# Patient Record
Sex: MALE | Race: WHITE | Employment: OTHER | URBAN - METROPOLITAN AREA
[De-identification: names, ages, dates, MRNs, and addresses within clinical notes are randomized per-mention and may not be internally consistent; named-entity substitution may affect disease eponyms.]

---

## 2017-07-25 PROBLEM — M54.17 RADICULOPATHY OF LUMBOSACRAL REGION: Status: ACTIVE | Noted: 2017-07-25

## 2017-11-02 PROBLEM — Z78.9 STATIN INTOLERANCE: Status: ACTIVE | Noted: 2017-11-02

## 2018-03-14 ENCOUNTER — ANTI-COAG VISIT (OUTPATIENT)
Dept: FAMILY MEDICINE CLINIC | Age: 63
End: 2018-03-14

## 2018-03-14 ENCOUNTER — NURSE ONLY (OUTPATIENT)
Dept: FAMILY MEDICINE CLINIC | Age: 63
End: 2018-03-14

## 2018-03-14 DIAGNOSIS — Z51.81 ENCOUNTER FOR MONITORING COUMADIN THERAPY: Primary | ICD-10-CM

## 2018-03-14 DIAGNOSIS — Z79.01 ENCOUNTER FOR MONITORING COUMADIN THERAPY: Primary | ICD-10-CM

## 2018-03-14 LAB
INTERNATIONAL NORMALIZATION RATIO, POC: 2.4
INTERNATIONAL NORMALIZATION RATIO, POC: 2.4
PROTHROMBIN TIME, POC: 29.1

## 2018-03-14 PROCEDURE — 85610 PROTHROMBIN TIME: CPT | Performed by: FAMILY MEDICINE

## 2018-03-14 PROCEDURE — 93793 ANTICOAG MGMT PT WARFARIN: CPT | Performed by: FAMILY MEDICINE

## 2018-04-11 ENCOUNTER — ANTI-COAG VISIT (OUTPATIENT)
Dept: FAMILY MEDICINE CLINIC | Age: 63
End: 2018-04-11

## 2018-04-11 ENCOUNTER — NURSE ONLY (OUTPATIENT)
Dept: FAMILY MEDICINE CLINIC | Age: 63
End: 2018-04-11

## 2018-04-11 DIAGNOSIS — Z79.01 ENCOUNTER FOR MONITORING COUMADIN THERAPY: Primary | ICD-10-CM

## 2018-04-11 DIAGNOSIS — Z79.01 ANTICOAGULATED ON COUMADIN: ICD-10-CM

## 2018-04-11 DIAGNOSIS — Z51.81 ENCOUNTER FOR MONITORING COUMADIN THERAPY: Primary | ICD-10-CM

## 2018-04-11 DIAGNOSIS — I48.0 PAROXYSMAL ATRIAL FIBRILLATION (HCC): ICD-10-CM

## 2018-04-11 LAB
INTERNATIONAL NORMALIZATION RATIO, POC: 2.3
INTERNATIONAL NORMALIZATION RATIO, POC: 2.3
PROTHROMBIN TIME, POC: 28

## 2018-04-11 PROCEDURE — 93793 ANTICOAG MGMT PT WARFARIN: CPT | Performed by: FAMILY MEDICINE

## 2018-04-11 PROCEDURE — 85610 PROTHROMBIN TIME: CPT | Performed by: FAMILY MEDICINE

## 2018-04-26 ENCOUNTER — OFFICE VISIT (OUTPATIENT)
Dept: FAMILY MEDICINE CLINIC | Age: 63
End: 2018-04-26

## 2018-04-26 ENCOUNTER — ANTI-COAG VISIT (OUTPATIENT)
Dept: FAMILY MEDICINE CLINIC | Age: 63
End: 2018-04-26

## 2018-04-26 VITALS
DIASTOLIC BLOOD PRESSURE: 80 MMHG | HEART RATE: 90 BPM | TEMPERATURE: 98.1 F | SYSTOLIC BLOOD PRESSURE: 138 MMHG | BODY MASS INDEX: 43.81 KG/M2 | OXYGEN SATURATION: 96 % | WEIGHT: 315 LBS

## 2018-04-26 DIAGNOSIS — I10 ESSENTIAL HYPERTENSION: ICD-10-CM

## 2018-04-26 DIAGNOSIS — I48.0 PAROXYSMAL ATRIAL FIBRILLATION (HCC): ICD-10-CM

## 2018-04-26 LAB
HBA1C MFR BLD: 9.3 %
INTERNATIONAL NORMALIZATION RATIO, POC: 2.2
INTERNATIONAL NORMALIZATION RATIO, POC: 2.2
PROTHROMBIN TIME, POC: 26.9

## 2018-04-26 PROCEDURE — 99214 OFFICE O/P EST MOD 30 MIN: CPT | Performed by: FAMILY MEDICINE

## 2018-04-26 PROCEDURE — 93793 ANTICOAG MGMT PT WARFARIN: CPT | Performed by: FAMILY MEDICINE

## 2018-04-26 PROCEDURE — 85610 PROTHROMBIN TIME: CPT | Performed by: FAMILY MEDICINE

## 2018-04-26 PROCEDURE — 83036 HEMOGLOBIN GLYCOSYLATED A1C: CPT | Performed by: FAMILY MEDICINE

## 2018-05-23 ENCOUNTER — NURSE ONLY (OUTPATIENT)
Dept: FAMILY MEDICINE CLINIC | Age: 63
End: 2018-05-23

## 2018-05-23 ENCOUNTER — ANTI-COAG VISIT (OUTPATIENT)
Dept: FAMILY MEDICINE CLINIC | Age: 63
End: 2018-05-23

## 2018-05-23 DIAGNOSIS — Z51.81 ENCOUNTER FOR MONITORING COUMADIN THERAPY: Primary | ICD-10-CM

## 2018-05-23 DIAGNOSIS — Z79.01 ENCOUNTER FOR MONITORING COUMADIN THERAPY: Primary | ICD-10-CM

## 2018-05-23 LAB
INTERNATIONAL NORMALIZATION RATIO, POC: 2.2
INTERNATIONAL NORMALIZATION RATIO, POC: 2.2
PROTHROMBIN TIME, POC: 26.7

## 2018-05-23 PROCEDURE — 85610 PROTHROMBIN TIME: CPT | Performed by: FAMILY MEDICINE

## 2018-05-23 PROCEDURE — 93793 ANTICOAG MGMT PT WARFARIN: CPT | Performed by: FAMILY MEDICINE

## 2018-07-05 ENCOUNTER — NURSE ONLY (OUTPATIENT)
Dept: FAMILY MEDICINE CLINIC | Age: 63
End: 2018-07-05

## 2018-07-05 ENCOUNTER — ANTI-COAG VISIT (OUTPATIENT)
Dept: FAMILY MEDICINE CLINIC | Age: 63
End: 2018-07-05

## 2018-07-05 DIAGNOSIS — I48.0 PAROXYSMAL ATRIAL FIBRILLATION (HCC): ICD-10-CM

## 2018-07-05 LAB
INTERNATIONAL NORMALIZATION RATIO, POC: 2
INTERNATIONAL NORMALIZATION RATIO, POC: 2
PROTHROMBIN TIME, POC: 24

## 2018-07-05 PROCEDURE — 85610 PROTHROMBIN TIME: CPT | Performed by: FAMILY MEDICINE

## 2018-07-05 PROCEDURE — 93793 ANTICOAG MGMT PT WARFARIN: CPT | Performed by: FAMILY MEDICINE

## 2018-07-11 DIAGNOSIS — I10 ESSENTIAL HYPERTENSION: ICD-10-CM

## 2018-07-12 RX ORDER — HYDROCHLOROTHIAZIDE 25 MG/1
25 TABLET ORAL DAILY
Qty: 90 TABLET | Refills: 1 | Status: SHIPPED | OUTPATIENT
Start: 2018-07-12 | End: 2018-12-13 | Stop reason: SDUPTHER

## 2018-08-09 ENCOUNTER — TELEPHONE (OUTPATIENT)
Dept: FAMILY MEDICINE CLINIC | Age: 63
End: 2018-08-09

## 2018-08-13 ENCOUNTER — ANTI-COAG VISIT (OUTPATIENT)
Dept: FAMILY MEDICINE CLINIC | Age: 63
End: 2018-08-13

## 2018-08-13 ENCOUNTER — OFFICE VISIT (OUTPATIENT)
Dept: FAMILY MEDICINE CLINIC | Age: 63
End: 2018-08-13

## 2018-08-13 ENCOUNTER — HOSPITAL ENCOUNTER (OUTPATIENT)
Age: 63
Discharge: HOME | End: 2018-08-15

## 2018-08-13 VITALS
HEART RATE: 66 BPM | WEIGHT: 315 LBS | SYSTOLIC BLOOD PRESSURE: 128 MMHG | TEMPERATURE: 97.8 F | OXYGEN SATURATION: 95 % | DIASTOLIC BLOOD PRESSURE: 78 MMHG | BODY MASS INDEX: 43.26 KG/M2

## 2018-08-13 DIAGNOSIS — L98.9 SKIN LESION: ICD-10-CM

## 2018-08-13 DIAGNOSIS — E78.2 MIXED HYPERLIPIDEMIA: Chronic | ICD-10-CM

## 2018-08-13 DIAGNOSIS — I10 ESSENTIAL HYPERTENSION: ICD-10-CM

## 2018-08-13 DIAGNOSIS — I48.0 PAROXYSMAL ATRIAL FIBRILLATION (HCC): ICD-10-CM

## 2018-08-13 LAB
ALBUMIN SERPL-MCNC: 4.1 G/DL (ref 3.5–5.2)
ALP BLD-CCNC: 101 U/L (ref 40–129)
ALT SERPL-CCNC: 22 U/L (ref 0–40)
ANION GAP SERPL CALCULATED.3IONS-SCNC: 19 MMOL/L (ref 7–16)
AST SERPL-CCNC: 30 U/L (ref 0–39)
BASOPHILS ABSOLUTE: 0.06 E9/L (ref 0–0.2)
BASOPHILS RELATIVE PERCENT: 0.8 % (ref 0–2)
BILIRUB SERPL-MCNC: 0.5 MG/DL (ref 0–1.2)
BUN BLDV-MCNC: 21 MG/DL (ref 8–23)
CALCIUM SERPL-MCNC: 9.1 MG/DL (ref 8.6–10.2)
CHLORIDE BLD-SCNC: 101 MMOL/L (ref 98–107)
CHOLESTEROL, TOTAL: 188 MG/DL (ref 0–199)
CO2: 22 MMOL/L (ref 22–29)
CREAT SERPL-MCNC: 1 MG/DL (ref 0.7–1.2)
CREATININE URINE: 221 MG/DL (ref 40–278)
EOSINOPHILS ABSOLUTE: 0.18 E9/L (ref 0.05–0.5)
EOSINOPHILS RELATIVE PERCENT: 2.5 % (ref 0–6)
GFR AFRICAN AMERICAN: >60
GFR NON-AFRICAN AMERICAN: >60 ML/MIN/1.73
GLUCOSE BLD-MCNC: 187 MG/DL (ref 74–109)
HBA1C MFR BLD: 8.6 %
HCT VFR BLD CALC: 48.8 % (ref 37–54)
HDLC SERPL-MCNC: 35 MG/DL
HEMOGLOBIN: 15.8 G/DL (ref 12.5–16.5)
IMMATURE GRANULOCYTES #: 0.04 E9/L
IMMATURE GRANULOCYTES %: 0.6 % (ref 0–5)
INTERNATIONAL NORMALIZATION RATIO, POC: 2.5
INTERNATIONAL NORMALIZATION RATIO, POC: 2.5
LDL CHOLESTEROL CALCULATED: 114 MG/DL (ref 0–99)
LYMPHOCYTES ABSOLUTE: 1.63 E9/L (ref 1.5–4)
LYMPHOCYTES RELATIVE PERCENT: 22.7 % (ref 20–42)
MCH RBC QN AUTO: 27.4 PG (ref 26–35)
MCHC RBC AUTO-ENTMCNC: 32.4 % (ref 32–34.5)
MCV RBC AUTO: 84.7 FL (ref 80–99.9)
MICROALBUMIN UR-MCNC: 30.2 MG/L
MICROALBUMIN/CREAT UR-RTO: 13.7 (ref 0–30)
MONOCYTES ABSOLUTE: 0.71 E9/L (ref 0.1–0.95)
MONOCYTES RELATIVE PERCENT: 9.9 % (ref 2–12)
NEUTROPHILS ABSOLUTE: 4.55 E9/L (ref 1.8–7.3)
NEUTROPHILS RELATIVE PERCENT: 63.5 % (ref 43–80)
PDW BLD-RTO: 15.9 FL (ref 11.5–15)
PLATELET # BLD: 253 E9/L (ref 130–450)
PMV BLD AUTO: 11.4 FL (ref 7–12)
POTASSIUM SERPL-SCNC: 4 MMOL/L (ref 3.5–5)
PROTHROMBIN TIME, POC: 29.4
RBC # BLD: 5.76 E12/L (ref 3.8–5.8)
SODIUM BLD-SCNC: 142 MMOL/L (ref 132–146)
TOTAL PROTEIN: 7.9 G/DL (ref 6.4–8.3)
TRIGL SERPL-MCNC: 193 MG/DL (ref 0–149)
VLDLC SERPL CALC-MCNC: 39 MG/DL
WBC # BLD: 7.2 E9/L (ref 4.5–11.5)

## 2018-08-13 PROCEDURE — 85610 PROTHROMBIN TIME: CPT | Performed by: FAMILY MEDICINE

## 2018-08-13 PROCEDURE — 83036 HEMOGLOBIN GLYCOSYLATED A1C: CPT | Performed by: FAMILY MEDICINE

## 2018-08-13 PROCEDURE — 80053 COMPREHEN METABOLIC PANEL: CPT

## 2018-08-13 PROCEDURE — 85025 COMPLETE CBC W/AUTO DIFF WBC: CPT

## 2018-08-13 PROCEDURE — 82570 ASSAY OF URINE CREATININE: CPT

## 2018-08-13 PROCEDURE — 82044 UR ALBUMIN SEMIQUANTITATIVE: CPT

## 2018-08-13 PROCEDURE — 93793 ANTICOAG MGMT PT WARFARIN: CPT | Performed by: FAMILY MEDICINE

## 2018-08-13 PROCEDURE — 36415 COLL VENOUS BLD VENIPUNCTURE: CPT | Performed by: FAMILY MEDICINE

## 2018-08-13 PROCEDURE — 99214 OFFICE O/P EST MOD 30 MIN: CPT | Performed by: FAMILY MEDICINE

## 2018-08-13 PROCEDURE — 80061 LIPID PANEL: CPT

## 2018-08-13 ASSESSMENT — PATIENT HEALTH QUESTIONNAIRE - PHQ9
SUM OF ALL RESPONSES TO PHQ QUESTIONS 1-9: 0
SUM OF ALL RESPONSES TO PHQ QUESTIONS 1-9: 0
SUM OF ALL RESPONSES TO PHQ9 QUESTIONS 1 & 2: 0
2. FEELING DOWN, DEPRESSED OR HOPELESS: 0
1. LITTLE INTEREST OR PLEASURE IN DOING THINGS: 0

## 2018-08-13 NOTE — PROGRESS NOTES
[facility]  Office Progress Note - Dr. Cora Arreaga  8/13/18    Chief Complaint   Patient presents with    Diabetes        S:   Diabetes mellitus  Follow-up - worsening recently. Was regularly forgetting evening 70/30 insulin dose. He did well for a while after our last appt but more recently has bene forgetting or hasnt been eating at home a lot. Has a statin intolerance and has tried a couple different kinds. Lab Results   Component Value Date    LABA1C 8.6 08/13/2018    LABA1C 9.3 04/26/2018    LABA1C 8.7 12/29/2017     Lab Results   Component Value Date    LABMICR 33.7 (H) 05/22/2017    LDLCALC 106 (H) 05/22/2017    CREATININE 0.9 05/22/2017     Wt Readings from Last 3 Encounters:   08/13/18 (!) 319 lb (144.7 kg)   04/26/18 (!) 323 lb (146.5 kg)   12/29/17 (!) 332 lb (150.6 kg)   Weight down a couple more pounds - good progress. Patient continues diabetic medication regimen. Compliance is good. No side effects of medications noted. Diabetes is not adequately controlled. Peripheral neuropathy is present. Regular foot exams encouraged. Vision changes are not present. Yearly eye exam encouraged. Strongly. Hypertension  Follow-up - has been borderline controlled recently. Blood pressure is controlled today. Better. BP Readings from Last 3 Encounters:   08/13/18 128/78   04/26/18 138/80   12/29/17 136/80     Patient continues medications regularly. Compliance is good. Denies CP, sob, abd pain, headaches, vision changes, dizziness, hypotensive symptoms. No side effects from medications noted.'    Hyperlipidemia  Follow-up  Unable to tolerate statins. Discussed RYR as a less effective alternative, though not good evidence.      Lab Results   Component Value Date    CHOL 175 05/22/2017    CHOL 167 01/13/2016    CHOL 159 03/24/2012     Lab Results   Component Value Date    TRIG 153 (H) 05/22/2017    TRIG 218 (H) 01/13/2016    TRIG 157 (H) 03/24/2012     Lab Results   Component person, place, and time. He appears well-developed and well-nourished. HENT:    Nose: Nose normal.    Mouth/Throat: Oropharynx is clear and moist.   Eyes:    Conjunctivae are normal.    Pupils are equal, round, and reactive to light. EOMI. Neck:    Normal range of motion. No thyromegaly or nodules noted. No bruit. Cardiovascular:    Normal rate, irregularly irreg rhythm and normal heart sounds. No murmur. No gallop and no friction rub. Pulmonary/Chest:    Effort normal and breath sounds normal.    No wheezes. No rales or rhonchi. Abdominal:    Soft. Obese. Bowel sounds are normal.    No distension. No tenderness. Musculoskeletal:    Normal range of motion. No joint swelling noted. +1 mid calf peripheral edema. Skin:    Skin is warm and dry. No rashes. Skin lesion at the right temple area about 5mm, scabbed. Psychiatric:    He has a normal mood and affect. Normal groom and dress. Current Outpatient Prescriptions on File Prior to Visit   Medication Sig Dispense Refill    hydrochlorothiazide (HYDRODIURIL) 25 MG tablet Take 1 tablet by mouth daily For blood pressure. 90 tablet 1    diltiazem (CARDIZEM CD) 240 MG extended release capsule Take 1 capsule by mouth daily 90 capsule 3    carvedilol (COREG) 25 MG tablet Take 1 tablet by mouth 2 times daily 180 tablet 3    furosemide (LASIX) 20 MG tablet Take one tablet by mouth 4 days weekly  Sa, Wna, T, Th. 50 tablet 1    benazepril (LOTENSIN) 20 MG tablet Take 1 tablet by mouth 2 times daily 180 tablet 1    glipiZIDE (GLUCOTROL) 10 MG tablet Take 1 tablet by mouth 2 times daily (before meals) 180 tablet 3    warfarin (COUMADIN) 10 MG tablet TAKE ACCORDING TO COUMADIN REGIMEN 90 tablet 3    miconazole (MICOTIN) 2 % cream Apply topically to rash twice daily. 28 g 0    warfarin (COUMADIN) 2 MG tablet Take according to warfarin regimen.  30 tablet 3    metFORMIN (GLUCOPHAGE) 1000 MG tablet Take 1 tablet by mouth 2 times

## 2018-08-14 DIAGNOSIS — I10 ESSENTIAL HYPERTENSION: ICD-10-CM

## 2018-08-14 RX ORDER — BENAZEPRIL HYDROCHLORIDE 20 MG/1
20 TABLET ORAL 2 TIMES DAILY
Qty: 180 TABLET | Refills: 1 | Status: SHIPPED | OUTPATIENT
Start: 2018-08-14 | End: 2018-12-13 | Stop reason: SDUPTHER

## 2018-08-14 RX ORDER — WARFARIN SODIUM 2 MG/1
TABLET ORAL
Qty: 90 TABLET | Refills: 3 | Status: SHIPPED | OUTPATIENT
Start: 2018-08-14 | End: 2018-12-13 | Stop reason: SDUPTHER

## 2018-08-15 ENCOUNTER — OFFICE VISIT (OUTPATIENT)
Dept: CARDIOLOGY CLINIC | Age: 63
End: 2018-08-15

## 2018-08-15 VITALS
HEIGHT: 72 IN | SYSTOLIC BLOOD PRESSURE: 120 MMHG | WEIGHT: 315 LBS | DIASTOLIC BLOOD PRESSURE: 74 MMHG | HEART RATE: 61 BPM | BODY MASS INDEX: 42.66 KG/M2 | RESPIRATION RATE: 16 BRPM

## 2018-08-15 DIAGNOSIS — E66.01 MORBID OBESITY DUE TO EXCESS CALORIES (HCC): ICD-10-CM

## 2018-08-15 DIAGNOSIS — Z99.89 OSA ON CPAP: ICD-10-CM

## 2018-08-15 DIAGNOSIS — G47.33 OSA ON CPAP: ICD-10-CM

## 2018-08-15 DIAGNOSIS — E11.9 CONTROLLED TYPE 2 DIABETES MELLITUS WITHOUT COMPLICATION, WITHOUT LONG-TERM CURRENT USE OF INSULIN (HCC): ICD-10-CM

## 2018-08-15 DIAGNOSIS — Z78.9 STATIN INTOLERANCE: ICD-10-CM

## 2018-08-15 DIAGNOSIS — I10 ESSENTIAL HYPERTENSION: ICD-10-CM

## 2018-08-15 DIAGNOSIS — M54.17 RADICULOPATHY OF LUMBOSACRAL REGION: ICD-10-CM

## 2018-08-15 DIAGNOSIS — E78.5 DYSLIPIDEMIA: ICD-10-CM

## 2018-08-15 DIAGNOSIS — E66.01 MORBID OBESITY WITH BMI OF 40.0-44.9, ADULT (HCC): ICD-10-CM

## 2018-08-15 DIAGNOSIS — I48.0 PAF (PAROXYSMAL ATRIAL FIBRILLATION) (HCC): Primary | ICD-10-CM

## 2018-08-15 PROCEDURE — 93000 ELECTROCARDIOGRAM COMPLETE: CPT | Performed by: INTERNAL MEDICINE

## 2018-08-15 PROCEDURE — 99214 OFFICE O/P EST MOD 30 MIN: CPT | Performed by: INTERNAL MEDICINE

## 2018-08-15 NOTE — PROGRESS NOTES
OFFICE VISIT     PRIMARY CARE PHYSICIAN:      Jodi Elam MD       ALLERGIES / SENSITIVITIES:        Allergies   Allergen Reactions    Lipitor      Body pain, DIARRHEA ETC. HAS PROBS WITH ALL CHOLESTEROL MEDS-MOST PROB WITH LIPITOR    Other Itching     Throat itches when he eats magnus nuts          REVIEWED MEDICATIONS:        Current Outpatient Prescriptions:     benazepril (LOTENSIN) 20 MG tablet, Take 1 tablet by mouth 2 times daily, Disp: 180 tablet, Rfl: 1    warfarin (COUMADIN) 2 MG tablet, Take according to warfarin regimen. (Patient taking differently: Take according to warfarin regimen;  Currently 11mg daily, with 10mg Mon, Wed & Fri.), Disp: 90 tablet, Rfl: 3    hydrochlorothiazide (HYDRODIURIL) 25 MG tablet, Take 1 tablet by mouth daily For blood pressure., Disp: 90 tablet, Rfl: 1    diltiazem (CARDIZEM CD) 240 MG extended release capsule, Take 1 capsule by mouth daily, Disp: 90 capsule, Rfl: 3    carvedilol (COREG) 25 MG tablet, Take 1 tablet by mouth 2 times daily, Disp: 180 tablet, Rfl: 3    furosemide (LASIX) 20 MG tablet, Take one tablet by mouth 4 days weekly  Sa, Su, T, Th., Disp: 50 tablet, Rfl: 1    glipiZIDE (GLUCOTROL) 10 MG tablet, Take 1 tablet by mouth 2 times daily (before meals), Disp: 180 tablet, Rfl: 3    warfarin (COUMADIN) 10 MG tablet, TAKE ACCORDING TO COUMADIN REGIMEN (Patient taking differently: TAKE ACCORDING TO COUMADIN REGIMEN; currently 11 mg daily, with 10 mg on Mon, Wed & Fri), Disp: 90 tablet, Rfl: 3    metFORMIN (GLUCOPHAGE) 1000 MG tablet, Take 1 tablet by mouth 2 times daily (with meals), Disp: 180 tablet, Rfl: 3    CPAP Machine MISC, by Does not apply route nightly, Disp: , Rfl:     Insulin NPH Isophane & Regular (NOVOLIN 70/30 SC), Inject 30 Units into the skin 2 times daily , Disp: , Rfl:       S: REASON FOR VISIT:       Chief Complaint   Patient presents with    Atrial Fibrillation     9 month ov. Last echo & stress 5/2015.   Labs of 8/13/18 ([facility] Denied ED/Hosp admits. Is c/o LE edema. No other complaints.  Hypertension    Hyperlipidemia    Diabetes    Sleep Apnea    Edema          History of Present Illness:       Office Visit for follow up of A fib, HTN,      No hospitalizations or surgeries since last visit     Wilner any exertional chest pain   C/o mild FUENTES, no orthopnea   No palpitations, dizzy or syncope.    Active at home   No orthopnea   Try to watch diet   Compliant with all medications       Past Medical History:   Diagnosis Date    Anticoagulant long-term use     Atrial fibrillation (HCC)     [facility]     Degenerative joint disease of left hip 1/28/2015    Diabetes mellitus ([facility]     Hyperlipidemia     Hypertension     Obesity     PONV (postoperative nausea and vomiting)     Radiculopathy of lumbosacral region 7/25/2017    Radiculopathy of lumbosacral region 7/25/2017    Sleep apnea     Type II or unspecified type diabetes mellitus without mention of complication, not stated as uncontrolled             Past Surgical History:   Procedure Laterality Date    APPENDECTOMY      CHOLECYSTECTOMY      CYST INCISION AND DRAINAGE  06/08/2017    abd wall cyst    HERNIA REPAIR      HIP ARTHROPLASTY Left           Family History   Problem Relation Age of Onset    High Blood Pressure Mother     Cancer Mother         lymphoma    Stroke Mother     Diabetes Father     Heart Surgery Sister         heart valve replacement    Heart Disease Brother         pt was waiting for a heart transplant    Cancer Maternal Grandfather     Cancer Paternal Grandmother     Cancer Paternal Grandfather           Social History   Substance Use Topics    Smoking status: Never Smoker    Smokeless tobacco: Never Used    Alcohol use No      Comment: drinks occasional iced tea/coffee         Review of Systems:  HEENT: negative for acute visual symptoms or auditory problems, no dysphagia  Constitutional: negative [facility]): On Coumadin, Dr Matthew Thomas follows INTs. -     EKG 12 Lead    Essential hypertension: Stable    Dyslipidemia: Statin intolerance; Low cholesterol diet disucssed    Morbid obesity with BMI of 40.0-44.9, adult [facility]): Diet, exercise and weight loss discussed. AMIRAH on CPAP: Compliant with CPAP    Radiculopathy of lumbosacral region    Statin intolerance    Controlled type 2 diabetes mellitus without complication, without long-term current use of insulin ([facility]    Preventive Cardiology: Low cholesterol diet, regular exercise as tolerate, and gradual weight loss discussed. Monitor BP and heart rates. All questions answered about cardiac diagnoses and cardiac medications. Continue current medications. Compliance with medications and f/u with all physicians discussed. Risk factor modification based on risk profile discussed. Call if any exertional chest pain, short of breath, dizzy or palpitations   Follow up in 9 months or earlier if needed.          [facility]  [address] [place]  [phone]

## 2018-08-29 DIAGNOSIS — I10 ESSENTIAL HYPERTENSION: ICD-10-CM

## 2018-08-30 RX ORDER — FUROSEMIDE 20 MG/1
TABLET ORAL
Qty: 50 TABLET | Refills: 1 | Status: SHIPPED | OUTPATIENT
Start: 2018-08-30 | End: 2018-12-13 | Stop reason: SDUPTHER

## 2018-09-13 ENCOUNTER — ANTI-COAG VISIT (OUTPATIENT)
Dept: FAMILY MEDICINE CLINIC | Age: 63
End: 2018-09-13

## 2018-09-13 ENCOUNTER — NURSE ONLY (OUTPATIENT)
Dept: FAMILY MEDICINE CLINIC | Age: 63
End: 2018-09-13

## 2018-09-13 DIAGNOSIS — I48.0 PAROXYSMAL ATRIAL FIBRILLATION (HCC): Primary | ICD-10-CM

## 2018-09-13 LAB
INTERNATIONAL NORMALIZATION RATIO, POC: 1.3
INTERNATIONAL NORMALIZATION RATIO, POC: 1.3
PROTHROMBIN TIME, POC: 15.5

## 2018-09-13 PROCEDURE — 85610 PROTHROMBIN TIME: CPT | Performed by: FAMILY MEDICINE

## 2018-09-13 PROCEDURE — 93793 ANTICOAG MGMT PT WARFARIN: CPT | Performed by: FAMILY MEDICINE

## 2018-09-20 ENCOUNTER — ANTI-COAG VISIT (OUTPATIENT)
Dept: FAMILY MEDICINE CLINIC | Age: 63
End: 2018-09-20

## 2018-09-20 ENCOUNTER — NURSE ONLY (OUTPATIENT)
Dept: FAMILY MEDICINE CLINIC | Age: 63
End: 2018-09-20

## 2018-09-20 DIAGNOSIS — I48.0 PAROXYSMAL ATRIAL FIBRILLATION (HCC): Primary | ICD-10-CM

## 2018-09-20 LAB
INTERNATIONAL NORMALIZATION RATIO, POC: 1.9
INTERNATIONAL NORMALIZATION RATIO, POC: 1.9
PROTHROMBIN TIME, POC: 22.6

## 2018-09-20 PROCEDURE — 85610 PROTHROMBIN TIME: CPT | Performed by: FAMILY MEDICINE

## 2018-09-20 PROCEDURE — 93793 ANTICOAG MGMT PT WARFARIN: CPT | Performed by: FAMILY MEDICINE

## 2018-10-01 ENCOUNTER — ANTI-COAG VISIT (OUTPATIENT)
Dept: FAMILY MEDICINE CLINIC | Age: 63
End: 2018-10-01

## 2018-10-01 ENCOUNTER — NURSE ONLY (OUTPATIENT)
Dept: FAMILY MEDICINE CLINIC | Age: 63
End: 2018-10-01

## 2018-10-01 DIAGNOSIS — I48.0 PAROXYSMAL ATRIAL FIBRILLATION (HCC): ICD-10-CM

## 2018-10-01 DIAGNOSIS — Z79.01 ANTICOAGULATED ON COUMADIN: Primary | ICD-10-CM

## 2018-10-01 LAB
INR BLD: 3.1
INTERNATIONAL NORMALIZATION RATIO, POC: 3.1
PROTIME: 37 SECONDS

## 2018-10-01 PROCEDURE — 93793 ANTICOAG MGMT PT WARFARIN: CPT | Performed by: FAMILY MEDICINE

## 2018-10-01 PROCEDURE — 85610 PROTHROMBIN TIME: CPT | Performed by: FAMILY MEDICINE

## 2018-10-17 ENCOUNTER — ANTI-COAG VISIT (OUTPATIENT)
Dept: FAMILY MEDICINE CLINIC | Age: 63
End: 2018-10-17

## 2018-10-17 ENCOUNTER — NURSE ONLY (OUTPATIENT)
Dept: FAMILY MEDICINE CLINIC | Age: 63
End: 2018-10-17

## 2018-10-17 DIAGNOSIS — I48.0 PAROXYSMAL ATRIAL FIBRILLATION (HCC): Primary | ICD-10-CM

## 2018-10-17 LAB
INTERNATIONAL NORMALIZATION RATIO, POC: 3.2
INTERNATIONAL NORMALIZATION RATIO, POC: 3.2
PROTHROMBIN TIME, POC: 38

## 2018-10-17 PROCEDURE — 85610 PROTHROMBIN TIME: CPT | Performed by: FAMILY MEDICINE

## 2018-10-17 PROCEDURE — 93793 ANTICOAG MGMT PT WARFARIN: CPT | Performed by: FAMILY MEDICINE

## 2018-10-31 ENCOUNTER — NURSE ONLY (OUTPATIENT)
Dept: FAMILY MEDICINE CLINIC | Age: 63
End: 2018-10-31

## 2018-10-31 ENCOUNTER — ANTI-COAG VISIT (OUTPATIENT)
Dept: FAMILY MEDICINE CLINIC | Age: 63
End: 2018-10-31

## 2018-10-31 DIAGNOSIS — I48.0 PAROXYSMAL ATRIAL FIBRILLATION (HCC): Primary | ICD-10-CM

## 2018-10-31 LAB
INTERNATIONAL NORMALIZATION RATIO, POC: 2.7
INTERNATIONAL NORMALIZATION RATIO, POC: 2.7
PROTHROMBIN TIME, POC: 32.4

## 2018-10-31 PROCEDURE — 85610 PROTHROMBIN TIME: CPT | Performed by: FAMILY MEDICINE

## 2018-10-31 PROCEDURE — 93793 ANTICOAG MGMT PT WARFARIN: CPT | Performed by: FAMILY MEDICINE

## 2018-11-14 ENCOUNTER — ANTI-COAG VISIT (OUTPATIENT)
Dept: FAMILY MEDICINE CLINIC | Age: 63
End: 2018-11-14

## 2018-11-14 ENCOUNTER — NURSE ONLY (OUTPATIENT)
Dept: FAMILY MEDICINE CLINIC | Age: 63
End: 2018-11-14

## 2018-11-14 DIAGNOSIS — I48.0 PAROXYSMAL ATRIAL FIBRILLATION (HCC): Primary | ICD-10-CM

## 2018-11-14 PROCEDURE — 85610 PROTHROMBIN TIME: CPT | Performed by: FAMILY MEDICINE

## 2018-11-14 PROCEDURE — 93793 ANTICOAG MGMT PT WARFARIN: CPT | Performed by: FAMILY MEDICINE

## 2018-12-13 ENCOUNTER — ANTI-COAG VISIT (OUTPATIENT)
Dept: FAMILY MEDICINE CLINIC | Age: 63
End: 2018-12-13

## 2018-12-13 ENCOUNTER — OFFICE VISIT (OUTPATIENT)
Dept: FAMILY MEDICINE CLINIC | Age: 63
End: 2018-12-13

## 2018-12-13 VITALS
HEART RATE: 77 BPM | OXYGEN SATURATION: 95 % | BODY MASS INDEX: 44.62 KG/M2 | WEIGHT: 315 LBS | DIASTOLIC BLOOD PRESSURE: 84 MMHG | SYSTOLIC BLOOD PRESSURE: 130 MMHG | TEMPERATURE: 97.9 F

## 2018-12-13 DIAGNOSIS — I48.19 PERSISTENT ATRIAL FIBRILLATION (HCC): ICD-10-CM

## 2018-12-13 DIAGNOSIS — E11.9 CONTROLLED TYPE 2 DIABETES MELLITUS WITHOUT COMPLICATION, WITHOUT LONG-TERM CURRENT USE OF INSULIN (HCC): Primary | ICD-10-CM

## 2018-12-13 LAB
HBA1C MFR BLD: 8.5 %
INTERNATIONAL NORMALIZATION RATIO, POC: 2.2
INTERNATIONAL NORMALIZATION RATIO, POC: 2.2
PROTHROMBIN TIME, POC: 26.5

## 2018-12-13 PROCEDURE — 90686 IIV4 VACC NO PRSV 0.5 ML IM: CPT | Performed by: FAMILY MEDICINE

## 2018-12-13 PROCEDURE — 90471 IMMUNIZATION ADMIN: CPT | Performed by: FAMILY MEDICINE

## 2018-12-13 PROCEDURE — 85610 PROTHROMBIN TIME: CPT | Performed by: FAMILY MEDICINE

## 2018-12-13 PROCEDURE — 83036 HEMOGLOBIN GLYCOSYLATED A1C: CPT | Performed by: FAMILY MEDICINE

## 2018-12-13 PROCEDURE — 99213 OFFICE O/P EST LOW 20 MIN: CPT | Performed by: FAMILY MEDICINE

## 2018-12-13 RX ORDER — HYDROCHLOROTHIAZIDE 25 MG/1
25 TABLET ORAL DAILY
Qty: 90 TABLET | Refills: 1 | Status: SHIPPED | OUTPATIENT
Start: 2018-12-13 | End: 2019-06-13 | Stop reason: SDUPTHER

## 2018-12-13 RX ORDER — WARFARIN SODIUM 2 MG/1
TABLET ORAL
Qty: 90 TABLET | Refills: 3 | Status: SHIPPED | OUTPATIENT
Start: 2018-12-13 | End: 2020-03-23

## 2018-12-13 RX ORDER — GLIPIZIDE 10 MG/1
TABLET ORAL
Qty: 180 TABLET | Refills: 3 | Status: SHIPPED | OUTPATIENT
Start: 2018-12-13 | End: 2020-02-13

## 2018-12-13 RX ORDER — FUROSEMIDE 20 MG/1
TABLET ORAL
Qty: 50 TABLET | Refills: 1 | Status: SHIPPED | OUTPATIENT
Start: 2018-12-13 | End: 2019-09-16 | Stop reason: SDUPTHER

## 2018-12-13 RX ORDER — WARFARIN SODIUM 10 MG/1
TABLET ORAL
Qty: 90 TABLET | Refills: 3 | Status: SHIPPED | OUTPATIENT
Start: 2018-12-13 | End: 2019-10-14 | Stop reason: SDUPTHER

## 2018-12-13 RX ORDER — BENAZEPRIL HYDROCHLORIDE 20 MG/1
20 TABLET ORAL 2 TIMES DAILY
Qty: 180 TABLET | Refills: 1 | Status: SHIPPED | OUTPATIENT
Start: 2018-12-13 | End: 2019-06-13 | Stop reason: SDUPTHER

## 2018-12-13 RX ORDER — DILTIAZEM HYDROCHLORIDE 240 MG/1
240 CAPSULE, COATED, EXTENDED RELEASE ORAL DAILY
Qty: 90 CAPSULE | Refills: 3 | Status: SHIPPED | OUTPATIENT
Start: 2018-12-13 | End: 2020-01-09 | Stop reason: SDUPTHER

## 2018-12-13 RX ORDER — CARVEDILOL 25 MG/1
25 TABLET ORAL 2 TIMES DAILY
Qty: 180 TABLET | Refills: 3 | Status: SHIPPED | OUTPATIENT
Start: 2018-12-13 | End: 2020-01-09 | Stop reason: SDUPTHER

## 2018-12-13 NOTE — PATIENT INSTRUCTIONS
season. But even when the vaccine doesn't exactly match these viruses, it may still provide some protection. Flu vaccine cannot prevent:  · Flu that is caused by a virus not covered by the vaccine. · Illnesses that look like flu but are not. Some people should not get this vaccine  Tell the person who is giving you the vaccine:  · If you have any severe (life-threatening) allergies. If you ever had a life-threatening allergic reaction after a dose of flu vaccine, or have a severe allergy to any part of this vaccine, you may be advised not to get vaccinated. Most, but not all, types of flu vaccine contain a small amount of egg protein. · If you ever had Guillain-Barré syndrome (also called GBS) Some people with a history of GBS should not get this vaccine. This should be discussed with your doctor. · If you are not feeling well. It is usually okay to get flu vaccine when you have a mild illness, but you might be asked to come back when you feel better. Risks of a vaccine reaction  With any medicine, including vaccines, there is a chance of reactions. These are usually mild and go away on their own, but serious reactions are also possible. Most people who get a flu shot do not have any problems with it. Minor problems following a flu shot include:  · Soreness, redness, or swelling where the shot was given  · Hoarseness  · Sore, red or itchy eyes  · Cough  · Fever  · Aches  · Headache  · Itching  · Fatigue  If these problems occur, they usually begin soon after the shot and last 1 or 2 days. More serious problems following a flu shot can include the following:  · There may be a small increased risk of Guillain-Barré Syndrome (GBS) after inactivated flu vaccine. This risk has been estimated at 1 or 2 additional cases per million people vaccinated. This is much lower than the risk of severe complications from flu, which can be prevented by flu vaccine.   · [facility] children who get the flu shot along with [phone]. [facility] does not give medical advice. The National Vaccine Injury Compensation Program  The National Vaccine Injury Compensation Program (VICP) is a federal program that was created to compensate people who may have been injured by certain vaccines. Persons who believe they may have been injured by a vaccine can learn about the program and about filing a claim by calling 0-811.998.5563 or visiting the [employer] website at www.[facility].gov/vaccinecompensation. There is a time limit to file a claim for compensation. How can I learn more? · Ask your healthcare provider. He or she can give you the vaccine package insert or suggest other sources of information. · Call your local or state health department. · Contact the Centers for Disease Control and Prevention (CDC):  ? Call 4-123.199.1096 (1-800-CDC-INFO) or  ? Visit CDC's website at www.cdc.gov/flu  Vaccine Information Statement  Inactivated Influenza Vaccine  8/7/2015)  42 SHAN Wallace 264KK-70  Department of Health and Human Services  Centers for Disease Control and Prevention  Many Vaccine Information Statements are available in [place] and other languages. See www.immunize.org/vis. Muchas hojas de información sobre vacunas están disponibles en español y en otros idiomas. Visite www.immunize.org/vis. Care instructions adapted under license by [facility] ([facility]). If you have questions about a medical condition or this instruction, always ask your healthcare professional. [place] [address] any warranty or liability for your use of this information.

## 2018-12-13 NOTE — PROGRESS NOTES
HERNIA REPAIR      HIP ARTHROPLASTY Left        Social History   Substance Use Topics    Smoking status: Never Smoker    Smokeless tobacco: Never Used    Alcohol use No      Comment: drinks occasional iced tea/coffee       ROS:  No CP, No palpitations,   No sob, No cough,   No abd pain, No heartburn,   No headaches,   No tingling, No numbness, No weakness,   No bowel changes, No hematochezia, No melena,  No bladder changes, No hematuria  No skin rashes, No skin lesions. No vision changes, No hearing changes,   No polyuria, polydipsia, polyphagia. Stable mood. ROS otherwise negative unless as listed in HPI. Chart reviewed and updated where appropriate for PMH, Fam, and Soc Hx. Physical Exam   /84 (Site: Right Upper Arm, Position: Sitting, Cuff Size: Large Adult)   Pulse 77   Temp 97.9 °F (36.6 °C) (Oral)   Wt (!) 329 lb (149.2 kg)   SpO2 95%   BMI 44.62 kg/m²   Wt Readings from Last 3 Encounters:   12/13/18 (!) 329 lb (149.2 kg)   08/15/18 (!) 327 lb 9.6 oz (148.6 kg)   08/13/18 (!) 319 lb (144.7 kg)       Constitutional:    He is oriented to person, place, and time. He appears well-developed and well-nourished. HENT:    Nose: Nose normal.    Mouth/Throat: Oropharynx is clear and moist.   Eyes:    Conjunctivae are normal.    Pupils are equal, round, and reactive to light. EOMI. Neck:    Normal range of motion. No thyromegaly or nodules noted. No bruit. Cardiovascular:    Normal rate, regular rhythm and normal heart sounds. No murmur. No gallop and no friction rub. Pulmonary/Chest:    Effort normal and breath sounds normal.    No wheezes. No rales or rhonchi. Abdominal:    Soft. Bowel sounds are normal.    No distension. No tenderness. Musculoskeletal:    Normal range of motion. No joint swelling noted. +1 to +2 peripheral edema. at the ankles  Skin:    Skin is warm and dry. No rashes, lesions. Psychiatric:    He has a normal mood and affect.     Normal groom

## 2019-01-14 ENCOUNTER — ANTI-COAG VISIT (OUTPATIENT)
Dept: FAMILY MEDICINE CLINIC | Age: 64
End: 2019-01-14

## 2019-01-14 ENCOUNTER — NURSE ONLY (OUTPATIENT)
Dept: FAMILY MEDICINE CLINIC | Age: 64
End: 2019-01-14

## 2019-01-14 DIAGNOSIS — I48.0 PAROXYSMAL ATRIAL FIBRILLATION (HCC): Primary | ICD-10-CM

## 2019-01-14 LAB
INTERNATIONAL NORMALIZATION RATIO, POC: 2.4
PROTHROMBIN TIME, POC: 29

## 2019-01-14 PROCEDURE — 93793 ANTICOAG MGMT PT WARFARIN: CPT | Performed by: FAMILY MEDICINE

## 2019-01-14 PROCEDURE — 85610 PROTHROMBIN TIME: CPT | Performed by: FAMILY MEDICINE

## 2019-02-11 ENCOUNTER — ANTI-COAG VISIT (OUTPATIENT)
Dept: FAMILY MEDICINE CLINIC | Age: 64
End: 2019-02-11

## 2019-02-11 ENCOUNTER — NURSE ONLY (OUTPATIENT)
Dept: FAMILY MEDICINE CLINIC | Age: 64
End: 2019-02-11

## 2019-02-11 DIAGNOSIS — I48.0 PAROXYSMAL ATRIAL FIBRILLATION (HCC): Primary | ICD-10-CM

## 2019-02-11 LAB
INTERNATIONAL NORMALIZATION RATIO, POC: 3.1
PROTHROMBIN TIME, POC: 37.5

## 2019-02-11 PROCEDURE — 85610 PROTHROMBIN TIME: CPT | Performed by: FAMILY MEDICINE

## 2019-02-11 PROCEDURE — 93793 ANTICOAG MGMT PT WARFARIN: CPT | Performed by: FAMILY MEDICINE

## 2019-02-23 ENCOUNTER — HOSPITAL ENCOUNTER (EMERGENCY)
Age: 64
Discharge: HOME | End: 2019-02-23

## 2019-02-23 ENCOUNTER — APPOINTMENT (OUTPATIENT)
Dept: GENERAL RADIOLOGY | Age: 64
End: 2019-02-23

## 2019-02-23 VITALS
TEMPERATURE: 98 F | WEIGHT: 315 LBS | BODY MASS INDEX: 42.66 KG/M2 | DIASTOLIC BLOOD PRESSURE: 86 MMHG | OXYGEN SATURATION: 96 % | SYSTOLIC BLOOD PRESSURE: 152 MMHG | HEIGHT: 72 IN | RESPIRATION RATE: 18 BRPM | HEART RATE: 67 BPM

## 2019-02-23 DIAGNOSIS — M47.816 OSTEOARTHRITIS OF LUMBAR SPINE, UNSPECIFIED SPINAL OSTEOARTHRITIS COMPLICATION STATUS: ICD-10-CM

## 2019-02-23 DIAGNOSIS — S30.0XXA LUMBAR CONTUSION, INITIAL ENCOUNTER: Primary | ICD-10-CM
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
## 2019-02-23 DIAGNOSIS — M43.10 RETROLISTHESIS OF VERTEBRAE: ICD-10-CM

## 2019-02-23 LAB
BACTERIA: NORMAL /HPF
BILIRUBIN URINE: NEGATIVE
BLOOD, URINE: ABNORMAL
CLARITY: CLEAR
COLOR: YELLOW
EPITHELIAL CELLS, UA: NORMAL /HPF
GLUCOSE URINE: 500 MG/DL
KETONES, URINE: NEGATIVE MG/DL
LEUKOCYTE ESTERASE, URINE: NEGATIVE
NITRITE, URINE: NEGATIVE
PH UA: 5 (ref 5–9)
PROTEIN UA: NEGATIVE MG/DL
RBC UA: NORMAL /HPF (ref 0–2)
SPECIFIC GRAVITY UA: 1.02 (ref 1–1.03)
UROBILINOGEN, URINE: 0.2 E.U./DL
WBC UA: NORMAL /HPF (ref 0–5)

## 2019-02-23 PROCEDURE — 6370000000 HC RX 637 (ALT 250 FOR IP): Performed by: NURSE PRACTITIONER

## 2019-02-23 PROCEDURE — 72110 X-RAY EXAM L-2 SPINE 4/>VWS: CPT

## 2019-02-23 PROCEDURE — 81001 URINALYSIS AUTO W/SCOPE: CPT

## 2019-02-23 PROCEDURE — 99283 EMERGENCY DEPT VISIT LOW MDM: CPT

## 2019-02-23 RX ORDER — HYDROCODONE BITARTRATE AND ACETAMINOPHEN 5; 325 MG/1; MG/1
1 TABLET ORAL ONCE
Status: COMPLETED | OUTPATIENT
Start: 2019-02-23 | End: 2019-02-23

## 2019-02-23 RX ORDER — METHYLPREDNISOLONE 4 MG/1
TABLET ORAL
Qty: 1 KIT | Refills: 0 | Status: SHIPPED | OUTPATIENT
Start: 2019-02-23 | End: 2019-03-01

## 2019-02-23 RX ORDER — HYDROCODONE BITARTRATE AND ACETAMINOPHEN 5; 325 MG/1; MG/1
1 TABLET ORAL EVERY 6 HOURS PRN
Qty: 12 TABLET | Refills: 0 | Status: SHIPPED | OUTPATIENT
Start: 2019-02-23 | End: 2019-02-26

## 2019-02-23 RX ADMIN — HYDROCODONE BITARTRATE AND ACETAMINOPHEN 1 TABLET: 5; 325 TABLET ORAL at 13:16

## 2019-02-23 ASSESSMENT — PAIN DESCRIPTION - LOCATION: LOCATION: BACK

## 2019-02-23 ASSESSMENT — PAIN DESCRIPTION - PAIN TYPE: TYPE: ACUTE PAIN

## 2019-02-23 ASSESSMENT — PAIN SCALES - GENERAL
PAINLEVEL_OUTOF10: 8

## 2019-02-23 ASSESSMENT — PAIN DESCRIPTION - DESCRIPTORS: DESCRIPTORS: ACHING

## 2019-02-23 ASSESSMENT — PAIN DESCRIPTION - ORIENTATION: ORIENTATION: LEFT;LOWER

## 2019-02-23 ASSESSMENT — PAIN - FUNCTIONAL ASSESSMENT: PAIN_FUNCTIONAL_ASSESSMENT: PREVENTS OR INTERFERES SOME ACTIVE ACTIVITIES AND ADLS

## 2019-02-25 ENCOUNTER — ANTI-COAG VISIT (OUTPATIENT)
Dept: FAMILY MEDICINE CLINIC | Age: 64
End: 2019-02-25

## 2019-02-25 ENCOUNTER — NURSE ONLY (OUTPATIENT)
Dept: FAMILY MEDICINE CLINIC | Age: 64
End: 2019-02-25

## 2019-02-25 DIAGNOSIS — I48.0 PAROXYSMAL ATRIAL FIBRILLATION (HCC): Primary | ICD-10-CM

## 2019-02-25 LAB
INTERNATIONAL NORMALIZATION RATIO, POC: 2.8
PROTHROMBIN TIME, POC: 33.4

## 2019-02-25 PROCEDURE — 85610 PROTHROMBIN TIME: CPT | Performed by: FAMILY MEDICINE

## 2019-02-25 PROCEDURE — 93793 ANTICOAG MGMT PT WARFARIN: CPT | Performed by: FAMILY MEDICINE

## 2019-03-06 ENCOUNTER — OFFICE VISIT (OUTPATIENT)
Dept: FAMILY MEDICINE CLINIC | Age: 64
End: 2019-03-06

## 2019-03-06 VITALS
TEMPERATURE: 98.6 F | OXYGEN SATURATION: 96 % | SYSTOLIC BLOOD PRESSURE: 132 MMHG | WEIGHT: 315 LBS | DIASTOLIC BLOOD PRESSURE: 86 MMHG | BODY MASS INDEX: 42.66 KG/M2 | HEIGHT: 72 IN | HEART RATE: 86 BPM

## 2019-03-06 DIAGNOSIS — M25.552 LEFT HIP PAIN: Primary | ICD-10-CM

## 2019-03-06 DIAGNOSIS — S70.02XA CONTUSION OF LEFT HIP, INITIAL ENCOUNTER: ICD-10-CM

## 2019-03-06 PROCEDURE — 99213 OFFICE O/P EST LOW 20 MIN: CPT | Performed by: FAMILY MEDICINE

## 2019-03-06 RX ORDER — CYCLOBENZAPRINE HCL 10 MG
5-10 TABLET ORAL 3 TIMES DAILY PRN
Qty: 30 TABLET | Refills: 0 | Status: SHIPPED | OUTPATIENT
Start: 2019-03-06 | End: 2019-03-16

## 2019-03-06 ASSESSMENT — PATIENT HEALTH QUESTIONNAIRE - PHQ9
SUM OF ALL RESPONSES TO PHQ QUESTIONS 1-9: 0
SUM OF ALL RESPONSES TO PHQ9 QUESTIONS 1 & 2: 0
2. FEELING DOWN, DEPRESSED OR HOPELESS: 0
SUM OF ALL RESPONSES TO PHQ QUESTIONS 1-9: 0
1. LITTLE INTEREST OR PLEASURE IN DOING THINGS: 0

## 2019-03-20 ENCOUNTER — ANTI-COAG VISIT (OUTPATIENT)
Dept: FAMILY MEDICINE CLINIC | Age: 64
End: 2019-03-20

## 2019-03-20 ENCOUNTER — NURSE ONLY (OUTPATIENT)
Dept: FAMILY MEDICINE CLINIC | Age: 64
End: 2019-03-20

## 2019-03-20 DIAGNOSIS — I48.19 PERSISTENT ATRIAL FIBRILLATION (HCC): Primary | ICD-10-CM

## 2019-03-20 LAB
INTERNATIONAL NORMALIZATION RATIO, POC: 3.6
PROTHROMBIN TIME, POC: 43.4

## 2019-03-20 PROCEDURE — 85610 PROTHROMBIN TIME: CPT | Performed by: FAMILY MEDICINE

## 2019-03-20 PROCEDURE — 93793 ANTICOAG MGMT PT WARFARIN: CPT | Performed by: FAMILY MEDICINE

## 2019-04-02 ENCOUNTER — NURSE ONLY (OUTPATIENT)
Dept: FAMILY MEDICINE CLINIC | Age: 64
End: 2019-04-02

## 2019-04-02 ENCOUNTER — ANTI-COAG VISIT (OUTPATIENT)
Dept: FAMILY MEDICINE CLINIC | Age: 64
End: 2019-04-02

## 2019-04-02 DIAGNOSIS — I48.0 PAROXYSMAL ATRIAL FIBRILLATION (HCC): Primary | ICD-10-CM

## 2019-04-02 LAB
INTERNATIONAL NORMALIZATION RATIO, POC: 4.2
PROTHROMBIN TIME, POC: 50.2

## 2019-04-02 PROCEDURE — 93793 ANTICOAG MGMT PT WARFARIN: CPT | Performed by: FAMILY MEDICINE

## 2019-04-02 PROCEDURE — 85610 PROTHROMBIN TIME: CPT | Performed by: FAMILY MEDICINE

## 2019-04-09 ENCOUNTER — NURSE ONLY (OUTPATIENT)
Dept: FAMILY MEDICINE CLINIC | Age: 64
End: 2019-04-09

## 2019-04-09 ENCOUNTER — ANTI-COAG VISIT (OUTPATIENT)
Dept: FAMILY MEDICINE CLINIC | Age: 64
End: 2019-04-09

## 2019-04-09 DIAGNOSIS — I48.0 PAROXYSMAL ATRIAL FIBRILLATION (HCC): Primary | ICD-10-CM

## 2019-04-09 LAB
INTERNATIONAL NORMALIZATION RATIO, POC: 1.7
PROTHROMBIN TIME, POC: 20.4

## 2019-04-09 PROCEDURE — 93793 ANTICOAG MGMT PT WARFARIN: CPT | Performed by: FAMILY MEDICINE

## 2019-04-09 PROCEDURE — 85610 PROTHROMBIN TIME: CPT | Performed by: FAMILY MEDICINE

## 2019-04-24 ENCOUNTER — NURSE ONLY (OUTPATIENT)
Dept: FAMILY MEDICINE CLINIC | Age: 64
End: 2019-04-24

## 2019-04-24 ENCOUNTER — ANTI-COAG VISIT (OUTPATIENT)
Dept: FAMILY MEDICINE CLINIC | Age: 64
End: 2019-04-24

## 2019-04-24 DIAGNOSIS — I48.0 PAROXYSMAL ATRIAL FIBRILLATION (HCC): Primary | ICD-10-CM

## 2019-04-24 LAB
INTERNATIONAL NORMALIZATION RATIO, POC: 2.4
PROTHROMBIN TIME, POC: 28.7

## 2019-04-24 PROCEDURE — 85610 PROTHROMBIN TIME: CPT | Performed by: FAMILY MEDICINE

## 2019-04-24 PROCEDURE — 93793 ANTICOAG MGMT PT WARFARIN: CPT | Performed by: FAMILY MEDICINE

## 2019-05-15 ENCOUNTER — ANTI-COAG VISIT (OUTPATIENT)
Dept: FAMILY MEDICINE CLINIC | Age: 64
End: 2019-05-15

## 2019-05-15 ENCOUNTER — OFFICE VISIT (OUTPATIENT)
Dept: CARDIOLOGY CLINIC | Age: 64
End: 2019-05-15

## 2019-05-15 ENCOUNTER — NURSE ONLY (OUTPATIENT)
Dept: FAMILY MEDICINE CLINIC | Age: 64
End: 2019-05-15

## 2019-05-15 VITALS
RESPIRATION RATE: 20 BRPM | WEIGHT: 315 LBS | HEART RATE: 75 BPM | DIASTOLIC BLOOD PRESSURE: 86 MMHG | BODY MASS INDEX: 42.66 KG/M2 | HEIGHT: 72 IN | SYSTOLIC BLOOD PRESSURE: 130 MMHG

## 2019-05-15 DIAGNOSIS — I48.0 PAROXYSMAL ATRIAL FIBRILLATION (HCC): Primary | ICD-10-CM

## 2019-05-15 DIAGNOSIS — I10 ESSENTIAL HYPERTENSION: ICD-10-CM

## 2019-05-15 DIAGNOSIS — Z99.89 OSA ON CPAP: ICD-10-CM

## 2019-05-15 DIAGNOSIS — I48.19 PERSISTENT ATRIAL FIBRILLATION (HCC): Primary | ICD-10-CM

## 2019-05-15 DIAGNOSIS — E11.9 CONTROLLED TYPE 2 DIABETES MELLITUS WITHOUT COMPLICATION, WITHOUT LONG-TERM CURRENT USE OF INSULIN (HCC): ICD-10-CM

## 2019-05-15 DIAGNOSIS — E78.5 DYSLIPIDEMIA: ICD-10-CM

## 2019-05-15 DIAGNOSIS — E66.01 MORBID OBESITY WITH BMI OF 40.0-44.9, ADULT (HCC): ICD-10-CM

## 2019-05-15 DIAGNOSIS — G47.33 OSA ON CPAP: ICD-10-CM

## 2019-05-15 DIAGNOSIS — Z78.9 STATIN INTOLERANCE: ICD-10-CM

## 2019-05-15 LAB
INTERNATIONAL NORMALIZATION RATIO, POC: 2.7
PROTHROMBIN TIME, POC: 32.7

## 2019-05-15 PROCEDURE — 85610 PROTHROMBIN TIME: CPT | Performed by: FAMILY MEDICINE

## 2019-05-15 PROCEDURE — 93793 ANTICOAG MGMT PT WARFARIN: CPT | Performed by: FAMILY MEDICINE

## 2019-05-15 PROCEDURE — 99214 OFFICE O/P EST MOD 30 MIN: CPT | Performed by: INTERNAL MEDICINE

## 2019-05-15 PROCEDURE — 93000 ELECTROCARDIOGRAM COMPLETE: CPT | Performed by: INTERNAL MEDICINE

## 2019-05-15 NOTE — PROGRESS NOTES
OFFICE VISIT     PRIMARY CARE PHYSICIAN:      Javy Cain MD       ALLERGIES / SENSITIVITIES:        Allergies   Allergen Reactions    Lipitor      Body pain, DIARRHEA ETC. HAS PROBS WITH ALL CHOLESTEROL MEDS-MOST PROB WITH LIPITOR    Other Itching     Throat itches when he eats magnus nuts          REVIEWED MEDICATIONS:        Current Outpatient Medications:     diltiazem (CARDIZEM CD) 240 MG extended release capsule, Take 1 capsule by mouth daily, Disp: 90 capsule, Rfl: 3    carvedilol (COREG) 25 MG tablet, Take 1 tablet by mouth 2 times daily, Disp: 180 tablet, Rfl: 3    hydrochlorothiazide (HYDRODIURIL) 25 MG tablet, Take 1 tablet by mouth daily For blood pressure., Disp: 90 tablet, Rfl: 1    warfarin (COUMADIN) 10 MG tablet, TAKE ACCORDING TO COUMADIN REGIMEN, Disp: 90 tablet, Rfl: 3    benazepril (LOTENSIN) 20 MG tablet, Take 1 tablet by mouth 2 times daily, Disp: 180 tablet, Rfl: 1    warfarin (COUMADIN) 2 MG tablet, Take according to warfarin regimen;  Currently 11mg daily, with 10mg Mon, Wed & Fri., Disp: 90 tablet, Rfl: 3    furosemide (LASIX) 20 MG tablet, Take one tablet by mouth 4 days weekly  Sa, Su, T, Th., Disp: 50 tablet, Rfl: 1    metFORMIN (GLUCOPHAGE) 1000 MG tablet, Take 1 tablet by mouth 2 times daily (with meals), Disp: 180 tablet, Rfl: 3    glipiZIDE (GLUCOTROL) 10 MG tablet, TAKE ONE TABLET BY MOUTH TWICE DAILY BEFORE MEALS, Disp: 180 tablet, Rfl: 3    CPAP Machine MISC, by Does not apply route nightly, Disp: , Rfl:     Insulin NPH Isophane & Regular (NOVOLIN 70/30 SC), Inject 30 Units into the skin 2 times daily , Disp: , Rfl:       S: REASON FOR VISIT:       Chief Complaint   Patient presents with    Atrial Fibrillation     9 mo ov; having some edema LE          History of Present Illness:       Office Visit for follow up of A Fib, HTN    Katherin Briscoe few months ago and hit the left hip      No hospitalizations or surgeries since last visit     Wilner any exertional chest pain or short of breath   No palpitations, dizzy or syncope.    Active at home   No orthopnea   Try to watch diet   Compliant with all medications       Past Medical History:   Diagnosis Date    Anticoagulant long-term use     Atrial fibrillation (HCC)     [facility]est[facility]) [facility]     Degenerative joint disease of left hip 1/28/2015    [facility]     Hyperlipidemia     Hypertension     Obesity     PONV (postoperative nausea and vomiting)     Radiculopathy of lumbosacral region 7/25/2017    Radiculopathy of lumbosacral region 7/25/2017    Sleep apnea     Type II or unspecified type diabetes mellitus without mention of complication, not stated as uncontrolled             Past Surgical History:   Procedure Laterality Date    APPENDECTOMY      CHOLECYSTECTOMY      CYST INCISION AND DRAINAGE  06/08/2017    abd wall cyst    HERNIA REPAIR      HIP ARTHROPLASTY Left           Family History   Problem Relation Age of Onset    High Blood Pressure Mother     Cancer Mother         lymphoma    Stroke Mother     Diabetes Father     Heart Surgery Sister         heart valve replacement    Heart Disease Brother         pt was waiting for a heart transplant    Cancer Maternal Grandfather     Cancer Paternal Grandmother     Cancer Paternal Grandfather           Social History     Tobacco Use    Smoking status: Never Smoker    Smokeless tobacco: Never Used   Substance Use Topics    Alcohol use: No     Alcohol/week: 0.0 oz     Comment: drinks occasional iced tea/coffee         Review of Systems:  HEENT: negative for acute visual symptoms or auditory problems, no dysphagia  Constitutional: negative for fever and chills, or significant weight loss  Respiratory: negative for cough, wheezing, or hemoptysis  Cardiovascular: negative for chest pain, palpitations, and dyspnea  Gastrointestinal: negative for abdominal pain, diarrhea, nausea and vomiting  Endocrine: Negative for polyuria and polydyspsia  Genitourinary:negative for dysuria and hematuria  Derm: negative for rash and skin lesion(s)  Neurological: negative for tingling, numbness, weakness, seizures and tremors  Endocrine: negative for polydipsia and polyuria  Musculoskeletal: negative for pain or tenderness  Psychiatric: negative for anxiety, depression, or suicidal ideations         O:  COMPLETE PHYSICAL EXAM:       /86   Pulse 75   Resp 20   Ht 6' (1.829 m)   Wt (!) 337 lb 6.4 oz (153 kg)   BMI 45.76 kg/m²       General:   Patient alert, comfortable, no distress. Appears stated age. HEENT:    Pupils equal, no icterus, no nasal drainage, tongue moist.   Neck:              No masses, Thyroid not palpable. Chest:   Normal configuration, non tender. Lungs:   Clear to auscultation bilaterally, few scattered rhonchi. Cardiovascular:  Irregular rhythm, 1/6 systolic murmur, No S3, PMI at 5th ICS, no palpable thrills, No elevated JVD, No carotid bruit. Abdomen:  Soft, Non tender, Bowel sounds normal, no pulsatile abdominal aorta, no palpable masses. Extremities:  No edema. Distal pulses palpable. No cyanosis, no clubbing. Skin:   Good turgor, warm and dry, no cyanosis. Musculoskeletal: No joint swelling or deformity. Neuro:   Cranial nerves grossly intact; No focal neurologic deficit. Psych:   Alert, good mood and effect. REVIEW OF DIAGNOSTIC TESTS:        Electrocardiogram: A fib                 A/P:   ASSESSMENT / PLAN:    Deatra Buerger was seen today for atrial fibrillation. Diagnoses and all orders for this visit:    PAF (paroxysmal atrial fibrillation) ([facility]): On Coumadin, Dr Daya Mcclelland follows INTs. -     EKG 12 Lead     Essential hypertension: Stable     Dyslipidemia: Statin intolerance;  Low cholesterol diet disucssed     Morbid obesity with BMI of 40.0-44.9, adult ([facility]): Diet, exercise and weight loss discussed.     AMIRAH on CPAP: Compliant with CPAP     Radiculopathy of lumbosacral region     Statin intolerance     Controlled type 2 diabetes mellitus without complication, without long-term current use of insulin ([facility]     Preventive Cardiology: Low cholesterol diet, regular exercise as tolerate, and gradual weight loss discussed    Other orders  -     EKG 12 Lead     Preventive Cardiology: Low cholesterol diet, regular exercise as tolerate, and gradual weight loss discussed. Monitor BP and heart rates. All questions answered about cardiac diagnoses and cardiac medications. Continue current medications. Compliance with medications and f/u with all physicians discussed. Risk factor modification based on risk profile discussed. Call if any exertional chest pain, short of breath, dizzy or palpitations   Follow up in 9 months or earlier if needed.    Echo after next visit    [facility]  [address] [place], [address]  [phone]

## 2019-05-15 NOTE — PATIENT INSTRUCTIONS
Patient Education        A Healthy Heart: Care Instructions  Your Care Instructions    Heart disease occurs when a substance called plaque builds up in the vessels that supply oxygen-rich blood to your heart. This can narrow the blood vessels and reduce blood flow. A heart attack happens when blood flow is completely blocked. A high-fat diet, smoking, and other factors increase the risk of heart disease. Your doctor has found that you have a chance of having heart disease. You can do lots of things to keep your heart healthy. It may not be easy, but you can change your diet, exercise more, and quit smoking. These steps really work to lower your chance of heart disease. Follow-up care is a key part of your treatment and safety. Be sure to make and go to all appointments, and call your doctor if you are having problems. It's also a good idea to know your test results and keep a list of the medicines you take. How can you care for yourself at home? Diet    · Use less salt when you cook and eat. This helps lower your blood pressure. Taste food before salting. Add only a little salt when you think you need it. With time, your taste buds will adjust to less salt.     · Eat fewer snack items, fast foods, canned soups, and other high-salt, high-fat, processed foods.     · Read food labels and try to avoid saturated and trans fats. They increase your risk of heart disease by raising cholesterol levels.     · Limit the amount of solid fat-butter, margarine, and shortening-you eat. Use olive, peanut, or canola oil when you cook. Bake, broil, and steam foods instead of frying them.     · Eating fish can lower your risk for heart disease. Eat at least 2 servings of fish a week. [place], mackerel, herring, sardines, and chunk light tuna are very good choices. These fish contain omega-3 fatty acids.     · Eat a variety of fruit and vegetables every day.  Dark green, deep orange, red, or yellow fruits and vegetables are especially good for you. Examples include spinach, carrots, peaches, and berries.     · Foods high in fiber can reduce your cholesterol and provide important vitamins and minerals. High-fiber foods include whole-grain cereals and breads, oatmeal, beans, brown rice, citrus fruits, and apples.     · Limit drinks and foods with added sugar. These include candy, desserts, and soda pop.    Lifestyle changes    · If your doctor recommends it, get more exercise. Walking is a good choice. Bit by bit, increase the amount you walk every day. Try for at least 30 minutes on most days of the week. You also may want to swim, bike, or do other activities.     · Do not smoke. If you need help quitting, talk to your doctor about stop-smoking programs and medicines. These can increase your chances of quitting for good. Quitting smoking may be the most important step you can take to protect your heart. It is never too late to quit. You will get health benefits right away.     · Limit alcohol to 2 drinks a day for men and 1 drink a day for women. Too much alcohol can cause health problems. Medicines    · Take your medicines exactly as prescribed. Call your doctor if you think you are having a problem with your medicine.     · If your doctor recommends aspirin, take the amount directed each day. Make sure you take aspirin and not another kind of pain reliever, such as acetaminophen (Tylenol). If you take ibuprofen (such as Advil or Motrin) for other problems, take aspirin at least 2 hours before taking ibuprofen. When should you call for help? Call 911 if you have symptoms of a heart attack.  These may include:    · Chest pain or pressure, or a strange feeling in the chest.     · Sweating.     · Shortness of breath.     · Pain, pressure, or a strange feeling in the back, neck, jaw, or upper belly or in one or both shoulders or arms.     · Lightheadedness or sudden weakness.     · A fast or irregular heartbeat.    After you call 911,

## 2019-06-05 ENCOUNTER — ANTI-COAG VISIT (OUTPATIENT)
Dept: FAMILY MEDICINE CLINIC | Age: 64
End: 2019-06-05

## 2019-06-05 ENCOUNTER — NURSE ONLY (OUTPATIENT)
Dept: FAMILY MEDICINE CLINIC | Age: 64
End: 2019-06-05

## 2019-06-05 DIAGNOSIS — I48.0 PAROXYSMAL ATRIAL FIBRILLATION (HCC): Primary | ICD-10-CM

## 2019-06-05 LAB
INTERNATIONAL NORMALIZATION RATIO, POC: 2.7
PROTHROMBIN TIME, POC: 32.6

## 2019-06-05 PROCEDURE — 85610 PROTHROMBIN TIME: CPT | Performed by: FAMILY MEDICINE

## 2019-06-05 PROCEDURE — 93793 ANTICOAG MGMT PT WARFARIN: CPT | Performed by: FAMILY MEDICINE

## 2019-06-13 ENCOUNTER — OFFICE VISIT (OUTPATIENT)
Dept: FAMILY MEDICINE CLINIC | Age: 64
End: 2019-06-13

## 2019-06-13 VITALS
OXYGEN SATURATION: 98 % | DIASTOLIC BLOOD PRESSURE: 80 MMHG | SYSTOLIC BLOOD PRESSURE: 130 MMHG | HEIGHT: 72 IN | HEART RATE: 74 BPM | WEIGHT: 315 LBS | TEMPERATURE: 97.7 F | BODY MASS INDEX: 42.66 KG/M2

## 2019-06-13 DIAGNOSIS — I87.2 VENOUS STASIS DERMATITIS OF BOTH LOWER EXTREMITIES: ICD-10-CM

## 2019-06-13 DIAGNOSIS — E11.9 CONTROLLED TYPE 2 DIABETES MELLITUS WITHOUT COMPLICATION, WITHOUT LONG-TERM CURRENT USE OF INSULIN (HCC): Primary | ICD-10-CM

## 2019-06-13 LAB — HBA1C MFR BLD: 8.8 %

## 2019-06-13 PROCEDURE — 99213 OFFICE O/P EST LOW 20 MIN: CPT | Performed by: FAMILY MEDICINE

## 2019-06-13 PROCEDURE — 83036 HEMOGLOBIN GLYCOSYLATED A1C: CPT | Performed by: FAMILY MEDICINE

## 2019-06-13 RX ORDER — BENAZEPRIL HYDROCHLORIDE 20 MG/1
20 TABLET ORAL 2 TIMES DAILY
Qty: 180 TABLET | Refills: 1 | Status: SHIPPED
Start: 2019-06-13 | End: 2020-02-12

## 2019-06-13 RX ORDER — HYDROCHLOROTHIAZIDE 25 MG/1
25 TABLET ORAL DAILY
Qty: 90 TABLET | Refills: 1 | Status: SHIPPED
Start: 2019-06-13 | End: 2020-02-12

## 2019-06-13 NOTE — PROGRESS NOTES
[facility]  Office Progress Note - Dr. Aldrich Files  6/13/19    Chief Complaint   Patient presents with    Diabetes        S:   Diabetes mellitus  Follow-up -slightly worsened  Still not getting evening doses of medication very often. This includes insulin. Lab Results   Component Value Date    LABA1C 8.8 06/13/2019    LABA1C 8.5 12/13/2018    LABA1C 8.6 08/13/2018     Lab Results   Component Value Date    LABMICR 30.2 (H) 08/13/2018    LDLCALC 114 (H) 08/13/2018    CREATININE 1.0 08/13/2018     Wt Readings from Last 3 Encounters:   06/13/19 (!) 341 lb (154.7 kg)   05/15/19 (!) 337 lb 6.4 oz (153 kg)   03/06/19 (!) 336 lb (152.4 kg)     Patient continues diabetic medication regimen. Compliance is good. No side effects of medications noted. Diabetes is not adequately controlled. Peripheral neuropathy is  present. Regular foot exams encouraged. Vision changes are not present. Yearly eye exam encouraged. BL lower extremity edema. Worsened recently. BL  veouns stasis changes. Two small superical ulcers RLE. Not draining  Lasix 20mg 4 days weekly. Compression stockings     Family History   Problem Relation Age of Onset    High Blood Pressure Mother     Cancer Mother         lymphoma    Stroke Mother     Diabetes Father     Heart Surgery Sister         heart valve replacement    Heart Disease Brother         pt was waiting for a heart transplant    Cancer Maternal Grandfather     Cancer Paternal Grandmother     Cancer Paternal Grandfather        Past Surgical History:   Procedure Laterality Date    APPENDECTOMY      CHOLECYSTECTOMY      CYST INCISION AND DRAINAGE  06/08/2017    abd wall cyst    HERNIA REPAIR      HIP ARTHROPLASTY Left        Social History     Tobacco Use    Smoking status: Never Smoker    Smokeless tobacco: Never Used   Substance Use Topics    Alcohol use: No     Alcohol/week: 0.0 oz     Comment: drinks occasional iced tea/coffee    Drug use:  No ROS:  No CP, No palpitations,   No sob, No cough,   No abd pain, No heartburn,   No headaches,   +tingling, +numbness, No weakness,   No bowel changes, No hematochezia, No melena,  No bladder changes, No hematuria  No skin rashes, +skin lesions. No vision changes, No hearing changes,   No polyuria, polydipsia, polyphagia. Stable mood. ROS otherwise negative unless as listed in HPI. Chart reviewed and updated where appropriate for PMH, Fam, and Soc Hx. Physical Exam   /80 (Site: Right Upper Arm, Position: Sitting, Cuff Size: Large Adult)   Pulse 74   Temp 97.7 °F (36.5 °C) (Oral)   Ht 6' (1.829 m)   Wt (!) 341 lb (154.7 kg)   SpO2 98%   BMI 46.25 kg/m²   Wt Readings from Last 3 Encounters:   06/13/19 (!) 341 lb (154.7 kg)   05/15/19 (!) 337 lb 6.4 oz (153 kg)   03/06/19 (!) 336 lb (152.4 kg)       Constitutional:    He is oriented to person, place, and time. He appears well-developed and well-nourished. HENT:    Nose: Nose normal.    Mouth/Throat: Oropharynx is clear and moist.   Eyes:    Conjunctivae are normal.    Pupils are equal, round, and reactive to light. EOMI. Neck:    Normal range of motion. No thyromegaly or nodules noted. No bruit. Cardiovascular:    Normal rate, regular rhythm and normal heart sounds. No murmur. No gallop and no friction rub. Pulmonary/Chest:    Effort normal and breath sounds normal.    No wheezes. No rales or rhonchi. Abdominal:    Soft. Morbid obesity. Bowel sounds are normal.    No distension. No tenderness. Musculoskeletal:    Normal range of motion. 2 small ulcerations of the skin at the right distal lower extremity along the lateral calf. +2-3 pitting edema bilaterally. Venous stasis changes. No joint swelling noted. Skin:    Skin is warm and dry. No rashes, lesions. As above for the ulcerations. Psychiatric:    He has a normal mood and affect. Normal groom and dress.     Current Outpatient Medications on File Prior to Visit   Medication Sig Dispense Refill    diltiazem (CARDIZEM CD) 240 MG extended release capsule Take 1 capsule by mouth daily 90 capsule 3    carvedilol (COREG) 25 MG tablet Take 1 tablet by mouth 2 times daily 180 tablet 3    warfarin (COUMADIN) 10 MG tablet TAKE ACCORDING TO COUMADIN REGIMEN 90 tablet 3    warfarin (COUMADIN) 2 MG tablet Take according to warfarin regimen;  Currently 11mg daily, with 10mg Mon, Wed & Fri. 90 tablet 3    furosemide (LASIX) 20 MG tablet Take one tablet by mouth 4 days weekly  Sa, Wan, T, Th. 50 tablet 1    metFORMIN (GLUCOPHAGE) 1000 MG tablet Take 1 tablet by mouth 2 times daily (with meals) 180 tablet 3    glipiZIDE (GLUCOTROL) 10 MG tablet TAKE ONE TABLET BY MOUTH TWICE DAILY BEFORE MEALS 180 tablet 3    CPAP Machine MISC by Does not apply route nightly      Insulin NPH Isophane & Regular (NOVOLIN 70/30 SC) Inject 30 Units into the skin 2 times daily        No current facility-administered medications on file prior to visit. Patient Active Problem List   Diagnosis Code    Degenerative joint disease of left hip M16.12    Mixed hyperlipidemia E78.2    Anticoagulated on Coumadin Z79.01    Essential hypertension I10    Paroxysmal atrial fibrillation (HCC) I48.0    Morbid obesity due to excess calories ([address] E66.01    Dyslipidemia E78.5    Controlled type 2 diabetes mellitus without complication, without long-term current use of insulin (HCC) E11.9    AMIRAH on CPAP G47.33, Z99.89    Radiculopathy of lumbosacral region M54.17    Statin intolerance Z78.9       Amber / Fatimah Holm was seen today for diabetes. Diagnoses and all orders for this visit:    Controlled type 2 diabetes mellitus without complication, without long-term current use of insulin ([address] worsened control  -     POCT glycosylated hemoglobin (Hb A1C)  A1c of 8.8 is not good enough. He is continuing to miss his evening doses of medications.   He was again counseled about the

## 2019-07-03 ENCOUNTER — NURSE ONLY (OUTPATIENT)
Dept: FAMILY MEDICINE CLINIC | Age: 64
End: 2019-07-03

## 2019-07-03 ENCOUNTER — ANTI-COAG VISIT (OUTPATIENT)
Dept: FAMILY MEDICINE CLINIC | Age: 64
End: 2019-07-03

## 2019-07-03 DIAGNOSIS — I48.0 PAROXYSMAL ATRIAL FIBRILLATION (HCC): Primary | ICD-10-CM

## 2019-07-03 LAB
INTERNATIONAL NORMALIZATION RATIO, POC: 2.3
PROTHROMBIN TIME, POC: 27.3

## 2019-07-03 PROCEDURE — 85610 PROTHROMBIN TIME: CPT | Performed by: FAMILY MEDICINE

## 2019-07-03 PROCEDURE — 93793 ANTICOAG MGMT PT WARFARIN: CPT | Performed by: FAMILY MEDICINE

## 2019-07-31 ENCOUNTER — ANTI-COAG VISIT (OUTPATIENT)
Dept: FAMILY MEDICINE CLINIC | Age: 64
End: 2019-07-31

## 2019-07-31 ENCOUNTER — NURSE ONLY (OUTPATIENT)
Dept: FAMILY MEDICINE CLINIC | Age: 64
End: 2019-07-31

## 2019-07-31 DIAGNOSIS — I48.0 PAROXYSMAL ATRIAL FIBRILLATION (HCC): Primary | ICD-10-CM

## 2019-07-31 LAB
INTERNATIONAL NORMALIZATION RATIO, POC: 2.7
PROTHROMBIN TIME, POC: 32.8

## 2019-07-31 PROCEDURE — 93793 ANTICOAG MGMT PT WARFARIN: CPT | Performed by: FAMILY MEDICINE

## 2019-07-31 PROCEDURE — 85610 PROTHROMBIN TIME: CPT | Performed by: FAMILY MEDICINE

## 2019-08-15 ENCOUNTER — OFFICE VISIT (OUTPATIENT)
Dept: FAMILY MEDICINE CLINIC | Age: 64
End: 2019-08-15

## 2019-08-15 VITALS
RESPIRATION RATE: 15 BRPM | HEIGHT: 72 IN | BODY MASS INDEX: 42.66 KG/M2 | SYSTOLIC BLOOD PRESSURE: 120 MMHG | WEIGHT: 315 LBS | HEART RATE: 81 BPM | DIASTOLIC BLOOD PRESSURE: 86 MMHG | OXYGEN SATURATION: 97 % | TEMPERATURE: 97.4 F

## 2019-08-15 DIAGNOSIS — L89.899 PRESSURE ULCER OF LEFT LEG: Primary | ICD-10-CM

## 2019-08-15 PROCEDURE — 99213 OFFICE O/P EST LOW 20 MIN: CPT | Performed by: FAMILY MEDICINE

## 2019-08-15 ASSESSMENT — ENCOUNTER SYMPTOMS
BLOOD IN STOOL: 0
DIARRHEA: 0
TROUBLE SWALLOWING: 0
EYE REDNESS: 0
SHORTNESS OF BREATH: 0
BACK PAIN: 0
EYE DISCHARGE: 0
SINUS PAIN: 0
COUGH: 0
SORE THROAT: 0
NAUSEA: 0
VOMITING: 0
CHEST TIGHTNESS: 0
ROS SKIN COMMENTS: LEFT LOWER LEG
EYE PAIN: 0
PHOTOPHOBIA: 0
ABDOMINAL PAIN: 0
ALLERGIC/IMMUNOLOGIC NEGATIVE: 1

## 2019-08-15 NOTE — PROGRESS NOTES
8/15/19  Terar Brizuela : 1955 Sex: male  Age: 61 y.o. Chief Complaint   Patient presents with    Cellulitis     left lower leg       Superficial ulcer 3x1 cm left lower leg, posterior. Denies trauma, fever, chills, redness, drainage, streaking. He does have chronic stasis changes of both lower legs. Noticed for the last week, not worsening. BSS 160s      Review of Systems   Constitutional: Negative for appetite change, fatigue and unexpected weight change. HENT: Negative for congestion, ear pain, hearing loss, sinus pain, sore throat and trouble swallowing. Eyes: Negative for photophobia, pain, discharge and redness. Respiratory: Negative for cough, chest tightness and shortness of breath. Cardiovascular: Negative for chest pain, palpitations and leg swelling. Gastrointestinal: Negative for abdominal pain, blood in stool, diarrhea, nausea and vomiting. Endocrine: Negative. Genitourinary: Negative for dysuria, flank pain, frequency and hematuria. Musculoskeletal: Negative for arthralgias, back pain, joint swelling and myalgias. Skin: Positive for wound. Left lower leg   Allergic/Immunologic: Negative. Neurological: Negative for dizziness, seizures, syncope, weakness, light-headedness, numbness and headaches. Hematological: Negative for adenopathy. Does not bruise/bleed easily. Psychiatric/Behavioral: Negative. Current Outpatient Medications:     mupirocin (BACTROBAN) 2 % ointment, Apply twice dailyApply 3 times daily. , Disp: 30 g, Rfl: 1    hydrochlorothiazide (HYDRODIURIL) 25 MG tablet, Take 1 tablet by mouth daily For blood pressure., Disp: 90 tablet, Rfl: 1    benazepril (LOTENSIN) 20 MG tablet, Take 1 tablet by mouth 2 times daily, Disp: 180 tablet, Rfl: 1    diltiazem (CARDIZEM CD) 240 MG extended release capsule, Take 1 capsule by mouth daily, Disp: 90 capsule, Rfl: 3    carvedilol (COREG) 25 MG tablet, Take 1 tablet by mouth 2 times daily, heart valve replacement    Heart Disease Brother         pt was waiting for a heart transplant    Cancer Maternal Grandfather     Cancer Paternal Grandmother     Cancer Paternal Grandfather      Social History     Socioeconomic History    Marital status:      Spouse name: Not on file    Number of children: Not on file    Years of education: Not on file    Highest education level: Not on file   Occupational History    Not on file   Social Needs    Financial resource strain: Not on file    Food insecurity:     Worry: Not on file     Inability: Not on file    Transportation needs:     Medical: Not on file     Non-medical: Not on file   Tobacco Use    Smoking status: Never Smoker    Smokeless tobacco: Never Used   Substance and Sexual Activity    Alcohol use: No     Alcohol/week: 0.0 standard drinks     Comment: drinks occasional iced tea/coffee    Drug use: No    Sexual activity: Not on file   Lifestyle    Physical activity:     Days per week: Not on file     Minutes per session: Not on file    Stress: Not on file   Relationships    Social connections:     Talks on phone: Not on file     Gets together: Not on file     Attends Mosque service: Not on file     Active member of club or organization: Not on file     Attends meetings of clubs or organizations: Not on file     Relationship status: Not on file    Intimate partner violence:     Fear of current or ex partner: Not on file     Emotionally abused: Not on file     Physically abused: Not on file     Forced sexual activity: Not on file   Other Topics Concern    Not on file   Social History Narrative    Not on file       Vitals:    08/15/19 0947   BP: 120/86   Pulse: 81   Resp: 15   Temp: 97.4 °F (36.3 °C)   TempSrc: Temporal   SpO2: 97%   Weight: (!) 337 lb (152.9 kg)   Height: 6' (1.829 m)       Physical Exam   Constitutional: He is oriented to person, place, and time. He appears well-developed and well-nourished.    HENT:   Head:

## 2019-08-19 ENCOUNTER — OFFICE VISIT (OUTPATIENT)
Dept: FAMILY MEDICINE CLINIC | Age: 64
End: 2019-08-19

## 2019-08-19 VITALS
HEART RATE: 71 BPM | DIASTOLIC BLOOD PRESSURE: 80 MMHG | OXYGEN SATURATION: 98 % | WEIGHT: 315 LBS | TEMPERATURE: 97.9 F | BODY MASS INDEX: 42.66 KG/M2 | HEIGHT: 72 IN | SYSTOLIC BLOOD PRESSURE: 120 MMHG

## 2019-08-19 DIAGNOSIS — L97.221 VENOUS STASIS ULCER OF LEFT CALF LIMITED TO BREAKDOWN OF SKIN WITHOUT VARICOSE VEINS (HCC): Primary | ICD-10-CM

## 2019-08-19 DIAGNOSIS — I87.2 VENOUS STASIS ULCER OF LEFT CALF LIMITED TO BREAKDOWN OF SKIN WITHOUT VARICOSE VEINS (HCC): Primary | ICD-10-CM

## 2019-08-19 PROCEDURE — 99213 OFFICE O/P EST LOW 20 MIN: CPT | Performed by: FAMILY MEDICINE

## 2019-09-04 ENCOUNTER — NURSE ONLY (OUTPATIENT)
Dept: FAMILY MEDICINE CLINIC | Age: 64
End: 2019-09-04

## 2019-09-04 ENCOUNTER — ANTI-COAG VISIT (OUTPATIENT)
Dept: FAMILY MEDICINE CLINIC | Age: 64
End: 2019-09-04

## 2019-09-04 DIAGNOSIS — I48.0 PAROXYSMAL ATRIAL FIBRILLATION (HCC): Primary | ICD-10-CM

## 2019-09-04 LAB
INTERNATIONAL NORMALIZATION RATIO, POC: 2.2
PROTHROMBIN TIME, POC: 26.7

## 2019-09-04 PROCEDURE — 93793 ANTICOAG MGMT PT WARFARIN: CPT | Performed by: FAMILY MEDICINE

## 2019-09-04 PROCEDURE — 85610 PROTHROMBIN TIME: CPT | Performed by: FAMILY MEDICINE

## 2019-09-16 DIAGNOSIS — E11.9 CONTROLLED TYPE 2 DIABETES MELLITUS WITHOUT COMPLICATION, WITHOUT LONG-TERM CURRENT USE OF INSULIN (HCC): ICD-10-CM

## 2019-09-16 RX ORDER — FUROSEMIDE 20 MG/1
TABLET ORAL
Qty: 90 TABLET | Refills: 1 | Status: SHIPPED
Start: 2019-09-16 | End: 2020-03-23

## 2019-10-14 ENCOUNTER — OFFICE VISIT (OUTPATIENT)
Dept: FAMILY MEDICINE CLINIC | Age: 64
End: 2019-10-14

## 2019-10-14 ENCOUNTER — HOSPITAL ENCOUNTER (OUTPATIENT)
Age: 64
Discharge: HOME | End: 2019-10-16

## 2019-10-14 VITALS
DIASTOLIC BLOOD PRESSURE: 88 MMHG | WEIGHT: 315 LBS | HEART RATE: 80 BPM | SYSTOLIC BLOOD PRESSURE: 130 MMHG | OXYGEN SATURATION: 97 % | BODY MASS INDEX: 42.66 KG/M2 | TEMPERATURE: 97.4 F | HEIGHT: 72 IN

## 2019-10-14 DIAGNOSIS — Z12.5 SCREENING FOR MALIGNANT NEOPLASM OF PROSTATE: ICD-10-CM

## 2019-10-14 DIAGNOSIS — I87.2 VENOUS INSUFFICIENCY: ICD-10-CM

## 2019-10-14 DIAGNOSIS — I48.19 PERSISTENT ATRIAL FIBRILLATION (HCC): ICD-10-CM

## 2019-10-14 DIAGNOSIS — I10 ESSENTIAL HYPERTENSION: ICD-10-CM

## 2019-10-14 DIAGNOSIS — E11.9 CONTROLLED TYPE 2 DIABETES MELLITUS WITHOUT COMPLICATION, WITHOUT LONG-TERM CURRENT USE OF INSULIN (HCC): ICD-10-CM

## 2019-10-14 DIAGNOSIS — E11.9 CONTROLLED TYPE 2 DIABETES MELLITUS WITHOUT COMPLICATION, WITHOUT LONG-TERM CURRENT USE OF INSULIN (HCC): Primary | ICD-10-CM

## 2019-10-14 LAB
ALBUMIN SERPL-MCNC: 4.1 G/DL (ref 3.5–5.2)
ALP BLD-CCNC: 81 U/L (ref 40–129)
ALT SERPL-CCNC: 24 U/L (ref 0–40)
ANION GAP SERPL CALCULATED.3IONS-SCNC: 16 MMOL/L (ref 7–16)
AST SERPL-CCNC: 23 U/L (ref 0–39)
BASOPHILS ABSOLUTE: 0.04 E9/L (ref 0–0.2)
BASOPHILS RELATIVE PERCENT: 0.5 % (ref 0–2)
BILIRUB SERPL-MCNC: 0.5 MG/DL (ref 0–1.2)
BUN BLDV-MCNC: 16 MG/DL (ref 8–23)
CALCIUM SERPL-MCNC: 9.4 MG/DL (ref 8.6–10.2)
CHLORIDE BLD-SCNC: 106 MMOL/L (ref 98–107)
CHOLESTEROL, TOTAL: 160 MG/DL (ref 0–199)
CO2: 23 MMOL/L (ref 22–29)
CREAT SERPL-MCNC: 1 MG/DL (ref 0.7–1.2)
CREATININE URINE: 41 MG/DL (ref 40–278)
EOSINOPHILS ABSOLUTE: 0.18 E9/L (ref 0.05–0.5)
EOSINOPHILS RELATIVE PERCENT: 2.4 % (ref 0–6)
GFR AFRICAN AMERICAN: >60
GFR NON-AFRICAN AMERICAN: >60 ML/MIN/1.73
GLUCOSE BLD-MCNC: 171 MG/DL (ref 74–99)
HBA1C MFR BLD: 8.7 %
HCT VFR BLD CALC: 46.2 % (ref 37–54)
HDLC SERPL-MCNC: 38 MG/DL
HEMOGLOBIN: 14.7 G/DL (ref 12.5–16.5)
IMMATURE GRANULOCYTES #: 0.03 E9/L
IMMATURE GRANULOCYTES %: 0.4 % (ref 0–5)
INTERNATIONAL NORMALIZATION RATIO, POC: 2.2
LDL CHOLESTEROL CALCULATED: 95 MG/DL (ref 0–99)
LYMPHOCYTES ABSOLUTE: 1.3 E9/L (ref 1.5–4)
LYMPHOCYTES RELATIVE PERCENT: 17.1 % (ref 20–42)
MCH RBC QN AUTO: 27.1 PG (ref 26–35)
MCHC RBC AUTO-ENTMCNC: 31.8 % (ref 32–34.5)
MCV RBC AUTO: 85.1 FL (ref 80–99.9)
MICROALBUMIN UR-MCNC: <12 MG/L
MICROALBUMIN/CREAT UR-RTO: ABNORMAL (ref 0–30)
MONOCYTES ABSOLUTE: 0.74 E9/L (ref 0.1–0.95)
MONOCYTES RELATIVE PERCENT: 9.7 % (ref 2–12)
NEUTROPHILS ABSOLUTE: 5.32 E9/L (ref 1.8–7.3)
NEUTROPHILS RELATIVE PERCENT: 69.9 % (ref 43–80)
PDW BLD-RTO: 15.7 FL (ref 11.5–15)
PLATELET # BLD: 209 E9/L (ref 130–450)
PMV BLD AUTO: 11.4 FL (ref 7–12)
POTASSIUM SERPL-SCNC: 3.9 MMOL/L (ref 3.5–5)
PROSTATE SPECIFIC ANTIGEN: 0.96 NG/ML (ref 0–4)
PROTHROMBIN TIME, POC: 26.4
RBC # BLD: 5.43 E12/L (ref 3.8–5.8)
SODIUM BLD-SCNC: 145 MMOL/L (ref 132–146)
TOTAL PROTEIN: 7.4 G/DL (ref 6.4–8.3)
TRIGL SERPL-MCNC: 133 MG/DL (ref 0–149)
VLDLC SERPL CALC-MCNC: 27 MG/DL
WBC # BLD: 7.6 E9/L (ref 4.5–11.5)

## 2019-10-14 PROCEDURE — 90686 IIV4 VACC NO PRSV 0.5 ML IM: CPT | Performed by: FAMILY MEDICINE

## 2019-10-14 PROCEDURE — 82044 UR ALBUMIN SEMIQUANTITATIVE: CPT

## 2019-10-14 PROCEDURE — 99214 OFFICE O/P EST MOD 30 MIN: CPT | Performed by: FAMILY MEDICINE

## 2019-10-14 PROCEDURE — 85025 COMPLETE CBC W/AUTO DIFF WBC: CPT

## 2019-10-14 PROCEDURE — 80061 LIPID PANEL: CPT

## 2019-10-14 PROCEDURE — 36415 COLL VENOUS BLD VENIPUNCTURE: CPT

## 2019-10-14 PROCEDURE — 83036 HEMOGLOBIN GLYCOSYLATED A1C: CPT | Performed by: FAMILY MEDICINE

## 2019-10-14 PROCEDURE — 80053 COMPREHEN METABOLIC PANEL: CPT

## 2019-10-14 PROCEDURE — 85610 PROTHROMBIN TIME: CPT | Performed by: FAMILY MEDICINE

## 2019-10-14 PROCEDURE — 82570 ASSAY OF URINE CREATININE: CPT

## 2019-10-14 PROCEDURE — G0103 PSA SCREENING: HCPCS

## 2019-10-14 PROCEDURE — 90471 IMMUNIZATION ADMIN: CPT | Performed by: FAMILY MEDICINE

## 2019-10-14 RX ORDER — WARFARIN SODIUM 10 MG/1
TABLET ORAL
Qty: 90 TABLET | Refills: 3 | Status: SHIPPED
Start: 2019-10-14 | End: 2020-10-22

## 2019-12-11 ENCOUNTER — NURSE ONLY (OUTPATIENT)
Dept: FAMILY MEDICINE CLINIC | Age: 64
End: 2019-12-11

## 2019-12-11 ENCOUNTER — ANTI-COAG VISIT (OUTPATIENT)
Dept: FAMILY MEDICINE CLINIC | Age: 64
End: 2019-12-11

## 2019-12-11 DIAGNOSIS — I48.19 PERSISTENT ATRIAL FIBRILLATION (HCC): Primary | ICD-10-CM

## 2019-12-11 LAB
INTERNATIONAL NORMALIZATION RATIO, POC: 2.1
PROTHROMBIN TIME, POC: 25.5

## 2019-12-11 PROCEDURE — 85610 PROTHROMBIN TIME: CPT | Performed by: FAMILY MEDICINE

## 2019-12-11 PROCEDURE — 93793 ANTICOAG MGMT PT WARFARIN: CPT | Performed by: FAMILY MEDICINE

## 2019-12-18 NOTE — PROGRESS NOTES
Notified patient. Patient verbalized understanding. Appt made. Detail Level: Detailed General Sunscreen Counseling: I recommended a broad spectrum sunscreen with a SPF of 30 or higher.  I explained that SPF 30 sunscreens block approximately 97 percent of the sun's harmful rays.  Sunscreens should be applied at least 15 minutes prior to expected sun exposure and then every 2 hours after that as long as sun exposure continues. If swimming or exercising sunscreen should be reapplied every 45 minutes to an hour after getting wet or sweating.  One ounce, or the equivalent of a shot glass full of sunscreen, is adequate to protect the skin not covered by a bathing suit. I also recommended a lip balm with a sunscreen as well. Sun protective clothing can be used in lieu of sunscreen but must be worn the entire time you are exposed to the sun's rays.

## 2020-01-10 RX ORDER — CARVEDILOL 25 MG/1
25 TABLET ORAL 2 TIMES DAILY
Qty: 180 TABLET | Refills: 1 | Status: SHIPPED
Start: 2020-01-10 | End: 2020-06-19 | Stop reason: SDUPTHER

## 2020-01-10 RX ORDER — DILTIAZEM HYDROCHLORIDE 240 MG/1
240 CAPSULE, COATED, EXTENDED RELEASE ORAL DAILY
Qty: 90 CAPSULE | Refills: 1 | Status: SHIPPED
Start: 2020-01-10 | End: 2020-05-14 | Stop reason: SDUPTHER

## 2020-01-17 ENCOUNTER — ANTI-COAG VISIT (OUTPATIENT)
Dept: FAMILY MEDICINE CLINIC | Age: 65
End: 2020-01-17

## 2020-01-17 ENCOUNTER — OFFICE VISIT (OUTPATIENT)
Dept: FAMILY MEDICINE CLINIC | Age: 65
End: 2020-01-17

## 2020-01-17 VITALS
SYSTOLIC BLOOD PRESSURE: 144 MMHG | TEMPERATURE: 97.1 F | HEART RATE: 54 BPM | OXYGEN SATURATION: 98 % | WEIGHT: 315 LBS | BODY MASS INDEX: 42.66 KG/M2 | HEIGHT: 72 IN | DIASTOLIC BLOOD PRESSURE: 90 MMHG

## 2020-01-17 LAB
HBA1C MFR BLD: 9.2 %
INTERNATIONAL NORMALIZATION RATIO, POC: 2.7
PROTHROMBIN TIME, POC: 31.9

## 2020-01-17 PROCEDURE — 85610 PROTHROMBIN TIME: CPT | Performed by: FAMILY MEDICINE

## 2020-01-17 PROCEDURE — 83036 HEMOGLOBIN GLYCOSYLATED A1C: CPT | Performed by: FAMILY MEDICINE

## 2020-01-17 PROCEDURE — 99214 OFFICE O/P EST MOD 30 MIN: CPT | Performed by: FAMILY MEDICINE

## 2020-01-17 RX ORDER — CYCLOBENZAPRINE HCL 10 MG
5-10 TABLET ORAL 3 TIMES DAILY PRN
Qty: 15 TABLET | Refills: 0 | Status: SHIPPED
Start: 2020-01-17 | End: 2020-05-11

## 2020-01-17 ASSESSMENT — PATIENT HEALTH QUESTIONNAIRE - PHQ9
2. FEELING DOWN, DEPRESSED OR HOPELESS: 0
SUM OF ALL RESPONSES TO PHQ QUESTIONS 1-9: 0
1. LITTLE INTEREST OR PLEASURE IN DOING THINGS: 0
SUM OF ALL RESPONSES TO PHQ9 QUESTIONS 1 & 2: 0
SUM OF ALL RESPONSES TO PHQ QUESTIONS 1-9: 0

## 2020-01-17 NOTE — PROGRESS NOTES
regimen;  Currently 11mg daily, with 10mg Mon, Wed & Fri. 90 tablet 3    glipiZIDE (GLUCOTROL) 10 MG tablet TAKE ONE TABLET BY MOUTH TWICE DAILY BEFORE MEALS 180 tablet 3    CPAP Machine MISC by Does not apply route nightly      Insulin NPH Isophane & Regular (NOVOLIN 70/30 SC) Inject 30 Units into the skin 2 times daily        No current facility-administered medications on file prior to visit. Patient Active Problem List   Diagnosis Code    Degenerative joint disease of left hip M16.12    Mixed hyperlipidemia E78.2    Anticoagulated on Coumadin Z79.01    Essential hypertension I10    Paroxysmal atrial fibrillation (HCC) I48.0    Morbid obesity due to excess calories ([facility] [address] E66.01    Dyslipidemia E78.5    Controlled type 2 diabetes mellitus without complication, without long-term current use of insulin (HCC) E11.9    AMIRAH on CPAP G47.33, Z99.89    Radiculopathy of lumbosacral region M54.17    Statin intolerance Z78.9       Assessment / Johnathan Ceballos was seen today for diabetes and atrial fibrillation. Diagnoses and all orders for this visit:    Paroxysmal atrial fibrillation ([facility] [address] [facility] stable  -     POCT INR  Rate controlled. Anticoagulated. INR stable. Continue same dose of meds as above. Type 2 diabetes mellitus with other specified complication, with long-term current use of insulin (HCC), worsening  -     POCT glycosylated hemoglobin (Hb A1C)  On maximum dose of metformin and sulfonylurea. No medical insurance coverage on prescriptions. Continues 70/30 insulin. Advised him to get larger syringes so he can administer 40 units in the morning regularly, and then work on administering an evening dose, maybe 10 units to start out. Acute left-sided low back pain without sciatica  -     cyclobenzaprine (FLEXERIL) 10 MG tablet; Take 0.5-1 tablets by mouth 3 times daily as needed for Muscle spasms  This seems to be muscular low back pain.   It is focal over the distal left lumbar paraspinal muscle. No pain over the spine. Not radicular. Large abdomen likely exacerbating. Essential hypertension  Blood pressure borderline today. He is in some pain. First abnormal reading in a while. We will keep his meds the same for now. Weight loss. Return in about 3 months (around 4/17/2020). Patient counseled to follow up sooner or seek more acute care if symptoms worsening. Electronically signed by Donald Sauceda MD on 1/17/2020    This note may have been created using dictation software.  Efforts were made to reduce grammatical or syntax errors, but some may persist.

## 2020-02-13 RX ORDER — GLIPIZIDE 10 MG/1
TABLET ORAL
Qty: 180 TABLET | Refills: 1 | Status: SHIPPED
Start: 2020-02-13 | End: 2020-08-04 | Stop reason: SDUPTHER

## 2020-02-13 NOTE — TELEPHONE ENCOUNTER
Last Appointment:  1/17/2020  Future Appointments   Date Time Provider Edmar Reyna   2/17/2020  8:15 AM SCHEDULE, POLLY MONTEZ [facility]   4/17/2020  8:40 AM Rita De La Cruz  [address] [facility] [address]

## 2020-02-17 ENCOUNTER — NURSE ONLY (OUTPATIENT)
Dept: FAMILY MEDICINE CLINIC | Age: 65
End: 2020-02-17

## 2020-02-17 ENCOUNTER — ANTI-COAG VISIT (OUTPATIENT)
Dept: FAMILY MEDICINE CLINIC | Age: 65
End: 2020-02-17

## 2020-02-17 LAB
INTERNATIONAL NORMALIZATION RATIO, POC: 2
PROTHROMBIN TIME, POC: 24

## 2020-02-17 PROCEDURE — 93793 ANTICOAG MGMT PT WARFARIN: CPT | Performed by: FAMILY MEDICINE

## 2020-02-17 PROCEDURE — 85610 PROTHROMBIN TIME: CPT | Performed by: FAMILY MEDICINE

## 2020-03-17 ENCOUNTER — ANTI-COAG VISIT (OUTPATIENT)
Dept: FAMILY MEDICINE CLINIC | Age: 65
End: 2020-03-17

## 2020-03-17 ENCOUNTER — NURSE ONLY (OUTPATIENT)
Dept: FAMILY MEDICINE CLINIC | Age: 65
End: 2020-03-17

## 2020-03-17 LAB
INTERNATIONAL NORMALIZATION RATIO, POC: 1.9
PROTHROMBIN TIME, POC: 23.2

## 2020-03-17 PROCEDURE — 93793 ANTICOAG MGMT PT WARFARIN: CPT | Performed by: FAMILY MEDICINE

## 2020-03-17 PROCEDURE — 85610 PROTHROMBIN TIME: CPT | Performed by: FAMILY MEDICINE

## 2020-03-23 RX ORDER — FUROSEMIDE 20 MG/1
TABLET ORAL
Qty: 90 TABLET | Refills: 0 | Status: SHIPPED
Start: 2020-03-23 | End: 2020-06-19 | Stop reason: SDUPTHER

## 2020-03-23 RX ORDER — WARFARIN SODIUM 2 MG/1
TABLET ORAL
Qty: 90 TABLET | Refills: 0 | Status: SHIPPED
Start: 2020-03-23 | End: 2020-10-07 | Stop reason: SDUPTHER

## 2020-03-23 NOTE — TELEPHONE ENCOUNTER
Last Appointment:  1/17/2020  Future Appointments   Date Time Provider Edmar Reyna   3/31/2020  8:30 AM SCHEDULE, [facility]   4/20/2020 10:00 AM Raysa Cordoba  [address] [facility] [address]

## 2020-03-31 ENCOUNTER — NURSE ONLY (OUTPATIENT)
Dept: FAMILY MEDICINE CLINIC | Age: 65
End: 2020-03-31

## 2020-03-31 ENCOUNTER — ANTI-COAG VISIT (OUTPATIENT)
Dept: FAMILY MEDICINE CLINIC | Age: 65
End: 2020-03-31

## 2020-03-31 LAB
INR BLD: 1.7
INTERNATIONAL NORMALIZATION RATIO, POC: 1.7
PROTHROMBIN TIME, POC: 20.4

## 2020-03-31 PROCEDURE — 93793 ANTICOAG MGMT PT WARFARIN: CPT | Performed by: FAMILY MEDICINE

## 2020-03-31 PROCEDURE — 85610 PROTHROMBIN TIME: CPT | Performed by: FAMILY MEDICINE

## 2020-04-14 ENCOUNTER — ANTI-COAG VISIT (OUTPATIENT)
Dept: FAMILY MEDICINE CLINIC | Age: 65
End: 2020-04-14

## 2020-04-14 ENCOUNTER — NURSE ONLY (OUTPATIENT)
Dept: FAMILY MEDICINE CLINIC | Age: 65
End: 2020-04-14

## 2020-04-14 LAB
INTERNATIONAL NORMALIZATION RATIO, POC: 2.8
PROTHROMBIN TIME, POC: 33.5

## 2020-04-14 PROCEDURE — 93793 ANTICOAG MGMT PT WARFARIN: CPT | Performed by: FAMILY MEDICINE

## 2020-04-14 PROCEDURE — 85610 PROTHROMBIN TIME: CPT | Performed by: FAMILY MEDICINE

## 2020-04-28 ENCOUNTER — NURSE ONLY (OUTPATIENT)
Dept: FAMILY MEDICINE CLINIC | Age: 65
End: 2020-04-28

## 2020-04-28 ENCOUNTER — ANTI-COAG VISIT (OUTPATIENT)
Dept: FAMILY MEDICINE CLINIC | Age: 65
End: 2020-04-28

## 2020-04-28 LAB
INTERNATIONAL NORMALIZATION RATIO, POC: 2.4
PROTHROMBIN TIME, POC: 29

## 2020-04-28 PROCEDURE — 85610 PROTHROMBIN TIME: CPT | Performed by: FAMILY MEDICINE

## 2020-04-28 PROCEDURE — 93793 ANTICOAG MGMT PT WARFARIN: CPT | Performed by: FAMILY MEDICINE

## 2020-05-11 ENCOUNTER — VIRTUAL VISIT (OUTPATIENT)
Dept: FAMILY MEDICINE CLINIC | Age: 65
End: 2020-05-11

## 2020-05-11 VITALS — BODY MASS INDEX: 42.66 KG/M2 | WEIGHT: 315 LBS | HEIGHT: 72 IN

## 2020-05-11 PROCEDURE — 99213 OFFICE O/P EST LOW 20 MIN: CPT | Performed by: FAMILY MEDICINE

## 2020-05-11 RX ORDER — CYCLOBENZAPRINE HCL 10 MG
5-10 TABLET ORAL 3 TIMES DAILY PRN
Qty: 25 TABLET | Refills: 0 | Status: SHIPPED
Start: 2020-05-11 | End: 2020-05-22 | Stop reason: SDUPTHER

## 2020-05-11 ASSESSMENT — ENCOUNTER SYMPTOMS
SHORTNESS OF BREATH: 0
EYES NEGATIVE: 1
VOMITING: 0
COUGH: 0
CONSTIPATION: 0
CHEST TIGHTNESS: 0
DIARRHEA: 0
ABDOMINAL PAIN: 0
WHEEZING: 0
NAUSEA: 0

## 2020-05-11 NOTE — PROGRESS NOTES
for Muscle spasms Yes Humaira Olvera MD   warfarin (COUMADIN) 2 MG tablet TAKE AS DIRECTED ACCORDING  TO  WARFARIN  REGIMEN Yes Humaira Olvera MD   furosemide (LASIX) 20 MG tablet Take 1 tablet by mouth once daily Yes Humaira Olvera MD   glipiZIDE (GLUCOTROL) 10 MG tablet TAKE 1 TABLET BY MOUTH TWICE DAILY BEFORE  MEALS Yes Humaira Olvera MD   hydrochlorothiazide (HYDRODIURIL) 25 MG tablet TAKE 1 TABLET BY MOUTH ONCE DAILY FOR BLOOD PRESSURE Yes Humaira Olvera MD   benazepril (LOTENSIN) 20 MG tablet TAKE 1 TABLET BY MOUTH TWICE DAILY Yes Humaira Olvera MD   carvedilol (COREG) 25 MG tablet Take 1 tablet by mouth 2 times daily Yes Humaira Olvera MD   diltiazem (CARDIZEM CD) 240 MG extended release capsule Take 1 capsule by mouth daily Yes Humaira Olvera MD   warfarin (COUMADIN) 10 MG tablet TAKE ACCORDING TO COUMADIN REGIMEN Yes Humaira Olvera MD   metFORMIN (GLUCOPHAGE) 1000 MG tablet Take 1 tablet by mouth 2 times daily (with meals) Yes Humaira Olvera MD   CPAP Machine MISC by Does not apply route nightly Yes Historical Provider, MD   Insulin NPH Isophane & Regular (NOVOLIN 70/30 SC) Inject 30 Units into the skin 2 times daily  Yes Historical Provider, MD       Social History     Tobacco Use    Smoking status: Never Smoker    Smokeless tobacco: Never Used   Substance Use Topics    Alcohol use: No     Alcohol/week: 0.0 standard drinks     Comment: drinks occasional iced tea/coffee    Drug use: No        Allergies   Allergen Reactions    Lipitor      Body pain, DIARRHEA ETC. HAS PROBS WITH ALL CHOLESTEROL MEDS-MOST PROB WITH LIPITOR    Other Itching     Throat itches when he eats magnus nuts   ,   Past Medical History:   Diagnosis Date    Anticoagulant long-term use     Atrial fibrillation (HCC)     CHF ([address]estive heart failure) ([facility]     Degenerative joint disease of left hip 1/28/2015    Diabetes mellitus ([facility] [address]    

## 2020-05-14 RX ORDER — DILTIAZEM HYDROCHLORIDE 240 MG/1
240 CAPSULE, COATED, EXTENDED RELEASE ORAL DAILY
Qty: 90 CAPSULE | Refills: 1 | Status: SHIPPED
Start: 2020-05-14 | End: 2020-09-03 | Stop reason: SDUPTHER

## 2020-05-20 ENCOUNTER — NURSE ONLY (OUTPATIENT)
Dept: FAMILY MEDICINE CLINIC | Age: 65
End: 2020-05-20

## 2020-05-20 ENCOUNTER — ANTI-COAG VISIT (OUTPATIENT)
Dept: FAMILY MEDICINE CLINIC | Age: 65
End: 2020-05-20

## 2020-05-20 LAB
INTERNATIONAL NORMALIZATION RATIO, POC: 3.2
PROTHROMBIN TIME, POC: 38.5

## 2020-05-20 PROCEDURE — 85610 PROTHROMBIN TIME: CPT | Performed by: FAMILY MEDICINE

## 2020-05-20 PROCEDURE — 93793 ANTICOAG MGMT PT WARFARIN: CPT | Performed by: FAMILY MEDICINE

## 2020-05-22 ENCOUNTER — OFFICE VISIT (OUTPATIENT)
Dept: FAMILY MEDICINE CLINIC | Age: 65
End: 2020-05-22

## 2020-05-22 ENCOUNTER — TELEPHONE (OUTPATIENT)
Dept: FAMILY MEDICINE CLINIC | Age: 65
End: 2020-05-22

## 2020-05-22 VITALS
WEIGHT: 315 LBS | SYSTOLIC BLOOD PRESSURE: 140 MMHG | HEART RATE: 73 BPM | OXYGEN SATURATION: 98 % | HEIGHT: 72 IN | BODY MASS INDEX: 42.66 KG/M2 | TEMPERATURE: 97.8 F | DIASTOLIC BLOOD PRESSURE: 86 MMHG

## 2020-05-22 LAB — HBA1C MFR BLD: 9.2 %

## 2020-05-22 PROCEDURE — 99213 OFFICE O/P EST LOW 20 MIN: CPT | Performed by: FAMILY MEDICINE

## 2020-05-22 PROCEDURE — 83036 HEMOGLOBIN GLYCOSYLATED A1C: CPT | Performed by: FAMILY MEDICINE

## 2020-05-22 RX ORDER — GABAPENTIN 100 MG/1
100 CAPSULE ORAL 2 TIMES DAILY
Qty: 60 CAPSULE | Refills: 0 | Status: SHIPPED
Start: 2020-05-22 | End: 2020-06-19

## 2020-05-22 RX ORDER — CYCLOBENZAPRINE HCL 10 MG
5-10 TABLET ORAL 3 TIMES DAILY PRN
Qty: 25 TABLET | Refills: 0 | Status: SHIPPED
Start: 2020-05-22 | End: 2020-08-03

## 2020-05-22 NOTE — PROGRESS NOTES
times daily 180 tablet 1    warfarin (COUMADIN) 10 MG tablet TAKE ACCORDING TO COUMADIN REGIMEN 90 tablet 3    metFORMIN (GLUCOPHAGE) 1000 MG tablet Take 1 tablet by mouth 2 times daily (with meals) 180 tablet 3    CPAP Machine MISC by Does not apply route nightly      Insulin NPH Isophane & Regular (NOVOLIN 70/30 SC) Inject 30 Units into the skin 2 times daily        No current facility-administered medications on file prior to visit. Patient Active Problem List   Diagnosis Code    Degenerative joint disease of left hip M16.12    Mixed hyperlipidemia E78.2    Anticoagulated on Coumadin Z79.01    Essential hypertension I10    Paroxysmal atrial fibrillation (HCC) I48.0    Morbid obesity due to excess calories ([facility] [address] E66.01    Dyslipidemia E78.5    Controlled type 2 diabetes mellitus without complication, without long-term current use of insulin (HCC) E11.9    AMIRAH on CPAP G47.33, Z99.89    Radiculopathy of lumbosacral region M54.17    Statin intolerance Z78.9       Assessment / Deborrah Kting was seen today for leg pain and back pain. Diagnoses and all orders for this visit:    Controlled type 2 diabetes mellitus without complication, without long-term current use of insulin (HCC)  -     POCT glycosylated hemoglobin (Hb A1C)  Still poor control at 9.2, will follow up. Acute right-sided low back pain without sciatica, improving.   -  Refill   cyclobenzaprine (FLEXERIL) 10 MG tablet; Take 0.5-1 tablets by mouth 3 times daily as needed for Muscle spasms    Right foot pain, suspect nerve impingement vs sequelae of chronic swelling.   -  Start   gabapentin (NEURONTIN) 100 MG capsule; Take 1 capsule by mouth 2 times daily for 30 days. Intended supply: 30 days  Discussed common side effects of this medication and problems that would necessitate calling the office for advice or stopping the medication. All questions answered. Lymphedema therapy would benefit him probably.  Some concern about cost given no insurance. Patient counseled to follow up sooner or seek more acute care if symptoms worsening. Electronically signed by Josie Hagan MD on 5/22/2020    This note may have been created using dictation software.  Efforts were made to reduce grammatical or syntax errors, but some may persist.

## 2020-05-26 ENCOUNTER — TELEPHONE (OUTPATIENT)
Dept: FAMILY MEDICINE CLINIC | Age: 65
End: 2020-05-26

## 2020-05-27 ENCOUNTER — ANTI-COAG VISIT (OUTPATIENT)
Dept: FAMILY MEDICINE CLINIC | Age: 65
End: 2020-05-27

## 2020-05-27 ENCOUNTER — NURSE ONLY (OUTPATIENT)
Dept: FAMILY MEDICINE CLINIC | Age: 65
End: 2020-05-27

## 2020-05-27 LAB
INTERNATIONAL NORMALIZATION RATIO, POC: 2.9
PROTHROMBIN TIME, POC: 34.3

## 2020-05-27 PROCEDURE — 93793 ANTICOAG MGMT PT WARFARIN: CPT | Performed by: FAMILY MEDICINE

## 2020-05-27 PROCEDURE — 85610 PROTHROMBIN TIME: CPT | Performed by: FAMILY MEDICINE

## 2020-06-03 ENCOUNTER — TELEPHONE (OUTPATIENT)
Dept: FAMILY MEDICINE CLINIC | Age: 65
End: 2020-06-03

## 2020-06-04 ENCOUNTER — TELEPHONE (OUTPATIENT)
Dept: FAMILY MEDICINE CLINIC | Age: 65
End: 2020-06-04

## 2020-06-05 NOTE — TELEPHONE ENCOUNTER
Referral was made but he needs to have those ankle blood pressure measurements and I sent an order for that yesterday. I would expect him to be contacted about it soon.

## 2020-06-10 ENCOUNTER — NURSE ONLY (OUTPATIENT)
Dept: FAMILY MEDICINE CLINIC | Age: 65
End: 2020-06-10

## 2020-06-10 ENCOUNTER — ANTI-COAG VISIT (OUTPATIENT)
Dept: FAMILY MEDICINE CLINIC | Age: 65
End: 2020-06-10

## 2020-06-10 LAB
INTERNATIONAL NORMALIZATION RATIO, POC: 2.5
PROTHROMBIN TIME, POC: 29.6

## 2020-06-10 PROCEDURE — 93793 ANTICOAG MGMT PT WARFARIN: CPT | Performed by: FAMILY MEDICINE

## 2020-06-10 PROCEDURE — 85610 PROTHROMBIN TIME: CPT | Performed by: FAMILY MEDICINE

## 2020-06-12 ENCOUNTER — HOSPITAL ENCOUNTER (OUTPATIENT)
Dept: INTERVENTIONAL RADIOLOGY/VASCULAR | Age: 65
Discharge: HOME | End: 2020-06-14

## 2020-06-12 PROCEDURE — 93922 UPR/L XTREMITY ART 2 LEVELS: CPT

## 2020-06-19 ENCOUNTER — OFFICE VISIT (OUTPATIENT)
Dept: FAMILY MEDICINE CLINIC | Age: 65
End: 2020-06-19

## 2020-06-19 VITALS
OXYGEN SATURATION: 98 % | HEART RATE: 93 BPM | HEIGHT: 72 IN | TEMPERATURE: 98.2 F | WEIGHT: 315 LBS | DIASTOLIC BLOOD PRESSURE: 86 MMHG | SYSTOLIC BLOOD PRESSURE: 144 MMHG | BODY MASS INDEX: 42.66 KG/M2

## 2020-06-19 PROCEDURE — 99212 OFFICE O/P EST SF 10 MIN: CPT | Performed by: FAMILY MEDICINE

## 2020-06-19 RX ORDER — GABAPENTIN 300 MG/1
100 CAPSULE ORAL 2 TIMES DAILY
Qty: 60 CAPSULE | Refills: 0 | Status: SHIPPED
Start: 2020-06-19 | End: 2020-07-01

## 2020-06-19 RX ORDER — CARVEDILOL 25 MG/1
25 TABLET ORAL 2 TIMES DAILY
Qty: 180 TABLET | Refills: 1 | Status: SHIPPED
Start: 2020-06-19 | End: 2021-01-12 | Stop reason: SDUPTHER

## 2020-06-19 RX ORDER — FUROSEMIDE 20 MG/1
TABLET ORAL
Qty: 90 TABLET | Refills: 1 | Status: SHIPPED
Start: 2020-06-19 | End: 2021-01-12 | Stop reason: SDUPTHER

## 2020-06-19 NOTE — PROGRESS NOTES
BP (!) 144/86 (Site: Left Upper Arm, Position: Sitting, Cuff Size: Large Adult)   Pulse 93   Temp 98.2 °F (36.8 °C) (Temporal)   Ht 6' (1.829 m)   Wt (!) 330 lb (149.7 kg)   SpO2 98%   BMI 44.76 kg/m²   Wt Readings from Last 3 Encounters:   06/19/20 (!) 330 lb (149.7 kg)   05/22/20 (!) 332 lb (150.6 kg)   05/11/20 (!) 320 lb (145.2 kg)       Constitutional:    He is oriented to person, place, and time. He appears well-developed and well-nourished. Cardiovascular:    Normal rate, regular rhythm and normal heart sounds. No murmur. No gallop and no friction rub. Pulmonary/Chest:    Effort normal and breath sounds normal.    No wheezes. No rales or rhonchi. Abdominal:    Soft. Bowel sounds are normal.    No distension. No tenderness. Musculoskeletal:    Normal range of motion. No joint swelling noted. +3 peripheral edema. Skin:    Skin is warm and dry. No rashes, lesions.   ________________________________________________________  Current Outpatient Medications on File Prior to Visit   Medication Sig Dispense Refill    dilTIAZem (CARDIZEM CD) 240 MG extended release capsule Take 1 capsule by mouth daily 90 capsule 1    warfarin (COUMADIN) 2 MG tablet TAKE AS DIRECTED ACCORDING  TO  WARFARIN  REGIMEN 90 tablet 0    glipiZIDE (GLUCOTROL) 10 MG tablet TAKE 1 TABLET BY MOUTH TWICE DAILY BEFORE  MEALS 180 tablet 1    hydrochlorothiazide (HYDRODIURIL) 25 MG tablet TAKE 1 TABLET BY MOUTH ONCE DAILY FOR BLOOD PRESSURE 90 tablet 1    benazepril (LOTENSIN) 20 MG tablet TAKE 1 TABLET BY MOUTH TWICE DAILY 180 tablet 1    warfarin (COUMADIN) 10 MG tablet TAKE ACCORDING TO COUMADIN REGIMEN 90 tablet 3    metFORMIN (GLUCOPHAGE) 1000 MG tablet Take 1 tablet by mouth 2 times daily (with meals) 180 tablet 3    CPAP Machine MISC by Does not apply route nightly      Insulin NPH Isophane & Regular (NOVOLIN 70/30 SC) Inject 30 Units into the skin 2 times daily       cyclobenzaprine (FLEXERIL) 10 MG tablet Take 0.5-1 tablets by mouth 3 times daily as needed for Muscle spasms (Patient not taking: Reported on 6/19/2020) 25 tablet 0     No current facility-administered medications on file prior to visit. Patient Active Problem List   Diagnosis Code    Degenerative joint disease of left hip M16.12    Mixed hyperlipidemia E78.2    Anticoagulated on Coumadin Z79.01    Essential hypertension I10    Paroxysmal atrial fibrillation (HCC) I48.0    Morbid obesity due to excess calories ([facility] [address] E66.01    Dyslipidemia E78.5    Controlled type 2 diabetes mellitus without complication, without long-term current use of insulin (HCC) E11.9    AMIRAH on CPAP G47.33, Z99.89    Radiculopathy of lumbosacral region M54.17    Statin intolerance Z78.9     ________________________________________________________  Assessment / Maira Fisher was seen today for leg pain. Diagnoses and all orders for this visit:    Right foot pain, stable not improved  -     gabapentin (NEURONTIN) 300 MG capsule; Take 1 capsule by mouth 2 times daily for 30 days. Intended supply: 30 days  Inc GBP. To Pod if not helpful. ABIs normal BL  Lymphedema therapy started. Other orders  -     furosemide (LASIX) 20 MG tablet; Take 1 tablet by mouth once daily  -     carvedilol (COREG) 25 MG tablet; Take 1 tablet by mouth 2 times daily    Return in about 6 weeks (around 7/31/2020). Patient counseled to follow up sooner or seek more acute care if symptoms worsening or not improving according to plan. .     Electronically signed by Butch Briseno MD on 6/20/2020    This note may have been created using dictation software.  Efforts were made to reduce grammatical or syntax errors, but some may persist.

## 2020-06-19 NOTE — LETTER
[address] [place] [address]  Phone: [phone]  Fax: [phone]    Ha Vines MD        June 19, 2020     Patient: Randy Mercado   YOB: 1955   Date of Visit: 6/19/2020       To Whom It May Concern: It is my medical opinion that Randy Mercado requires a disability parking placard for the following reasons:  He cannot walk without assistance from another person or the use of an assistance device (cane, crutch, prosthetic device, wheelchair, etc.). Duration of need: 2 years    If you have any questions or concerns, please don't hesitate to call.     Sincerely,        Ha Vines MD

## 2020-07-01 ENCOUNTER — ANTI-COAG VISIT (OUTPATIENT)
Dept: FAMILY MEDICINE CLINIC | Age: 65
End: 2020-07-01

## 2020-07-01 ENCOUNTER — TELEPHONE (OUTPATIENT)
Dept: FAMILY MEDICINE CLINIC | Age: 65
End: 2020-07-01

## 2020-07-01 ENCOUNTER — NURSE ONLY (OUTPATIENT)
Dept: FAMILY MEDICINE CLINIC | Age: 65
End: 2020-07-01

## 2020-07-01 LAB
INTERNATIONAL NORMALIZATION RATIO, POC: 2.1
PROTHROMBIN TIME, POC: 25.1

## 2020-07-01 PROCEDURE — 85610 PROTHROMBIN TIME: CPT | Performed by: FAMILY MEDICINE

## 2020-07-01 PROCEDURE — 93793 ANTICOAG MGMT PT WARFARIN: CPT | Performed by: FAMILY MEDICINE

## 2020-07-08 ENCOUNTER — OFFICE VISIT (OUTPATIENT)
Dept: PODIATRY | Age: 65
End: 2020-07-08

## 2020-07-08 VITALS — WEIGHT: 315 LBS | BODY MASS INDEX: 42.66 KG/M2 | HEIGHT: 72 IN

## 2020-07-08 PROCEDURE — 99203 OFFICE O/P NEW LOW 30 MIN: CPT | Performed by: PODIATRIST

## 2020-07-08 PROCEDURE — 29580 STRAPPING UNNA BOOT: CPT | Performed by: PODIATRIST

## 2020-07-08 NOTE — LETTER
Jesus Wilkins MD   [address] [facility] [address] [place] [id number]     Patient: Russ Blancas  YOB: 1955  Date of Visit: 7/8/2020     Dear Jesus Wilkins MD    Thank you for referring Russ Blancas to me for evaluation. I did see patient for bilateral lower extremity swelling worse on the right. Did order three-view weightbearing radiographs right foot. I did apply Unna boot for edema control right lower leg. Did discuss alpha lipoic acid 600 mg with patient today. I will follow-up 1 week. Once again, thank you very much for this kind referral and allowing me to participate in the care of this patient. If you have questions, please do not hesitate to call me.     Sincerely,        BORIS Canas [place] [id number]  Phone: [phone]  Fax: [phone]

## 2020-07-08 NOTE — PROGRESS NOTES
New patient in office referred by Dr Sara Lane for right foot/leg neuropathy pain. Sx x 2-3 months. Had ABIs done 20. Currently wears compress wraps to marjan LE and received lymphedema therapy. Completed last visit this am.     Iggy Rodriguez : 1955 Sex: male  Age: 59 y.o. Patient was referred by Tawanda Rush MD    CC:   Diabetic foot exam   Swelling and pain right foot    HPI:   This pleasant 59 of male patient referred to me today from Dr. Sara Lane for pain and swelling right foot. Does have history of bilateral lower extremity swelling. Has done for lymphedema therapy treatments at [facility] physical therapy. Did complete his fourth treatment today. States this was his last appointment there. Did have recent ABIs performed 2020. Does have history of neuropathy both left and right worse on the right foot. Does have history of gabapentin which did seem to help much and is not currently taking anything. Denies any calf pain denies any history of open wounds. Denies recent radiographs. Does have low-dose compression stockings and custom compression socks he has been wearing which does seem to help some. No additional pedal complaints at this time.     ROS:  Const: Denies constitutional symptoms  Musculo: Denies symptoms other than stated above  Skin: Denies symptoms other than stated above      Current Outpatient Medications:     Alpha-Lipoic Acid 600 MG TABS, Take 1 Bottle by mouth daily, Disp: 60 tablet, Rfl: 1    furosemide (LASIX) 20 MG tablet, Take 1 tablet by mouth once daily, Disp: 90 tablet, Rfl: 1    carvedilol (COREG) 25 MG tablet, Take 1 tablet by mouth 2 times daily, Disp: 180 tablet, Rfl: 1    cyclobenzaprine (FLEXERIL) 10 MG tablet, Take 0.5-1 tablets by mouth 3 times daily as needed for Muscle spasms (Patient not taking: Reported on 2020), Disp: 25 tablet, Rfl: 0    dilTIAZem (CARDIZEM CD) 240 MG extended release capsule, Take 1 capsule by mouth daily, Disp: 90 capsule, Rfl: 1    warfarin (COUMADIN) 2 MG tablet, TAKE AS DIRECTED ACCORDING  TO  WARFARIN  REGIMEN, Disp: 90 tablet, Rfl: 0    glipiZIDE (GLUCOTROL) 10 MG tablet, TAKE 1 TABLET BY MOUTH TWICE DAILY BEFORE  MEALS, Disp: 180 tablet, Rfl: 1    hydrochlorothiazide (HYDRODIURIL) 25 MG tablet, TAKE 1 TABLET BY MOUTH ONCE DAILY FOR BLOOD PRESSURE, Disp: 90 tablet, Rfl: 1    benazepril (LOTENSIN) 20 MG tablet, TAKE 1 TABLET BY MOUTH TWICE DAILY, Disp: 180 tablet, Rfl: 1    warfarin (COUMADIN) 10 MG tablet, TAKE ACCORDING TO COUMADIN REGIMEN, Disp: 90 tablet, Rfl: 3    metFORMIN (GLUCOPHAGE) 1000 MG tablet, Take 1 tablet by mouth 2 times daily (with meals), Disp: 180 tablet, Rfl: 3    CPAP Machine MISC, by Does not apply route nightly, Disp: , Rfl:     Insulin NPH Isophane & Regular (NOVOLIN 70/30 SC), Inject 30 Units into the skin 2 times daily , Disp: , Rfl:   Allergies   Allergen Reactions    Lipitor      Body pain, DIARRHEA ETC. HAS PROBS WITH ALL CHOLESTEROL MEDS-MOST PROB WITH LIPITOR    Other Itching     Throat itches when he eats magnus nuts       Past Medical History:   Diagnosis Date    Anticoagulant long-term use     Atrial fibrillation (HCC)     CHF (congestive heart failure) (HCC)     Degenerative joint disease of left hip 1/28/2015    Diabetes mellitus (HCC)     Hyperlipidemia     Hypertension     Obesity     PONV (postoperative nausea and vomiting)     Radiculopathy of lumbosacral region 7/25/2017    Radiculopathy of lumbosacral region 7/25/2017    Sleep apnea     Type II or unspecified type diabetes mellitus without mention of complication, not stated as uncontrolled        There were no vitals filed for this visit.        Work History/Social History: [employer]    Focused Lower Extremity Physical Exam:      Neurovascular examination:    Dorsalis Pedis palpable bilateral.  Posterior tibialis non-palpable bilateral.    Capillary Refill Time:  Immediate return  Hair growth:  Symmetrical and bilateral   Skin:  Not atrophic  Edema: Mild edema bilateral feet. Mild edema bilateral ankles. Neurologic:  Light touch diminished bilateral.  Warm to coolness bilateral distal toes  Decreased epicritic sensation     Musculoskeletal/ Orthopedic examination:    Equinis: present bilateral  Dorsiflexion, plantarflexion, inversion, eversion bilateral 5 out of 5 muscle strength  Wiggling toes  Negative Homans  Tenderness to palpation tarsometatarsal joints 1 through 5 right foot. Negative De Guzman    Dermatology examination:    Toenails 1 through 5 bilateral thickened, elongated, dystrophic, mycotic with subungual debris. Web spaces 1 through 4 bilateral clean dry and intact. Swelling bilateral lower legs and dorsal foot worse on the right                Assessment and Plan:  Caleb Patiño was seen today for foot injury and leg swelling. Diagnoses and all orders for this visit:    Type 2 diabetes mellitus without complication, unspecified whether long term insulin use ([facility] [address]    Encounter for current long-term use of anticoagulants    Localized edema  -     XR FOOT RIGHT (MIN 3 VIEWS); Future    Right foot pain  -     XR FOOT RIGHT (MIN 3 VIEWS); Future    Other orders  -     Alpha-Lipoic Acid 600 MG TABS; Take 1 Bottle by mouth daily        Patient seen today new referral bilateral lower extremity swelling  Diabetic foot and ankle examination performed today  Formal debridement toenails 1 through 5 right and left with manual debridement for thickness and overall length. An unna boot  compressive wrap was applied to the right lower extremity. It's purpose is to  decrease  the amount of edema present, and to allow proper healing of the soft tissues. The patient was instructed to keep the unna boot clean, dry and intact until the next follow up visit. The patient was instructed to look for signs of redness, irritation, blistering and pain.   If these or any other symptoms were to develop, they were advised to contact the office immediately for reevaluation. Unna boot and Ace bandage for edema control. Keep clean dry intact next 3-5 days. Any increasing calf pain removed sooner to go directly to the emergency room. Alpha lipoic acid 600 mg once daily. Three-view weightbearing right foot radiographs ordered today. Does have low-dose compression stockings and custom compression socks. Hopeful transition back to compression stockings at follow-up appointment. I will follow-up 1 week        Return in about 1 week (around 7/15/2020). Seen By:  Mo Long DPM      Document was created using voice recognition software. Note was reviewed however may contain grammatical errors.

## 2020-07-15 ENCOUNTER — OFFICE VISIT (OUTPATIENT)
Dept: PODIATRY | Age: 65
End: 2020-07-15

## 2020-07-15 VITALS — WEIGHT: 315 LBS | HEIGHT: 72 IN | BODY MASS INDEX: 42.66 KG/M2

## 2020-07-15 PROCEDURE — 99213 OFFICE O/P EST LOW 20 MIN: CPT | Performed by: PODIATRIST

## 2020-07-15 NOTE — PROGRESS NOTES
Patient in office to follow up with edema right LE. Unna boot from right leg removed at apt. Lavelle Sandoval : 1955 Sex: male  Age: 59 y.o. Patient was referred by Giuliano Helms MD    CC:   Follow-up swelling right foot and right ankle  Diabetic exam    HPI:   Tolerated Unna boot and Ace bandage very well. Has been taking once daily Alpha-lipoic acid 600mg. Tolerating well. Significant decrease in overall swelling and pain right foot. Pleased about progression. Does have custom low-dose compression stockings and ankle wraps. Was able to leave ankle wrap with Unna boot this week. No calf pain. No additional pedal complaints this time.     ROS:  Const: Denies constitutional symptoms  Musculo: Denies symptoms other than stated above  Skin: Denies symptoms other than stated above      Current Outpatient Medications:     Alpha-Lipoic Acid 600 MG TABS, Take 1 Bottle by mouth daily, Disp: 60 tablet, Rfl: 1    furosemide (LASIX) 20 MG tablet, Take 1 tablet by mouth once daily, Disp: 90 tablet, Rfl: 1    carvedilol (COREG) 25 MG tablet, Take 1 tablet by mouth 2 times daily, Disp: 180 tablet, Rfl: 1    cyclobenzaprine (FLEXERIL) 10 MG tablet, Take 0.5-1 tablets by mouth 3 times daily as needed for Muscle spasms (Patient not taking: Reported on 2020), Disp: 25 tablet, Rfl: 0    dilTIAZem (CARDIZEM CD) 240 MG extended release capsule, Take 1 capsule by mouth daily, Disp: 90 capsule, Rfl: 1    warfarin (COUMADIN) 2 MG tablet, TAKE AS DIRECTED ACCORDING  TO  WARFARIN  REGIMEN, Disp: 90 tablet, Rfl: 0    glipiZIDE (GLUCOTROL) 10 MG tablet, TAKE 1 TABLET BY MOUTH TWICE DAILY BEFORE  MEALS, Disp: 180 tablet, Rfl: 1    hydrochlorothiazide (HYDRODIURIL) 25 MG tablet, TAKE 1 TABLET BY MOUTH ONCE DAILY FOR BLOOD PRESSURE, Disp: 90 tablet, Rfl: 1    benazepril (LOTENSIN) 20 MG tablet, TAKE 1 TABLET BY MOUTH TWICE DAILY, Disp: 180 tablet, Rfl: 1    warfarin (COUMADIN) 10 MG tablet, TAKE ACCORDING tarsometatarsal joints 1 through 5 right foot-improving  Negative Gab    Dermatology examination:    Toenails 1 through 5 bilateral thickened, dystrophic, mycotic with subungual debris. Within normal limits for length. Web spaces 1 through 4 bilateral clean dry and intact. Mild edema bilateral lower legs. Significant decreased right lower extremity                Assessment and Plan:  Zhao Owens was seen today for follow-up and leg swelling. Diagnoses and all orders for this visit:    Type 2 diabetes mellitus without complication, unspecified whether long term insulin use ([facility] [address]    Encounter for current long-term use of anticoagulants    Localized edema    Right foot pain    Tinea unguium    Corns and callosities      Follow-up diabetic exam.  Follow-up right foot and right ankle swelling  Responded very well with Unna boot. Transition back to low-dose compression custom wraps. Progressing well. Continue Alpha-lipoic acid 600mg once daily. I will follow-up 3 months for follow-up diabetic foot and ankle exam.      Return in about 3 months (around 10/15/2020). Seen By:  Josh Medina DPM      Document was created using voice recognition software. Note was reviewed however may contain grammatical errors.

## 2020-07-20 ENCOUNTER — HOSPITAL ENCOUNTER (OUTPATIENT)
Age: 65
Discharge: HOME | End: 2020-07-22
Payer: OTHER GOVERNMENT

## 2020-07-20 ENCOUNTER — OFFICE VISIT (OUTPATIENT)
Dept: PRIMARY CARE CLINIC | Age: 65
End: 2020-07-20

## 2020-07-20 VITALS
WEIGHT: 315 LBS | TEMPERATURE: 97.6 F | HEIGHT: 72 IN | OXYGEN SATURATION: 98 % | SYSTOLIC BLOOD PRESSURE: 118 MMHG | HEART RATE: 77 BPM | BODY MASS INDEX: 42.66 KG/M2 | DIASTOLIC BLOOD PRESSURE: 70 MMHG

## 2020-07-20 PROCEDURE — 99213 OFFICE O/P EST LOW 20 MIN: CPT | Performed by: NURSE PRACTITIONER

## 2020-07-20 PROCEDURE — U0003 INFECTIOUS AGENT DETECTION BY NUCLEIC ACID (DNA OR RNA); SEVERE ACUTE RESPIRATORY SYNDROME CORONAVIRUS 2 (SARS-COV-2) (CORONAVIRUS DISEASE [COVID-19]), AMPLIFIED PROBE TECHNIQUE, MAKING USE OF HIGH THROUGHPUT TECHNOLOGIES AS DESCRIBED BY CMS-2020-01-R: HCPCS

## 2020-07-20 RX ORDER — ALBUTEROL SULFATE 90 UG/1
2 AEROSOL, METERED RESPIRATORY (INHALATION)
Qty: 1 INHALER | Refills: 0 | Status: SHIPPED
Start: 2020-07-20 | End: 2021-04-02

## 2020-07-20 NOTE — PROGRESS NOTES
Gustavo Rater  1955    Chief Complaint   Patient presents with    Fatigue    Shortness of Breath       Respiratory Symptoms:  Patient complains of 2 day(s) history of shortness of breath and fatigue. Symptoms have been stable with time. He denies any other symptoms. States he feels like intermittently he cannot get a breath. No exertional dyspnea. Denies chest pain. No increased swelling of lower extremities. Relevant PMH: DM, HTN, A-Fib. Smoking history:  He  reports that he has never smoked. He has never used smokeless tobacco.     He has had no known ill contacts. Treatment to date: none. Travel screen completed:  Yes        Vitals:    07/20/20 1444   BP: 118/70   Pulse: 77   Temp: 97.6 °F (36.4 °C)   SpO2: 98%   Weight: (!) 330 lb (149.7 kg)   Height: 6' (1.829 m)      Physical Exam  Vitals signs and nursing note reviewed. Constitutional:       General: He is not in acute distress. Appearance: Normal appearance. He is well-developed. He is obese. He is not toxic-appearing. HENT:      Head: Normocephalic and atraumatic. Right Ear: Hearing normal.      Left Ear: Hearing normal.      Nose: Nose normal.      Mouth/Throat:      Lips: Pink. Mouth: Mucous membranes are moist.      Pharynx: Oropharynx is clear. Uvula midline. Eyes:      General: Lids are normal.      Conjunctiva/sclera: Conjunctivae normal.      Pupils: Pupils are equal, round, and reactive to light. Neck:      Musculoskeletal: Normal range of motion. Trachea: Trachea normal.   Cardiovascular:      Rate and Rhythm: Normal rate and regular rhythm. Pulses: Normal pulses. Heart sounds: Normal heart sounds. Pulmonary:      Effort: Pulmonary effort is normal. No respiratory distress. Breath sounds: Normal breath sounds. No wheezing, rhonchi or rales. Abdominal:      General: Abdomen is flat. Bowel sounds are normal.      Palpations: Abdomen is soft.    Musculoskeletal: Normal range of motion. Lymphadenopathy:      Cervical: No cervical adenopathy. Skin:     General: Skin is warm and dry. Capillary Refill: Capillary refill takes less than 2 seconds. Neurological:      Mental Status: He is alert and oriented to person, place, and time. Psychiatric:         Attention and Perception: Attention normal.         Mood and Affect: Mood normal.         Speech: Speech normal.         Behavior: Behavior normal.         Thought Content: Thought content normal.         Cognition and Memory: Cognition normal.         Judgment: Judgment normal.        Assessment/Plan:  1. Shortness of breath  - albuterol sulfate HFA (VENTOLIN HFA) 108 (90 Base) MCG/ACT inhaler; Inhale 2 puffs into the lungs every 4-6 hours as needed for Wheezing or Shortness of Breath  Dispense: 1 Inhaler; Refill: 0    2. Suspected COVID-19 virus infection  - Covid-19 Ambulatory; Future  - Will advise patient of results once available  - Patient advised to continue strict self-quarantine at home for 14 days or until her test is negative and is fever free for 72 hours without medications  - If negative patient advised should be 7 days from symptom onset, have improving respiratory symptoms, and fever free for 72 hours without fever reducers prior to returning to [address] [place], [place] [address]STEVE [facility]  7/20/20     This visit was provided as a focused evaluation during the COVID -19 pandemic/national emergency. A comprehensive review of all previous patient history and testing was not conducted. Pertinent findings were elicited during the visit.

## 2020-07-21 ENCOUNTER — APPOINTMENT (OUTPATIENT)
Dept: CT IMAGING | Age: 65
End: 2020-07-21

## 2020-07-21 ENCOUNTER — APPOINTMENT (OUTPATIENT)
Dept: GENERAL RADIOLOGY | Age: 65
End: 2020-07-21

## 2020-07-21 ENCOUNTER — HOSPITAL ENCOUNTER (EMERGENCY)
Age: 65
Discharge: HOME | End: 2020-07-21
Attending: EMERGENCY MEDICINE

## 2020-07-21 VITALS
HEART RATE: 67 BPM | TEMPERATURE: 98.3 F | OXYGEN SATURATION: 98 % | DIASTOLIC BLOOD PRESSURE: 72 MMHG | RESPIRATION RATE: 20 BRPM | SYSTOLIC BLOOD PRESSURE: 115 MMHG

## 2020-07-21 LAB
ANION GAP SERPL CALCULATED.3IONS-SCNC: 13 MMOL/L (ref 7–16)
BACTERIA: ABNORMAL /HPF
BASOPHILS ABSOLUTE: 0 E9/L (ref 0–0.2)
BASOPHILS RELATIVE PERCENT: 0 % (ref 0–2)
BILIRUBIN URINE: ABNORMAL
BLOOD, URINE: NEGATIVE
BUN BLDV-MCNC: 19 MG/DL (ref 8–23)
CALCIUM SERPL-MCNC: 8.7 MG/DL (ref 8.6–10.2)
CHLORIDE BLD-SCNC: 98 MMOL/L (ref 98–107)
CLARITY: ABNORMAL
CO2: 26 MMOL/L (ref 22–29)
COLOR: YELLOW
CREAT SERPL-MCNC: 1.1 MG/DL (ref 0.7–1.2)
D DIMER: 602 NG/ML DDU
EOSINOPHILS ABSOLUTE: 0.01 E9/L (ref 0.05–0.5)
EOSINOPHILS RELATIVE PERCENT: 0.2 % (ref 0–6)
GFR AFRICAN AMERICAN: >60
GFR NON-AFRICAN AMERICAN: >60 ML/MIN/1.73
GLUCOSE BLD-MCNC: 179 MG/DL (ref 74–99)
GLUCOSE URINE: NEGATIVE MG/DL
HCT VFR BLD CALC: 42.6 % (ref 37–54)
HEMOGLOBIN: 14.9 G/DL (ref 12.5–16.5)
HYALINE CASTS: ABNORMAL /LPF (ref 0–2)
IMMATURE GRANULOCYTES #: 0.02 E9/L
IMMATURE GRANULOCYTES %: 0.4 % (ref 0–5)
INR BLD: 3.5
KETONES, URINE: 15 MG/DL
LEUKOCYTE ESTERASE, URINE: NEGATIVE
LYMPHOCYTES ABSOLUTE: 0.84 E9/L (ref 1.5–4)
LYMPHOCYTES RELATIVE PERCENT: 16 % (ref 20–42)
MCH RBC QN AUTO: 28.7 PG (ref 26–35)
MCHC RBC AUTO-ENTMCNC: 35 % (ref 32–34.5)
MCV RBC AUTO: 82.1 FL (ref 80–99.9)
MONOCYTES ABSOLUTE: 0.38 E9/L (ref 0.1–0.95)
MONOCYTES RELATIVE PERCENT: 7.3 % (ref 2–12)
MUCUS: PRESENT /LPF
NEUTROPHILS ABSOLUTE: 3.99 E9/L (ref 1.8–7.3)
NEUTROPHILS RELATIVE PERCENT: 76.1 % (ref 43–80)
NITRITE, URINE: NEGATIVE
PDW BLD-RTO: 15.8 FL (ref 11.5–15)
PH UA: 5.5 (ref 5–9)
PLATELET # BLD: 207 E9/L (ref 130–450)
PMV BLD AUTO: 12.8 FL (ref 7–12)
POTASSIUM REFLEX MAGNESIUM: 4.5 MMOL/L (ref 3.5–5)
PRO-BNP: 827 PG/ML (ref 0–125)
PROTEIN UA: 30 MG/DL
PROTHROMBIN TIME: 43.1 SEC (ref 9.3–12.4)
RBC # BLD: 5.19 E12/L (ref 3.8–5.8)
RBC UA: ABNORMAL /HPF (ref 0–2)
SODIUM BLD-SCNC: 137 MMOL/L (ref 132–146)
SPECIFIC GRAVITY UA: >=1.03 (ref 1–1.03)
TROPONIN: <0.01 NG/ML (ref 0–0.03)
UROBILINOGEN, URINE: 0.2 E.U./DL
WBC # BLD: 5.2 E9/L (ref 4.5–11.5)
WBC UA: ABNORMAL /HPF (ref 0–5)

## 2020-07-21 PROCEDURE — 83880 ASSAY OF NATRIURETIC PEPTIDE: CPT

## 2020-07-21 PROCEDURE — 93005 ELECTROCARDIOGRAM TRACING: CPT | Performed by: EMERGENCY MEDICINE

## 2020-07-21 PROCEDURE — 85025 COMPLETE CBC W/AUTO DIFF WBC: CPT

## 2020-07-21 PROCEDURE — 99284 EMERGENCY DEPT VISIT MOD MDM: CPT

## 2020-07-21 PROCEDURE — 81001 URINALYSIS AUTO W/SCOPE: CPT

## 2020-07-21 PROCEDURE — 85610 PROTHROMBIN TIME: CPT

## 2020-07-21 PROCEDURE — 84484 ASSAY OF TROPONIN QUANT: CPT

## 2020-07-21 PROCEDURE — 85378 FIBRIN DEGRADE SEMIQUANT: CPT

## 2020-07-21 PROCEDURE — 71275 CT ANGIOGRAPHY CHEST: CPT

## 2020-07-21 PROCEDURE — 71045 X-RAY EXAM CHEST 1 VIEW: CPT

## 2020-07-21 PROCEDURE — 2580000003 HC RX 258: Performed by: STUDENT IN AN ORGANIZED HEALTH CARE EDUCATION/TRAINING PROGRAM

## 2020-07-21 PROCEDURE — 80048 BASIC METABOLIC PNL TOTAL CA: CPT

## 2020-07-21 PROCEDURE — 6360000004 HC RX CONTRAST MEDICATION: Performed by: RADIOLOGY

## 2020-07-21 RX ORDER — 0.9 % SODIUM CHLORIDE 0.9 %
500 INTRAVENOUS SOLUTION INTRAVENOUS ONCE
Status: COMPLETED | OUTPATIENT
Start: 2020-07-21 | End: 2020-07-21

## 2020-07-21 RX ORDER — CEFDINIR 300 MG/1
300 CAPSULE ORAL 2 TIMES DAILY
Qty: 14 CAPSULE | Refills: 0 | Status: SHIPPED | OUTPATIENT
Start: 2020-07-21 | End: 2020-07-28

## 2020-07-21 RX ORDER — DOXYCYCLINE HYCLATE 100 MG
100 TABLET ORAL 2 TIMES DAILY
Qty: 14 TABLET | Refills: 0 | Status: SHIPPED | OUTPATIENT
Start: 2020-07-21 | End: 2020-07-28

## 2020-07-21 RX ADMIN — IOPAMIDOL 100 ML: 755 INJECTION, SOLUTION INTRAVENOUS at 16:18

## 2020-07-21 RX ADMIN — SODIUM CHLORIDE 500 ML: 9 INJECTION, SOLUTION INTRAVENOUS at 14:19

## 2020-07-21 ASSESSMENT — ENCOUNTER SYMPTOMS
COUGH: 0
ABDOMINAL PAIN: 0
SORE THROAT: 0
VOMITING: 0
EYE REDNESS: 0
SHORTNESS OF BREATH: 1
EYE PAIN: 0
NAUSEA: 0
WHEEZING: 0
DIARRHEA: 0
BACK PAIN: 0
SINUS PRESSURE: 0
EYE DISCHARGE: 0

## 2020-07-21 NOTE — ED PROVIDER NOTES
Patient states he has been having shortness of breath that has been going on for approximately 1 month. He also had some low blood pressure that was concerning to him. The history is provided by the patient and the spouse. No  was used. Shortness of Breath   Severity:  Moderate  Onset quality:  Gradual  Duration:  1 month  Timing:  Intermittent  Progression:  Worsening  Chronicity:  New  Context: activity    Relieved by: Inhaler  Worsened by: Activity, exertion and movement  Associated symptoms: rash    Associated symptoms: no abdominal pain, no chest pain, no cough, no ear pain, no fever, no headaches, no sore throat, no vomiting and no wheezing         Review of Systems   Constitutional: Positive for fatigue. Negative for chills and fever. HENT: Negative for ear pain, sinus pressure and sore throat. Eyes: Negative for pain, discharge and redness. Respiratory: Positive for shortness of breath. Negative for cough and wheezing. Cardiovascular: Positive for leg swelling. Negative for chest pain. Gastrointestinal: Negative for abdominal pain, diarrhea, nausea and vomiting. Genitourinary: Negative for dysuria and frequency. Musculoskeletal: Positive for gait problem. Negative for arthralgias and back pain. Skin: Positive for rash. Negative for wound. Neurological: Negative for weakness and headaches. Hematological: Negative for adenopathy. All other systems reviewed and are negative. Physical Exam  Vitals signs and nursing note reviewed. Constitutional:       Appearance: He is well-developed. He is obese. He is not toxic-appearing. HENT:      Head: Normocephalic and atraumatic. Right Ear: External ear normal.      Left Ear: External ear normal.      Nose: Nose normal.   Eyes:      Extraocular Movements: Extraocular movements intact. Conjunctiva/sclera: Conjunctivae normal.   Neck:      Musculoskeletal: Normal range of motion and neck supple. Cardiovascular:      Rate and Rhythm: Normal rate and regular rhythm. Pulses: Normal pulses. Heart sounds: Normal heart sounds. No murmur. Pulmonary:      Effort: Pulmonary effort is normal. No respiratory distress. Breath sounds: Normal breath sounds. No wheezing or rales. Abdominal:      General: Bowel sounds are normal. There is distension. Palpations: Abdomen is soft. Tenderness: There is no abdominal tenderness. There is no guarding or rebound. Musculoskeletal:         General: No tenderness or deformity. Right lower leg: Edema present. Left lower leg: Edema present. Skin:     General: Skin is warm and dry. Neurological:      Mental Status: He is alert and oriented to person, place, and time. Cranial Nerves: No cranial nerve deficit. Coordination: Coordination normal.      Gait: Gait abnormal.          Procedures     MDM  Number of Diagnoses or Management Options  Shortness of breath:   Diagnosis management comments: Patient with PMHx of diabetes, HTN, hypercholesteremia, aFib, and sleep apnea presented to the emergency room for approximately 1 month of increasing shortness of breath and fatigue. Patient also has had some hypotension at home and then also here in the ED. Patient denies fever, chest pain, nausea, abdominal pain, and numbness. BNP ordered to evaluate congestive heart failure and was elevated at 827 consistent with history of CHF. CBC ordered to evaluate blood counts. BMP ordered to evaluate electrolytes. Both the CBC and BMP were noncontributory. Due to history of coumadin, INR ordered which was 3.5 Urinalysis ordered to evaluate for blood or infection which was WNL with the refractory urinalysis to microscopic. D-Dimer to evaluate possible PE which was elevated at 602. Due to this  CTA Pulm ordered to evaluate for PE vs infection. No PE was noted but innumerable groundglass and consolidative opacity of the lungs were found bilaterally.  At this time, patient likely has infection of some kind. Patient currently has a pending Covid test at [facility]. In addition patient prescribed Omnicef and doxycycline to cover any atypical bacteria. Patient states that he is feeling better than this morning and is agreeable to discharge. Patient recommended to isolate at home until Covid results and to follow up with his PCP. Amount and/or Complexity of Data Reviewed  Clinical lab tests: ordered and reviewed  Tests in the radiology section of CPT®: ordered and reviewed    Risk of Complications, Morbidity, and/or Mortality  Presenting problems: low  Diagnostic procedures: low  Management options: low    Patient Progress  Patient progress: stable       ED Course as of Jul 21 1658 Tue Jul 21, 2020   1317 Patient ambulated and O2 Sat monitored. SAT's remained above 97%. [BB]   1601 D-Dimer, Quantitative [BB]   1655 Patient ambulated again and O2 SAT was 97-98%    [BB]      ED Course User Index  [BB] Aranza Glynn DO        ED Course as of Jul 21 1658 Tue Jul 21, 2020   1317 Patient ambulated and O2 Sat monitored. SAT's remained above 97%. [BB]   1601 D-Dimer, Quantitative [BB]   1655 Patient ambulated again and O2 SAT was 97-98%    [BB]      ED Course User Index  [BB] Aranza Glynn,        --------------------------------------------- PAST HISTORY ---------------------------------------------  Past Medical History:  has a past medical history of Anticoagulant long-term use, Atrial fibrillation ([facility] CHF (congestive heart failure) ([facility] [address] Degenerative joint disease of left hip, Diabetes mellitus ([facility] [address] Hyperlipidemia, Hypertension, Obesity, PONV (postoperative nausea and vomiting), Radiculopathy of lumbosacral region, Radiculopathy of lumbosacral region, Sleep apnea, and Type II or unspecified type diabetes mellitus without mention of complication, not stated as uncontrolled.     Past Surgical History:  has a past surgical history that includes Cholecystectomy; Appendectomy; hernia repair; Hip Arthroplasty (Left); and cyst incision and drainage (06/08/2017). Social History:  reports that he has never smoked. He has never used smokeless tobacco. He reports that he does not drink alcohol or use drugs. Family History: family history includes Cancer in his maternal grandfather, mother, paternal grandfather, and paternal grandmother; Diabetes in his father; Heart Disease in his brother; Heart Surgery in his sister; High Blood Pressure in his mother; Stroke in his mother. The patients home medications have been reviewed.     Allergies: Lipitor and Other    -------------------------------------------------- RESULTS -------------------------------------------------  Labs:  Results for orders placed or performed during the hospital encounter of 07/21/20   CBC Auto Differential   Result Value Ref Range    WBC 5.2 4.5 - 11.5 E9/L    RBC 5.19 3.80 - 5.80 E12/L    Hemoglobin 14.9 12.5 - 16.5 g/dL    Hematocrit 42.6 37.0 - 54.0 %    MCV 82.1 80.0 - 99.9 fL    MCH 28.7 26.0 - 35.0 pg    MCHC 35.0 (H) 32.0 - 34.5 %    RDW 15.8 (H) 11.5 - 15.0 fL    Platelets 138 961 - 496 E9/L    MPV 12.8 (H) 7.0 - 12.0 fL    Neutrophils % 76.1 43.0 - 80.0 %    Immature Granulocytes % 0.4 0.0 - 5.0 %    Lymphocytes % 16.0 (L) 20.0 - 42.0 %    Monocytes % 7.3 2.0 - 12.0 %    Eosinophils % 0.2 0.0 - 6.0 %    Basophils % 0.0 0.0 - 2.0 %    Neutrophils Absolute 3.99 1.80 - 7.30 E9/L    Immature Granulocytes # 0.02 E9/L    Lymphocytes Absolute 0.84 (L) 1.50 - 4.00 E9/L    Monocytes Absolute 0.38 0.10 - 0.95 E9/L    Eosinophils Absolute 0.01 (L) 0.05 - 0.50 E9/L    Basophils Absolute 0.00 0.00 - 0.20 D2/W   Basic Metabolic Panel w/ Reflex to MG   Result Value Ref Range    Sodium 137 132 - 146 mmol/L    Potassium reflex Magnesium 4.5 3.5 - 5.0 mmol/L    Chloride 98 98 - 107 mmol/L    CO2 26 22 - 29 mmol/L    Anion Gap 13 7 - 16 mmol/L    Glucose 179 (H) 74 - 99 mg/dL    BUN 19 8 - 23 ------------------------------------------  4:54 PM EDT  I have spoken with the patient and discussed todays results, in addition to providing specific details for the plan of care and counseling regarding the diagnosis and prognosis. Their questions are answered at this time and they are agreeable with the plan. I discussed at length with them reasons for immediate return here for re evaluation. They will followup with their primary care physician by calling their office tomorrow. --------------------------------- ADDITIONAL PROVIDER NOTES ---------------------------------  At this time the patient is without objective evidence of an acute process requiring hospitalization or inpatient management. They have remained hemodynamically stable throughout their entire ED visit and are stable for discharge with outpatient follow-up. The plan has been discussed in detail and they are aware of the specific conditions for emergent return, as well as the importance of follow-up. New Prescriptions    CEFDINIR (OMNICEF) 300 MG CAPSULE    Take 1 capsule by mouth 2 times daily for 7 days    DOXYCYCLINE HYCLATE (VIBRA-TABS) 100 MG TABLET    Take 1 tablet by mouth 2 times daily for 14 doses       Diagnosis:  1. Shortness of breath        Disposition:  Patient's disposition: Discharge to home  Patient's condition is stable.     Patient seen and discussed with and by Dr. Mark Santana DO  Resident  07/21/20 2275

## 2020-07-22 ENCOUNTER — CARE COORDINATION (OUTPATIENT)
Dept: CARE COORDINATION | Age: 65
End: 2020-07-22

## 2020-07-22 LAB
EKG ATRIAL RATE: 80 BPM
EKG Q-T INTERVAL: 434 MS
EKG QRS DURATION: 92 MS
EKG QTC CALCULATION (BAZETT): 447 MS
EKG R AXIS: 50 DEGREES
EKG T AXIS: -17 DEGREES
EKG VENTRICULAR RATE: 64 BPM

## 2020-07-22 PROCEDURE — 93010 ELECTROCARDIOGRAM REPORT: CPT | Performed by: INTERNAL MEDICINE

## 2020-07-23 ENCOUNTER — CARE COORDINATION (OUTPATIENT)
Dept: CARE COORDINATION | Age: 65
End: 2020-07-23

## 2020-07-23 ENCOUNTER — TELEPHONE (OUTPATIENT)
Dept: FAMILY MEDICINE CLINIC | Age: 65
End: 2020-07-23

## 2020-07-23 LAB
SARS-COV-2: DETECTED
SOURCE: ABNORMAL

## 2020-07-23 RX ORDER — TRAMADOL HYDROCHLORIDE 50 MG/1
50 TABLET ORAL DAILY PRN
Qty: 30 TABLET | Refills: 0 | Status: SHIPPED
Start: 2020-07-23 | End: 2020-08-04 | Stop reason: SDUPTHER

## 2020-07-23 NOTE — TELEPHONE ENCOUNTER
Jesus notified. Will you refill tramadol 50mg 1 a day to [facility] in [facility]? His legs hurt from therapy&it helps him sleep.  rx pended for review

## 2020-07-23 NOTE — CARE COORDINATION
COVID-19 ED/Flu Clinic Initial Outreach Note    Patient contacted regarding recent visit for viral symptoms. This Madisyn Bryson contacted the patient by telephone to perform post discharge call. Verified name and  with patient as identifiers. Provided introduction to self, and reason for call due to viral symptoms of infection and/or exposure to COVID-19. Patient presented to emergency department/flu clinic with complaints of viral symptoms/exposure to COVID. Patient reports symptoms are the same. Due to no new or worsening symptoms the RN CTN/MELLISA was not notified for escalation. Discussed exposure protocols and quarantine with CDC Guidelines What To Do If You Are Sick    Patient was given an opportunity for questions and concerns. Stay home except to get medical care  People who are mildly ill with COVID-19 are able to isolate at home during their illness. You should restrict activities outside your home, except for getting medical care. Do not go to work, school, or public areas. Avoid using public transportation, ride-sharing, or taxis. Separate yourself from other people and animals in your home  People: As much as possible, you should stay in a specific room and away from other people in your home. Also, you should use a separate bathroom, if available. Animals: You should restrict contact with pets and other animals while you are sick with COVID-19, just like you would around other people. Although there have not been reports of pets or other animals becoming sick with COVID-19, it is still recommended that people sick with COVID-19 limit contact with animals until more information is known about the virus. When possible, have another member of your household care for your animals while you are sick. If you are sick with COVID-19, avoid contact with your pet, including petting, snuggling, being kissed or licked, and sharing food.  If you must care for your pet or be around animals while you are sick, wash your hands before and after you interact with pets and wear a facemask. Call ahead before visiting your doctor  If you have a medical appointment, call the healthcare provider and tell them that you have or may have COVID-19. This will help the healthcare provider's office take steps to keep other people from getting infected or exposed. Wear a facemask  You should wear a facemask when you are around other people (e.g., sharing a room or vehicle) or pets and before you enter a healthcare provider's office. If you are not able to wear a facemask (for example, because it causes trouble breathing), then people who live with you should not stay in the same room with you, or they should wear a facemask if they enter your room. Cover your coughs and sneezes  Cover your mouth and nose with a tissue when you cough or sneeze. Throw used tissues in a lined trash can. Immediately wash your hands with soap and water for at least 20 seconds or, if soap and water are not available, clean your hands with an alcohol-based hand  that contains at least 60% alcohol. Clean your hands often  Wash your hands often with soap and water for at least 20 seconds, especially after blowing your nose, coughing, or sneezing; going to the bathroom; and before eating or preparing food. If soap and water are not readily available, use an alcohol-based hand  with at least 60% alcohol, covering all surfaces of your hands and rubbing them together until they feel dry. Soap and water are the best option if hands are visibly dirty. Avoid touching your eyes, nose, and mouth with unwashed hands. Avoid sharing personal household items  You should not share dishes, drinking glasses, cups, eating utensils, towels, or bedding with other people or pets in your home. After using these items, they should be washed thoroughly with soap and water.   Clean all high-touch surfaces everyday  High touch surfaces include counters, tabletops, doorknobs, bathroom fixtures, toilets, phones, keyboards, tablets, and bedside tables. Also, clean any surfaces that may have blood, stool, or body fluids on them. Use a household cleaning spray or wipe, according to the label instructions. Labels contain instructions for safe and effective use of the cleaning product including precautions you should take when applying the product, such as wearing gloves and making sure you have good ventilation during use of the product. Monitor your symptoms  Seek prompt medical attention if your illness is worsening (e.g., difficulty breathing). Before seeking care, call your healthcare provider and tell them that you have, or are being evaluated for, COVID-19. Put on a facemask before you enter the facility. These steps will help the healthcare provider's office to keep other people in the office or waiting room from getting infected or exposed. Ask your healthcare provider to call the local or [facility] health department. Persons who are placed under If you have a medical emergency and need to call 911, notify the dispatch personnel that you have, or are being evaluated for COVID-19. If possible, put on a facemask before emergency medical services arrive. The patient agrees to contact the [employer] exposure line [phone], local health department [place] [facility]: ([phone]) and PCP office for questions related to their healthcare. Author provided contact information for future reference. Patient/family/caregiver given information for [employer] and agrees to enroll no        Patient was seen for shortness of breath. Patient reports that he is still short of breath and is wondering if he should take the medication that was prescribed to him at ED. Patient explained that before they found out he was positive for COVID they were giving him medications but now that he is positive for COVID he is not sure if he should take the medications.   Referred patient to contact his PCP to discuss medications. Patient reports that he did call and is waiting for a return call. Patient reports no other concerns. Will follow up with patient in 2 weeks.

## 2020-07-23 NOTE — TELEPHONE ENCOUNTER
Sent tramadol after reviewing all. No more than one daily. Skip it on days that not needed. Will need to talk about this longer term.    Can do so at august appt if he is able to attend (covid)

## 2020-07-23 NOTE — TELEPHONE ENCOUNTER
Yes, short term as long as he is feeling reasonably well, he should stay home. Back to the ED if trouble breathing or other worrisome things going on. Right now policy is if active symptoms he cant come in. We would have to have him get at least one negative test and be symptom free before he comes in. The follow up isnt critical, just that he gets better with time and seeks care if he is worsening.

## 2020-08-03 ENCOUNTER — ANTI-COAG VISIT (OUTPATIENT)
Dept: FAMILY MEDICINE CLINIC | Age: 65
End: 2020-08-03

## 2020-08-03 ENCOUNTER — HOSPITAL ENCOUNTER (EMERGENCY)
Age: 65
Discharge: HOME | End: 2020-08-04
Attending: EMERGENCY MEDICINE

## 2020-08-03 ENCOUNTER — TELEPHONE (OUTPATIENT)
Dept: FAMILY MEDICINE CLINIC | Age: 65
End: 2020-08-03

## 2020-08-03 ENCOUNTER — OFFICE VISIT (OUTPATIENT)
Dept: FAMILY MEDICINE CLINIC | Age: 65
End: 2020-08-03

## 2020-08-03 VITALS
BODY MASS INDEX: 42.72 KG/M2 | HEART RATE: 84 BPM | TEMPERATURE: 97.6 F | OXYGEN SATURATION: 98 % | WEIGHT: 315 LBS | DIASTOLIC BLOOD PRESSURE: 84 MMHG | SYSTOLIC BLOOD PRESSURE: 124 MMHG

## 2020-08-03 VITALS
DIASTOLIC BLOOD PRESSURE: 84 MMHG | HEIGHT: 72 IN | SYSTOLIC BLOOD PRESSURE: 148 MMHG | RESPIRATION RATE: 16 BRPM | OXYGEN SATURATION: 97 % | HEART RATE: 84 BPM | BODY MASS INDEX: 42.66 KG/M2 | TEMPERATURE: 98.1 F | WEIGHT: 315 LBS

## 2020-08-03 LAB
ABO/RH: NORMAL
ALBUMIN SERPL-MCNC: 2.6 G/DL (ref 3.5–5.2)
ALP BLD-CCNC: 45 U/L (ref 40–129)
ALT SERPL-CCNC: 23 U/L (ref 0–40)
ANION GAP SERPL CALCULATED.3IONS-SCNC: 10 MMOL/L (ref 7–16)
ANTIBODY SCREEN: NORMAL
AST SERPL-CCNC: 25 U/L (ref 0–39)
BASOPHILS ABSOLUTE: 0.05 E9/L (ref 0–0.2)
BASOPHILS RELATIVE PERCENT: 0.6 % (ref 0–2)
BILIRUB SERPL-MCNC: 0.4 MG/DL (ref 0–1.2)
BUN BLDV-MCNC: 13 MG/DL (ref 8–23)
CALCIUM SERPL-MCNC: 6.4 MG/DL (ref 8.6–10.2)
CHLORIDE BLD-SCNC: 114 MMOL/L (ref 98–107)
CO2: 19 MMOL/L (ref 22–29)
CREAT SERPL-MCNC: 0.6 MG/DL (ref 0.7–1.2)
EOSINOPHILS ABSOLUTE: 0.18 E9/L (ref 0.05–0.5)
EOSINOPHILS RELATIVE PERCENT: 2.3 % (ref 0–6)
GFR AFRICAN AMERICAN: >60
GFR NON-AFRICAN AMERICAN: >60 ML/MIN/1.73
GLUCOSE BLD-MCNC: 55 MG/DL (ref 74–99)
HCT VFR BLD CALC: 39.6 % (ref 37–54)
HEMOGLOBIN: 13.9 G/DL (ref 12.5–16.5)
IMMATURE GRANULOCYTES #: 0.06 E9/L
IMMATURE GRANULOCYTES %: 0.8 % (ref 0–5)
INR BLD: 7.4
INTERNATIONAL NORMALIZATION RATIO, POC: >8
LYMPHOCYTES ABSOLUTE: 2.01 E9/L (ref 1.5–4)
LYMPHOCYTES RELATIVE PERCENT: 25.8 % (ref 20–42)
MAGNESIUM: 1.3 MG/DL (ref 1.6–2.6)
MCH RBC QN AUTO: 28.3 PG (ref 26–35)
MCHC RBC AUTO-ENTMCNC: 35.1 % (ref 32–34.5)
MCV RBC AUTO: 80.7 FL (ref 80–99.9)
MONOCYTES ABSOLUTE: 0.91 E9/L (ref 0.1–0.95)
MONOCYTES RELATIVE PERCENT: 11.7 % (ref 2–12)
NEUTROPHILS ABSOLUTE: 4.57 E9/L (ref 1.8–7.3)
NEUTROPHILS RELATIVE PERCENT: 58.8 % (ref 43–80)
PDW BLD-RTO: 15.4 FL (ref 11.5–15)
PLATELET # BLD: 323 E9/L (ref 130–450)
PMV BLD AUTO: 10.4 FL (ref 7–12)
POTASSIUM REFLEX MAGNESIUM: 2.3 MMOL/L (ref 3.5–5)
PROTHROMBIN TIME, POC: >96
PROTHROMBIN TIME: 91.3 SEC (ref 9.3–12.4)
RBC # BLD: 4.91 E12/L (ref 3.8–5.8)
REASON FOR REJECTION: NORMAL
REJECTED TEST: NORMAL
SODIUM BLD-SCNC: 143 MMOL/L (ref 132–146)
TOTAL PROTEIN: 4.9 G/DL (ref 6.4–8.3)
WBC # BLD: 7.8 E9/L (ref 4.5–11.5)

## 2020-08-03 PROCEDURE — 83735 ASSAY OF MAGNESIUM: CPT

## 2020-08-03 PROCEDURE — 86850 RBC ANTIBODY SCREEN: CPT

## 2020-08-03 PROCEDURE — 6370000000 HC RX 637 (ALT 250 FOR IP): Performed by: EMERGENCY MEDICINE

## 2020-08-03 PROCEDURE — 85025 COMPLETE CBC W/AUTO DIFF WBC: CPT

## 2020-08-03 PROCEDURE — 99214 OFFICE O/P EST MOD 30 MIN: CPT | Performed by: FAMILY MEDICINE

## 2020-08-03 PROCEDURE — 96361 HYDRATE IV INFUSION ADD-ON: CPT

## 2020-08-03 PROCEDURE — 6360000002 HC RX W HCPCS: Performed by: EMERGENCY MEDICINE

## 2020-08-03 PROCEDURE — 85610 PROTHROMBIN TIME: CPT

## 2020-08-03 PROCEDURE — 86900 BLOOD TYPING SEROLOGIC ABO: CPT

## 2020-08-03 PROCEDURE — 96360 HYDRATION IV INFUSION INIT: CPT

## 2020-08-03 PROCEDURE — 80053 COMPREHEN METABOLIC PANEL: CPT

## 2020-08-03 PROCEDURE — 85610 PROTHROMBIN TIME: CPT | Performed by: FAMILY MEDICINE

## 2020-08-03 PROCEDURE — 99283 EMERGENCY DEPT VISIT LOW MDM: CPT

## 2020-08-03 PROCEDURE — 86901 BLOOD TYPING SEROLOGIC RH(D): CPT

## 2020-08-03 PROCEDURE — 36415 COLL VENOUS BLD VENIPUNCTURE: CPT

## 2020-08-03 RX ORDER — POTASSIUM CHLORIDE 20 MEQ/1
40 TABLET, EXTENDED RELEASE ORAL ONCE
Status: COMPLETED | OUTPATIENT
Start: 2020-08-03 | End: 2020-08-03

## 2020-08-03 RX ORDER — POTASSIUM CHLORIDE 7.45 MG/ML
10 INJECTION INTRAVENOUS ONCE
Status: COMPLETED | OUTPATIENT
Start: 2020-08-03 | End: 2020-08-03

## 2020-08-03 RX ORDER — PHYTONADIONE 5 MG/1
10 TABLET ORAL ONCE
Status: COMPLETED | OUTPATIENT
Start: 2020-08-03 | End: 2020-08-04

## 2020-08-03 RX ORDER — LANOLIN ALCOHOL/MO/W.PET/CERES
400 CREAM (GRAM) TOPICAL DAILY
Qty: 30 TABLET | Refills: 0 | Status: SHIPPED | OUTPATIENT
Start: 2020-08-03 | End: 2021-04-02

## 2020-08-03 RX ORDER — POTASSIUM CHLORIDE 20 MEQ/1
20 TABLET, EXTENDED RELEASE ORAL DAILY
Qty: 30 TABLET | Refills: 0 | Status: SHIPPED | OUTPATIENT
Start: 2020-08-03 | End: 2021-04-02

## 2020-08-03 RX ADMIN — POTASSIUM CHLORIDE 40 MEQ: 20 TABLET, EXTENDED RELEASE ORAL at 20:59

## 2020-08-03 RX ADMIN — MAGNESIUM OXIDE TAB 400 MG (241.3 MG ELEMENTAL MG) 400 MG: 400 (241.3 MG) TAB at 22:17

## 2020-08-03 RX ADMIN — POTASSIUM CHLORIDE 10 MEQ: 7.46 INJECTION, SOLUTION INTRAVENOUS at 21:00

## 2020-08-03 NOTE — TELEPHONE ENCOUNTER
He called back and said that he is symptom free. He had fever for only one day and is going to keep his appointment today.

## 2020-08-03 NOTE — PROGRESS NOTES
[facility]  Office Progress Note - Dr. Tobias Nelson  8/3/20    Chief Complaint   Patient presents with    Office Visit for Anticoagulation Management    Hypertension    Diabetes        HPI: COVID 19  Dx on 7/20  Felt fatigued for a few days and more SOB, had \"low grade fever for one day\" at 99. Has been Asx for 10 days w/o fevers or otherwise. Was told he no longer had to quarantine by health Dept last Friday. No lingering symptoms noted. He was Tx with cefdinir and doxycycline from the ED upon Dx for possible concomitant bacterial pneumonia. He has completed this Abx. A fib  His INR today is >8 in the office with bruising noted but no active bleeding. Continues 11mg coumadin daily. Took an extra dose ~3 weeks ago, otherwise accurate. His BS has been looking better in the mornings. Dec diet with illness recently. Some rare hypoglycemic symptoms. Has continued same  Med and insulin regimen since last visit. A1c >9, encouraged to work on it harder. Has been 2 months. FBS in am recently 110s-120s. He requested tramadol for leg pain  He has been receiving lymphedema therapy and it has helped greatly with leg swelling. He has compression lymphedema \"wraps\" in place. They are uncomofrtable but he is wearing. Swelling improved. Discussed could try cutting back on lasix.          ______________________________________________________  Family History   Problem Relation Age of Onset    High Blood Pressure Mother     Cancer Mother         lymphoma    Stroke Mother     Diabetes Father     Heart Surgery Sister         heart valve replacement    Heart Disease Brother         pt was waiting for a heart transplant    Cancer Maternal Grandfather     Cancer Paternal Grandmother     Cancer Paternal Grandfather        Past Surgical History:   Procedure Laterality Date    APPENDECTOMY      CHOLECYSTECTOMY      CYST INCISION AND DRAINAGE  06/08/2017    abd wall cyst    HERNIA REPAIR      HIP ARTHROPLASTY Left        Social History     Tobacco Use    Smoking status: Never Smoker    Smokeless tobacco: Never Used   Substance Use Topics    Alcohol use: No     Alcohol/week: 0.0 standard drinks     Comment: drinks occasional iced tea/coffee    Drug use: No     ______________________________________________________  ROS: POSITIVE:leg pain  Otherwise:  No CP, No palpitations,   No sob, No cough,   No abd pain, No heartburn,   No headaches, No vision changes, No hearing changes,   No tingling, No numbness, No weakness,   No bowel changes, No hematochezia, No melena,  No bladder changes, No hematuria  No skin rashes, No skin lesions. No polyuria, polydipsia, polyphagia. Stable mood. ROS otherwise negative unless as listed in HPI. Chart reviewed and updated where appropriate for PMH, Fam, and Soc Hx.  _______________________________________________________  Physical Exam   /84   Pulse 84   Temp 97.6 °F (36.4 °C)   Wt (!) 315 lb (142.9 kg)   SpO2 98%   BMI 42.72 kg/m²   Wt Readings from Last 3 Encounters:   08/03/20 (!) 315 lb (142.9 kg)   08/03/20 (!) 315 lb (142.9 kg)   07/20/20 (!) 330 lb (149.7 kg)       Constitutional:    He is oriented to person, place, and time. He appears well-developed and well-nourished. HENT:    Nose: Nose normal.    Mouth/Throat: Oropharynx is clear and moist.   Eyes:    Conjunctivae are normal.    Pupils are equal, round, and reactive to light. EOMI. Cardiovascular:    Normal rate, irreg irreg rhythm and normal heart sounds. No murmur. No gallop and no friction rub. Pulmonary/Chest:    Effort normal and breath sounds normal.    No wheezes. No rales or rhonchi. CTAB  Abdominal:    Soft. Obese. Bowel sounds are normal.    No distension. No tenderness. Musculoskeletal:    Normal range of motion. No joint swelling noted.     Unable to assess peripheral edema in legs with wraps on, but legs look less swollen compared to previous visits. .  Skin:    Skin is warm and dry. No rashes, lesions. Psychiatric:    He has a normal mood and affect. Normal groom and dress. No SI or HI.   ________________________________________________________  Current Outpatient Medications on File Prior to Visit   Medication Sig Dispense Refill    albuterol sulfate HFA (VENTOLIN HFA) 108 (90 Base) MCG/ACT inhaler Inhale 2 puffs into the lungs every 4-6 hours as needed for Wheezing or Shortness of Breath 1 Inhaler 0    Alpha-Lipoic Acid 600 MG TABS Take 1 Bottle by mouth daily 60 tablet 1    furosemide (LASIX) 20 MG tablet Take 1 tablet by mouth once daily 90 tablet 1    carvedilol (COREG) 25 MG tablet Take 1 tablet by mouth 2 times daily 180 tablet 1    dilTIAZem (CARDIZEM CD) 240 MG extended release capsule Take 1 capsule by mouth daily 90 capsule 1    warfarin (COUMADIN) 2 MG tablet TAKE AS DIRECTED ACCORDING  TO  WARFARIN  REGIMEN 90 tablet 0    warfarin (COUMADIN) 10 MG tablet TAKE ACCORDING TO COUMADIN REGIMEN 90 tablet 3    metFORMIN (GLUCOPHAGE) 1000 MG tablet Take 1 tablet by mouth 2 times daily (with meals) 180 tablet 3    CPAP Machine MISC by Does not apply route nightly      Insulin NPH Isophane & Regular (NOVOLIN 70/30 SC) Inject 30 Units into the skin 2 times daily        No current facility-administered medications on file prior to visit.         Patient Active Problem List   Diagnosis Code    Degenerative joint disease of left hip M16.12    Mixed hyperlipidemia E78.2    Anticoagulated on Coumadin Z79.01    Essential hypertension I10    Paroxysmal atrial fibrillation (HCC) I48.0    Morbid obesity due to excess calories (HCC) E66.01    Dyslipidemia E78.5    Controlled type 2 diabetes mellitus without complication, without long-term current use of insulin (HCC) E11.9    AMIRAH on CPAP G47.33, Z99.89    Radiculopathy of lumbosacral region M54.17    Statin intolerance Z78.9 ________________________________________________________  Assessment / Caryl Francois was seen today for office visit for anticoagulation management, hypertension and diabetes. Diagnoses and all orders for this visit:    Paroxysmal atrial fibrillation ([address]  Supratherapeutic INR  -     POCT INR  Hold coumadin today. To the ED for Vit K and true INR check - I dont know if its 8 or 13 as office reader only goes up to \">8\"  Back tomorrow afternoon for INR. Controlled type 2 diabetes mellitus without complication, without long-term current use of insulin (HCC)  -     glipiZIDE (GLUCOTROL) 10 MG tablet; TAKE 1 TABLET BY MOUTH TWICE DAILY BEFORE  MEALS  Continue same med regimen as above, no changes. FBS improved. Expect A1c <9 at check next month. Bilateral leg pain  -     traMADol (ULTRAM) 50 MG tablet; Take 1 tablet by mouth daily as needed (leg pain) for up to 30 days. Not nsaid candidate. COVID-19  No longer in quarantine. No lasting symptoms at this point. Normal lung exam.   Seems recovered. Recent abx though have raised INR. Other orders - refills. -     benazepril (LOTENSIN) 20 MG tablet; TAKE 1 TABLET BY MOUTH TWICE DAILY  -     hydroCHLOROthiazide (HYDRODIURIL) 25 MG tablet; TAKE 1 TABLET BY MOUTH ONCE DAILY FOR BLOOD PRESSURE    Return in about 1 month (around 9/3/2020). Patient counseled to follow up sooner or seek more acute care if symptoms worsening or not improving according to plan. .     Electronically signed by Dimple Garcia MD on 8/4/2020    This note may have been created using dictation software.  Efforts were made to reduce grammatical or syntax errors, but some may persist.

## 2020-08-04 ENCOUNTER — NURSE ONLY (OUTPATIENT)
Dept: FAMILY MEDICINE CLINIC | Age: 65
End: 2020-08-04

## 2020-08-04 ENCOUNTER — ANTI-COAG VISIT (OUTPATIENT)
Dept: FAMILY MEDICINE CLINIC | Age: 65
End: 2020-08-04

## 2020-08-04 LAB
INTERNATIONAL NORMALIZATION RATIO, POC: 2.8
PROTHROMBIN TIME, POC: 33.6

## 2020-08-04 PROCEDURE — 85610 PROTHROMBIN TIME: CPT | Performed by: FAMILY MEDICINE

## 2020-08-04 PROCEDURE — 6370000000 HC RX 637 (ALT 250 FOR IP): Performed by: EMERGENCY MEDICINE

## 2020-08-04 PROCEDURE — 93793 ANTICOAG MGMT PT WARFARIN: CPT | Performed by: FAMILY MEDICINE

## 2020-08-04 RX ORDER — HYDROCHLOROTHIAZIDE 25 MG/1
TABLET ORAL
Qty: 90 TABLET | Refills: 1 | Status: SHIPPED
Start: 2020-08-04 | End: 2021-02-11

## 2020-08-04 RX ORDER — BENAZEPRIL HYDROCHLORIDE 20 MG/1
TABLET ORAL
Qty: 180 TABLET | Refills: 1 | Status: SHIPPED
Start: 2020-08-04 | End: 2021-01-26

## 2020-08-04 RX ORDER — TRAMADOL HYDROCHLORIDE 50 MG/1
50 TABLET ORAL DAILY PRN
Qty: 30 TABLET | Refills: 0 | Status: SHIPPED | OUTPATIENT
Start: 2020-08-04 | End: 2020-09-03

## 2020-08-04 RX ORDER — GLIPIZIDE 10 MG/1
TABLET ORAL
Qty: 180 TABLET | Refills: 1 | Status: SHIPPED
Start: 2020-08-04 | End: 2021-02-11

## 2020-08-04 RX ADMIN — PHYTONADIONE 10 MG: 5 TABLET ORAL at 00:00

## 2020-08-04 NOTE — ED PROVIDER NOTES
Department of Emergency Medicine   ED  Provider Note  Admit Date/RoomTime: 8/3/2020  7:22 PM  ED Room: 21/21  MRN: [id number]  Chief Complaint: Abnormal Lab (elevated INR.  >8 @ PCP. was taking 2 abx last week. takes coumadin)       History of Present Illness   Source of history provided by:  patient. History/Exam Limitations: none. Iggy Rodriguez is a 59 y.o. male who has a past medical history of:   Past Medical History:   Diagnosis Date    Anticoagulant long-term use     Atrial fibrillation (HCC)     CHF (congestive heart failure) (HCC)     Degenerative joint disease of left hip 1/28/2015    Diabetes mellitus ([address]     Hyperlipidemia     Hypertension     Obesity     PONV (postoperative nausea and vomiting)     Radiculopathy of lumbosacral region 7/25/2017    Radiculopathy of lumbosacral region 7/25/2017    Sleep apnea     Type II or unspecified type diabetes mellitus without mention of complication, not stated as uncontrolled      Patient presents to the emergency department for evaluation of supratherapeutic INR. He states that he was diagnosed 2 weeks ago with COVID-19 infection as well as antibiotics. He saw his PCP today for an INR check and his INR was found to be over 8. His doctor thought this was likely secondary to the antibiotic use. Patient was sent in to the ED for vitamin K. Patient denies any rectal bleeding or hematuria. He has had no worsening shortness of breath or chest pain. He denies any focal symptoms. ROS    Pertinent positives and negatives are stated within HPI, all other systems reviewed and are negative. Past Surgical History:   Procedure Laterality Date    APPENDECTOMY      CHOLECYSTECTOMY      CYST INCISION AND DRAINAGE  06/08/2017    abd wall cyst    HERNIA REPAIR      HIP ARTHROPLASTY Left    Social History:  reports that he has never smoked. He has never used smokeless tobacco. He reports that he does not drink alcohol or use drugs.   Family History: family history includes Cancer in his maternal grandfather, mother, paternal grandfather, and paternal grandmother; Diabetes in his father; Heart Disease in his brother; Heart Surgery in his sister; High Blood Pressure in his mother; Stroke in his mother. Allergies: Lipitor and Other    Physical Exam   Oxygen Saturation Interpretation: Normal.   ED Triage Vitals   BP Temp Temp Source Pulse Resp SpO2 Height Weight   08/03/20 1807 08/03/20 1713 08/03/20 1713 08/03/20 1713 08/03/20 1713 08/03/20 1713 08/03/20 1808 08/03/20 1808   (!) 145/88 97.5 °F (36.4 °C) Temporal 98 16 98 % 6' (1.829 m) (!) 315 lb (142.9 kg)       Physical Exam  · Constitutional/General: Alert and oriented x3, well appearing, non toxic  · HEENT:  NC/NT. PERRLA,  Airway patent. · Neck: Supple, full ROM, non tender to palpation in the midline, no stridor, no crepitus, no meningeal signs  · Respiratory: Lungs clear to auscultation bilaterally, no wheezes, rales, or rhonchi. Not in respiratory distress  · CV:  Regular rate. Regular rhythm. No murmurs, gallops, or rubs. 2+ distal pulses  · Chest: No chest wall tenderness  · GI:  Abdomen Soft, Non tender, Non distended. +BS. No rebound, guarding, or rigidity. No pulsatile masses. · Musculoskeletal: Moves all extremities x 4. Warm and well perfused, no clubbing, cyanosis, or edema. Capillary refill <3 seconds  · Integument: skin warm and dry. No rashes.    · Lymphatic: no lymphadenopathy noted  · Neurologic: GCS 15, no focal deficits, symmetric strength 5/5 in the upper and lower extremities bilaterally  · Psychiatric: Normal Affect    Lab / Imaging Results   (All laboratory and radiology results have been personally reviewed by myself)  Labs:  Results for orders placed or performed during the hospital encounter of 08/03/20   Protime-INR   Result Value Ref Range    Protime 91.3 (H) 9.3 - 12.4 sec    INR 7.4 (HH)    CBC auto differential   Result Value Ref Range    WBC 7.8 4.5 - 11.5 E9/L (Peripheral Line) (0 mEq Intravenous Stopped 8/3/20 2254)   potassium chloride (KLOR-CON M) extended release tablet 40 mEq (40 mEq Oral Given 8/3/20 2059)   magnesium oxide (MAG-OX) tablet 400 mg (400 mg Oral Given 8/3/20 2217)        Re-examination:  8/3/20       Time: Patient feels better after boxed lunch. Consult(s):   None    Procedure(s):   none    MDM:   Patient presents to the ED for evaluation of supratherapeutic INR. He started to feel as though his sugar was low. Glucose was 55. He ate a box lunch and reports feeling much better. Potassium and magnesium were low at 2.3 and 1.3, respectively. These were replaced and he was given prescriptions for supplements. INR was 7.4. Patient given a dose of vitamin K and will hold his Coumadin. He has an appointment with his PCP tomorrow for an INR recheck. All questions were answered and he was discharged home in stable condition. Counseling: The emergency provider has spoken with the patient and discussed todays results, in addition to providing specific details for the plan of care and counseling regarding the diagnosis and prognosis. Questions are answered at this time and they are agreeable with the plan. Assessment      1. Supratherapeutic INR    2. Hypokalemia    3. Hypomagnesemia      Plan   Discharge to home  Patient condition is stable    New Medications     New Prescriptions    MAGNESIUM OXIDE (MAG-OX) 400 (240 MG) MG TABLET    Take 1 tablet by mouth daily    POTASSIUM CHLORIDE (KLOR-CON M) 20 MEQ EXTENDED RELEASE TABLET    Take 1 tablet by mouth daily     Electronically signed by Aline Cortez DO   DD: 8/3/20  **This report was transcribed using voice recognition software. Every effort was made to ensure accuracy; however, inadvertent computerized transcription errors may be present.   END OF ED PROVIDER NOTE        Aline Cortez DO  08/03/20 2614

## 2020-08-10 ENCOUNTER — CARE COORDINATION (OUTPATIENT)
Dept: CARE COORDINATION | Age: 65
End: 2020-08-10

## 2020-08-10 NOTE — CARE COORDINATION
Patient contacted regarding COVID-19 risk and screening. This author contacted the patient by telephone to perform follow-up call. Verified name and  with patient as identifiers. Symptoms reviewed with patient. Patient reports symptoms are improving. Due to no new or worsening symptoms the RN CTN/ACM was not notified for escalation. This author reviewed discharge instructions, medical action plan and red flags such as increased shortness of breath, increasing fever, worsening cough or chest pain with patient who verbalized understanding. Discussed exposure protocols and quarantine with CDC Guidelines What To Do If You Are Sick    Patient who was given an opportunity for questions and concerns. The patient agrees to contact the [facility] exposure line [phone], local health department [place] [facility]: ([phone])VPT PCP office for questions related to their healthcare. Author provided contact information for future reference. Spoke with patient who reports he is feeling better. Report no concerns with SOB, fever, cough. Patient reports that he did follow up with his doctor last week however reports he was sent to ED again for evaluation of INR. Patient reports no new concerns. Will close episode.

## 2020-08-11 ENCOUNTER — ANTI-COAG VISIT (OUTPATIENT)
Dept: FAMILY MEDICINE CLINIC | Age: 65
End: 2020-08-11

## 2020-08-11 ENCOUNTER — NURSE ONLY (OUTPATIENT)
Dept: FAMILY MEDICINE CLINIC | Age: 65
End: 2020-08-11

## 2020-08-11 LAB
INTERNATIONAL NORMALIZATION RATIO, POC: 2.4
PROTHROMBIN TIME, POC: 29.4

## 2020-08-11 PROCEDURE — 85610 PROTHROMBIN TIME: CPT | Performed by: FAMILY MEDICINE

## 2020-08-11 PROCEDURE — 93793 ANTICOAG MGMT PT WARFARIN: CPT | Performed by: FAMILY MEDICINE

## 2020-09-03 ENCOUNTER — OFFICE VISIT (OUTPATIENT)
Dept: FAMILY MEDICINE CLINIC | Age: 65
End: 2020-09-03

## 2020-09-03 ENCOUNTER — ANTI-COAG VISIT (OUTPATIENT)
Dept: FAMILY MEDICINE CLINIC | Age: 65
End: 2020-09-03

## 2020-09-03 VITALS
OXYGEN SATURATION: 97 % | DIASTOLIC BLOOD PRESSURE: 84 MMHG | HEART RATE: 88 BPM | TEMPERATURE: 97.8 F | WEIGHT: 305 LBS | SYSTOLIC BLOOD PRESSURE: 132 MMHG | BODY MASS INDEX: 41.37 KG/M2

## 2020-09-03 LAB
HBA1C MFR BLD: 7.3 %
INTERNATIONAL NORMALIZATION RATIO, POC: 4.1
PROTHROMBIN TIME, POC: 48.9

## 2020-09-03 PROCEDURE — 85610 PROTHROMBIN TIME: CPT | Performed by: FAMILY MEDICINE

## 2020-09-03 PROCEDURE — 3051F HG A1C>EQUAL 7.0%<8.0%: CPT | Performed by: FAMILY MEDICINE

## 2020-09-03 PROCEDURE — 83036 HEMOGLOBIN GLYCOSYLATED A1C: CPT | Performed by: FAMILY MEDICINE

## 2020-09-03 PROCEDURE — 99213 OFFICE O/P EST LOW 20 MIN: CPT | Performed by: FAMILY MEDICINE

## 2020-09-03 RX ORDER — KETOCONAZOLE 20 MG/G
CREAM TOPICAL
Qty: 30 G | Refills: 1 | Status: SHIPPED
Start: 2020-09-03 | End: 2021-08-06

## 2020-09-03 RX ORDER — DILTIAZEM HYDROCHLORIDE 240 MG/1
240 CAPSULE, COATED, EXTENDED RELEASE ORAL DAILY
Qty: 90 CAPSULE | Refills: 1 | Status: SHIPPED
Start: 2020-09-03 | End: 2021-05-17

## 2020-09-03 NOTE — PROGRESS NOTES
[facility]  Office Progress Note - Dr. Damon Morning  9/3/20    Chief Complaint   Patient presents with    Diabetes    Office Visit for Anticoagulation Management        HPI:   Diabetes mellitus  Follow-up - good improvement  Has lost some weight. Lab Results   Component Value Date    LABA1C 7.3 09/03/2020    LABA1C 9.2 05/22/2020    LABA1C 9.2 01/17/2020     Lab Results   Component Value Date    LABMICR <12.0 10/14/2019    LDLCALC 95 10/14/2019    CREATININE 0.6 (L) 08/03/2020     Wt Readings from Last 3 Encounters:   09/03/20 (!) 305 lb (138.3 kg)   08/03/20 (!) 315 lb (142.9 kg)   08/03/20 (!) 315 lb (142.9 kg)     Patient continues diabetic medication regimen. Compliance is good. No side effects of medications noted. Diabetes is fairly adequately controlled. Peripheral neuropathy is  present. Regular foot exams encouraged. Vision changes are not present. Yearly eye exam encouraged. A fib  Feels well. INR elevated again , now at 4.1. Will dec weekly schedule to 10mg MWF and 11mg on other days.    Hold next dose.     ______________________________________________________  Family History   Problem Relation Age of Onset    High Blood Pressure Mother     Cancer Mother         lymphoma    Stroke Mother     Diabetes Father     Heart Surgery Sister         heart valve replacement    Heart Disease Brother         pt was waiting for a heart transplant    Cancer Maternal Grandfather     Cancer Paternal Grandmother     Cancer Paternal Grandfather        Past Surgical History:   Procedure Laterality Date    APPENDECTOMY      CHOLECYSTECTOMY      CYST INCISION AND DRAINAGE  06/08/2017    abd wall cyst    HERNIA REPAIR      HIP ARTHROPLASTY Left        Social History     Tobacco Use    Smoking status: Never Smoker    Smokeless tobacco: Never Used   Substance Use Topics    Alcohol use: No     Alcohol/week: 0.0 standard drinks     Comment: drinks occasional iced tea/coffee    Drug use: No     ______________________________________________________  ROS: POSITIVE:  Otherwise:  No CP, No palpitations,   No sob, No cough,   No abd pain, No heartburn,   No headaches, No vision changes, No hearing changes,   No tingling, No numbness, No weakness,   No bowel changes, No hematochezia, No melena,  No bladder changes, No hematuria  No skin rashes, No skin lesions. No polyuria, polydipsia, polyphagia. Stable mood. ROS otherwise negative unless as listed in HPI. Chart reviewed and updated where appropriate for PMH, Fam, and Soc Hx.  _______________________________________________________  Physical Exam   /84   Pulse 88   Temp 97.8 °F (36.6 °C)   Wt (!) 305 lb (138.3 kg)   SpO2 97%   BMI 41.37 kg/m²   Wt Readings from Last 3 Encounters:   09/03/20 (!) 305 lb (138.3 kg)   08/03/20 (!) 315 lb (142.9 kg)   08/03/20 (!) 315 lb (142.9 kg)       Constitutional:    He is oriented to person, place, and time. He appears well-developed and well-nourished. Cardiovascular:    Normal rate, irreg irreg rhythm and normal heart sounds. No murmur. No gallop and no friction rub. Pulmonary/Chest:    Effort normal and breath sounds normal.    No wheezes. No rales or rhonchi. Abdominal:    Soft. Obese. Bowel sounds are normal.    No distension. No tenderness. Musculoskeletal:    Normal range of motion. No joint swelling noted. +1-2 peripheral edema. Skin:    Skin is warm and dry. No rashes, lesions. Psychiatric:    He has a normal mood and affect. Normal groom and dress.  No SI or HI.   ________________________________________________________  Current Outpatient Medications on File Prior to Visit   Medication Sig Dispense Refill    glipiZIDE (GLUCOTROL) 10 MG tablet TAKE 1 TABLET BY MOUTH TWICE DAILY BEFORE  MEALS 180 tablet 1    benazepril (LOTENSIN) 20 MG tablet TAKE 1 TABLET BY MOUTH TWICE DAILY 180 tablet 1    hydroCHLOROthiazide (HYDRODIURIL) 25 MG tablet TAKE 1 TABLET mellitus without complication, without long-term current use of insulin (HCC)  -     POCT glycosylated hemoglobin (Hb A1C)  Good improvement  Continue weight loss ad better diet. Persistent atrial fibrillation  -     POCT INR  -     dilTIAZem (CARDIZEM CD) 240 MG extended release capsule; Take 1 capsule by mouth daily  Lower weekly warfarin 10 MWF and 11 others. Other orders  -     ketoconazole (NIZORAL) 2 % cream; Apply topically daily. Tinea cole type rash on the left leg near his comp stockings. RTo 3 months  Patient counseled to follow up sooner or seek more acute care if symptoms worsening or not improving according to plan. .     Electronically signed by Anju Stark MD on 9/5/2020    This note may have been created using dictation software.  Efforts were made to reduce grammatical or syntax errors, but some may persist.

## 2020-09-05 PROBLEM — I48.19 PERSISTENT ATRIAL FIBRILLATION (HCC): Status: ACTIVE | Noted: 2020-09-05

## 2020-09-17 ENCOUNTER — NURSE ONLY (OUTPATIENT)
Dept: FAMILY MEDICINE CLINIC | Age: 65
End: 2020-09-17

## 2020-09-17 ENCOUNTER — ANTI-COAG VISIT (OUTPATIENT)
Dept: FAMILY MEDICINE CLINIC | Age: 65
End: 2020-09-17

## 2020-09-17 LAB
INTERNATIONAL NORMALIZATION RATIO, POC: 2.8
PROTHROMBIN TIME, POC: 34.2

## 2020-09-17 PROCEDURE — 93793 ANTICOAG MGMT PT WARFARIN: CPT | Performed by: FAMILY MEDICINE

## 2020-09-17 PROCEDURE — 85610 PROTHROMBIN TIME: CPT | Performed by: FAMILY MEDICINE

## 2020-10-01 ENCOUNTER — NURSE ONLY (OUTPATIENT)
Dept: FAMILY MEDICINE CLINIC | Age: 65
End: 2020-10-01

## 2020-10-01 ENCOUNTER — ANTI-COAG VISIT (OUTPATIENT)
Dept: FAMILY MEDICINE CLINIC | Age: 65
End: 2020-10-01

## 2020-10-01 LAB
INTERNATIONAL NORMALIZATION RATIO, POC: 2.7
PROTHROMBIN TIME, POC: 32.9

## 2020-10-01 PROCEDURE — 85610 PROTHROMBIN TIME: CPT | Performed by: FAMILY MEDICINE

## 2020-10-01 PROCEDURE — 93793 ANTICOAG MGMT PT WARFARIN: CPT | Performed by: FAMILY MEDICINE

## 2020-10-07 RX ORDER — WARFARIN SODIUM 2 MG/1
TABLET ORAL
Qty: 90 TABLET | Refills: 0 | Status: SHIPPED
Start: 2020-10-07 | End: 2021-06-08 | Stop reason: SDUPTHER

## 2020-10-22 RX ORDER — WARFARIN SODIUM 10 MG/1
TABLET ORAL
Qty: 90 TABLET | Refills: 1 | Status: SHIPPED
Start: 2020-10-22 | End: 2021-05-04

## 2020-10-22 NOTE — TELEPHONE ENCOUNTER
Last Appointment:  9/3/2020  Future Appointments   Date Time Provider Edmar Méndezi   11/2/2020 10:00 AM SCHEDULE, POLLY MONTEZ [facility]   11/18/2020  1:15 PM Rachel Nuñez DPM Col Podiatry [facility]   12/2/2020  1:40 PM Tyler Yepez  W [address] [place] [address]

## 2020-11-02 ENCOUNTER — ANTI-COAG VISIT (OUTPATIENT)
Dept: FAMILY MEDICINE CLINIC | Age: 65
End: 2020-11-02

## 2020-11-02 ENCOUNTER — NURSE ONLY (OUTPATIENT)
Dept: FAMILY MEDICINE CLINIC | Age: 65
End: 2020-11-02
Payer: MEDICARE

## 2020-11-02 LAB
INTERNATIONAL NORMALIZATION RATIO, POC: 2.6
PROTHROMBIN TIME, POC: 30.6

## 2020-11-02 PROCEDURE — 85610 PROTHROMBIN TIME: CPT | Performed by: FAMILY MEDICINE

## 2020-11-02 PROCEDURE — 93793 ANTICOAG MGMT PT WARFARIN: CPT | Performed by: FAMILY MEDICINE

## 2020-11-24 ENCOUNTER — PROCEDURE VISIT (OUTPATIENT)
Dept: PODIATRY | Age: 65
End: 2020-11-24
Payer: MEDICARE

## 2020-11-24 PROCEDURE — 11721 DEBRIDE NAIL 6 OR MORE: CPT | Performed by: PODIATRIST

## 2020-11-24 PROCEDURE — 11056 PARNG/CUTG B9 HYPRKR LES 2-4: CPT | Performed by: PODIATRIST

## 2020-11-24 NOTE — PROGRESS NOTES
Providence Simmonds is here today for a diabetic foot exam and nail care. his PCP is Graham Arambula MD last OV was 20. Patient states he trimmed his own toenails and cut the 4th toe on his right foot too short. Providence Simmonds : 1955 Sex: male  Age: 72 y.o. Patient was referred by Graham Arambula MD    CC:   Follow-up swelling right foot and right ankle  Follow-up diabetic foot ankle exam      HPI:   Follow-up diabetic foot ankle exam.  Does have Velcro compression strap she does wear on the bottom of both lower legs. Denies any calf pain today. States he did try cutting his own nails and did cut his fourth toenail where it was bleeding several weeks ago. Does have tenderness all toenails due to length today. Did have Covid 2020. Denies any nausea vomiting fever chills shortness of breath today. Denies any cough or fever today. ROS:  Const: Denies constitutional symptoms  Musculo: Denies symptoms other than stated above  Skin: Denies symptoms other than stated above      Current Outpatient Medications:     warfarin (COUMADIN) 10 MG tablet, TAKE AS DIRECTED ACCORDING TO COUMADIN REGIMEN, Disp: 90 tablet, Rfl: 1    warfarin (COUMADIN) 2 MG tablet, TAKE AS DIRECTED ACCORDING TO WARFARIN REGIMEN, Disp: 90 tablet, Rfl: 0    metFORMIN (GLUCOPHAGE) 1000 MG tablet, TAKE 1 TABLET BY MOUTH TWICE DAILY WITH MEALS, Disp: 180 tablet, Rfl: 1    dilTIAZem (CARDIZEM CD) 240 MG extended release capsule, Take 1 capsule by mouth daily, Disp: 90 capsule, Rfl: 1    ketoconazole (NIZORAL) 2 % cream, Apply topically daily. , Disp: 30 g, Rfl: 1    glipiZIDE (GLUCOTROL) 10 MG tablet, TAKE 1 TABLET BY MOUTH TWICE DAILY BEFORE  MEALS, Disp: 180 tablet, Rfl: 1    benazepril (LOTENSIN) 20 MG tablet, TAKE 1 TABLET BY MOUTH TWICE DAILY, Disp: 180 tablet, Rfl: 1    hydroCHLOROthiazide (HYDRODIURIL) 25 MG tablet, TAKE 1 TABLET BY MOUTH ONCE DAILY FOR BLOOD PRESSURE, Disp: 90 tablet, Rfl: 1   non-palpable bilateral.    Capillary Refill Time:  Immediate return  Hair growth:  Symmetrical and bilateral   Skin:  Not atrophic  Edema: Mild edema bilateral feet. Mild edema bilateral ankles. Neurologic:  Light touch diminished bilateral.  Warm to coolness bilateral distal toes  Decreased epicritic sensation     Musculoskeletal/ Orthopedic examination:    Equinis: present bilateral  Dorsiflexion, plantarflexion, inversion, eversion bilateral 5 out of 5 muscle strength  Wiggling toes  Negative Homans      Dermatology examination:    Toenails 1 through 5 bilateral thickened, dystrophic, mycotic with subungual debris. Within normal limits for length. Web spaces 1 through 4 bilateral clean dry and intact. Mild edema bilateral lower legs improving. Hyperkeratotic tissue plantar first metatarsal head bilateral.  No open skin lesions or abrasions. Assessment and Plan:  Arnol Stark was seen today for callouses, nail problem and diabetes. Diagnoses and all orders for this visit:    Type 2 diabetes mellitus without complication, unspecified whether long term insulin use (HCC)    Localized edema    Tinea unguium    Corns and callosities    Encounter for current long-term use of anticoagulants      Follow-up diabetic foot ankle exam    Continue Alpha-lipoic acid 600mg once daily. Manual debridement with standard technique toenails 1 through 5 right and left in thickness and length. Patient tolerated procedure well. Paring of hyperkeratotic tissue with #15 blade plantar first metatarsal head bilateral.    Follow-up 2 months. Return in about 2 months (around 1/24/2021). Seen By:  Zoraida Swanson DPM      Document was created using voice recognition software. Note was reviewed however may contain grammatical errors.

## 2020-12-02 ENCOUNTER — ANTI-COAG VISIT (OUTPATIENT)
Dept: FAMILY MEDICINE CLINIC | Age: 65
End: 2020-12-02
Payer: MEDICARE

## 2020-12-02 ENCOUNTER — OFFICE VISIT (OUTPATIENT)
Dept: FAMILY MEDICINE CLINIC | Age: 65
End: 2020-12-02
Payer: MEDICARE

## 2020-12-02 VITALS
HEART RATE: 106 BPM | SYSTOLIC BLOOD PRESSURE: 128 MMHG | WEIGHT: 315 LBS | TEMPERATURE: 97.9 F | RESPIRATION RATE: 18 BRPM | DIASTOLIC BLOOD PRESSURE: 84 MMHG | BODY MASS INDEX: 42.66 KG/M2 | HEIGHT: 72 IN | OXYGEN SATURATION: 95 %

## 2020-12-02 DIAGNOSIS — I48.0 PAROXYSMAL ATRIAL FIBRILLATION (HCC): ICD-10-CM

## 2020-12-02 DIAGNOSIS — E11.9 CONTROLLED TYPE 2 DIABETES MELLITUS WITHOUT COMPLICATION, WITHOUT LONG-TERM CURRENT USE OF INSULIN (HCC): ICD-10-CM

## 2020-12-02 PROBLEM — I87.2 VENOUS STASIS ULCER OF LEFT CALF LIMITED TO BREAKDOWN OF SKIN WITHOUT VARICOSE VEINS (HCC): Status: ACTIVE | Noted: 2020-12-02

## 2020-12-02 PROBLEM — L97.221 VENOUS STASIS ULCER OF LEFT CALF LIMITED TO BREAKDOWN OF SKIN WITHOUT VARICOSE VEINS (HCC): Status: ACTIVE | Noted: 2020-12-02

## 2020-12-02 LAB
ALBUMIN SERPL-MCNC: 4 G/DL (ref 3.5–5.2)
ALP BLD-CCNC: 80 U/L (ref 40–129)
ALT SERPL-CCNC: 15 U/L (ref 0–40)
ANION GAP SERPL CALCULATED.3IONS-SCNC: 15 MMOL/L (ref 7–16)
AST SERPL-CCNC: 19 U/L (ref 0–39)
BASOPHILS ABSOLUTE: 0.03 E9/L (ref 0–0.2)
BASOPHILS RELATIVE PERCENT: 0.4 % (ref 0–2)
BILIRUB SERPL-MCNC: 0.4 MG/DL (ref 0–1.2)
BUN BLDV-MCNC: 17 MG/DL (ref 8–23)
CALCIUM SERPL-MCNC: 9.5 MG/DL (ref 8.6–10.2)
CHLORIDE BLD-SCNC: 101 MMOL/L (ref 98–107)
CHOLESTEROL, TOTAL: 177 MG/DL (ref 0–199)
CO2: 25 MMOL/L (ref 22–29)
CREAT SERPL-MCNC: 1 MG/DL (ref 0.7–1.2)
EOSINOPHILS ABSOLUTE: 0.12 E9/L (ref 0.05–0.5)
EOSINOPHILS RELATIVE PERCENT: 1.7 % (ref 0–6)
GFR AFRICAN AMERICAN: >60
GFR NON-AFRICAN AMERICAN: >60 ML/MIN/1.73
GLUCOSE BLD-MCNC: 139 MG/DL (ref 74–99)
HCT VFR BLD CALC: 45.2 % (ref 37–54)
HDLC SERPL-MCNC: 39 MG/DL
HEMOGLOBIN: 14.7 G/DL (ref 12.5–16.5)
IMMATURE GRANULOCYTES #: 0.03 E9/L
IMMATURE GRANULOCYTES %: 0.4 % (ref 0–5)
INTERNATIONAL NORMALIZATION RATIO, POC: 2.4
LDL CHOLESTEROL CALCULATED: 106 MG/DL (ref 0–99)
LYMPHOCYTES ABSOLUTE: 1.36 E9/L (ref 1.5–4)
LYMPHOCYTES RELATIVE PERCENT: 18.8 % (ref 20–42)
MCH RBC QN AUTO: 27.8 PG (ref 26–35)
MCHC RBC AUTO-ENTMCNC: 32.5 % (ref 32–34.5)
MCV RBC AUTO: 85.4 FL (ref 80–99.9)
MONOCYTES ABSOLUTE: 0.62 E9/L (ref 0.1–0.95)
MONOCYTES RELATIVE PERCENT: 8.6 % (ref 2–12)
NEUTROPHILS ABSOLUTE: 5.08 E9/L (ref 1.8–7.3)
NEUTROPHILS RELATIVE PERCENT: 70.1 % (ref 43–80)
PDW BLD-RTO: 15 FL (ref 11.5–15)
PLATELET # BLD: 234 E9/L (ref 130–450)
PMV BLD AUTO: 11.3 FL (ref 7–12)
POTASSIUM SERPL-SCNC: 3.8 MMOL/L (ref 3.5–5)
RBC # BLD: 5.29 E12/L (ref 3.8–5.8)
SODIUM BLD-SCNC: 141 MMOL/L (ref 132–146)
TOTAL PROTEIN: 7.4 G/DL (ref 6.4–8.3)
TRIGL SERPL-MCNC: 160 MG/DL (ref 0–149)
VLDLC SERPL CALC-MCNC: 32 MG/DL
WBC # BLD: 7.2 E9/L (ref 4.5–11.5)

## 2020-12-02 PROCEDURE — 2022F DILAT RTA XM EVC RTNOPTHY: CPT | Performed by: FAMILY MEDICINE

## 2020-12-02 PROCEDURE — 4040F PNEUMOC VAC/ADMIN/RCVD: CPT | Performed by: FAMILY MEDICINE

## 2020-12-02 PROCEDURE — 90694 VACC AIIV4 NO PRSRV 0.5ML IM: CPT | Performed by: FAMILY MEDICINE

## 2020-12-02 PROCEDURE — 99214 OFFICE O/P EST MOD 30 MIN: CPT | Performed by: FAMILY MEDICINE

## 2020-12-02 PROCEDURE — 1123F ACP DISCUSS/DSCN MKR DOCD: CPT | Performed by: FAMILY MEDICINE

## 2020-12-02 PROCEDURE — 85610 PROTHROMBIN TIME: CPT | Performed by: FAMILY MEDICINE

## 2020-12-02 PROCEDURE — G0008 ADMIN INFLUENZA VIRUS VAC: HCPCS | Performed by: FAMILY MEDICINE

## 2020-12-02 PROCEDURE — G8427 DOCREV CUR MEDS BY ELIG CLIN: HCPCS | Performed by: FAMILY MEDICINE

## 2020-12-02 PROCEDURE — G8417 CALC BMI ABV UP PARAM F/U: HCPCS | Performed by: FAMILY MEDICINE

## 2020-12-02 PROCEDURE — 1036F TOBACCO NON-USER: CPT | Performed by: FAMILY MEDICINE

## 2020-12-02 PROCEDURE — G8484 FLU IMMUNIZE NO ADMIN: HCPCS | Performed by: FAMILY MEDICINE

## 2020-12-02 PROCEDURE — 3052F HG A1C>EQUAL 8.0%<EQUAL 9.0%: CPT | Performed by: FAMILY MEDICINE

## 2020-12-02 PROCEDURE — 3017F COLORECTAL CA SCREEN DOC REV: CPT | Performed by: FAMILY MEDICINE

## 2020-12-02 NOTE — PROGRESS NOTES
[facility]  Office Progress Note - Dr. Kory Malik  12/2/20    Chief Complaint   Patient presents with    3 Month Follow-Up    Anticoagulation    Diabetes        HPI: doing \"pretty good\"  Slipped and fell on ice yesterday. right wrist sore but functional.     A fib  INR 2.4  No bleeding or bruising. No palps or lightheaded. No CP or exertional symptoms     Sugars a little higher lately, but not horrible. Bs dropped overnight when he took a shot in the evening. Insulin in the  ~35 units. Diabetes mellitus  Follow-up  Lab Results   Component Value Date    LABA1C 8.0 (H) 12/02/2020    LABA1C 7.3 09/03/2020    LABA1C 9.2 05/22/2020     Lab Results   Component Value Date    LABMICR <12.0 12/02/2020    LDLCALC 106 (H) 12/02/2020    CREATININE 1.0 12/02/2020     Wt Readings from Last 3 Encounters:   12/02/20 (!) 321 lb (145.6 kg)   09/03/20 (!) 305 lb (138.3 kg)   08/03/20 (!) 315 lb (142.9 kg)     Patient continues diabetic medication regimen. Compliance is good. No side effects of medications noted. Continues lymphedema wraps  Legs looking good less swollen  No open sores any longer. Obesity  Has not been working on weight   not Restricting diet. not Exercising increased pain in lower extremities from swleling. not Making progress.   Barriers: as above  Wt Readings from Last 3 Encounters:   12/02/20 (!) 321 lb (145.6 kg)   09/03/20 (!) 305 lb (138.3 kg)   08/03/20 (!) 315 lb (142.9 kg)     BMI Readings from Last 3 Encounters:   12/02/20 43.54 kg/m²   09/03/20 41.37 kg/m²   08/03/20 42.72 kg/m²         ______________________________________________________  Family History   Problem Relation Age of Onset    High Blood Pressure Mother     Cancer Mother         lymphoma    Stroke Mother     Diabetes Father     Heart Surgery Sister         heart valve replacement    Heart Disease Brother         pt was waiting for a heart transplant    Cancer Maternal Grandfather     Cancer Paternal Grandmother     Cancer Paternal Grandfather        Past Surgical History:   Procedure Laterality Date    APPENDECTOMY      CHOLECYSTECTOMY      CYST INCISION AND DRAINAGE  06/08/2017    abd wall cyst    HERNIA REPAIR      HIP ARTHROPLASTY Left        Social History     Tobacco Use    Smoking status: Never Smoker    Smokeless tobacco: Never Used   Substance Use Topics    Alcohol use: No     Alcohol/week: 0.0 standard drinks     Comment: drinks occasional iced tea/coffee    Drug use: No     ______________________________________________________  ROS: POSITIVE:as in hpi  Otherwise:  No CP, No palpitations,   No sob, No cough,   No abd pain, No heartburn,   No headaches, No vision changes, No hearing changes,   No tingling, No numbness, No weakness,   No bowel changes, No hematochezia, No melena,  No bladder changes, No hematuria  No skin rashes, No skin lesions. No polyuria, polydipsia, polyphagia. Stable mood. ROS otherwise negative unless as listed in HPI. Chart reviewed and updated where appropriate for PMH, Fam, and Soc Hx.  _______________________________________________________  Physical Exam   /84   Pulse 106   Temp 97.9 °F (36.6 °C) (Temporal)   Resp 18   Ht 6' (1.829 m)   Wt (!) 321 lb (145.6 kg)   SpO2 95%   BMI 43.54 kg/m²   Wt Readings from Last 3 Encounters:   12/02/20 (!) 321 lb (145.6 kg)   09/03/20 (!) 305 lb (138.3 kg)   08/03/20 (!) 315 lb (142.9 kg)       Constitutional:    He is oriented to person, place, and time. He appears well-developed and well-nourished. Eyes:    Conjunctivae are normal.    Pupils are equal, round, and reactive to light. EOMI. Neck:    Normal range of motion. No thyromegaly or nodules noted. No bruit. Cardiovascular:    Normal rate, irreg irreg rhythm and normal heart sounds. No murmur. No gallop and no friction rub. Pulmonary/Chest:    Effort normal and breath sounds normal.    No wheezes.  No rales or ([facility] [address]  Worsening  Encouraged to work in diet more. Other orders  -     INFLUENZA, QUADV, ADJUVANTED, 65 YRS =, IM, PF, PREFILL SYR, 0.5ML (FLUAD    Return in about 4 months (around 4/2/2021). Patient counseled to follow up sooner or seek more acute care if symptoms worsening or not improving according to plan. .     Electronically signed by Anoop Tejada MD on 12/6/2020    This note may have been created using dictation software. Efforts were made to reduce grammatical or syntax errors, but some may persist.   On the basis of positive falls risk screening, assessment and plan is as follows: patient declines any further evaluation/treatment for increased falls risk.

## 2020-12-03 LAB
CREATININE URINE: 23 MG/DL (ref 40–278)
HBA1C MFR BLD: 8 % (ref 4–5.6)
MICROALBUMIN UR-MCNC: <12 MG/L
MICROALBUMIN/CREAT UR-RTO: ABNORMAL (ref 0–30)

## 2021-01-04 ENCOUNTER — ANTI-COAG VISIT (OUTPATIENT)
Dept: FAMILY MEDICINE CLINIC | Age: 66
End: 2021-01-04

## 2021-01-04 ENCOUNTER — NURSE ONLY (OUTPATIENT)
Dept: FAMILY MEDICINE CLINIC | Age: 66
End: 2021-01-04
Payer: MEDICARE

## 2021-01-04 DIAGNOSIS — Z79.01 ANTICOAGULATED ON COUMADIN: ICD-10-CM

## 2021-01-04 DIAGNOSIS — I48.19 PERSISTENT ATRIAL FIBRILLATION (HCC): ICD-10-CM

## 2021-01-04 DIAGNOSIS — I48.0 PAROXYSMAL ATRIAL FIBRILLATION (HCC): Primary | ICD-10-CM

## 2021-01-04 LAB
INTERNATIONAL NORMALIZATION RATIO, POC: 2.1
PROTHROMBIN TIME, POC: 25.5

## 2021-01-04 PROCEDURE — 85610 PROTHROMBIN TIME: CPT | Performed by: FAMILY MEDICINE

## 2021-01-04 PROCEDURE — 93793 ANTICOAG MGMT PT WARFARIN: CPT | Performed by: FAMILY MEDICINE

## 2021-01-04 NOTE — PROGRESS NOTES
01/04/21 --Pt reported taking 10 mg MWF and 11 mg all other days. You say \"continue\" 11 mg daily. Please clarify.

## 2021-01-12 DIAGNOSIS — E11.9 CONTROLLED TYPE 2 DIABETES MELLITUS WITHOUT COMPLICATION, WITHOUT LONG-TERM CURRENT USE OF INSULIN (HCC): Primary | ICD-10-CM

## 2021-01-12 RX ORDER — FUROSEMIDE 20 MG/1
TABLET ORAL
Qty: 90 TABLET | Refills: 1 | Status: SHIPPED
Start: 2021-01-12 | End: 2021-08-06 | Stop reason: SDUPTHER

## 2021-01-12 RX ORDER — CARVEDILOL 25 MG/1
25 TABLET ORAL 2 TIMES DAILY
Qty: 180 TABLET | Refills: 1 | Status: SHIPPED
Start: 2021-01-12 | End: 2021-07-13

## 2021-01-12 NOTE — TELEPHONE ENCOUNTER
Last Appointment:  12/2/2020  Future Appointments   Date Time Provider Edmar Méndezi   1/26/2021 10:15 AM Lance Sterling DPM Col Podiatry [facility]   2/4/2021 10:00 AM SCHEDULE, POLLY MONTEZ [facility]   4/2/2021 11:00 AM Filomena Sims  W [address] [place] [address]

## 2021-01-26 ENCOUNTER — PROCEDURE VISIT (OUTPATIENT)
Dept: PODIATRY | Age: 66
End: 2021-01-26
Payer: MEDICARE

## 2021-01-26 VITALS — HEIGHT: 72 IN | BODY MASS INDEX: 42.66 KG/M2 | WEIGHT: 315 LBS

## 2021-01-26 DIAGNOSIS — B35.1 TINEA UNGUIUM: Primary | ICD-10-CM

## 2021-01-26 DIAGNOSIS — L84 CORNS AND CALLOSITIES: ICD-10-CM

## 2021-01-26 DIAGNOSIS — Z79.01 ENCOUNTER FOR CURRENT LONG-TERM USE OF ANTICOAGULANTS: ICD-10-CM

## 2021-01-26 DIAGNOSIS — E11.9 TYPE 2 DIABETES MELLITUS WITHOUT COMPLICATION, UNSPECIFIED WHETHER LONG TERM INSULIN USE (HCC): ICD-10-CM

## 2021-01-26 DIAGNOSIS — R60.0 LOCALIZED EDEMA: ICD-10-CM

## 2021-01-26 PROCEDURE — 11721 DEBRIDE NAIL 6 OR MORE: CPT | Performed by: PODIATRIST

## 2021-01-26 RX ORDER — BENAZEPRIL HYDROCHLORIDE 20 MG/1
TABLET ORAL
Qty: 180 TABLET | Refills: 1 | Status: SHIPPED
Start: 2021-01-26 | End: 2021-07-28

## 2021-01-26 NOTE — TELEPHONE ENCOUNTER
Last Appointment:  12/2/2020  Future Appointments   Date Time Provider Edmar Reyna   1/26/2021 10:15 AM Jeny Henry DPM Col Podiatry [facility]   2/4/2021 10:00 AM SCHEDULE, POLLY MONTEZ [facility]   4/2/2021 11:00 AM Mariya Rachel  W [address] [place] [address]

## 2021-01-26 NOTE — PROGRESS NOTES
Jada Woo is here today for a diabetic foot exam and nail care. his PCP is Cesario Urbina MD last OV was  2020. Jada Woo : 1955 Sex: male  Age: 72 y.o. Patient was referred by Cesario Urbina MD    CC:   Follow-up swelling right foot and right ankle  Follow-up diabetic foot ankle exam      HPI:   Follow-up diabetic foot ankle exam.    Continues Velcro compressions stockings has noticed improvement. No calf pain. Does continue bowling weekly. Does continue off lipoic acid. No additional pedal complaints.     ROS:  Const: Denies constitutional symptoms  Musculo: Denies symptoms other than stated above  Skin: Denies symptoms other than stated above      Current Outpatient Medications:     benazepril (LOTENSIN) 20 MG tablet, Take 1 tablet by mouth twice daily, Disp: 180 tablet, Rfl: 1    furosemide (LASIX) 20 MG tablet, Take 1 tablet by mouth once daily, Disp: 90 tablet, Rfl: 1    carvedilol (COREG) 25 MG tablet, Take 1 tablet by mouth 2 times daily, Disp: 180 tablet, Rfl: 1    insulin 70-30 (NOVOLIN 70/30) (70-30) 100 UNIT per ML injection vial, Inject 30 Units into the skin 2 times daily, Disp: 5 vial, Rfl: 1    blood glucose test strips (ASCENSIA AUTODISC VI;ONE TOUCH ULTRA TEST VI) strip, Testing once daily, Disp: 100 strip, Rfl: 2    Insulin Syringe-Needle U-100 (AIMSCO INS SYR .3CC/29GX0.5\") 29G X 1/2\" 0.3 ML MISC, 1 each by Does not apply route 2 times daily, Disp: 200 each, Rfl: 2    warfarin (COUMADIN) 10 MG tablet, TAKE AS DIRECTED ACCORDING TO COUMADIN REGIMEN, Disp: 90 tablet, Rfl: 1    warfarin (COUMADIN) 2 MG tablet, TAKE AS DIRECTED ACCORDING TO WARFARIN REGIMEN, Disp: 90 tablet, Rfl: 0    metFORMIN (GLUCOPHAGE) 1000 MG tablet, TAKE 1 TABLET BY MOUTH TWICE DAILY WITH MEALS, Disp: 180 tablet, Rfl: 1    dilTIAZem (CARDIZEM CD) 240 MG extended release capsule, Take 1 capsule by mouth daily, Disp: 90 capsule, Rfl: 1    ketoconazole (NIZORAL) 2 % cream, Apply topically daily. , Disp: 30 g, Rfl: 1    glipiZIDE (GLUCOTROL) 10 MG tablet, TAKE 1 TABLET BY MOUTH TWICE DAILY BEFORE  MEALS, Disp: 180 tablet, Rfl: 1    hydroCHLOROthiazide (HYDRODIURIL) 25 MG tablet, TAKE 1 TABLET BY MOUTH ONCE DAILY FOR BLOOD PRESSURE, Disp: 90 tablet, Rfl: 1    magnesium oxide (MAG-OX) 400 (240 Mg) MG tablet, Take 1 tablet by mouth daily, Disp: 30 tablet, Rfl: 0    potassium chloride (KLOR-CON M) 20 MEQ extended release tablet, Take 1 tablet by mouth daily, Disp: 30 tablet, Rfl: 0    albuterol sulfate HFA (VENTOLIN HFA) 108 (90 Base) MCG/ACT inhaler, Inhale 2 puffs into the lungs every 4-6 hours as needed for Wheezing or Shortness of Breath, Disp: 1 Inhaler, Rfl: 0    Alpha-Lipoic Acid 600 MG TABS, Take 1 Bottle by mouth daily, Disp: 60 tablet, Rfl: 1    CPAP Machine MISC, by Does not apply route nightly, Disp: , Rfl:   Allergies   Allergen Reactions    Lipitor      Body pain, DIARRHEA ETC. HAS PROBS WITH ALL CHOLESTEROL MEDS-MOST PROB WITH LIPITOR    Other Itching     Throat itches when he eats magnus nuts       Past Medical History:   Diagnosis Date    Anticoagulant long-term use     Atrial fibrillation (HCC)     CHF (congestive heart failure) (HCC)     Degenerative joint disease of left hip 1/28/2015    Diabetes mellitus (HCC)     Hyperlipidemia     Hypertension     Obesity     PONV (postoperative nausea and vomiting)     Radiculopathy of lumbosacral region 7/25/2017    Radiculopathy of lumbosacral region 7/25/2017    Sleep apnea     Type II or unspecified type diabetes mellitus without mention of complication, not stated as uncontrolled        There were no vitals filed for this visit.        Work History/Social History: [employer]  Orthopedic history: EMG testing June 2020, Alpha-lipoic acid 600mg      Focused Lower Extremity Physical Exam:      Neurovascular examination:    Dorsalis Pedis palpable bilateral.  Posterior tibialis non-palpable bilateral.

## 2021-02-04 ENCOUNTER — ANTI-COAG VISIT (OUTPATIENT)
Dept: FAMILY MEDICINE CLINIC | Age: 66
End: 2021-02-04

## 2021-02-04 ENCOUNTER — NURSE ONLY (OUTPATIENT)
Dept: FAMILY MEDICINE CLINIC | Age: 66
End: 2021-02-04
Payer: MEDICARE

## 2021-02-04 DIAGNOSIS — I48.19 PERSISTENT ATRIAL FIBRILLATION (HCC): ICD-10-CM

## 2021-02-04 DIAGNOSIS — Z79.01 ANTICOAGULATED ON COUMADIN: ICD-10-CM

## 2021-02-04 DIAGNOSIS — I48.19 PERSISTENT ATRIAL FIBRILLATION (HCC): Primary | ICD-10-CM

## 2021-02-04 LAB
INTERNATIONAL NORMALIZATION RATIO, POC: 1.7
PROTHROMBIN TIME, POC: 20.4

## 2021-02-04 PROCEDURE — 85610 PROTHROMBIN TIME: CPT | Performed by: FAMILY MEDICINE

## 2021-02-04 PROCEDURE — 93793 ANTICOAG MGMT PT WARFARIN: CPT | Performed by: FAMILY MEDICINE

## 2021-02-11 DIAGNOSIS — E11.9 CONTROLLED TYPE 2 DIABETES MELLITUS WITHOUT COMPLICATION, WITHOUT LONG-TERM CURRENT USE OF INSULIN (HCC): ICD-10-CM

## 2021-02-11 RX ORDER — HYDROCHLOROTHIAZIDE 25 MG/1
TABLET ORAL
Qty: 90 TABLET | Refills: 1 | Status: SHIPPED
Start: 2021-02-11 | End: 2021-08-06 | Stop reason: SDUPTHER

## 2021-02-11 RX ORDER — GLIPIZIDE 10 MG/1
TABLET ORAL
Qty: 180 TABLET | Refills: 1 | Status: SHIPPED
Start: 2021-02-11 | End: 2021-08-06 | Stop reason: SDUPTHER

## 2021-02-11 NOTE — TELEPHONE ENCOUNTER
Last Appointment:  12/2/2020  Future Appointments   Date Time Provider Edmar Hendricksonisti   2/18/2021 10:30 AM SCHEDULE, POLLY MONTEZ [facility]   4/1/2021 10:00 AM Freddy Headley DPM Col Podiatry [facility]   4/2/2021 11:00 AM Anali Davis  [address] [place] [address]

## 2021-02-18 ENCOUNTER — NURSE ONLY (OUTPATIENT)
Dept: FAMILY MEDICINE CLINIC | Age: 66
End: 2021-02-18
Payer: MEDICARE

## 2021-02-18 ENCOUNTER — ANTI-COAG VISIT (OUTPATIENT)
Dept: FAMILY MEDICINE CLINIC | Age: 66
End: 2021-02-18

## 2021-02-18 DIAGNOSIS — I48.19 PERSISTENT ATRIAL FIBRILLATION (HCC): Primary | ICD-10-CM

## 2021-02-18 DIAGNOSIS — I48.19 PERSISTENT ATRIAL FIBRILLATION (HCC): ICD-10-CM

## 2021-02-18 LAB
INTERNATIONAL NORMALIZATION RATIO, POC: 1.4
PROTHROMBIN TIME, POC: 17.3

## 2021-02-18 PROCEDURE — 93793 ANTICOAG MGMT PT WARFARIN: CPT | Performed by: FAMILY MEDICINE

## 2021-02-18 PROCEDURE — 85610 PROTHROMBIN TIME: CPT | Performed by: FAMILY MEDICINE

## 2021-03-04 ENCOUNTER — ANTI-COAG VISIT (OUTPATIENT)
Dept: FAMILY MEDICINE CLINIC | Age: 66
End: 2021-03-04

## 2021-03-04 ENCOUNTER — NURSE ONLY (OUTPATIENT)
Dept: FAMILY MEDICINE CLINIC | Age: 66
End: 2021-03-04
Payer: MEDICARE

## 2021-03-04 DIAGNOSIS — I48.19 PERSISTENT ATRIAL FIBRILLATION (HCC): ICD-10-CM

## 2021-03-04 DIAGNOSIS — I48.19 PERSISTENT ATRIAL FIBRILLATION (HCC): Primary | ICD-10-CM

## 2021-03-04 LAB
INTERNATIONAL NORMALIZATION RATIO, POC: 2.6
PROTHROMBIN TIME, POC: 31.1

## 2021-03-04 PROCEDURE — 93793 ANTICOAG MGMT PT WARFARIN: CPT | Performed by: FAMILY MEDICINE

## 2021-03-04 PROCEDURE — 85610 PROTHROMBIN TIME: CPT | Performed by: FAMILY MEDICINE

## 2021-03-22 DIAGNOSIS — E11.9 CONTROLLED TYPE 2 DIABETES MELLITUS WITHOUT COMPLICATION, WITHOUT LONG-TERM CURRENT USE OF INSULIN (HCC): ICD-10-CM

## 2021-03-22 NOTE — TELEPHONE ENCOUNTER
Last Appointment:  12/2/2020  Future Appointments   Date Time Provider Edmar Maribell   3/25/2021 10:30 AM SCHEDULE, POLLY MONTEZ [facility]   4/1/2021 10:00 AM Shahab Deleon DPM Col Podiatry [facility]   4/2/2021 11:00 AM Jeanmarie Cline  [address] [place] [address]

## 2021-04-01 ENCOUNTER — PROCEDURE VISIT (OUTPATIENT)
Dept: PODIATRY | Age: 66
End: 2021-04-01
Payer: MEDICARE

## 2021-04-01 VITALS — HEIGHT: 72 IN | BODY MASS INDEX: 42.66 KG/M2 | WEIGHT: 315 LBS

## 2021-04-01 DIAGNOSIS — L84 CORNS AND CALLOSITIES: ICD-10-CM

## 2021-04-01 DIAGNOSIS — Z79.01 ENCOUNTER FOR CURRENT LONG-TERM USE OF ANTICOAGULANTS: ICD-10-CM

## 2021-04-01 DIAGNOSIS — E11.9 TYPE 2 DIABETES MELLITUS WITHOUT COMPLICATION, UNSPECIFIED WHETHER LONG TERM INSULIN USE (HCC): ICD-10-CM

## 2021-04-01 DIAGNOSIS — R60.0 LOCALIZED EDEMA: ICD-10-CM

## 2021-04-01 DIAGNOSIS — B35.1 TINEA UNGUIUM: Primary | ICD-10-CM

## 2021-04-01 PROCEDURE — 11721 DEBRIDE NAIL 6 OR MORE: CPT | Performed by: PODIATRIST

## 2021-04-01 PROCEDURE — 11056 PARNG/CUTG B9 HYPRKR LES 2-4: CPT | Performed by: PODIATRIST

## 2021-04-01 NOTE — PROGRESS NOTES
the time.), Disp: 5 vial, Rfl: 1    Insulin Syringe-Needle U-100 (AIMSCO INS SYR .3CC/29GX0.5\") 29G X 1/2\" 0.3 ML MISC, 1 each by Does not apply route 2 times daily, Disp: 200 each, Rfl: 2    warfarin (COUMADIN) 10 MG tablet, TAKE AS DIRECTED ACCORDING TO COUMADIN REGIMEN, Disp: 90 tablet, Rfl: 1    warfarin (COUMADIN) 2 MG tablet, TAKE AS DIRECTED ACCORDING TO WARFARIN REGIMEN, Disp: 90 tablet, Rfl: 0    dilTIAZem (CARDIZEM CD) 240 MG extended release capsule, Take 1 capsule by mouth daily, Disp: 90 capsule, Rfl: 1    ketoconazole (NIZORAL) 2 % cream, Apply topically daily. , Disp: 30 g, Rfl: 1    Alpha-Lipoic Acid 600 MG TABS, Take 1 Bottle by mouth daily, Disp: 60 tablet, Rfl: 1    CPAP Machine MISC, by Does not apply route nightly, Disp: , Rfl:   Allergies   Allergen Reactions    Lipitor      Body pain, DIARRHEA ETC. HAS PROBS WITH ALL CHOLESTEROL MEDS-MOST PROB WITH LIPITOR    Other Itching     Throat itches when he eats magnus nuts       Past Medical History:   Diagnosis Date    Anticoagulant long-term use     Atrial fibrillation (HCC)     CHF (congestive heart failure) (HCC)     Degenerative joint disease of left hip 1/28/2015    Diabetes mellitus (HCC)     Hyperlipidemia     Hypertension     Obesity     PONV (postoperative nausea and vomiting)     Radiculopathy of lumbosacral region 7/25/2017    Radiculopathy of lumbosacral region 7/25/2017    Sleep apnea     Type II or unspecified type diabetes mellitus without mention of complication, not stated as uncontrolled        There were no vitals filed for this visit.        Work History/Social History: [employer]  Orthopedic history: EMG testing June 2020, Alpha-lipoic acid 600mg      Focused Lower Extremity Physical Exam:      Neurovascular examination:    Dorsalis Pedis palpable bilateral.  Posterior tibialis non-palpable bilateral.    Capillary Refill Time:  Immediate return  Hair growth:  Symmetrical and bilateral   Skin:  Not atrophic  Edema: Mild edema bilateral feet. Mild edema bilateral ankles. Neurologic:  Light touch diminished bilateral.  Warm to coolness bilateral distal toes  Decreased epicritic sensation     Musculoskeletal/ Orthopedic examination:    Equinis: present bilateral  Dorsiflexion, plantarflexion, inversion, eversion bilateral 5 out of 5 muscle strength  Wiggling toes  Negative Homans      Dermatology examination:    Toenails 1 through 5 bilateral thickened, dystrophic, mycotic with subungual debris. Within normal limits for length. Web spaces 1 through 4 bilateral clean dry and intact. Hyperkeratotic tissue plantar fifth metatarsal head bilateral.  Dry skin plantar feet bilateral.          Assessment and Plan:  Jo Ann Yip was seen today for callouses, nail problem and diabetes. Diagnoses and all orders for this visit:    Tinea unguium    Corns and callosities    Type 2 diabetes mellitus without complication, unspecified whether long term insulin use ([facility] [address]    Encounter for current long-term use of anticoagulants    Localized edema      Follow-up diabetic foot ankle exam      Manual debridement with standard technique toenails 1 through 5 right and left in thickness and length. Patient tolerated procedure well. Paring hyperkeratotic tissue plantar fifth metatarsal head bilateral #15 blade. No open wounds. Continue off lipoic acid 600 mg once daily. Follow-up 2 months        No follow-ups on file. Seen By:  Estephania Medley DPM      Document was created using voice recognition software. Note was reviewed however may contain grammatical errors.

## 2021-04-02 ENCOUNTER — ANTI-COAG VISIT (OUTPATIENT)
Dept: FAMILY MEDICINE CLINIC | Age: 66
End: 2021-04-02

## 2021-04-02 ENCOUNTER — OFFICE VISIT (OUTPATIENT)
Dept: FAMILY MEDICINE CLINIC | Age: 66
End: 2021-04-02
Payer: MEDICARE

## 2021-04-02 VITALS
DIASTOLIC BLOOD PRESSURE: 78 MMHG | TEMPERATURE: 97.7 F | BODY MASS INDEX: 42.66 KG/M2 | SYSTOLIC BLOOD PRESSURE: 130 MMHG | OXYGEN SATURATION: 99 % | HEIGHT: 72 IN | HEART RATE: 76 BPM | WEIGHT: 315 LBS

## 2021-04-02 DIAGNOSIS — E66.01 MORBID OBESITY WITH BMI OF 40.0-44.9, ADULT (HCC): ICD-10-CM

## 2021-04-02 DIAGNOSIS — Z79.4 TYPE 2 DIABETES MELLITUS WITH OTHER SPECIFIED COMPLICATION, WITH LONG-TERM CURRENT USE OF INSULIN (HCC): ICD-10-CM

## 2021-04-02 DIAGNOSIS — M25.561 ACUTE PAIN OF RIGHT KNEE: ICD-10-CM

## 2021-04-02 DIAGNOSIS — I48.19 PERSISTENT ATRIAL FIBRILLATION (HCC): Primary | ICD-10-CM

## 2021-04-02 DIAGNOSIS — M54.50 ACUTE RIGHT-SIDED LOW BACK PAIN WITHOUT SCIATICA: ICD-10-CM

## 2021-04-02 DIAGNOSIS — E11.69 TYPE 2 DIABETES MELLITUS WITH OTHER SPECIFIED COMPLICATION, WITH LONG-TERM CURRENT USE OF INSULIN (HCC): ICD-10-CM

## 2021-04-02 PROBLEM — L97.221 VENOUS STASIS ULCER OF LEFT CALF LIMITED TO BREAKDOWN OF SKIN WITHOUT VARICOSE VEINS (HCC): Status: RESOLVED | Noted: 2020-12-02 | Resolved: 2021-04-02

## 2021-04-02 PROBLEM — I87.2 VENOUS STASIS ULCER OF LEFT CALF LIMITED TO BREAKDOWN OF SKIN WITHOUT VARICOSE VEINS (HCC): Status: RESOLVED | Noted: 2020-12-02 | Resolved: 2021-04-02

## 2021-04-02 LAB
HBA1C MFR BLD: 8.3 %
INTERNATIONAL NORMALIZATION RATIO, POC: 2.6
PROTHROMBIN TIME, POC: 31.6

## 2021-04-02 PROCEDURE — 99214 OFFICE O/P EST MOD 30 MIN: CPT | Performed by: FAMILY MEDICINE

## 2021-04-02 PROCEDURE — 83036 HEMOGLOBIN GLYCOSYLATED A1C: CPT | Performed by: FAMILY MEDICINE

## 2021-04-02 PROCEDURE — 1123F ACP DISCUSS/DSCN MKR DOCD: CPT | Performed by: FAMILY MEDICINE

## 2021-04-02 PROCEDURE — 3017F COLORECTAL CA SCREEN DOC REV: CPT | Performed by: FAMILY MEDICINE

## 2021-04-02 PROCEDURE — 1036F TOBACCO NON-USER: CPT | Performed by: FAMILY MEDICINE

## 2021-04-02 PROCEDURE — 4040F PNEUMOC VAC/ADMIN/RCVD: CPT | Performed by: FAMILY MEDICINE

## 2021-04-02 PROCEDURE — 85610 PROTHROMBIN TIME: CPT | Performed by: FAMILY MEDICINE

## 2021-04-02 PROCEDURE — G8417 CALC BMI ABV UP PARAM F/U: HCPCS | Performed by: FAMILY MEDICINE

## 2021-04-02 PROCEDURE — 2022F DILAT RTA XM EVC RTNOPTHY: CPT | Performed by: FAMILY MEDICINE

## 2021-04-02 PROCEDURE — 3052F HG A1C>EQUAL 8.0%<EQUAL 9.0%: CPT | Performed by: FAMILY MEDICINE

## 2021-04-02 PROCEDURE — G8427 DOCREV CUR MEDS BY ELIG CLIN: HCPCS | Performed by: FAMILY MEDICINE

## 2021-04-02 RX ORDER — GLUCOSAMINE HCL/CHONDROITIN SU 500-400 MG
1 CAPSULE ORAL DAILY
Qty: 100 STRIP | Refills: 10 | Status: SHIPPED
Start: 2021-04-02 | End: 2021-08-06 | Stop reason: SDUPTHER

## 2021-04-02 RX ORDER — GLUCOSAMINE HCL/CHONDROITIN SU 500-400 MG
100 CAPSULE ORAL DAILY
COMMUNITY
End: 2021-04-02 | Stop reason: SDUPTHER

## 2021-04-02 ASSESSMENT — PATIENT HEALTH QUESTIONNAIRE - PHQ9
2. FEELING DOWN, DEPRESSED OR HOPELESS: 0
SUM OF ALL RESPONSES TO PHQ QUESTIONS 1-9: 0
SUM OF ALL RESPONSES TO PHQ QUESTIONS 1-9: 0

## 2021-04-02 NOTE — PROGRESS NOTES
[facility]  Office Progress Note -  Yusuf Mccullough  4/2/21    CC:   Chief Complaint   Patient presents with    Diabetes    Leg Pain     Bi-lat leg pain        HPI:   Diabetes mellitus  Follow-up -worsening control  Lab Results   Component Value Date    LABA1C 8.3 04/02/2021    LABA1C 8.0 (H) 12/02/2020    LABA1C 7.3 09/03/2020     Lab Results   Component Value Date    LABMICR <12.0 12/02/2020    LDLCALC 106 (H) 12/02/2020    CREATININE 1.0 12/02/2020     Wt Readings from Last 3 Encounters:   04/02/21 (!) 328 lb (148.8 kg)   04/01/21 (!) 321 lb (145.6 kg)   01/26/21 (!) 321 lb (145.6 kg)     Patient continues diabetic medication regimen. Compliance is good. No side effects of medications noted. Diabetes is not adequately controlled. Peripheral neuropathy is present. Regular foot exams encouraged. Vision changes are not present. Yearly eye exam encouraged. Regularly misses his evening insulin shot. He does take the pills in the evening though. Uses 70 30, 30 units in the morning. Had a fall 4-6 weeks ago. Some SI joint pain on the right side and right knee patellar piain. Has bene managing with ibu, tylenol, chiro care. Improving over time but slow. No tingling or numbness. Continues lymphedema stockings. They are painful. He does wear them. His swelling is improved when he wears them. He has not had any skin breakdown or ulcerations recently since using them. INR therapeutic today. Continues 11 mg of Coumadin daily. No bleeding or bruising problems. Stable from last appointment. Remains in atrial fibrillation. Patient unable to tell.    _________________________________________________________    Assessment / Ramsey Pierre was seen today for diabetes and leg pain. Diagnoses and all orders for this visit:    Persistent atrial fibrillation ([address]  -     POCT INR  Rate controlled. INR therapeutic.     Type 2 diabetes mellitus with other specified complication, with long-term current use of insulin (HCC)  -     POCT glycosylated hemoglobin (Hb A1C)  -     blood glucose monitor strips; 1 strip by Other route daily Test 1 time a day & as needed for symptoms of irregular blood glucose. Dispense sufficient amount for indicated testing frequency plus additional to accommodate PRN testing needs. ReliOn prime test strips  Worsening control. Continue same medication regimen. Work on weight loss. Encourage patient to take an evening dose of insulin, just 5 units, with his evening pills. Morbid obesity with BMI of 40.0-44.9, adult [facility])  Not making much progress over time. Weight generally stable, but obesity persists with BMI at 44. Acute pain of right knee    Acute right-sided low back pain without sciatica    Low back pain and right knee pain after a slip and fall about a month ago. This does not seem like sciatica. There are not any neurologic signs or symptoms present. Encouraged continued conservative care with Tylenol and/or ibuprofen. Return in about 4 months (around 8/2/2021). or as scheduled. Patient counseled to follow up sooner or seek more acute care if symptoms worsening or not improving according to plan.      Electronically signed by Angelita Garvey MD on 4/2/2021    _________________________________________________________  Current Outpatient Medications on File Prior to Visit   Medication Sig Dispense Refill    metFORMIN (GLUCOPHAGE) 1000 MG tablet TAKE 1 TABLET BY MOUTH TWICE DAILY WITH MEALS 180 tablet 1    hydroCHLOROthiazide (HYDRODIURIL) 25 MG tablet Take 1 tablet by mouth once daily for blood pressure 90 tablet 1    glipiZIDE (GLUCOTROL) 10 MG tablet TAKE 1 TABLET BY MOUTH TWICE DAILY BEFORE MEAL(S) 180 tablet 1    benazepril (LOTENSIN) 20 MG tablet Take 1 tablet by mouth twice daily 180 tablet 1    furosemide (LASIX) 20 MG tablet Take 1 tablet by mouth once daily 90 tablet 1    carvedilol (COREG) 25 MG tablet Take 1 tablet by mouth 2 times daily 180 tablet 1    insulin 70-30 (NOVOLIN 70/30) (70-30) 100 UNIT per ML injection vial Inject 30 Units into the skin 2 times daily (Patient taking differently: Inject 30 Units into the skin 2 times daily Pt reports not taking in the evening all the time.) 5 vial 1    Insulin Syringe-Needle U-100 (AIMSCO INS SYR .3CC/29GX0.5\") 29G X 1/2\" 0.3 ML MISC 1 each by Does not apply route 2 times daily 200 each 2    warfarin (COUMADIN) 10 MG tablet TAKE AS DIRECTED ACCORDING TO COUMADIN REGIMEN 90 tablet 1    warfarin (COUMADIN) 2 MG tablet TAKE AS DIRECTED ACCORDING TO WARFARIN REGIMEN 90 tablet 0    dilTIAZem (CARDIZEM CD) 240 MG extended release capsule Take 1 capsule by mouth daily 90 capsule 1    ketoconazole (NIZORAL) 2 % cream Apply topically daily. 30 g 1    Alpha-Lipoic Acid 600 MG TABS Take 1 Bottle by mouth daily 60 tablet 1    CPAP Machine MISC by Does not apply route nightly       No current facility-administered medications on file prior to visit.         Patient Active Problem List   Diagnosis Code    Degenerative joint disease of left hip M16.12    Mixed hyperlipidemia E78.2    Anticoagulated on Coumadin Z79.01    Essential hypertension I10    Morbid obesity due to excess calories ([address] E66.01    Diabetes mellitus ([address] E11.9    AMIRAH on CPAP G47.33, Z99.89    Radiculopathy of lumbosacral region M54.17    Statin intolerance Z78.9    Persistent atrial fibrillation (HCC) I48.19     _________________________________________________________  Past Medical History:   Diagnosis Date    Anticoagulant long-term use     Atrial fibrillation (HCC)     CHF (congestive heart failure) (HCC)     Degenerative joint disease of left hip 1/28/2015    Diabetes mellitus ([address]     Hyperlipidemia     Hypertension     Obesity     PONV (postoperative nausea and vomiting)     Radiculopathy of lumbosacral region 7/25/2017    Radiculopathy of lumbosacral region 7/25/2017    Sleep apnea     Type II or unspecified type diabetes mellitus without mention of complication, not stated as uncontrolled        Family History   Problem Relation Age of Onset    High Blood Pressure Mother     Cancer Mother         lymphoma    Stroke Mother     Diabetes Father     Heart Surgery Sister         heart valve replacement    Heart Disease Brother         pt was waiting for a heart transplant    Cancer Maternal Grandfather     Cancer Paternal Grandmother     Cancer Paternal Grandfather        Past Surgical History:   Procedure Laterality Date    APPENDECTOMY      CHOLECYSTECTOMY      CYST INCISION AND DRAINAGE  06/08/2017    abd wall cyst    HERNIA REPAIR      HIP ARTHROPLASTY Left        Social History     Tobacco Use    Smoking status: Never Smoker    Smokeless tobacco: Never Used   Substance Use Topics    Alcohol use: No     Alcohol/week: 0.0 standard drinks     Comment: drinks occasional iced tea/coffee    Drug use: No       Chart reviewed and updated where appropriate for PMH, Fam, and Soc Hx.  _________________________________________________________  ROS: POSITIVE: As in the HPI. Otherwise Pertinent negatives are negative.    __________________________________________________________  Physical Exam   /78 (Site: Left Upper Arm, Position: Sitting, Cuff Size: Large Adult)   Pulse 76   Temp 97.7 °F (36.5 °C) (Temporal)   Ht 6' (1.829 m)   Wt (!) 328 lb (148.8 kg)   SpO2 99%   BMI 44.48 kg/m²   Wt Readings from Last 3 Encounters:   04/02/21 (!) 328 lb (148.8 kg)   04/01/21 (!) 321 lb (145.6 kg)   01/26/21 (!) 321 lb (145.6 kg)       Constitutional:    He is oriented to person, place, and time. He appears well-developed and well-nourished. HENT:    Nose: Nose normal.    Mouth/Throat: Oropharynx is clear and moist.   Eyes:    Conjunctivae are normal.    Pupils are equal, round, and reactive to light. EOMI. Neck:    Normal range of motion. No thyromegaly or nodules noted. No bruit. Cardiovascular:    Normal rate, irregularly irregular rhythm and normal heart sounds. No murmur. No gallop and no friction rub. Pulmonary/Chest:    Effort normal and breath sounds normal.    No wheezes. No rales or rhonchi. Abdominal:    Soft. Obese. Bowel sounds are normal.    No distension. No tenderness. Musculoskeletal:    Normal range of motion. No joint swelling noted. Lymphedema compression wraps in place. Neurological:    He is A&Ox3. Motor and sensation grossly intact. Normal Gait. Normal strength in lower extremities. Skin:    Skin is warm and dry. No rashes, lesions. Psychiatric:    He has a normal mood and affect. Normal groom and dress. No SI or HI.   ________________________________________________________    This note may have been created using dictation software.  Efforts were made to reduce grammatical or syntax errors, but some may persist.

## 2021-04-13 ENCOUNTER — APPOINTMENT (OUTPATIENT)
Dept: ULTRASOUND IMAGING | Age: 66
End: 2021-04-13
Payer: MEDICARE

## 2021-04-13 ENCOUNTER — APPOINTMENT (OUTPATIENT)
Dept: GENERAL RADIOLOGY | Age: 66
End: 2021-04-13
Payer: MEDICARE

## 2021-04-13 ENCOUNTER — HOSPITAL ENCOUNTER (EMERGENCY)
Age: 66
Discharge: HOME | End: 2021-04-13
Payer: MEDICARE

## 2021-04-13 VITALS
RESPIRATION RATE: 16 BRPM | HEIGHT: 72 IN | OXYGEN SATURATION: 99 % | DIASTOLIC BLOOD PRESSURE: 95 MMHG | SYSTOLIC BLOOD PRESSURE: 158 MMHG | HEART RATE: 96 BPM | BODY MASS INDEX: 42.66 KG/M2 | WEIGHT: 315 LBS | TEMPERATURE: 98.2 F

## 2021-04-13 DIAGNOSIS — S80.11XA CONTUSION OF RIGHT LEG, INITIAL ENCOUNTER: Primary | ICD-10-CM

## 2021-04-13 DIAGNOSIS — I89.0 CHRONIC ACQUIRED LYMPHEDEMA: ICD-10-CM

## 2021-04-13 PROCEDURE — 73590 X-RAY EXAM OF LOWER LEG: CPT

## 2021-04-13 PROCEDURE — 6370000000 HC RX 637 (ALT 250 FOR IP): Performed by: PHYSICIAN ASSISTANT

## 2021-04-13 PROCEDURE — 99283 EMERGENCY DEPT VISIT LOW MDM: CPT

## 2021-04-13 PROCEDURE — 93971 EXTREMITY STUDY: CPT

## 2021-04-13 PROCEDURE — 73564 X-RAY EXAM KNEE 4 OR MORE: CPT

## 2021-04-13 RX ORDER — HYDROCODONE BITARTRATE AND ACETAMINOPHEN 5; 325 MG/1; MG/1
1 TABLET ORAL EVERY 6 HOURS PRN
Qty: 6 TABLET | Refills: 0 | Status: SHIPPED | OUTPATIENT
Start: 2021-04-13 | End: 2021-04-15

## 2021-04-13 RX ORDER — HYDROCODONE BITARTRATE AND ACETAMINOPHEN 5; 325 MG/1; MG/1
1 TABLET ORAL ONCE
Status: COMPLETED | OUTPATIENT
Start: 2021-04-13 | End: 2021-04-13

## 2021-04-13 RX ADMIN — HYDROCODONE BITARTRATE AND ACETAMINOPHEN 1 TABLET: 5; 325 TABLET ORAL at 21:10

## 2021-04-13 ASSESSMENT — PAIN DESCRIPTION - PAIN TYPE: TYPE: ACUTE PAIN

## 2021-04-13 ASSESSMENT — PAIN SCALES - GENERAL: PAINLEVEL_OUTOF10: 1

## 2021-04-14 ENCOUNTER — OFFICE VISIT (OUTPATIENT)
Dept: FAMILY MEDICINE CLINIC | Age: 66
End: 2021-04-14
Payer: MEDICARE

## 2021-04-14 VITALS
HEIGHT: 72 IN | TEMPERATURE: 97.6 F | OXYGEN SATURATION: 97 % | HEART RATE: 83 BPM | DIASTOLIC BLOOD PRESSURE: 80 MMHG | BODY MASS INDEX: 42.66 KG/M2 | WEIGHT: 315 LBS | SYSTOLIC BLOOD PRESSURE: 118 MMHG
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
## 2021-04-14 PROCEDURE — G8417 CALC BMI ABV UP PARAM F/U: HCPCS | Performed by: FAMILY MEDICINE

## 2021-04-14 PROCEDURE — 4040F PNEUMOC VAC/ADMIN/RCVD: CPT | Performed by: FAMILY MEDICINE

## 2021-04-14 PROCEDURE — 1036F TOBACCO NON-USER: CPT | Performed by: FAMILY MEDICINE

## 2021-04-14 PROCEDURE — G8427 DOCREV CUR MEDS BY ELIG CLIN: HCPCS | Performed by: FAMILY MEDICINE

## 2021-04-14 PROCEDURE — 3017F COLORECTAL CA SCREEN DOC REV: CPT | Performed by: FAMILY MEDICINE

## 2021-04-14 PROCEDURE — 99213 OFFICE O/P EST LOW 20 MIN: CPT | Performed by: FAMILY MEDICINE

## 2021-04-14 PROCEDURE — 1123F ACP DISCUSS/DSCN MKR DOCD: CPT | Performed by: FAMILY MEDICINE

## 2021-04-14 RX ORDER — DOXYCYCLINE HYCLATE 100 MG
100 TABLET ORAL 2 TIMES DAILY
Qty: 28 TABLET | Refills: 0 | Status: SHIPPED | OUTPATIENT
Start: 2021-04-14 | End: 2021-04-28

## 2021-04-14 NOTE — ED PROVIDER NOTES
[facility]  Department of Emergency Medicine   ED  Encounter Note  Admit Date/RoomTime: 2021  8:13 PM  ED Room:     NAME: Mahin Mcintyre  : 1955  MRN: [id number]     Chief Complaint:  Fall (last thursday, mechanical fall onto right knee, no head injury, no loc, takes coumadin) and Knee Pain (right, swollen and bruising)    History of Present Illness       Mahin Mcintyre is a 72 y.o. old male who presents to the emergency department by private vehicle, for traumatic pain located proximal tibia to right knee  which occured 5 day(s) prior to arrival.  The complaint is due to a fall from stumbling while at home. Since onset the symptoms have been persistent. Patient  has a prior history of pain to mentioned area related to today's visit. His pain is aggraveated by pressure on or palpation of, extending or standing and relieved by rest of injured area and pt has severe bilateral lymphedema and usually elevating his legs helps with this but pain increasing in the leg with elevation. His weight bearing ability:  normal. He denies any head injury. Tetanus Status: unknown. ROS   Pertinent positives and negatives are stated within HPI, all other systems reviewed and are negative. Past Medical History:  has a past medical history of Anticoagulant long-term use, Atrial fibrillation ([address] CHF (congestive heart failure) ([address] Degenerative joint disease of left hip, Diabetes mellitus ([address] Hyperlipidemia, Hypertension, Obesity, PONV (postoperative nausea and vomiting), Radiculopathy of lumbosacral region, Radiculopathy of lumbosacral region, Sleep apnea, and Type II or unspecified type diabetes mellitus without mention of complication, not stated as uncontrolled. Surgical History:  has a past surgical history that includes Cholecystectomy; Appendectomy; hernia repair; Hip Arthroplasty (Left); and cyst incision and drainage (2017).     Social History: reports that he has never smoked. He has never used smokeless tobacco. He reports that he does not drink alcohol or use drugs. Family History: family history includes Cancer in his maternal grandfather, mother, paternal grandfather, and paternal grandmother; Diabetes in his father; Heart Disease in his brother; Heart Surgery in his sister; High Blood Pressure in his mother; Stroke in his mother. Allergies: Lipitor and Other    Physical Exam   Oxygen Saturation Interpretation: Normal.        ED Triage Vitals [04/13/21 2011]   BP Temp Temp Source Pulse Resp SpO2 Height Weight   (!) 158/95 98.2 °F (36.8 °C) Temporal 96 16 99 % 6' (1.829 m) (!) 320 lb (145.2 kg)         Constitutional:  Alert, development consistent with age. Neck:  Normal ROM. Supple. Knee:  Right medial proximal tibia:             Tenderness:  moderate. .              Swelling/Effusion: Mild. Deformity: no.              ROM: full range with pain. Skin:  abrasion simple, bruise and contusion. Drawer's:  Negative. Lachman's: Not Tested. Apley's: Not Tested. Etienne's: Negative. Valgus/Varus Stress: Negative. Crepitus: Negative. Hip:            Tenderness:  none. Swelling: None. Deformity: no.              ROM: full range of motion. Skin:  normal exam; no wounds, erythema, or swelling. Joint(s) Below: calf. Tenderness:  mild. Swelling: Yes. Deformity: no.             ROM: full range of motion. Skin:  Chronic discoloration of vascular disease bilateral, increased on right. Neurovascular: Motor deficit: none. Sensory deficit: none. Pulse deficit: none. Capillary refill: normal.  Gait:  limp due to affected limb. Lymphatics: No lymphangitis or adenopathy noted. Neurological:  Oriented. Motor functions intact.     Marylen Blacksmith / Kathrin Mckinley Results   (All laboratory and radiology results have been personally reviewed by myself)  Labs:  No results found for this visit on 04/13/21. Imaging: All Radiology results interpreted by Radiologist unless otherwise noted. XR KNEE RIGHT (MIN 4 VIEWS)   Final Result   No acute abnormality of the knee. XR TIBIA FIBULA RIGHT (2 VIEWS)   Final Result   No acute osseous abnormality. Soft tissue swelling. US DUP LOWER EXTREMITY RIGHT ASIM   Final Result   No evidence of DVT in the right lower extremity. There is swelling of the calf, limiting the evaluation of the calf veins. ED Course / Medical Decision Making     Medications   HYDROcodone-acetaminophen (NORCO) 5-325 MG per tablet 1 tablet (1 tablet Oral Given 4/13/21 2110)        Consult(s):   None    Procedure(s):   none. MDM:     Imaging was obtained based on moderate suspicion for fracture / bony abnormality, possible DVT as per history/physical findings. Pt has compression wraps he wears chronically for the lymphedema, will continue these. Home with some pain medication. Plan is subsequently for symptom control, limited use and  immobilization with appropriate outpatient follow-up with pcp for recheck 2-3 days. Plan of Care/Counseling:  I reviewed today's visit with the patient in addition to providing specific details for the plan of care and counseling regarding the diagnosis and prognosis. Questions are answered at this time and are agreeable with the plan. Assessment      1. Contusion of right leg, initial encounter    2. Chronic acquired lymphedema      Plan   Discharge home. Patient condition is stable    New Medications     Discharge Medication List as of 4/13/2021 10:38 PM      START taking these medications    Details   HYDROcodone-acetaminophen (NORCO) 5-325 MG per tablet Take 1 tablet by mouth every 6 hours as needed for Pain for up to 2 days. , Disp-6 tablet, R-0Print           Electronically signed by Danielle Hernandez PA-C   DD: 4/14/21  **This report was transcribed using voice recognition software. Every effort was made to ensure accuracy; however, inadvertent computerized transcription errors may be present.   END OF ED PROVIDER NOTE       Danielle Hernandez PA-C  04/14/21 0153

## 2021-04-14 NOTE — PROGRESS NOTES
21  Glenn Renteria : 1955 Sex: male  Age: 72 y.o. Chief Complaint   Patient presents with    Tick Removal       Patient is a 80-year-old white male with a tick bite of the right upper thigh medially. He has had a removed there is some tenderness and erythema. Review of Systems      Current Outpatient Medications:     doxycycline hyclate (VIBRA-TABS) 100 MG tablet, Take 1 tablet by mouth 2 times daily for 14 days, Disp: 28 tablet, Rfl: 0    HYDROcodone-acetaminophen (NORCO) 5-325 MG per tablet, Take 1 tablet by mouth every 6 hours as needed for Pain for up to 2 days. , Disp: 6 tablet, Rfl: 0    blood glucose monitor strips, 1 strip by Other route daily Test 1 time a day & as needed for symptoms of irregular blood glucose. Dispense sufficient amount for indicated testing frequency plus additional to accommodate PRN testing needs.  ReliOn prime test strips, Disp: 100 strip, Rfl: 10    metFORMIN (GLUCOPHAGE) 1000 MG tablet, TAKE 1 TABLET BY MOUTH TWICE DAILY WITH MEALS, Disp: 180 tablet, Rfl: 1    hydroCHLOROthiazide (HYDRODIURIL) 25 MG tablet, Take 1 tablet by mouth once daily for blood pressure, Disp: 90 tablet, Rfl: 1    glipiZIDE (GLUCOTROL) 10 MG tablet, TAKE 1 TABLET BY MOUTH TWICE DAILY BEFORE MEAL(S), Disp: 180 tablet, Rfl: 1    benazepril (LOTENSIN) 20 MG tablet, Take 1 tablet by mouth twice daily, Disp: 180 tablet, Rfl: 1    furosemide (LASIX) 20 MG tablet, Take 1 tablet by mouth once daily, Disp: 90 tablet, Rfl: 1    carvedilol (COREG) 25 MG tablet, Take 1 tablet by mouth 2 times daily, Disp: 180 tablet, Rfl: 1    insulin 70-30 (NOVOLIN 70/30) (70-30) 100 UNIT per ML injection vial, Inject 30 Units into the skin 2 times daily (Patient taking differently: Inject 30 Units into the skin 2 times daily Pt reports not taking in the evening all the time.), Disp: 5 vial, Rfl: 1    Insulin Syringe-Needle U-100 (AIMSCO INS SYR .3CC/29GX0.5\") 29G X 1/2\" 0.3 ML MISC, 1 each by Does not apply route 2 times daily, Disp: 200 each, Rfl: 2    warfarin (COUMADIN) 10 MG tablet, TAKE AS DIRECTED ACCORDING TO COUMADIN REGIMEN, Disp: 90 tablet, Rfl: 1    warfarin (COUMADIN) 2 MG tablet, TAKE AS DIRECTED ACCORDING TO WARFARIN REGIMEN, Disp: 90 tablet, Rfl: 0    dilTIAZem (CARDIZEM CD) 240 MG extended release capsule, Take 1 capsule by mouth daily, Disp: 90 capsule, Rfl: 1    ketoconazole (NIZORAL) 2 % cream, Apply topically daily. , Disp: 30 g, Rfl: 1    Alpha-Lipoic Acid 600 MG TABS, Take 1 Bottle by mouth daily, Disp: 60 tablet, Rfl: 1    CPAP Machine MISC, by Does not apply route nightly, Disp: , Rfl:   Allergies   Allergen Reactions    Lipitor      Body pain, DIARRHEA ETC. HAS PROBS WITH ALL CHOLESTEROL MEDS-MOST PROB WITH LIPITOR    Other Itching     Throat itches when he eats magnus nuts       Past Medical History:   Diagnosis Date    Anticoagulant long-term use     Atrial fibrillation (HCC)     CHF (congestive heart failure) (HCC)     Degenerative joint disease of left hip 1/28/2015    Diabetes mellitus ([facility] [address]     Hyperlipidemia     Hypertension     Obesity     PONV (postoperative nausea and vomiting)     Radiculopathy of lumbosacral region 7/25/2017    Radiculopathy of lumbosacral region 7/25/2017    Sleep apnea     Type II or unspecified type diabetes mellitus without mention of complication, not stated as uncontrolled      Past Surgical History:   Procedure Laterality Date    APPENDECTOMY      CHOLECYSTECTOMY      CYST INCISION AND DRAINAGE  06/08/2017    abd wall cyst    HERNIA REPAIR      HIP ARTHROPLASTY Left      Family History   Problem Relation Age of Onset    High Blood Pressure Mother     Cancer Mother         lymphoma    Stroke Mother     Diabetes Father     Heart Surgery Sister         heart valve replacement    Heart Disease Brother         pt was waiting for a heart transplant    Cancer Maternal Grandfather     Cancer Paternal Grandmother     Cancer Paternal Grandfather      Social History     Tobacco Use    Smoking status: Never Smoker    Smokeless tobacco: Never Used   Substance Use Topics    Alcohol use: No     Alcohol/week: 0.0 standard drinks     Comment: drinks occasional iced tea/coffee    Drug use: No        Vitals:    04/14/21 1226   BP: 118/80   Pulse: 83   Temp: 97.6 °F (36.4 °C)   TempSrc: Temporal   SpO2: 97%   Weight: (!) 327 lb (148.3 kg)   Height: 6' (1.829 m)       Physical Exam    Assessment and Plan:  Rosalia Minor was seen today for tick removal.    Diagnoses and all orders for this visit:    Tick bite, initial encounter  -     Lyme Disease Acute Reflexive Panel; Future    Other orders  -     doxycycline hyclate (VIBRA-TABS) 100 MG tablet; Take 1 tablet by mouth 2 times daily for 14 days        Orders Placed This Encounter   Medications    doxycycline hyclate (VIBRA-TABS) 100 MG tablet     Sig: Take 1 tablet by mouth 2 times daily for 14 days     Dispense:  28 tablet     Refill:  0        Patient advised to follow up with PCP as needed. Seen By:  Dianne Stout, DO  Treated with Doxy for 2 weeks and then check his Lyme's titer and we will call him with the results.

## 2021-04-15 ENCOUNTER — TELEPHONE (OUTPATIENT)
Dept: FAMILY MEDICINE CLINIC | Age: 66
End: 2021-04-15

## 2021-04-15 ENCOUNTER — OFFICE VISIT (OUTPATIENT)
Dept: FAMILY MEDICINE CLINIC | Age: 66
End: 2021-04-15
Payer: MEDICARE

## 2021-04-15 VITALS
SYSTOLIC BLOOD PRESSURE: 136 MMHG | WEIGHT: 315 LBS | TEMPERATURE: 97.1 F | HEIGHT: 72 IN | OXYGEN SATURATION: 99 % | DIASTOLIC BLOOD PRESSURE: 86 MMHG | HEART RATE: 87 BPM | BODY MASS INDEX: 42.66 KG/M2

## 2021-04-15 DIAGNOSIS — S80.11XD CONTUSION OF RIGHT KNEE AND LOWER LEG, SUBSEQUENT ENCOUNTER: Primary | ICD-10-CM

## 2021-04-15 DIAGNOSIS — S80.01XD CONTUSION OF RIGHT KNEE AND LOWER LEG, SUBSEQUENT ENCOUNTER: Primary | ICD-10-CM

## 2021-04-15 PROCEDURE — 4040F PNEUMOC VAC/ADMIN/RCVD: CPT | Performed by: FAMILY MEDICINE

## 2021-04-15 PROCEDURE — 3017F COLORECTAL CA SCREEN DOC REV: CPT | Performed by: FAMILY MEDICINE

## 2021-04-15 PROCEDURE — G8417 CALC BMI ABV UP PARAM F/U: HCPCS | Performed by: FAMILY MEDICINE

## 2021-04-15 PROCEDURE — 1123F ACP DISCUSS/DSCN MKR DOCD: CPT | Performed by: FAMILY MEDICINE

## 2021-04-15 PROCEDURE — 99213 OFFICE O/P EST LOW 20 MIN: CPT | Performed by: FAMILY MEDICINE

## 2021-04-15 PROCEDURE — 1036F TOBACCO NON-USER: CPT | Performed by: FAMILY MEDICINE

## 2021-04-15 PROCEDURE — G8427 DOCREV CUR MEDS BY ELIG CLIN: HCPCS | Performed by: FAMILY MEDICINE

## 2021-04-15 RX ORDER — TRAMADOL HYDROCHLORIDE 50 MG/1
50 TABLET ORAL EVERY 6 HOURS PRN
Qty: 20 TABLET | Refills: 0 | Status: SHIPPED
Start: 2021-04-15 | End: 2021-05-03 | Stop reason: SDUPTHER

## 2021-04-15 NOTE — PROGRESS NOTES
[facility]  Office Progress Note - Dr. Tia Melendez  4/15/21    CC:   Chief Complaint   Patient presents with    Leg Pain     Right leg pain. Was seen in ER 04/13/21        /86 (Site: Left Upper Arm, Position: Sitting, Cuff Size: Large Adult)   Pulse 87   Temp 97.1 °F (36.2 °C) (Temporal)   Ht 6' (1.829 m)   Wt (!) 327 lb (148.3 kg)   SpO2 99%   BMI 44.35 kg/m²   Wt Readings from Last 3 Encounters:   04/15/21 (!) 327 lb (148.3 kg)   04/14/21 (!) 327 lb (148.3 kg)   04/13/21 (!) 320 lb (145.2 kg)       HPI: had a fall 1 week ago when trailer started rolling away from him. Pulled him to the ground  Landed on right knee. Inc pain over a few days  Swelling  Went to ED.   xrays and US neg. Able to ambulate. Inc leg swelling and bruising. On coumadin. Looks like has a hematoma distal to the knee without knee swelling. Knee not unstable. Mild superficial abrasions with some inc heat and redness. ON doxycycline started yday due to a tick bite. Using a couple norco from the ED.    _________________________________________________________    Assessment / Mira Fagan was seen today for leg pain. Diagnoses and all orders for this visit:    Contusion of right knee and lower leg, subsequent encounter  -     traMADol (ULTRAM) 50 MG tablet; Take 1 tablet by mouth every 6 hours as needed for Pain for up to 5 days. Intended supply: 5 days. Take lowest dose possible to manage pain  Ok to use tylenol as well. Ice. Rest and elevation. If redness increasing or swelling increasing, back next week for re-evaluation. Sooner if needed. OARRS reviewed and consistent. Return if symptoms worsen or fail to improve. or as scheduled. Patient counseled to follow up sooner or seek more acute care if symptoms worsening or not improving according to plan.      Electronically signed by Alvaro Mariscal MD on 4/15/2021    _________________________________________________________  Current Alpha-Lipoic Acid 600 MG TABS Take 1 Bottle by mouth daily 60 tablet 1    CPAP Machine MISC by Does not apply route nightly       No current facility-administered medications on file prior to visit.         Patient Active Problem List   Diagnosis Code    Degenerative joint disease of left hip M16.12    Mixed hyperlipidemia E78.2    Anticoagulated on Coumadin Z79.01    Essential hypertension I10    Morbid obesity due to excess calories ([address] E66.01    Diabetes mellitus ([address] E11.9    AMIRAH on CPAP G47.33, Z99.89    Radiculopathy of lumbosacral region M54.17    Statin intolerance Z78.9    Persistent atrial fibrillation (HCC) I48.19     _________________________________________________________  Past Medical History:   Diagnosis Date    Anticoagulant long-term use     Atrial fibrillation (HCC)     CHF (congestive heart failure) (HCC)     Degenerative joint disease of left hip 1/28/2015    Diabetes mellitus ([facility] [address]     Hyperlipidemia     Hypertension     Obesity     PONV (postoperative nausea and vomiting)     Radiculopathy of lumbosacral region 7/25/2017    Radiculopathy of lumbosacral region 7/25/2017    Sleep apnea     Type II or unspecified type diabetes mellitus without mention of complication, not stated as uncontrolled        Family History   Problem Relation Age of Onset    High Blood Pressure Mother     Cancer Mother         lymphoma    Stroke Mother     Diabetes Father     Heart Surgery Sister         heart valve replacement    Heart Disease Brother         pt was waiting for a heart transplant    Cancer Maternal Grandfather     Cancer Paternal Grandmother     Cancer Paternal Grandfather        Past Surgical History:   Procedure Laterality Date    APPENDECTOMY      CHOLECYSTECTOMY      CYST INCISION AND DRAINAGE  06/08/2017    abd wall cyst    HERNIA REPAIR      HIP ARTHROPLASTY Left        Social History     Tobacco Use    Smoking status: Never Smoker    Smokeless tobacco:

## 2021-04-19 ENCOUNTER — TELEPHONE (OUTPATIENT)
Dept: FAMILY MEDICINE CLINIC | Age: 66
End: 2021-04-19

## 2021-04-19 NOTE — TELEPHONE ENCOUNTER
Ok, continue to monitor, draw a new line around the redness and check it tomorrow. Let me know if it is again expanding please .

## 2021-04-19 NOTE — TELEPHONE ENCOUNTER
Vijaya Flowers said there is not increased pain, in fact it feels a little better. There is no increased swelling or heat. He is not having fever or chills.

## 2021-04-20 RX ORDER — CEPHALEXIN 500 MG/1
500 CAPSULE ORAL 4 TIMES DAILY
Qty: 28 CAPSULE | Refills: 0 | Status: SHIPPED | OUTPATIENT
Start: 2021-04-20 | End: 2021-04-27

## 2021-04-20 NOTE — TELEPHONE ENCOUNTER
I just spoke to Leidy Gordon. I was not able to reach him last night. Not much has changed this morning. The redness and swelling is uncharged. He will follow your direction and yon the area. He will call back later today if it spreads beyond the new yon. He does want you to know that it became pretty uncomfortable last night, so he just went to bed.

## 2021-04-20 NOTE — TELEPHONE ENCOUNTER
I am going to recommend some Keflex antibiotic just in case. This should not interfere with his INR too much, if at all, but could stop an early infection if it is occurring. Sent to pharmacy. I doubt it, expecting this is just redness and swelling from being on the coumadin leading to increased bleeding under the skin, but I don't want to miss and infection that would complicate this minor injury that will otherwise heal on its own.
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
## 2021-05-01 LAB — LYME, EIA: 0.07 LIV (ref 0–1.2)

## 2021-05-03 ENCOUNTER — NURSE ONLY (OUTPATIENT)
Dept: FAMILY MEDICINE CLINIC | Age: 66
End: 2021-05-03
Payer: MEDICARE

## 2021-05-03 ENCOUNTER — ANTI-COAG VISIT (OUTPATIENT)
Dept: FAMILY MEDICINE CLINIC | Age: 66
End: 2021-05-03

## 2021-05-03 DIAGNOSIS — Z79.01 ANTICOAGULATED ON COUMADIN: ICD-10-CM

## 2021-05-03 DIAGNOSIS — S80.11XD CONTUSION OF RIGHT KNEE AND LOWER LEG, SUBSEQUENT ENCOUNTER: ICD-10-CM

## 2021-05-03 DIAGNOSIS — I48.19 PERSISTENT ATRIAL FIBRILLATION (HCC): ICD-10-CM

## 2021-05-03 DIAGNOSIS — I48.0 PAROXYSMAL ATRIAL FIBRILLATION (HCC): Primary | ICD-10-CM

## 2021-05-03 DIAGNOSIS — S80.01XD CONTUSION OF RIGHT KNEE AND LOWER LEG, SUBSEQUENT ENCOUNTER: ICD-10-CM

## 2021-05-03 LAB
INTERNATIONAL NORMALIZATION RATIO, POC: 3.7
PROTHROMBIN TIME, POC: 44.9

## 2021-05-03 PROCEDURE — 93793 ANTICOAG MGMT PT WARFARIN: CPT | Performed by: FAMILY MEDICINE

## 2021-05-03 PROCEDURE — 85610 PROTHROMBIN TIME: CPT | Performed by: FAMILY MEDICINE

## 2021-05-03 RX ORDER — TRAMADOL HYDROCHLORIDE 50 MG/1
50 TABLET ORAL EVERY 6 HOURS PRN
Qty: 20 TABLET | Refills: 0 | Status: SHIPPED | OUTPATIENT
Start: 2021-05-03 | End: 2021-05-08

## 2021-05-03 NOTE — PROGRESS NOTES
Epifanio shepard patient wanted to verify his maintenance plan. Do you indeed want him to take 11mg daily still and recheck 05/17?   Warfarin maintenance plan:  11 mg (10 mg x 1 and 1 mg x 1) every day   Weekly warfarin total:  77 mg   Plan last modified:  Geovanni Barry MD (2/18/2021)   Next INR check:  5/17/2021

## 2021-05-04 DIAGNOSIS — I48.19 PERSISTENT ATRIAL FIBRILLATION (HCC): ICD-10-CM

## 2021-05-04 NOTE — TELEPHONE ENCOUNTER
Last Appointment:  4/15/2021  Future Appointments   Date Time Provider Edmar Maribell   5/17/2021 10:00 AM SCHEDULE, POLLY MONTEZ [facility]   6/3/2021  9:30 AM Haroon Adan DPM Col Podiatry [facility]   8/6/2021 10:00 AM Beth Iverson  [address] [facility] [address] [facility] [address]

## 2021-05-05 RX ORDER — WARFARIN SODIUM 10 MG/1
TABLET ORAL
Qty: 90 TABLET | Refills: 1 | Status: SHIPPED
Start: 2021-05-05 | End: 2021-08-06

## 2021-05-17 ENCOUNTER — NURSE ONLY (OUTPATIENT)
Dept: FAMILY MEDICINE CLINIC | Age: 66
End: 2021-05-17
Payer: MEDICARE

## 2021-05-17 ENCOUNTER — ANTI-COAG VISIT (OUTPATIENT)
Dept: FAMILY MEDICINE CLINIC | Age: 66
End: 2021-05-17

## 2021-05-17 DIAGNOSIS — I48.19 PERSISTENT ATRIAL FIBRILLATION (HCC): ICD-10-CM

## 2021-05-17 DIAGNOSIS — I48.19 PERSISTENT ATRIAL FIBRILLATION (HCC): Primary | ICD-10-CM

## 2021-05-17 LAB
INTERNATIONAL NORMALIZATION RATIO, POC: 2.8
PROTHROMBIN TIME, POC: 33.1

## 2021-05-17 PROCEDURE — 85610 PROTHROMBIN TIME: CPT | Performed by: FAMILY MEDICINE

## 2021-05-17 PROCEDURE — 93793 ANTICOAG MGMT PT WARFARIN: CPT | Performed by: FAMILY MEDICINE

## 2021-05-17 RX ORDER — DILTIAZEM HYDROCHLORIDE 240 MG/1
CAPSULE, COATED, EXTENDED RELEASE ORAL
Qty: 90 CAPSULE | Refills: 1 | Status: SHIPPED
Start: 2021-05-17 | End: 2021-11-11 | Stop reason: SDUPTHER

## 2021-06-03 ENCOUNTER — PROCEDURE VISIT (OUTPATIENT)
Dept: PODIATRY | Age: 66
End: 2021-06-03
Payer: MEDICARE

## 2021-06-03 VITALS — HEIGHT: 72 IN | BODY MASS INDEX: 42.66 KG/M2 | WEIGHT: 315 LBS

## 2021-06-03 DIAGNOSIS — R60.0 LOCALIZED EDEMA: ICD-10-CM

## 2021-06-03 DIAGNOSIS — B35.1 TINEA UNGUIUM: Primary | ICD-10-CM

## 2021-06-03 DIAGNOSIS — L84 CORNS AND CALLOSITIES: ICD-10-CM

## 2021-06-03 DIAGNOSIS — Z79.01 ENCOUNTER FOR CURRENT LONG-TERM USE OF ANTICOAGULANTS: ICD-10-CM

## 2021-06-03 DIAGNOSIS — E11.9 TYPE 2 DIABETES MELLITUS WITHOUT COMPLICATION, UNSPECIFIED WHETHER LONG TERM INSULIN USE (HCC): ICD-10-CM

## 2021-06-03 PROCEDURE — 11721 DEBRIDE NAIL 6 OR MORE: CPT | Performed by: PODIATRIST

## 2021-06-03 PROCEDURE — 11056 PARNG/CUTG B9 HYPRKR LES 2-4: CPT | Performed by: PODIATRIST

## 2021-06-03 NOTE — PROGRESS NOTES
Wen Patel is here today for a diabetic foot exam and nail care. his PCP is Jennifer Bruce MD last OV was 04/15/21. Wen Patel : 1955 Sex: male  Age: 72 y.o. Patient was referred by Jennifer Bruce MD    CC:   Follow-up diabetic foot and ankle exam      HPI:   Follow-up diabetic exam.  Does continue Coumadin. Continues Metformin. Does continue off lipoic acid. No calf pain today. No open wounds. No additional pedal complaints. ROS:  Const: Denies constitutional symptoms  Musculo: Denies symptoms other than stated above  Skin: Denies symptoms other than stated above      Current Outpatient Medications:     dilTIAZem (CARDIZEM CD) 240 MG extended release capsule, Take 1 capsule by mouth once daily, Disp: 90 capsule, Rfl: 1    warfarin (COUMADIN) 10 MG tablet, TAKE AS DIRECTED ACCORDING TO COUMADIN REGIMEN, Disp: 90 tablet, Rfl: 1    blood glucose monitor strips, 1 strip by Other route daily Test 1 time a day & as needed for symptoms of irregular blood glucose. Dispense sufficient amount for indicated testing frequency plus additional to accommodate PRN testing needs.  ReliOn prime test strips, Disp: 100 strip, Rfl: 10    metFORMIN (GLUCOPHAGE) 1000 MG tablet, TAKE 1 TABLET BY MOUTH TWICE DAILY WITH MEALS, Disp: 180 tablet, Rfl: 1    hydroCHLOROthiazide (HYDRODIURIL) 25 MG tablet, Take 1 tablet by mouth once daily for blood pressure, Disp: 90 tablet, Rfl: 1    glipiZIDE (GLUCOTROL) 10 MG tablet, TAKE 1 TABLET BY MOUTH TWICE DAILY BEFORE MEAL(S), Disp: 180 tablet, Rfl: 1    benazepril (LOTENSIN) 20 MG tablet, Take 1 tablet by mouth twice daily, Disp: 180 tablet, Rfl: 1    furosemide (LASIX) 20 MG tablet, Take 1 tablet by mouth once daily, Disp: 90 tablet, Rfl: 1    carvedilol (COREG) 25 MG tablet, Take 1 tablet by mouth 2 times daily, Disp: 180 tablet, Rfl: 1    insulin 70-30 (NOVOLIN 70/30) (70-30) 100 UNIT per ML injection vial, Inject 30 Units into the skin 2 times daily (Patient taking differently: Inject 30 Units into the skin 2 times daily Pt reports not taking in the evening all the time.), Disp: 5 vial, Rfl: 1    Insulin Syringe-Needle U-100 (AIMSCO INS SYR .3CC/29GX0.5\") 29G X 1/2\" 0.3 ML MISC, 1 each by Does not apply route 2 times daily, Disp: 200 each, Rfl: 2    warfarin (COUMADIN) 2 MG tablet, TAKE AS DIRECTED ACCORDING TO WARFARIN REGIMEN, Disp: 90 tablet, Rfl: 0    ketoconazole (NIZORAL) 2 % cream, Apply topically daily. , Disp: 30 g, Rfl: 1    Alpha-Lipoic Acid 600 MG TABS, Take 1 Bottle by mouth daily, Disp: 60 tablet, Rfl: 1    CPAP Machine MISC, by Does not apply route nightly, Disp: , Rfl:   Allergies   Allergen Reactions    Lipitor      Body pain, DIARRHEA ETC. HAS PROBS WITH ALL CHOLESTEROL MEDS-MOST PROB WITH LIPITOR    Other Itching     Throat itches when he eats magnus nuts       Past Medical History:   Diagnosis Date    Anticoagulant long-term use     Atrial fibrillation (HCC)     CHF (congestive heart failure) (HCC)     Degenerative joint disease of left hip 1/28/2015    Diabetes mellitus (HCC)     Hyperlipidemia     Hypertension     Obesity     PONV (postoperative nausea and vomiting)     Radiculopathy of lumbosacral region 7/25/2017    Radiculopathy of lumbosacral region 7/25/2017    Sleep apnea     Type II or unspecified type diabetes mellitus without mention of complication, not stated as uncontrolled        There were no vitals filed for this visit. Work History/Social History: [employer]  Orthopedic history: EMG testing June 2020, Alpha-lipoic acid 600mg      Focused Lower Extremity Physical Exam:      Neurovascular examination:    Dorsalis Pedis palpable bilateral.  Posterior tibialis non-palpable bilateral.    Capillary Refill Time:  Immediate return  Hair growth:  Symmetrical and bilateral   Skin:  Not atrophic  Edema: Mild edema bilateral feet. Mild edema bilateral ankles.   Neurologic:  Light touch diminished bilateral.  Warm to coolness bilateral distal toes  Decreased epicritic sensation     Musculoskeletal/ Orthopedic examination:    Equinis: present bilateral  Dorsiflexion, plantarflexion, inversion, eversion bilateral 5 out of 5 muscle strength  Wiggling toes  Negative Homans      Dermatology examination:    Toenails 1 through 5 bilateral thickened, elongated, dystrophic, mycotic with subungual debris. Web spaces 1 through 4 bilateral clean dry and intact. Hyperkeratotic tissue plantar fifth metatarsal head bilateral.  Dry skin plantar feet bilateral.  No open skin lesions or abrasions. Assessment and Plan:  Miguel Daly was seen today for callouses, nail problem and diabetes. Diagnoses and all orders for this visit:    Tinea unguium    Corns and callosities    Type 2 diabetes mellitus without complication, unspecified whether long term insulin use ([facility] [address]    Encounter for current long-term use of anticoagulants    Localized edema      Follow-up diabetic foot ankle exam      Manual debridement with standard technique toenails 1 through 5 right and left in thickness and length. Patient tolerated procedure well. Paring hyperkeratotic tissue plantar fifth metatarsal head bilateral #15 blade. No open wounds. Continues off lipoic acid 600 mg daily. No calf pain today. Follow-up 2 months. Return in about 2 months (around 8/3/2021). Seen By:  Eliana Koehler DPM      Document was created using voice recognition software. Note was reviewed however may contain grammatical errors.

## 2021-06-08 DIAGNOSIS — I48.19 PERSISTENT ATRIAL FIBRILLATION (HCC): ICD-10-CM

## 2021-06-08 RX ORDER — WARFARIN SODIUM 2 MG/1
TABLET ORAL
Qty: 45 TABLET | Refills: 2 | Status: SHIPPED
Start: 2021-06-08 | End: 2021-08-06

## 2021-06-08 NOTE — TELEPHONE ENCOUNTER
Last Appointment:  4/15/2021  Future Appointments   Date Time Provider Edmar Maribell   6/14/2021 10:10 AM SCHEDULE, POLLY MONTEZ [facility]   8/5/2021 10:00 AM Phyllis Robledo DPM Col Podiatry [facility]   8/6/2021 10:00 AM Niurka Cruz  [address]

## 2021-06-14 ENCOUNTER — ANTI-COAG VISIT (OUTPATIENT)
Dept: FAMILY MEDICINE CLINIC | Age: 66
End: 2021-06-14

## 2021-06-14 ENCOUNTER — NURSE ONLY (OUTPATIENT)
Dept: FAMILY MEDICINE CLINIC | Age: 66
End: 2021-06-14
Payer: MEDICARE

## 2021-06-14 DIAGNOSIS — I48.19 PERSISTENT ATRIAL FIBRILLATION (HCC): ICD-10-CM

## 2021-06-14 DIAGNOSIS — I48.19 PERSISTENT ATRIAL FIBRILLATION (HCC): Primary | ICD-10-CM

## 2021-06-14 LAB
INTERNATIONAL NORMALIZATION RATIO, POC: 3.2
PROTHROMBIN TIME, POC: 38.7

## 2021-06-14 PROCEDURE — 85610 PROTHROMBIN TIME: CPT | Performed by: FAMILY MEDICINE

## 2021-06-14 PROCEDURE — 93793 ANTICOAG MGMT PT WARFARIN: CPT | Performed by: FAMILY MEDICINE

## 2021-06-28 ENCOUNTER — ANTI-COAG VISIT (OUTPATIENT)
Dept: FAMILY MEDICINE CLINIC | Age: 66
End: 2021-06-28

## 2021-06-28 ENCOUNTER — NURSE ONLY (OUTPATIENT)
Dept: FAMILY MEDICINE CLINIC | Age: 66
End: 2021-06-28
Payer: MEDICARE

## 2021-06-28 DIAGNOSIS — I48.19 PERSISTENT ATRIAL FIBRILLATION (HCC): Primary | ICD-10-CM

## 2021-06-28 DIAGNOSIS — I48.19 PERSISTENT ATRIAL FIBRILLATION (HCC): ICD-10-CM

## 2021-06-28 LAB
INTERNATIONAL NORMALIZATION RATIO, POC: 2.8
PROTHROMBIN TIME, POC: 33

## 2021-06-28 PROCEDURE — 93793 ANTICOAG MGMT PT WARFARIN: CPT | Performed by: FAMILY MEDICINE

## 2021-06-28 PROCEDURE — 85610 PROTHROMBIN TIME: CPT | Performed by: FAMILY MEDICINE

## 2021-07-13 RX ORDER — CARVEDILOL 25 MG/1
TABLET ORAL
Qty: 180 TABLET | Refills: 1 | Status: SHIPPED
Start: 2021-07-13 | End: 2022-01-13 | Stop reason: SDUPTHER

## 2021-07-13 NOTE — TELEPHONE ENCOUNTER
Last Appointment:  4/15/2021  Future Appointments   Date Time Provider Edmar Reyna   7/19/2021 10:10 AM SCHEDULE, [facility]   8/5/2021 10:00 AM Blue Leung DPM Col Podiatry [facility]   8/6/2021 10:00 AM Akhil Manley  [address]

## 2021-07-19 ENCOUNTER — ANTI-COAG VISIT (OUTPATIENT)
Dept: FAMILY MEDICINE CLINIC | Age: 66
End: 2021-07-19

## 2021-07-19 ENCOUNTER — NURSE ONLY (OUTPATIENT)
Dept: FAMILY MEDICINE CLINIC | Age: 66
End: 2021-07-19
Payer: MEDICARE

## 2021-07-19 DIAGNOSIS — I48.19 PERSISTENT ATRIAL FIBRILLATION (HCC): Primary | ICD-10-CM

## 2021-07-19 DIAGNOSIS — I48.19 PERSISTENT ATRIAL FIBRILLATION (HCC): ICD-10-CM

## 2021-07-19 LAB
INTERNATIONAL NORMALIZATION RATIO, POC: 2.3
PROTHROMBIN TIME, POC: 27.6

## 2021-07-19 PROCEDURE — 93793 ANTICOAG MGMT PT WARFARIN: CPT | Performed by: FAMILY MEDICINE

## 2021-07-19 PROCEDURE — 85610 PROTHROMBIN TIME: CPT | Performed by: FAMILY MEDICINE

## 2021-07-28 RX ORDER — BENAZEPRIL HYDROCHLORIDE 20 MG/1
TABLET ORAL
Qty: 180 TABLET | Refills: 1 | Status: SHIPPED
Start: 2021-07-28 | End: 2022-02-07 | Stop reason: SDUPTHER

## 2021-07-28 NOTE — TELEPHONE ENCOUNTER
Last Appointment:  4/15/2021  Future Appointments   Date Time Provider Edmar Maribell   8/5/2021 10:00 AM Blas Carey DPM Col Podiatry [facility]   8/6/2021 10:00 AM Enoch Betts  [address] [place] [address]

## 2021-08-05 ENCOUNTER — PROCEDURE VISIT (OUTPATIENT)
Dept: PODIATRY | Age: 66
End: 2021-08-05
Payer: MEDICARE

## 2021-08-05 VITALS — HEIGHT: 72 IN | BODY MASS INDEX: 42.66 KG/M2 | WEIGHT: 315 LBS

## 2021-08-05 DIAGNOSIS — B35.1 TINEA UNGUIUM: Primary | ICD-10-CM

## 2021-08-05 DIAGNOSIS — Z79.01 ENCOUNTER FOR CURRENT LONG-TERM USE OF ANTICOAGULANTS: ICD-10-CM

## 2021-08-05 DIAGNOSIS — L84 CORNS AND CALLOSITIES: ICD-10-CM

## 2021-08-05 DIAGNOSIS — E11.9 TYPE 2 DIABETES MELLITUS WITHOUT COMPLICATION, UNSPECIFIED WHETHER LONG TERM INSULIN USE (HCC): ICD-10-CM

## 2021-08-05 PROCEDURE — 11721 DEBRIDE NAIL 6 OR MORE: CPT | Performed by: PODIATRIST

## 2021-08-05 PROCEDURE — 11056 PARNG/CUTG B9 HYPRKR LES 2-4: CPT | Performed by: PODIATRIST

## 2021-08-05 NOTE — PROGRESS NOTES
Disp: 180 tablet, Rfl: 1    furosemide (LASIX) 20 MG tablet, Take 1 tablet by mouth once daily, Disp: 90 tablet, Rfl: 1    insulin 70-30 (NOVOLIN 70/30) (70-30) 100 UNIT per ML injection vial, Inject 30 Units into the skin 2 times daily (Patient taking differently: Inject 30 Units into the skin 2 times daily Pt reports not taking in the evening all the time.), Disp: 5 vial, Rfl: 1    Insulin Syringe-Needle U-100 (AIMSCO INS SYR .3CC/29GX0.5\") 29G X 1/2\" 0.3 ML MISC, 1 each by Does not apply route 2 times daily, Disp: 200 each, Rfl: 2    ketoconazole (NIZORAL) 2 % cream, Apply topically daily. , Disp: 30 g, Rfl: 1    Alpha-Lipoic Acid 600 MG TABS, Take 1 Bottle by mouth daily, Disp: 60 tablet, Rfl: 1    CPAP Machine MISC, by Does not apply route nightly, Disp: , Rfl:   Allergies   Allergen Reactions    Lipitor      Body pain, DIARRHEA ETC. HAS PROBS WITH ALL CHOLESTEROL MEDS-MOST PROB WITH LIPITOR    Other Itching     Throat itches when he eats magnus nuts       Past Medical History:   Diagnosis Date    Anticoagulant long-term use     Atrial fibrillation (HCC)     CHF (congestive heart failure) (HCC)     Degenerative joint disease of left hip 1/28/2015    Diabetes mellitus (HCC)     Hyperlipidemia     Hypertension     Obesity     PONV (postoperative nausea and vomiting)     Radiculopathy of lumbosacral region 7/25/2017    Radiculopathy of lumbosacral region 7/25/2017    Sleep apnea     Type II or unspecified type diabetes mellitus without mention of complication, not stated as uncontrolled        There were no vitals filed for this visit.        Work History/Social History: [employer]  Orthopedic history: EMG testing June 2020, Alpha-lipoic acid 600mg      Focused Lower Extremity Physical Exam:      Neurovascular examination:    Dorsalis Pedis palpable bilateral.  Posterior tibialis non-palpable bilateral.    Capillary Refill Time:  Immediate return  Hair growth:  Symmetrical and bilateral   Skin:  Not atrophic  Edema: Mild edema bilateral feet. Mild edema bilateral ankles. Neurologic:  Light touch diminished bilateral.  Warm to coolness bilateral distal toes  Decreased epicritic sensation     Musculoskeletal/ Orthopedic examination:    Equinis: present bilateral  Dorsiflexion, plantarflexion, inversion, eversion bilateral 5 out of 5 muscle strength  Wiggling toes  Negative Homans      Dermatology examination:    Toenails 1 through 5 bilateral thickened, elongated, dystrophic, mycotic with subungual debris. Web spaces 1 through 4 bilateral clean dry and intact. Hyperkeratotic tissue plantar fifth metatarsal head bilateral.    Dry skin plantar feet bilateral.  No open skin lesion abrasions. No erythema. Assessment and Plan:  Alicia Kearney was seen today for diabetes, callouses and nail problem. Diagnoses and all orders for this visit:    Tinea unguium    Corns and callosities    Type 2 diabetes mellitus without complication, unspecified whether long term insulin use ([facility] [address]    Encounter for current long-term use of anticoagulants      Follow-up diabetic foot ankle exam      Manual debridement with standard technique toenails 1 through 5 right and left in thickness and length. Patient tolerated procedure well. Paring hyperkeratotic tissue plantar fifth metatarsal head bilateral #15 blade. Continue wide respiratory walking shoes. Continue low-dose compression stockings with Velcro stockings during the day remove at night any calf pain go to emergency room. Does continue off lipoic acid 600 mg daily. Tolerating well. Follow-up 2 months diabetic foot ankle exam.      No follow-ups on file. Seen By:  Blas Carey DPM      Document was created using voice recognition software. Note was reviewed however may contain grammatical errors.

## 2021-08-06 ENCOUNTER — ANTI-COAG VISIT (OUTPATIENT)
Dept: FAMILY MEDICINE CLINIC | Age: 66
End: 2021-08-06

## 2021-08-06 ENCOUNTER — OFFICE VISIT (OUTPATIENT)
Dept: FAMILY MEDICINE CLINIC | Age: 66
End: 2021-08-06
Payer: MEDICARE

## 2021-08-06 VITALS
HEIGHT: 72 IN | BODY MASS INDEX: 42.66 KG/M2 | HEART RATE: 73 BPM | SYSTOLIC BLOOD PRESSURE: 128 MMHG | WEIGHT: 315 LBS | OXYGEN SATURATION: 99 % | DIASTOLIC BLOOD PRESSURE: 80 MMHG | TEMPERATURE: 97.8 F

## 2021-08-06 DIAGNOSIS — Z00.00 ROUTINE GENERAL MEDICAL EXAMINATION AT A HEALTH CARE FACILITY: Primary | ICD-10-CM

## 2021-08-06 DIAGNOSIS — I48.19 PERSISTENT ATRIAL FIBRILLATION (HCC): ICD-10-CM

## 2021-08-06 DIAGNOSIS — E11.69 TYPE 2 DIABETES MELLITUS WITH OTHER SPECIFIED COMPLICATION, WITH LONG-TERM CURRENT USE OF INSULIN (HCC): ICD-10-CM

## 2021-08-06 DIAGNOSIS — Z79.4 TYPE 2 DIABETES MELLITUS WITH OTHER SPECIFIED COMPLICATION, WITH LONG-TERM CURRENT USE OF INSULIN (HCC): ICD-10-CM

## 2021-08-06 LAB
INTERNATIONAL NORMALIZATION RATIO, POC: 2.5
PROTHROMBIN TIME, POC: 29.3

## 2021-08-06 PROCEDURE — 3017F COLORECTAL CA SCREEN DOC REV: CPT | Performed by: FAMILY MEDICINE

## 2021-08-06 PROCEDURE — 90732 PPSV23 VACC 2 YRS+ SUBQ/IM: CPT | Performed by: FAMILY MEDICINE

## 2021-08-06 PROCEDURE — 3052F HG A1C>EQUAL 8.0%<EQUAL 9.0%: CPT | Performed by: FAMILY MEDICINE

## 2021-08-06 PROCEDURE — 1123F ACP DISCUSS/DSCN MKR DOCD: CPT | Performed by: FAMILY MEDICINE

## 2021-08-06 PROCEDURE — 4040F PNEUMOC VAC/ADMIN/RCVD: CPT | Performed by: FAMILY MEDICINE

## 2021-08-06 PROCEDURE — 85610 PROTHROMBIN TIME: CPT | Performed by: FAMILY MEDICINE

## 2021-08-06 PROCEDURE — 83036 HEMOGLOBIN GLYCOSYLATED A1C: CPT | Performed by: FAMILY MEDICINE

## 2021-08-06 PROCEDURE — G0402 INITIAL PREVENTIVE EXAM: HCPCS | Performed by: FAMILY MEDICINE

## 2021-08-06 PROCEDURE — G0009 ADMIN PNEUMOCOCCAL VACCINE: HCPCS | Performed by: FAMILY MEDICINE

## 2021-08-06 RX ORDER — PEN NEEDLE, DIABETIC 30 GX5/16"
1 NEEDLE, DISPOSABLE MISCELLANEOUS DAILY
Qty: 100 EACH | Refills: 3 | Status: SHIPPED | OUTPATIENT
Start: 2021-08-06

## 2021-08-06 RX ORDER — GLIPIZIDE 10 MG/1
TABLET ORAL
Qty: 180 TABLET | Refills: 1 | Status: SHIPPED
Start: 2021-08-06 | End: 2022-02-07 | Stop reason: SDUPTHER

## 2021-08-06 RX ORDER — WARFARIN SODIUM 10 MG/1
TABLET ORAL
Qty: 90 TABLET | Refills: 1 | Status: CANCELLED | OUTPATIENT
Start: 2021-08-06

## 2021-08-06 RX ORDER — FUROSEMIDE 20 MG/1
TABLET ORAL
Qty: 90 TABLET | Refills: 1 | Status: SHIPPED
Start: 2021-08-06 | End: 2022-06-28 | Stop reason: SDUPTHER

## 2021-08-06 RX ORDER — HYDROCHLOROTHIAZIDE 25 MG/1
TABLET ORAL
Qty: 90 TABLET | Refills: 1 | Status: SHIPPED
Start: 2021-08-06 | End: 2022-02-17 | Stop reason: SDUPTHER

## 2021-08-06 RX ORDER — INSULIN DETEMIR 100 [IU]/ML
30 INJECTION, SOLUTION SUBCUTANEOUS NIGHTLY
Qty: 5 PEN | Refills: 3 | Status: SHIPPED
Start: 2021-08-06 | End: 2022-02-11 | Stop reason: DRUGHIGH

## 2021-08-06 RX ORDER — GLUCOSAMINE HCL/CHONDROITIN SU 500-400 MG
1 CAPSULE ORAL DAILY
Qty: 100 STRIP | Refills: 10 | Status: SHIPPED | OUTPATIENT
Start: 2021-08-06

## 2021-08-06 ASSESSMENT — PATIENT HEALTH QUESTIONNAIRE - PHQ9
SUM OF ALL RESPONSES TO PHQ QUESTIONS 1-9: 0
SUM OF ALL RESPONSES TO PHQ9 QUESTIONS 1 & 2: 0
SUM OF ALL RESPONSES TO PHQ QUESTIONS 1-9: 0
SUM OF ALL RESPONSES TO PHQ QUESTIONS 1-9: 0
2. FEELING DOWN, DEPRESSED OR HOPELESS: 0
1. LITTLE INTEREST OR PLEASURE IN DOING THINGS: 0

## 2021-08-06 NOTE — PATIENT INSTRUCTIONS
Personalized Preventive Plan for Jacob Dumont - 8/6/2021  Medicare offers a range of preventive health benefits. Some of the tests and screenings are paid in full while other may be subject to a deductible, co-insurance, and/or copay. Some of these benefits include a comprehensive review of your medical history including lifestyle, illnesses that may run in your family, and various assessments and screenings as appropriate. After reviewing your medical record and screening and assessments performed today your provider may have ordered immunizations, labs, imaging, and/or referrals for you. A list of these orders (if applicable) as well as your Preventive Care list are included within your After Visit Summary for your review. Other Preventive Recommendations:    · A preventive eye exam performed by an eye specialist is recommended every 1-2 years to screen for glaucoma; cataracts, macular degeneration, and other eye disorders. · A preventive dental visit is recommended every 6 months. · Try to get at least 150 minutes of exercise per week or 10,000 steps per day on a pedometer . · Order or download the FREE \"Exercise & Physical Activity: Your Everyday Guide\" from [employer]. Call 0-806.698.5251 or search [employer] online. · You need 5648-1383 mg of calcium and 5816-3379 IU of vitamin D per day. It is possible to meet your calcium requirement with diet alone, but a vitamin D supplement is usually necessary to meet this goal.  · When exposed to the sun, use a sunscreen that protects against both UVA and UVB radiation with an SPF of 30 or greater. Reapply every 2 to 3 hours or after sweating, drying off with a towel, or swimming. · Always wear a seat belt when traveling in a car. Always wear a helmet when riding a bicycle or motorcycle.

## 2021-08-06 NOTE — PROGRESS NOTES
Medicare Annual Wellness Visit  Name: Ronald Croft Date: 2021   MRN: [id number] Sex: Male   Age: 72 y.o. Ethnicity: Non- / Non    : 1955 Race: White (non-)      Pk Sarah is here for Medicare AWV    Screenings for behavioral, psychosocial and functional/safety risks, and cognitive dysfunction are all negative except as indicated below. These results, as well as other patient data from the [address] [facility] [address] [address] form, are documented in Flowsheets linked to this Encounter. Diabetes mellitus  Follow-up  Lab Results   Component Value Date    LABA1C 8.3 2021    LABA1C 8.0 (H) 2020    LABA1C 7.3 2020     Lab Results   Component Value Date    LABMICR <12.0 2020    LDLCALC 106 (H) 2020    CREATININE 1.0 2020     Wt Readings from Last 3 Encounters:   21 (!) 330 lb (149.7 kg)   21 (!) 330 lb (149.7 kg)   21 (!) 330 lb (149.7 kg)     Patient continues diabetic medication regimen. Compliance is good. No side effects of medications noted. Diabetes is not quite adequately controlled. Peripheral neuropathy is present. Regular foot exams encouraged. He is following with podiatry regularly. Vision changes are not present. Yearly eye exam encouraged. A fib  Continues coumadin  Inr therapeutic today. Rate controlled  Remains in A fib. Notes some SOBOE, but not different than normal.       Allergies   Allergen Reactions    Lipitor      Body pain, DIARRHEA ETC. HAS PROBS WITH ALL CHOLESTEROL MEDS-MOST PROB WITH LIPITOR    Other Itching     Throat itches when he eats magnus nuts         Prior to Visit Medications    Medication Sig Taking? Authorizing Provider   blood glucose monitor strips 1 strip by Other route daily Test 1 time a day & as needed for symptoms of irregular blood glucose. Dispense sufficient amount for indicated testing frequency plus additional to accommodate PRN testing needs.  ReliOn prime test strips Yes Amanda Cartagena MD   furosemide (LASIX) 20 MG tablet Take 1 tablet by mouth once daily Yes Amanda Cartagena MD   glipiZIDE (GLUCOTROL) 10 MG tablet TAKE 1 TABLET BY MOUTH TWICE DAILY BEFORE MEAL(S) Yes Amanda Cartagena MD   hydroCHLOROthiazide (HYDRODIURIL) 25 MG tablet Take 1 tablet by mouth once daily for blood pressure Yes Amanda Cartagena MD   metFORMIN (GLUCOPHAGE) 1000 MG tablet TAKE 1 TABLET BY MOUTH TWICE DAILY WITH MEALS Yes Amanda Cartagena MD   rivaroxaban (XARELTO) 20 MG TABS tablet Take 1 tablet by mouth daily (with breakfast) Yes Amanda Cartagena MD   insulin detemir (LEVEMIR FLEXTOUCH) 100 UNIT/ML injection pen Inject 30 Units into the skin nightly Yes Amanda Cartagena MD   Insulin Pen Needle (PEN NEEDLES 5/16\") 30G X 8 MM MISC 1 each by Does not apply route daily Yes Amanda Cartagena MD   benazepril (LOTENSIN) 20 MG tablet Take 1 tablet by mouth twice daily Yes Amanda Cartagena MD   carvedilol (COREG) 25 MG tablet Take 1 tablet by mouth twice daily Yes Amanda Cartagena MD   dilTIAZem (CARDIZEM CD) 240 MG extended release capsule Take 1 capsule by mouth once daily Yes Amanda Cartagena MD   Alpha-Lipoic Acid 600 MG TABS Take 1 Bottle by mouth daily Yes Zoraida Swanson DPM   CPAP Machine MISC by Does not apply route nightly Yes Historical Provider, MD         Past Medical History:   Diagnosis Date    Anticoagulant long-term use     [facility]     CHF (congestive heart failure) ([facility]     Degenerative joint disease of left hip 1/28/2015    Diabetes mellitus ([facility]     Hyperlipidemia     Hypertension     Obesity     PONV (postoperative nausea and vomiting)     Radiculopathy of lumbosacral region 7/25/2017    Radiculopathy of lumbosacral region 7/25/2017    Sleep apnea     Type II or unspecified type diabetes mellitus without mention of complication, not stated as uncontrolled        Past Surgical History:   Procedure Laterality Date    APPENDECTOMY      CHOLECYSTECTOMY      CYST INCISION AND DRAINAGE  06/08/2017    abd wall cyst    HERNIA REPAIR      HIP ARTHROPLASTY Left          Family History   Problem Relation Age of Onset    High Blood Pressure Mother     Cancer Mother         lymphoma    Stroke Mother     Diabetes Father     Heart Surgery Sister         heart valve replacement    Heart Disease Brother         pt was waiting for a heart transplant    Cancer Maternal Grandfather     Cancer Paternal Grandmother     Cancer Paternal Grandfather        CareTeam (Including outside providers/suppliers regularly involved in providing care):   Patient Care Team:  Kendall Osei MD as PCP - General (Family Medicine)  Kendall Osei MD as PCP - [facility] Empaneled Provider  Joanne Doty MD as Surgeon (Orthopedic Surgery)  Ladi Oconnell MD as Consulting Physician (Cardiology)    Wt Readings from Last 3 Encounters:   08/06/21 (!) 330 lb (149.7 kg)   08/05/21 (!) 330 lb (149.7 kg)   06/03/21 (!) 330 lb (149.7 kg)     Vitals:    08/06/21 1001   BP: 128/80   Site: Left Upper Arm   Position: Sitting   Cuff Size: Large Adult   Pulse: 73   Temp: 97.8 °F (36.6 °C)   TempSrc: Temporal   SpO2: 99%   Weight: (!) 330 lb (149.7 kg)   Height: 6' (1.829 m)     Body mass index is 44.76 kg/m². Based upon direct observation of the patient, evaluation of cognition reveals recent and remote memory intact.     General Appearance: alert and oriented to person, place and time, well developed and well- nourished, in no acute distress  Skin: warm and dry, no rash or erythema  Head: normocephalic and atraumatic  Eyes: pupils equal, round, and reactive to light, extraocular eye movements intact, conjunctivae normal  Neck: supple and non-tender without mass, no thyromegaly or thyroid nodules, no cervical lymphadenopathy  Pulmonary/Chest: clear to auscultation bilaterally- no wheezes, rales or 44.75  Health Habits/Nutrition Interventions:  · Dental exam overdue:  patient encouraged to make appointment with his/her dentist     Safety:  Safety  Do you have working smoke detectors?: Yes  Have all throw rugs been removed or fastened?: Yes  Do you have non-slip mats or surfaces in all bathtubs/showers?: Yes  Do all of your stairways have a railing or banister?: Yes  Are your doorways, halls and stairs free of clutter?: Yes  Do you always fasten your seatbelt when you are in a car?: (!) No  Safety Interventions:  · Home safety tips provided     Personalized Preventive Plan   Current Health Maintenance Status  Immunization History   Administered Date(s) Administered    Influenza Virus Vaccine 10/07/2015    Influenza, Nellene Liliana, IM, PF (6 mo and older Fluzone, Flulaval, Fluarix, and 3 yrs and older Afluria) 11/22/2016, 09/29/2017, 12/13/2018, 10/14/2019    Influenza, Quadv, adjuvanted, 65 yrs +, IM, PF (Fluad) 12/02/2020    Pneumococcal Polysaccharide ([id number]) 04/26/2011, 08/06/2021    Tdap (Boostrix, Adacel) 08/09/2015        Health Maintenance   Topic Date Due    Hepatitis C screen  Never done    Diabetic foot exam  Never done    Diabetic retinal exam  Never done    COVID-19 Vaccine (1) Never done    HIV screen  Never done    Shingles Vaccine (1 of 2) Never done   [employer] Visit (AWV)  Never done    Flu vaccine (1) 09/01/2021    Diabetic microalbuminuria test  12/02/2021    Lipid screen  12/02/2021    Potassium monitoring  12/02/2021    Creatinine monitoring  12/02/2021    A1C test (Diabetic or Prediabetic)  04/02/2022    Colon cancer screen colonoscopy  03/07/2023    DTaP/Tdap/Td vaccine (2 - Td or Tdap) 08/09/2025    Pneumococcal 65+ years Vaccine  Completed    Hepatitis A vaccine  Aged Out    Hib vaccine  Aged Out    Meningococcal (ACWY) vaccine  Aged Out     Recommendations for [employer] Due: see orders and patient instructions/AVS.  .   Recommended screening schedule for the next 5-10 years is provided to the patient in written form: see Patient Instructions/AVS.    Dain Dumont was seen today for medicare awv. Diagnoses and all orders for this visit:    Routine general medical examination at a health care facility  Overall [address] is doing well. Age appropriate HM topics reviewed and updated where agreeable. Vaccines reviewed and updated where agreeable. Age appropriate anticipatory guidance discussed. Encouraged to work on [employer] and exercise strategies. Opportunity to ask questions and answers provided as able. Accepting of Pneumovax today. Declines Covid. Recommend RTO in 1 year for annual physical.         Type 2 diabetes mellitus with other specified complication, with long-term current use of insulin ([address] stable but not at goal.  -     POCT glycosylated hemoglobin (Hb A1C)  -     blood glucose monitor strips; 1 strip by Other route daily Test 1 time a day & as needed for symptoms of irregular blood glucose. Dispense sufficient amount for indicated testing frequency plus additional to accommodate PRN testing needs. ReliOn prime test strips  -     glipiZIDE (GLUCOTROL) 10 MG tablet; TAKE 1 TABLET BY MOUTH TWICE DAILY BEFORE MEAL(S)  -     metFORMIN (GLUCOPHAGE) 1000 MG tablet; TAKE 1 TABLET BY MOUTH TWICE DAILY WITH MEALS  -     CBC Auto Differential; Future  -     Comprehensive Metabolic Panel; Future  -     Lipid Panel; Future  -     Microalbumin / Creatinine Urine Ratio; Future  -     insulin detemir (LEVEMIR FLEXTOUCH) 100 UNIT/ML injection pen; Inject 30 Units into the skin nightly  -     Insulin Pen Needle (PEN NEEDLES 5/16\") 30G X 8 MM MISC; 1 each by Does not apply route daily  Were going to switch him from 70/30 insulin to Levemir now that he has Medicare. Start with 30 units and increase that by 2 to 3 units every 3 days until fasting glucose in the morning between 101 30 reliably.   Continue other diabetes medications unchanged. Persistent atrial fibrillation (HCC)  -     POCT INR  -     furosemide (LASIX) 20 MG tablet; Take 1 tablet by mouth once daily  -     rivaroxaban (XARELTO) 20 MG TABS tablet; Take 1 tablet by mouth daily (with breakfast)  Were going to switch him from Coumadin to Xarelto now that he has Medicare. His INR is less than 3 today so he can make the switch as soon as he gets the medication. Stop Coumadin after starting Xarelto. Other orders  -     hydroCHLOROthiazide (HYDRODIURIL) 25 MG tablet;  Take 1 tablet by mouth once daily for blood pressure  -     PNEUMOVAX 23 subcutaneous/IM (Pneumococcal polysaccharide vaccine 23-valent >= 1yo)

## 2021-08-16 DIAGNOSIS — I48.19 PERSISTENT ATRIAL FIBRILLATION (HCC): ICD-10-CM

## 2021-09-14 DIAGNOSIS — E11.69 TYPE 2 DIABETES MELLITUS WITH OTHER SPECIFIED COMPLICATION, WITH LONG-TERM CURRENT USE OF INSULIN (HCC): ICD-10-CM

## 2021-09-14 DIAGNOSIS — Z79.4 TYPE 2 DIABETES MELLITUS WITH OTHER SPECIFIED COMPLICATION, WITH LONG-TERM CURRENT USE OF INSULIN (HCC): ICD-10-CM

## 2021-09-14 NOTE — TELEPHONE ENCOUNTER
Last Appointment:  8/6/2021  Future Appointments   Date Time Provider Edmar Reyna   10/7/2021 10:15 AM Celso Mora DPM [facility]   11/11/2021 10:00 AM Windy León MD [facility]   8/11/2022 10:00 AM Windy León  [address] [place] [address]

## 2021-10-07 ENCOUNTER — PROCEDURE VISIT (OUTPATIENT)
Dept: PODIATRY | Age: 66
End: 2021-10-07
Payer: MEDICARE

## 2021-10-07 VITALS — HEIGHT: 72 IN | WEIGHT: 315 LBS | BODY MASS INDEX: 42.66 KG/M2

## 2021-10-07 DIAGNOSIS — Z79.01 ENCOUNTER FOR CURRENT LONG-TERM USE OF ANTICOAGULANTS: ICD-10-CM

## 2021-10-07 DIAGNOSIS — B35.1 TINEA UNGUIUM: Primary | ICD-10-CM

## 2021-10-07 DIAGNOSIS — E11.9 TYPE 2 DIABETES MELLITUS WITHOUT COMPLICATION, UNSPECIFIED WHETHER LONG TERM INSULIN USE (HCC): ICD-10-CM

## 2021-10-07 DIAGNOSIS — L84 CORNS AND CALLOSITIES: ICD-10-CM

## 2021-10-07 DIAGNOSIS — R60.0 LOCALIZED EDEMA: ICD-10-CM

## 2021-10-07 PROCEDURE — 11056 PARNG/CUTG B9 HYPRKR LES 2-4: CPT | Performed by: PODIATRIST

## 2021-10-07 PROCEDURE — 11721 DEBRIDE NAIL 6 OR MORE: CPT | Performed by: PODIATRIST

## 2021-10-07 NOTE — PROGRESS NOTES
Patient is here for routine nail care. Patient has no concerns. Stephy Eastman MD   2021  Electronically signed by Romario Nichols DPM on 10/7/2021 at 10:39 AM      Alondra Paulino : 1955 Sex: male  Age: 72 y.o. Patient was referred by Stephy Eastman MD    CC:   Follow-up diabetic foot and ankle exam      HPI:   Follow-up diabetic foot ankle exam    No calf pain  Continue Xarelto long-term anticoagulant therapy. No additional pedal complaints. ROS:  Const: Denies constitutional symptoms  Musculo: Denies symptoms other than stated above  Skin: Denies symptoms other than stated above      Current Outpatient Medications:     metFORMIN (GLUCOPHAGE) 1000 MG tablet, TAKE 1 TABLET BY MOUTH TWICE DAILY WITH MEALS, Disp: 180 tablet, Rfl: 1    rivaroxaban (XARELTO) 20 MG TABS tablet, Take 1 tablet by mouth daily (with breakfast), Disp: 90 tablet, Rfl: 1    blood glucose monitor strips, 1 strip by Other route daily Test 1 time a day & as needed for symptoms of irregular blood glucose. Dispense sufficient amount for indicated testing frequency plus additional to accommodate PRN testing needs.  ReliOn prime test strips, Disp: 100 strip, Rfl: 10    furosemide (LASIX) 20 MG tablet, Take 1 tablet by mouth once daily, Disp: 90 tablet, Rfl: 1    glipiZIDE (GLUCOTROL) 10 MG tablet, TAKE 1 TABLET BY MOUTH TWICE DAILY BEFORE MEAL(S), Disp: 180 tablet, Rfl: 1    hydroCHLOROthiazide (HYDRODIURIL) 25 MG tablet, Take 1 tablet by mouth once daily for blood pressure, Disp: 90 tablet, Rfl: 1    insulin detemir (LEVEMIR FLEXTOUCH) 100 UNIT/ML injection pen, Inject 30 Units into the skin nightly, Disp: 5 pen, Rfl: 3    Insulin Pen Needle (PEN NEEDLES \") 30G X 8 MM MISC, 1 each by Does not apply route daily, Disp: 100 each, Rfl: 3    benazepril (LOTENSIN) 20 MG tablet, Take 1 tablet by mouth twice daily, Disp: 180 tablet, Rfl: 1    carvedilol (COREG) 25 MG tablet, Take 1 tablet by mouth twice daily, thickened, elongated, dystrophic, mycotic with subungual debris. Web spaces 1 through 4 bilateral clean dry and intact. Hyperkeratotic tissue plantar fifth metatarsal head bilateral.    No open skin lesions. No erythema. Assessment and Plan:  Pepe De Paz was seen today for nail problem. Diagnoses and all orders for this visit:    Tinea unguium    Corns and callosities    Type 2 diabetes mellitus without complication, unspecified whether long term insulin use ([facility] [address]    Encounter for current long-term use of anticoagulants    Localized edema      Follow-up diabetic foot ankle exam    Continues Xarelto long-term anticoagulant therapy. Manual debridement with standard technique toenails 1 through 5 right and left in thickness and length. Patient tolerated procedure well. Paring hyperkeratotic tissue plantar fifth metatarsal head bilateral #15 blade. Continue low-dose compression stockings during the day. Remove at night any calf pain go to emergency room. Continue alpha lipoic acid 600 mg once daily. I will follow-up 2 months to monitor progression. Return in about 2 months (around 12/7/2021). Seen By:  Maribel Garces DPM      Document was created using voice recognition software. Note was reviewed however may contain grammatical errors.

## 2021-11-11 ENCOUNTER — OFFICE VISIT (OUTPATIENT)
Dept: FAMILY MEDICINE CLINIC | Age: 66
End: 2021-11-11
Payer: MEDICARE

## 2021-11-11 VITALS
HEART RATE: 76 BPM | WEIGHT: 315 LBS | SYSTOLIC BLOOD PRESSURE: 130 MMHG | TEMPERATURE: 97.8 F | BODY MASS INDEX: 42.66 KG/M2 | OXYGEN SATURATION: 98 % | HEIGHT: 72 IN | DIASTOLIC BLOOD PRESSURE: 84 MMHG

## 2021-11-11 DIAGNOSIS — M54.50 CHRONIC BILATERAL LOW BACK PAIN WITHOUT SCIATICA: ICD-10-CM

## 2021-11-11 DIAGNOSIS — G89.29 CHRONIC BILATERAL LOW BACK PAIN WITHOUT SCIATICA: ICD-10-CM

## 2021-11-11 DIAGNOSIS — E11.69 TYPE 2 DIABETES MELLITUS WITH OTHER SPECIFIED COMPLICATION, WITH LONG-TERM CURRENT USE OF INSULIN (HCC): Primary | ICD-10-CM

## 2021-11-11 DIAGNOSIS — Z79.4 TYPE 2 DIABETES MELLITUS WITH OTHER SPECIFIED COMPLICATION, WITH LONG-TERM CURRENT USE OF INSULIN (HCC): Primary | ICD-10-CM

## 2021-11-11 DIAGNOSIS — I48.19 PERSISTENT ATRIAL FIBRILLATION (HCC): ICD-10-CM

## 2021-11-11 LAB — HBA1C MFR BLD: 8.2 %

## 2021-11-11 PROCEDURE — 83036 HEMOGLOBIN GLYCOSYLATED A1C: CPT | Performed by: FAMILY MEDICINE

## 2021-11-11 PROCEDURE — G8484 FLU IMMUNIZE NO ADMIN: HCPCS | Performed by: FAMILY MEDICINE

## 2021-11-11 PROCEDURE — 1036F TOBACCO NON-USER: CPT | Performed by: FAMILY MEDICINE

## 2021-11-11 PROCEDURE — 99214 OFFICE O/P EST MOD 30 MIN: CPT | Performed by: FAMILY MEDICINE

## 2021-11-11 PROCEDURE — 4040F PNEUMOC VAC/ADMIN/RCVD: CPT | Performed by: FAMILY MEDICINE

## 2021-11-11 PROCEDURE — G8417 CALC BMI ABV UP PARAM F/U: HCPCS | Performed by: FAMILY MEDICINE

## 2021-11-11 PROCEDURE — 3017F COLORECTAL CA SCREEN DOC REV: CPT | Performed by: FAMILY MEDICINE

## 2021-11-11 PROCEDURE — 1123F ACP DISCUSS/DSCN MKR DOCD: CPT | Performed by: FAMILY MEDICINE

## 2021-11-11 PROCEDURE — G8427 DOCREV CUR MEDS BY ELIG CLIN: HCPCS | Performed by: FAMILY MEDICINE

## 2021-11-11 PROCEDURE — 3052F HG A1C>EQUAL 8.0%<EQUAL 9.0%: CPT | Performed by: FAMILY MEDICINE

## 2021-11-11 PROCEDURE — 2022F DILAT RTA XM EVC RTNOPTHY: CPT | Performed by: FAMILY MEDICINE

## 2021-11-11 RX ORDER — DILTIAZEM HYDROCHLORIDE 240 MG/1
CAPSULE, COATED, EXTENDED RELEASE ORAL
Qty: 90 CAPSULE | Refills: 1 | Status: SHIPPED | OUTPATIENT
Start: 2021-11-11

## 2021-11-11 NOTE — PROGRESS NOTES
[facility]  Office Progress Note - Dr. Giselle Duran  11/11/21    CC:   Chief Complaint   Patient presents with    Diabetes    Hip Pain     bi-lat hip pain. Left side has hurt for years, right sided pain is new. /84 (Site: Left Upper Arm, Position: Sitting, Cuff Size: Large Adult)   Pulse 76   Temp 97.8 °F (36.6 °C) (Temporal)   Ht 6' (1.829 m)   Wt (!) 325 lb (147.4 kg)   SpO2 98%   BMI 44.08 kg/m²   Wt Readings from Last 3 Encounters:   11/11/21 (!) 325 lb (147.4 kg)   10/07/21 (!) 330 lb (149.7 kg)   08/06/21 (!) 330 lb (149.7 kg)       HPI:   Diabetes mellitus  Follow-up  levemir 32 units - switched to that from novolin. a1c 8.2 today. BS was 200 somehing, had some pie last night. Usually 130-170 in the morning. Lab Results   Component Value Date    LABA1C 8.2 11/11/2021    LABA1C 8.3 04/02/2021    LABA1C 8.0 (H) 12/02/2020     Lab Results   Component Value Date    LABMICR 29.9 (H) 08/06/2021    LDLCALC 106 (H) 08/06/2021    CREATININE 0.9 08/06/2021     Wt Readings from Last 3 Encounters:   11/11/21 (!) 325 lb (147.4 kg)   10/07/21 (!) 330 lb (149.7 kg)   08/06/21 (!) 330 lb (149.7 kg)     Patient continues diabetic medication regimen. Compliance is good. No side effects of medications noted. Diabetes is not quite adequately controlled. Peripheral neuropathy is present. Regular foot exams encouraged. Vision changes are not present. Yearly eye exam encouraged. A fib  Switched to xarekto in somewhat recent past.   Doing ok wit that. Minor nose bleeds. sched for derm fu for a spot on the nose next month. Chronic low back pain. Usually present on the left. Flared up recently and also present on the right. He had a fall couple of months ago when he lost his balance. No radiation. On exam no pain over the spine. No paraspinal tenderness. Pain is mostly over the SI joints bilaterally.   Large abdomen, likely causing lordotic features and spine.    _________________________________________________________    Assessment / Magaly Rodgers was seen today for diabetes and hip pain. Diagnoses and all orders for this visit:    Type 2 diabetes mellitus with other specified complication, with long-term current use of insulin ([address] stable  -     POCT glycosylated hemoglobin (Hb A1C)  Titrate Levemir up 2 units every 2 to 3 days. Stop when fasting blood sugar averages about 120 or sees any readings around 100. Continue weight loss efforts. Continue other medicines unchanged. Persistent atrial fibrillation (HCC), stable  -   Refill dilTIAZem (CARDIZEM CD) 240 MG extended release capsule; Take 1 capsule by mouth once daily  Tolerating Xarelto well. Rare mild nosebleed. Not causing problems. Continue same. Chronic bilateral low back pain without sciatica, worsened recently  -     XR SACROILIAC JOINTS (MIN 3 VIEWS); Future  -     XR LUMBAR SPINE (2-3 VIEWS); Future  Mostly SI joint pain on exam.  No radicular symptoms lately. Has had this in the past.  Update images which are about 3years old now. Weight loss will be important. 3 months or as scheduled. Patient counseled to follow up sooner or seek more acute care if symptoms worsening or not improving according to plan. Electronically signed by Violette Delgadillo MD on 11/11/2021    _________________________________________________________  Current Outpatient Medications on File Prior to Visit   Medication Sig Dispense Refill    metFORMIN (GLUCOPHAGE) 1000 MG tablet TAKE 1 TABLET BY MOUTH TWICE DAILY WITH MEALS 180 tablet 1    rivaroxaban (XARELTO) 20 MG TABS tablet Take 1 tablet by mouth daily (with breakfast) 90 tablet 1    blood glucose monitor strips 1 strip by Other route daily Test 1 time a day & as needed for symptoms of irregular blood glucose. Dispense sufficient amount for indicated testing frequency plus additional to accommodate PRN testing needs.  ReliOn prime test strips 100 strip 10    furosemide (LASIX) 20 MG tablet Take 1 tablet by mouth once daily 90 tablet 1    glipiZIDE (GLUCOTROL) 10 MG tablet TAKE 1 TABLET BY MOUTH TWICE DAILY BEFORE MEAL(S) 180 tablet 1    hydroCHLOROthiazide (HYDRODIURIL) 25 MG tablet Take 1 tablet by mouth once daily for blood pressure 90 tablet 1    insulin detemir (LEVEMIR FLEXTOUCH) 100 UNIT/ML injection pen Inject 30 Units into the skin nightly (Patient taking differently: Inject 32 Units into the skin nightly ) 5 pen 3    Insulin Pen Needle (PEN NEEDLES 5/16\") 30G X 8 MM MISC 1 each by Does not apply route daily 100 each 3    benazepril (LOTENSIN) 20 MG tablet Take 1 tablet by mouth twice daily 180 tablet 1    carvedilol (COREG) 25 MG tablet Take 1 tablet by mouth twice daily 180 tablet 1    Alpha-Lipoic Acid 600 MG TABS Take 1 Bottle by mouth daily 60 tablet 1    CPAP Machine MISC by Does not apply route nightly       No current facility-administered medications on file prior to visit.        Patient Active Problem List   Diagnosis Code    Degenerative joint disease of left hip M16.12    Mixed hyperlipidemia E78.2    Anticoagulated on Coumadin Z79.01    Essential hypertension I10    Morbid obesity due to excess calories ([address] E66.01    Diabetes mellitus ([address] E11.9    AMIRAH on CPAP G47.33, Z99.89    Radiculopathy of lumbosacral region M54.17    Statin intolerance Z78.9    Persistent atrial fibrillation (HCC) I48.19     _________________________________________________________  Past Medical History:   Diagnosis Date    Anticoagulant long-term use     Atrial fibrillation (HCC)     CHF (congestive heart failure) (HCC)     Degenerative joint disease of left hip 1/28/2015    Diabetes mellitus ([address]     Hyperlipidemia     Hypertension     Obesity     PONV (postoperative nausea and vomiting)     Radiculopathy of lumbosacral region 7/25/2017    Radiculopathy of lumbosacral region 7/25/2017    Sleep apnea     Type II or Normal Gait. Psychiatric:    He has a normal mood and affect. Normal groom and dress. No SI or HI.   ________________________________________________________    This note may have been created using dictation software.  Efforts were made to reduce errors, but some may persist.

## 2021-11-15 DIAGNOSIS — M54.50 CHRONIC BILATERAL LOW BACK PAIN WITHOUT SCIATICA: Primary | ICD-10-CM

## 2021-11-15 DIAGNOSIS — G89.29 CHRONIC BILATERAL LOW BACK PAIN WITHOUT SCIATICA: Primary | ICD-10-CM

## 2021-11-21 NOTE — PROGRESS NOTES
[address] [place] [address] and Rehabilitation   Phone: [phone]   Fax: [phone]           Date:  2021   Patient: Renee Malave  : 1955  MRN: [id number]  Referring Provider: MD MONA Puente/ Alexis Gomez 33.  [address] Diagnosis:   M54.50, P38.10 (ICD-10-CM) - Chronic bilateral low back pain without sciatica      SUBJECTIVE:     Onset date: years,     Onset: Insidious onset    Mechanism of Injury: Pt reports has had pain for years but hasn't always had insurance to take care of pain. Pt reports h/o L MALCOM. Was told when he got hip replacement that he also had a pinched nerve. Reports at times has to use cane when pain is increased in hip area. Reports pain walking on hard floors such as [employer]. 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 Pt reports has lift chair and uses it most of the time. Previous PT: only for lyphedema and hip replacement per pt    Medical Management for Current Problem: tylenol prn    Chief complaint: pain    Behavior: condition is getting worse    Pain:  Current: 2/10     Best: 0/10     Worst:10/10    Symptom Type/Quality: unable to describe  Location[de-identified] Back: L Low back radiates into buttock and slightly into thigh.   And R LB but doesn't radiate          Provoking Activities/Positions:walking on hard surfaces, standing to do dishes                  Relieving Activitie/Positions: sitting    Disturbed Sleep: no   Bladder Dysfunction: no  Bowel Dysfunction: no     Imaging results: XR LUMBAR SPINE (2-3 VIEWS)    Result Date: 2021  EXAMINATION: THREE XRAY VIEWS OF THE LUMBAR SPINE 2021 8:11 am COMPARISON: Lumbar spine x-ray 2019 HISTORY: ORDERING SYSTEM PROVIDED HISTORY: Chronic bilateral low back pain without sciatica TECHNOLOGIST PROVIDED HISTORY: Reason for exam:->chronic low back pain FINDINGS: There are moderate-advanced degenerative disc changes at L5-S1 with evidence of vacuum disc phenomenon. Mild degenerative changes are present elsewhere. No acute fracture is identified. Grade 1 retrolisthesis at L5-S1 is similar to the previous exam.  A left hip replacement is partially imaged. There are calcified phleboliths in the pelvis. 1.  No acute osseous abnormality is identified. 2. Degenerative changes appear most prominent at L5-S1 with grade 1 retrolisthesis, similar to the previous exam.     XR SACROILIAC JOINTS (MIN 3 VIEWS)    Result Date: 11/11/2021  EXAMINATION: THREE XRAY VIEWS OF THE SACRO-ILIAC JOINTS 11/11/2021 11:11 am COMPARISON: None. HISTORY: ORDERING SYSTEM PROVIDED HISTORY: Chronic bilateral low back pain without sciatica TECHNOLOGIST PROVIDED HISTORY: Reason for exam:->low back pain, si joint pain FINDINGS: There is no evidence of acute fracture. There is normal alignment. No acute joint abnormality. No focal osseous lesion. No focal soft tissue abnormality. Left hip arthroplasty. Degenerative changes L5-S1     No acute osseous abnormality. Grossly normal appearing SI joints. Degenerative changes L5-S1.        Past Medical History:  Past Medical History:   Diagnosis Date    Anticoagulant long-term use     Atrial fibrillation (HCC)     CHF (congestive heart failure) (HCC)     Degenerative joint disease of left hip 1/28/2015    Diabetes mellitus ([facility] [address]     Hyperlipidemia     Hypertension     Obesity     PONV (postoperative nausea and vomiting)     Radiculopathy of lumbosacral region 7/25/2017    Radiculopathy of lumbosacral region 7/25/2017    Sleep apnea     Type II or unspecified type diabetes mellitus without mention of complication, not stated as uncontrolled      Past Surgical History:   Procedure Laterality Date    APPENDECTOMY      CHOLECYSTECTOMY      CYST INCISION AND DRAINAGE  06/08/2017    abd wall cyst    HERNIA REPAIR      HIP ARTHROPLASTY Left        Medications:   Current Outpatient Medications   Medication Sig Dispense Refill    dilTIAZem (CARDIZEM CD) 240 MG extended release capsule Take 1 capsule by mouth once daily 90 capsule 1    metFORMIN (GLUCOPHAGE) 1000 MG tablet TAKE 1 TABLET BY MOUTH TWICE DAILY WITH MEALS 180 tablet 1    rivaroxaban (XARELTO) 20 MG TABS tablet Take 1 tablet by mouth daily (with breakfast) 90 tablet 1    blood glucose monitor strips 1 strip by Other route daily Test 1 time a day & as needed for symptoms of irregular blood glucose. Dispense sufficient amount for indicated testing frequency plus additional to accommodate PRN testing needs. ReliOn prime test strips 100 strip 10    furosemide (LASIX) 20 MG tablet Take 1 tablet by mouth once daily 90 tablet 1    glipiZIDE (GLUCOTROL) 10 MG tablet TAKE 1 TABLET BY MOUTH TWICE DAILY BEFORE MEAL(S) 180 tablet 1    hydroCHLOROthiazide (HYDRODIURIL) 25 MG tablet Take 1 tablet by mouth once daily for blood pressure 90 tablet 1    insulin detemir (LEVEMIR FLEXTOUCH) 100 UNIT/ML injection pen Inject 30 Units into the skin nightly (Patient taking differently: Inject 32 Units into the skin nightly ) 5 pen 3    Insulin Pen Needle (PEN NEEDLES 5/16\") 30G X 8 MM MISC 1 each by Does not apply route daily 100 each 3    benazepril (LOTENSIN) 20 MG tablet Take 1 tablet by mouth twice daily 180 tablet 1    carvedilol (COREG) 25 MG tablet Take 1 tablet by mouth twice daily 180 tablet 1    Alpha-Lipoic Acid 600 MG TABS Take 1 Bottle by mouth daily 60 tablet 1    CPAP Machine MISC by Does not apply route nightly       No current facility-administered medications for this visit. Occupation: works for himself at [employer]. Physical demands include: lifting, carrying, pushing, pulling heavy weighted items (50 - 100 lbs Occasionally), standing.   Status: part time    Exercise regimen: none    Hobbies:  golf in summer, bowling     Patient Goals: pain relief    Contraindications/Precautions: none    OBJECTIVE:     Observations: well nourished male      Gait: antalgic gait    Functional Strength:   Not Able to toe walk, heel walk, and able to mini squat. Range of Motion:    Trunk:    Flexion:  [] Normal   [x] Limited 40% reports pain with    Extension:  [] Normal   [x] Limited 30%    Right Rotation: [] Normal   [x] Limited 20%   Left Rotation:  [] Normal   [x] Limited 20%   Right Side Bending: [] Normal   [x] Limited 20%   Left Side Bending: [] Normal   [x] Limited 20%    Lower Extremity:   Right:   [x] Normal   [] Limited    Left:   [x] Normal   [] Limited       Strength:     Trunk: decreased ROM, decreased strength   R LE: 4/5   L LE: 4/5    Palpation: Tender to palpation over lower lumbar, Non-tender to palpation B ASIS ,  B PSIS, over sacrum    Sensation: pt reports numbness/tingling in feet. Special Tests:   [] Nerve Root Compression           Right []+ / [] -    Left []+ / [] -  [] Slump           Right []+ / [x] -    Left []+ / [x] -  [] FADIR          Right []+ / [] -    Left []+ / [] -  [] S-I Distraction          Right []+ / [] -    Left []+ / [] -     [] SLR           Right []+ / [x] -    Left []+ / [x] -     [] VALARIE          Right []+ / [x] -    Left []+ / [] -  [] S-I Compression          Right []+ / [] -    Left []+ / [] -   [] Other: []+ / [] -         ASSESSMENT     Outcome Measure:   Modified Oswestry 26% disability    Problems:    Pain reported 0-10/10   ROM decreased    Strength decreased   Decreased functional ability with walking, standing, work    [x] There are no barriers affecting plan of care or recovery    [] Barriers to this patient's plan of care or recovery include. Domestic Concerns:  [x] No  [] Yes:        Long Term goals (6 weeks)   Decrease reported pain to 0-5/10   Increase ROM  By 20%   Increase Strength to 4+ to 5-/5     Able to perform/complete the following functions/tasks: pt able to walk 25+ minutes with min pain/limitation. Pt able to stand 60 + minutes with min pain/limitation.   Pt able to perform work duties 5+ hours with min pain/limitation.  Oswestry Low Back Disability Questionnaire 15% disability    Independent with Home Exercise Programs    Rehab Potential: [x] Good  [] Fair  [] Poor    PLAN       Treatment Plan:   [x] Therapeutic Exercise  [x] Therapeutic Activity  [x] Neuromuscular Re-education   [x] Gait Training  [x] Balance Training  [x] Aerobic conditioning  [x] Manual Therapy  [x] Massage/Fascial release   [] Work/Sport specific activities    [] Pain Neuroscience [x] Cold/hotpack  [] Vasocompression  [x] Electrical Stimulation  [x] Lumbar/Cervical Traction  [x] Ultrasound   [] Iontophoresis: 4 mg/mL Dexamethasone Sodium Phosphate 40-80 mAmin        [x] Instruction in HEP      []  Medication allergies reviewed for use of Dexamethasone Sodium Phosphate 4mg/ml  with iontophoresis treatments. Patient is not allergic. The following CPT codes are likely to be used in the care of this patient: 30549 PT Evaluation: Low Complexity   88536 PT Re-Evaluation   74849 Therapeutic Exercise   59315 Neuromuscular Re-Education   75504 Therapeutic Activities   85741 Manual Therapy   33281 Mechanical Traction   05010 Gait Training    Electrical Stimulation  08601 US      Suggested Professional Referral: [x] No  [] Yes:     Patient Education:  [x] Plans/Goals, Risks/Benefits discussed  [x] Home exercise program  Method of Education: [x] Verbal  [x] Demo  [x] Written  Comprehension of Education:  [x] Verbalizes understanding. [x] Demonstrates understanding. [] Needs Review. [] Demonstrates/verbalizes understanding of HEP/Ed previously given. Frequency:  2 days per week for 6 weeks    Patient understands diagnosis/prognosis and consents to treatment, plan and goals: [x] Yes    [] No     Thank you for the opportunity to work with your patient. If you have questions or comments, please contact me at numbers listed above.     Electronically signed by: Terry Hernandez, PT DPT [id number] Please sign Physician's Certification and return to: [address] [place]  [address] [address]  Dept: [phone]  Dept Fax: [phone] Certification / Comments     Frequency/Duration 2 days per week for 6 weeks. Certification period from 11/22/2021  to 1/7/2021 . I have reviewed the Plan of Care established for skilled therapy services and certify that the services are required and that they will be provided while the patient is under my care.     Physician's Comments/Revisions:               Physician's Printed Name:                                           [de-identified] Signature:                                                               Date:

## 2021-11-22 ENCOUNTER — EVALUATION (OUTPATIENT)
Dept: PHYSICAL THERAPY | Age: 66
End: 2021-11-22
Payer: MEDICARE

## 2021-11-22 DIAGNOSIS — M54.50 CHRONIC BILATERAL LOW BACK PAIN WITHOUT SCIATICA: Primary | ICD-10-CM

## 2021-11-22 DIAGNOSIS — G89.29 CHRONIC BILATERAL LOW BACK PAIN WITHOUT SCIATICA: Primary | ICD-10-CM

## 2021-11-22 PROCEDURE — 97161 PT EVAL LOW COMPLEX 20 MIN: CPT | Performed by: PHYSICAL THERAPIST

## 2021-11-22 NOTE — PROGRESS NOTES
[address] [facility] and Rehabilitation   Phone: [phone]   Fax: [phone]      Physical Therapy Treatment Note    Date: 2021  Patient Name: Bela Ty  : 1955   MRN: [id number]  [facility]: years  DOSx: NA  Referring Provider: Val Bah MD  [address] [place],  [address] [place] [address] Diagnosis:   M54.50, G89.29 (ICD-10-CM) - Chronic bilateral low back pain without sciatica    Outcome Measure:  Oswestry 26%     S: see eval  O:  Time 1540- 1610     Visit 1 Repeat outcome measure at mid point and end. Pain 2/10     ROM      Modalities      MH + ES            Exercise      ALL EXERCISE DONE WITH DRAW-IN TECHNIQUE                            Functional activities To aid in reaching , pushing, pulling tasks at home     ROWS: H  \"    ROWS: M  \"    ROWS: L  \"    Obliques - high  \"    Obliques - low  \"     THEREX     Bike      Punches      Lat pulldowns      Triceps ext standing      Marching            Trunk ext TB      Trunk flex TB      Hip abd      Hip EXT      TG Squats                  A:  Tolerated well.     P: Continue with rehab plan  Aaron Chappell, PT  PT DPT [id number]    Treatment Charges: Mins Units   Initial Evaluation 30 1   Re-Evaluation     Ther Exercise         TE     Manual Therapy     MT     Ther Activities        TA     Gait Training          GT     Neuro Re-education NR     Modalities     Non-Billable Service Time     Other     Total Time/Units 30 1

## 2021-11-29 ENCOUNTER — TREATMENT (OUTPATIENT)
Dept: PHYSICAL THERAPY | Age: 66
End: 2021-11-29
Payer: MEDICARE

## 2021-11-29 DIAGNOSIS — M54.50 CHRONIC BILATERAL LOW BACK PAIN WITHOUT SCIATICA: Primary | ICD-10-CM

## 2021-11-29 DIAGNOSIS — G89.29 CHRONIC BILATERAL LOW BACK PAIN WITHOUT SCIATICA: Primary | ICD-10-CM

## 2021-11-29 PROCEDURE — 97110 THERAPEUTIC EXERCISES: CPT

## 2021-11-29 NOTE — PROGRESS NOTES
[address] [facility] and [facility]   Phone: [phone]   Fax: [phone]      Physical Therapy Treatment Note    Date: 2021  Patient Name: Lukas Madden  : 1955   MRN: [id number]  [facility]: years  DOSx: NA  Referring Provider: Gene Jiménez MD  [address] [place],  [address] [facility] Diagnosis:   M54.50, G89.29 (ICD-10-CM) - Chronic bilateral low back pain without sciatica    Outcome Measure:  Oswestry 26%     S: Pt reports minimal pain on arrival.   O:  Time 1535- 1613     Visit 2 Repeat outcome measure at mid point and end. Pain 1/10     ROM      Modalities      MH + ES            Exercise      ALL EXERCISE DONE WITH DRAW-IN TECHNIQUE                            Functional activities To aid in reaching , pushing, pulling tasks at home     ROWS: H  \"    ROWS: M  \"    ROWS: L  \"    Obliques - high  \"    Obliques - low  \"     THEREX     Bike  Attempted, pt unable to get feet in. Punches      Lat pulldowns 2 x 10 OTB    Triceps ext standing      Marching with core stability  X 30s OTB    Sit to stand  2 x 10 No UE, blue foam     Trunk ext seated with ball  2 x 10     Step ups  2 x 10  4 inch (support on R side)    Trunk flex TB      Hip abd 2 x 10     Hip EXT 2 x 10     SB roll out  10 x 5 s hold     Seated marches  X 20     SLR 2 x 10     Supine trunk rotation 10 x 3 sec hold each way     A:  Tolerated well.     P: Continue with rehab plan  Doneta Rank, PTA  [id number]    Treatment Charges: Mins Units   Initial Evaluation     Re-Evaluation     Ther Exercise         TE 38 3   Manual Therapy     MT     Ther Activities        TA     Gait Training          GT     Neuro Re-education NR     Modalities     Non-Billable Service Time     Other     Total Time/Units 38 3

## 2021-12-01 ENCOUNTER — TREATMENT (OUTPATIENT)
Dept: PHYSICAL THERAPY | Age: 66
End: 2021-12-01
Payer: MEDICARE

## 2021-12-01 DIAGNOSIS — G89.29 CHRONIC BILATERAL LOW BACK PAIN WITHOUT SCIATICA: Primary | ICD-10-CM

## 2021-12-01 DIAGNOSIS — M54.50 CHRONIC BILATERAL LOW BACK PAIN WITHOUT SCIATICA: Primary | ICD-10-CM

## 2021-12-01 PROCEDURE — 97110 THERAPEUTIC EXERCISES: CPT | Performed by: PHYSICAL THERAPIST

## 2021-12-01 NOTE — PROGRESS NOTES
[address] [facility]   Phone: [phone]   Fax: [phone]      Physical Therapy Treatment Note    Date: 2021  Patient Name: Pat Renee  : 1955   MRN: [id number]  DOInjury: years  DOSx: NA  Referring Provider: No referring provider defined for this encounter. Medical Diagnosis:   M54.50, G89.29 (ICD-10-CM) - Chronic bilateral low back pain without sciatica    Outcome Measure:  Oswestry 26%     S: Pt reports 2-3/10 central low back pain today. Reports was sore after last session but improved today. O:  Time 1040- 1116     Visit 3 Repeat outcome measure at mid point and end. Pain 2-3/10     ROM      Modalities      MH + ES            Exercise      ALL EXERCISE DONE WITH DRAW-IN TECHNIQUE                            Functional activities To aid in reaching , pushing, pulling tasks at home     ROWS: H  \"    ROWS: M 2 x 10  OTB\"    ROWS: L  \"    Obliques - high  \"    Obliques - low  \"     THEREX     Bike      SKTC  3 x 10s B  With gait belt to pull     Lat pulldowns 2 x 10 OTB    Triceps ext standing      Marching with core stability   OTB    Sit to stand  2 x 10 No UE, blue foam     Trunk ext seated with ball       Step ups  2 x 10  4 inch (support on B side)     Trunk flex TB      Hip abd 2 x 10     Hip EXT 2 x 10     SB roll out       Seated marches  X 20     SLR  x 10 B     Supine trunk rotation 10 x 3 sec hold each way     A:  Tolerated well. Pt reports pain improved end of session.    P: Continue with rehab plan  Josué Gray  [id number]    Treatment Charges: Mins Units   Initial Evaluation     Re-Evaluation     Ther Exercise         TE 36 2   Manual Therapy     MT     Ther Activities        TA     Gait Training          GT     Neuro Re-education NR     Modalities     Non-Billable Service Time     Other     Total Time/Units 36 2

## 2021-12-06 ENCOUNTER — TREATMENT (OUTPATIENT)
Dept: PHYSICAL THERAPY | Age: 66
End: 2021-12-06
Payer: MEDICARE

## 2021-12-06 DIAGNOSIS — M54.50 CHRONIC BILATERAL LOW BACK PAIN WITHOUT SCIATICA: Primary | ICD-10-CM

## 2021-12-06 DIAGNOSIS — G89.29 CHRONIC BILATERAL LOW BACK PAIN WITHOUT SCIATICA: Primary | ICD-10-CM

## 2021-12-06 PROCEDURE — 97110 THERAPEUTIC EXERCISES: CPT | Performed by: PHYSICAL THERAPIST

## 2021-12-06 NOTE — PROGRESS NOTES
[address] [facility] and Rehabilitation   Phone: [phone]   Fax: [phone]      Physical Therapy Treatment Note    Date: 2021  Patient Name: Evelia Ford  : 1955   MRN: [id number]  DOInjury: years  DOSx: NA  Referring Provider: No referring provider defined for this encounter. Medical Diagnosis:   M54.50, G89.29 (ICD-10-CM) - Chronic bilateral low back pain without sciatica    Outcome Measure:  Oswestry 26%     S: Pt reports some soreness central low back and L lateral hip area 1/10 improved from last session. O:  Time [id number]      Visit 5 Repeat outcome measure at mid point and end. Pain 1/10     ROM      Modalities      MH + ES            Exercise      ALL EXERCISE DONE WITH DRAW-IN TECHNIQUE                            Functional activities To aid in reaching , pushing, pulling tasks at home     ROWS: H  \"    ROWS: M 2 x 10  OTB\"    ROWS: L  \"    Obliques - high  \"    Obliques - low  \"     THEREX     Bike      SKTC  3 x 10s B  With gait belt to pull; added to HEP     Lat pulldowns 2 x 10 GTB    Triceps ext standing      Marching with core stability  X 30s GTB    Sit to stand  2 x 10 No UE, blue foam     Trunk ext seated with ball       Step ups  2 x 10  4 inch (support on B side)     Trunk extension standing  1x 5     Hip abd 2 x 10     Hip EXT 2 x 10     SB roll out       Seated marches  X 20     SLR 2 x 10 B     Supine trunk rotation 10 x 3 sec hold each way Added to HEP     A:  Tolerated well. Reports SKTC feels good. SKTC and supine lumbar trunk rotations added to HEP.         P: Continue with rehab plan  [place], [place]  [id number]    Treatment Charges: Mins Units   Initial Evaluation     Re-Evaluation     Ther Exercise         TE 36 2   Manual Therapy     MT     Ther Activities        TA     Gait Training          GT     Neuro Re-education NR     Modalities     Non-Billable Service Time     Other     Total Time/Units 36 2

## 2021-12-10 ENCOUNTER — TREATMENT (OUTPATIENT)
Dept: PHYSICAL THERAPY | Age: 66
End: 2021-12-10
Payer: MEDICARE

## 2021-12-10 DIAGNOSIS — G89.29 CHRONIC BILATERAL LOW BACK PAIN WITHOUT SCIATICA: Primary | ICD-10-CM

## 2021-12-10 DIAGNOSIS — M54.50 CHRONIC BILATERAL LOW BACK PAIN WITHOUT SCIATICA: Primary | ICD-10-CM

## 2021-12-10 PROCEDURE — 97110 THERAPEUTIC EXERCISES: CPT | Performed by: PHYSICAL THERAPIST

## 2021-12-14 ENCOUNTER — TREATMENT (OUTPATIENT)
Dept: PHYSICAL THERAPY | Age: 66
End: 2021-12-14
Payer: MEDICARE

## 2021-12-14 ENCOUNTER — PROCEDURE VISIT (OUTPATIENT)
Dept: PODIATRY | Age: 66
End: 2021-12-14
Payer: MEDICARE

## 2021-12-14 VITALS — HEIGHT: 72 IN | BODY MASS INDEX: 42.66 KG/M2 | WEIGHT: 315 LBS

## 2021-12-14 DIAGNOSIS — E11.9 TYPE 2 DIABETES MELLITUS WITHOUT COMPLICATION, UNSPECIFIED WHETHER LONG TERM INSULIN USE (HCC): ICD-10-CM

## 2021-12-14 DIAGNOSIS — Z79.01 ENCOUNTER FOR CURRENT LONG-TERM USE OF ANTICOAGULANTS: ICD-10-CM

## 2021-12-14 DIAGNOSIS — B35.1 TINEA UNGUIUM: Primary | ICD-10-CM

## 2021-12-14 DIAGNOSIS — M54.50 CHRONIC BILATERAL LOW BACK PAIN WITHOUT SCIATICA: Primary | ICD-10-CM

## 2021-12-14 DIAGNOSIS — G89.29 CHRONIC BILATERAL LOW BACK PAIN WITHOUT SCIATICA: Primary | ICD-10-CM

## 2021-12-14 DIAGNOSIS — R60.0 LOCALIZED EDEMA: ICD-10-CM

## 2021-12-14 DIAGNOSIS — L84 CORNS AND CALLOSITIES: ICD-10-CM

## 2021-12-14 PROCEDURE — 11056 PARNG/CUTG B9 HYPRKR LES 2-4: CPT | Performed by: PODIATRIST

## 2021-12-14 PROCEDURE — 97110 THERAPEUTIC EXERCISES: CPT | Performed by: PHYSICAL THERAPIST

## 2021-12-14 PROCEDURE — 11721 DEBRIDE NAIL 6 OR MORE: CPT | Performed by: PODIATRIST

## 2021-12-14 NOTE — PROGRESS NOTES
[address] [facility] and Rehabilitation   Phone: [phone]   Fax: [phone]      Physical Therapy Treatment Note    Date: 2021  Patient Name: Alondra Paulino  : 1955   MRN: [id number]  DOInjury: years  DOSx: NA  Referring Provider: No referring provider defined for this encounter. 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 Reports barely noticeable now. O:  Time 1042- 1119      Visit 7 Repeat outcome measure at mid point and end. Pain 1/10     ROM      Modalities      MH + ES            Exercise      ALL EXERCISE DONE WITH DRAW-IN TECHNIQUE                            Functional activities To aid in reaching , pushing, pulling tasks at home     ROWS: H  \"    ROWS: M 2 x 10  BTB\"    ROWS: L  \"    Obliques - high  \"    Obliques - low  \"     [address]   With gait belt to pull; added to HEP     Lat pulldowns  GTB    Triceps ext standing      Marching with core stability   2 X 60s BTB    Sit to stand  2 x 10 No UE, blue foam     Trunk ext seated with ball       Step ups   x 10  4 inch (support on B side)     Trunk extension standing       Supine abd bracing with marching  2 x 10  Added to HEP     Abdominal bracing 20 x 3s holds  Added to HEP     Hip abd 2 x 10     Hip EXT 2 x 10     SB roll out  10 x 5 s hold      Seated marches  X 20      SLR 2 x 10 B      Supine trunk rotation 10 x 3 sec hold each way Added to HEP     A:  Tolerated well. Pt given handout to add abdominal bracing and supine abdominal bracing with marching to HEP.         P: Continue with rehab plan  Josué Harrison  [id number]    Treatment Charges: Mins Units   Initial Evaluation     Re-Evaluation     Ther Exercise         TE 31 2   Manual Therapy     MT     Ther Activities        TA 6    Gait Training          GT     Neuro Re-education NR Modalities     Non-Billable Service Time     Other     Total Time/Units 37  2

## 2021-12-14 NOTE — PROGRESS NOTES
Anahi Mejia is here today for a diabetic foot exam and nail care. his PCP is John Castro MD last OV was 2021. Anahi Mejia : 1955 Sex: male  Age: 77 y.o. Patient was referred by John Castro MD    CC:   Follow-up diabetic foot and ankle exam      HPI:   Follow-up diabetic foot ankle exam  Continue Xarelto. Continues Metformin for diabetes. Does continue alpha lipoic acid 600 mg daily. Denies calf pain. Denies additional pedal complaints today. ROS:  Const: Denies constitutional symptoms  Musculo: Denies symptoms other than stated above  Skin: Denies symptoms other than stated above      Current Outpatient Medications:     dilTIAZem (CARDIZEM CD) 240 MG extended release capsule, Take 1 capsule by mouth once daily, Disp: 90 capsule, Rfl: 1    metFORMIN (GLUCOPHAGE) 1000 MG tablet, TAKE 1 TABLET BY MOUTH TWICE DAILY WITH MEALS, Disp: 180 tablet, Rfl: 1    rivaroxaban (XARELTO) 20 MG TABS tablet, Take 1 tablet by mouth daily (with breakfast), Disp: 90 tablet, Rfl: 1    blood glucose monitor strips, 1 strip by Other route daily Test 1 time a day & as needed for symptoms of irregular blood glucose. Dispense sufficient amount for indicated testing frequency plus additional to accommodate PRN testing needs.  ReliOn prime test strips, Disp: 100 strip, Rfl: 10    furosemide (LASIX) 20 MG tablet, Take 1 tablet by mouth once daily, Disp: 90 tablet, Rfl: 1    glipiZIDE (GLUCOTROL) 10 MG tablet, TAKE 1 TABLET BY MOUTH TWICE DAILY BEFORE MEAL(S), Disp: 180 tablet, Rfl: 1    hydroCHLOROthiazide (HYDRODIURIL) 25 MG tablet, Take 1 tablet by mouth once daily for blood pressure, Disp: 90 tablet, Rfl: 1    insulin detemir (LEVEMIR FLEXTOUCH) 100 UNIT/ML injection pen, Inject 30 Units into the skin nightly (Patient taking differently: Inject 32 Units into the skin nightly ), Disp: 5 pen, Rfl: 3    Insulin Pen Needle (PEN NEEDLES \") 30G X 8 MM MISC, 1 each by Does not apply route daily, Disp: 100 each, Rfl: 3    benazepril (LOTENSIN) 20 MG tablet, Take 1 tablet by mouth twice daily, Disp: 180 tablet, Rfl: 1    carvedilol (COREG) 25 MG tablet, Take 1 tablet by mouth twice daily, Disp: 180 tablet, Rfl: 1    Alpha-Lipoic Acid 600 MG TABS, Take 1 Bottle by mouth daily, Disp: 60 tablet, Rfl: 1    CPAP Machine MISC, by Does not apply route nightly, Disp: , Rfl:   Allergies   Allergen Reactions    Lipitor      Body pain, DIARRHEA ETC. HAS PROBS WITH ALL CHOLESTEROL MEDS-MOST PROB WITH LIPITOR    Other Itching     Throat itches when he eats magnus nuts       Past Medical History:   Diagnosis Date    Anticoagulant long-term use     Atrial fibrillation (HCC)     CH[facility]estive [facility] failure) [facility]     Degenerative joint disease of left hip 1/28/2015    Diabetes mellitus ([facility] [address]     Hyperlipidemia     Hypertension     Obesity     PONV (postoperative nausea and vomiting)     Radiculopathy of lumbosacral region 7/25/2017    Radiculopathy of lumbosacral region 7/25/2017    Sleep apnea     Type II or unspecified type diabetes mellitus without mention of complication, not stated as uncontrolled        There were no vitals filed for this visit. Work History/Social History: [employer]  Orthopedic history: EMG testing June 2020, Alpha-lipoic acid 600mg      Focused Lower Extremity Physical Exam:      Neurovascular examination:    Dorsalis Pedis palpable bilateral.  Posterior tibialis non-palpable bilateral.    Capillary Refill Time:  Immediate return  Hair growth:  Symmetrical and bilateral   Skin:  Not atrophic  Edema: Mild edema bilateral feet. Mild edema bilateral ankles.   Neurologic:  Light touch diminished bilateral.  Warm to coolness bilateral distal toes  Decreased epicritic sensation     Musculoskeletal/ Orthopedic examination:    Equinis: present bilateral  Dorsiflexion, plantarflexion, inversion, eversion bilateral 5 out of 5 muscle strength  Wiggling toes  Negative Homans  No pain to palpation bilateral heel. No pain insertion Achilles tendon. Dermatology examination:    Toenails 1 through 5 bilateral thickened, elongated, dystrophic, mycotic with subungual debris. Web spaces 1 through 4 bilateral clean dry and intact. Hyperkeratotic tissue plantar fifth metatarsal head bilateral.    No open skin lesions or abrasions. No erythema. Assessment and Plan:  Nichole Lehman was seen today for diabetes, callouses and nail problem. Diagnoses and all orders for this visit:    Tinea unguium    Corns and callosities    Type 2 diabetes mellitus without complication, unspecified whether long term insulin use ([facility] [address]    Encounter for current long-term use of anticoagulants    Localized edema      Follow-up diabetic foot ankle exam  Continue Xarelto anticoagulant therapy. Manual debridement with standard technique toenails 1 through 5 right and left in thickness and length. Patient tolerated procedure well. Paring hyperkeratotic tissue plantar fifth metatarsal head bilateral #15 blade. Continue alpha lipoic acid 600 mg once daily. Follow-up 2 months      Return in about 2 months (around 2/14/2022). Seen By:  Fannie Prescott DPM      Document was created using voice recognition software. Note was reviewed however may contain grammatical errors.

## 2021-12-17 ENCOUNTER — TREATMENT (OUTPATIENT)
Dept: PHYSICAL THERAPY | Age: 66
End: 2021-12-17
Payer: MEDICARE

## 2021-12-17 DIAGNOSIS — G89.29 CHRONIC BILATERAL LOW BACK PAIN WITHOUT SCIATICA: Primary | ICD-10-CM

## 2021-12-17 DIAGNOSIS — M54.50 CHRONIC BILATERAL LOW BACK PAIN WITHOUT SCIATICA: Primary | ICD-10-CM

## 2021-12-17 PROCEDURE — 97110 THERAPEUTIC EXERCISES: CPT

## 2021-12-17 NOTE — PROGRESS NOTES
[address] [facility]   Phone: [phone]   Fax: [phone]      Physical Therapy Treatment Note    Date: 2021  Patient Name: Shay Bridges  : 1955   MRN: [id number]  [facility]: years  DOSx: NA  Referring Provider: Andrew Nagy MD  [address] [place],  [address] [place] [address] Diagnosis:   M54.50, E76.83 (ICD-10-CM) - Chronic bilateral low back pain without sciatica    Outcome Measure:  Oswestry 26%     S: Pt reports muscle soreness in low back when asked rating pt reports \"It's just there\"   O:  Time 1118- 1156     Visit 8 Repeat outcome measure at mid point and end. Pain See above     ROM      Modalities      MH + ES            Exercise      ALL EXERCISE DONE WITH DRAW-IN TECHNIQUE                            Functional activities To aid in reaching , pushing, pulling tasks at home     ROWS: H  \"    ROWS: M 2 x 10  BTB    ROWS: L  \"    Obliques - high  \"    Obliques - low  \"     [address]   With gait belt to pull; added to HEP     Lat pulldowns  GTB    Bridge 2 x 5     Marching with core stability   BTB    Sit to stand  2 x 10 No UE, blue foam     Trunk ext seated with ball       Step ups   x 10  4 inch (support on B side)     Trunk extension standing       Supine abd bracing with marching  2 x 10  Added to HEP     Abdominal bracing   Added to HEP     Hip abd 2 x 10     Hip EXT 2 x 10     SB roll out  10 x 5 s hold      Seated marches  X 20      SLR 2 x 10 B      Supine trunk rotation 10 x 3 sec hold each way Added to HEP     A:  Tolerated well. No increase in pain.      P: Continue with rehab plan  Melissa Herbert, PTA  [id number]    Treatment Charges: Mins Units   Initial Evaluation     Re-Evaluation     Ther Exercise         TE 32 2   Manual Therapy     MT     Ther Activities        TA 6    Gait Training          GT     Neuro Re-education NR     Modalities     Non-Billable Service Time     Other     Total Time/Units 38  2

## 2021-12-20 ENCOUNTER — TREATMENT (OUTPATIENT)
Dept: PHYSICAL THERAPY | Age: 66
End: 2021-12-20
Payer: MEDICARE

## 2021-12-20 DIAGNOSIS — M54.50 CHRONIC BILATERAL LOW BACK PAIN WITHOUT SCIATICA: Primary | ICD-10-CM

## 2021-12-20 DIAGNOSIS — G89.29 CHRONIC BILATERAL LOW BACK PAIN WITHOUT SCIATICA: Primary | ICD-10-CM

## 2021-12-20 PROCEDURE — 97110 THERAPEUTIC EXERCISES: CPT

## 2021-12-20 NOTE — PROGRESS NOTES
[address] [facility]   Phone: [phone]   Fax: [phone]      Physical Therapy Treatment Note    Date: 2021  Patient Name: Philippe Colbert  : 1955   MRN: [id number]  DOInjury: years  DOSx: NA  Referring Provider: Michele Hanson MD  [address] [place],  [address] [place] [address] Diagnosis:   M54.50, P81.69 (ICD-10-CM) - Chronic bilateral low back pain without sciatica    Outcome Measure:  Oswestry 26%     S: Pt reports no pain in low back. O:  Time 3865- 4896     Visit 8 Repeat outcome measure at mid point and end. Pain See above     ROM      Modalities      MH + ES            Exercise      ALL EXERCISE DONE WITH DRAW-IN TECHNIQUE                            Functional activities To aid in reaching , pushing, pulling tasks at home     ROWS: H  \"    ROWS: M 2 x 10  BTB    ROWS: L  \"    Obliques - high  \"    Obliques - low  \"     [address]   With gait belt to pull; added to HEP     Lat pulldowns 2 x 10 BTB    Bridge 2 x 10     Marching with core stability   BTB    Sit to stand  2 x 10 No UE, blue foam     Trunk ext seated with ball       Step ups   x 10  6 inch (support on B side)     Trunk extension standing       Supine abd bracing with marching  2 x 10  Added to HEP     Abdominal bracing   Added to HEP     Hip abd 2 x 10     Hip EXT 2 x 10     SB roll out and lat 10 x 5 s hold      Seated marches       SLR 2 x 10 B      Supine trunk rotation 10 x 3 sec hold each way Added to HEP     A:  Tolerated well. No increase in pain.      P: Continue with rehab plan  Donell Malcolm, PTA  [id number]    Treatment Charges: Mins Units   Initial Evaluation     Re-Evaluation     Ther Exercise         TE 28 2   Manual Therapy     MT     Ther Activities        TA 6    Gait Training          GT     Neuro Re-education NR     Modalities     Non-Billable Service Time     Other     Total Time/Units 34  2

## 2021-12-21 ENCOUNTER — TREATMENT (OUTPATIENT)
Dept: PHYSICAL THERAPY | Age: 66
End: 2021-12-21
Payer: MEDICARE

## 2021-12-21 DIAGNOSIS — M54.50 CHRONIC BILATERAL LOW BACK PAIN WITHOUT SCIATICA: Primary | ICD-10-CM

## 2021-12-21 DIAGNOSIS — G89.29 CHRONIC BILATERAL LOW BACK PAIN WITHOUT SCIATICA: Primary | ICD-10-CM

## 2021-12-21 PROCEDURE — 97110 THERAPEUTIC EXERCISES: CPT

## 2021-12-21 NOTE — PROGRESS NOTES
[address] [facility]   Phone: [phone]   Fax: [phone]      Physical Therapy Treatment Note    Date: 2021  Patient Name: Darin Chappell  : 1955   MRN: [id number]  DOInjury: years  DOSx: NA  Referring Provider: No referring provider defined for this encounter. Medical Diagnosis:   M54.50, G89.29 (ICD-10-CM) - Chronic bilateral low back pain without sciatica    Outcome Measure:  Oswestry 26%     S: Pt reports \"a smidge\" of pain in low back. O:  Time 0792- 7452     Visit 9 Repeat outcome measure at mid point and end. Pain See above     ROM      Modalities      MH + ES            Exercise      ALL EXERCISE DONE WITH DRAW-IN TECHNIQUE                            Functional activities To aid in reaching , pushing, pulling tasks at home     ROWS: H  \"    ROWS: M 2 x 10  BTB    ROWS: L  \"    Obliques - high  \"    Obliques - low  \"     [address]   With gait belt to pull; added to HEP     Lat pulldowns 2 x 10 BTB    Bridge 2 x 10     Marching with core stability   BTB    Sit to stand  2 x 10 No UE, blue foam     Trunk ext seated with ball       Step ups  2 x 10  6 inch (support on B side)     Trunk extension standing       Supine abd bracing with marching  2 x 10  Added to HEP     Abdominal bracing 20 x 3s holds  Added to HEP     Hip abd 2 x 10     Hip EXT 2 x 10     SB roll out and lat 10 x 5 s hold      Seated marches       SLR 2 x 10 B      Supine trunk rotation 10 x 3 sec hold each way Added to HEP     A:  Tolerated well. No increase in pain.      P: Continue with rehab plan  Tom Nageotte, PTA  [id number]    Treatment Charges: Mins Units   Initial Evaluation     Re-Evaluation     Ther Exercise         TE 38 3   Manual Therapy     MT     Ther Activities        TA     Gait Training          GT     Neuro Re-education NR     Modalities     Non-Billable Service Time     Other     Total Time/Units 38 3

## 2021-12-29 ENCOUNTER — TREATMENT (OUTPATIENT)
Dept: PHYSICAL THERAPY | Age: 66
End: 2021-12-29
Payer: MEDICARE

## 2021-12-29 DIAGNOSIS — G89.29 CHRONIC BILATERAL LOW BACK PAIN WITHOUT SCIATICA: Primary | ICD-10-CM

## 2021-12-29 DIAGNOSIS — M54.50 CHRONIC BILATERAL LOW BACK PAIN WITHOUT SCIATICA: Primary | ICD-10-CM

## 2021-12-29 PROCEDURE — 97110 THERAPEUTIC EXERCISES: CPT

## 2021-12-29 NOTE — PROGRESS NOTES
[address] [facility]   Phone: [phone]   Fax: [phone]      Physical Therapy Treatment Note    Date: 2021  Patient Name: Pat Renee  : 1955   MRN: [id number]  DOInjury: years  DOSx: NA  Referring Provider: No referring provider defined for this encounter. Medical Diagnosis:   M54.50, G89.29 (ICD-10-CM) - Chronic bilateral low back pain without sciatica    Outcome Measure:  Oswestry 26%     S: Pt reports pain L side of low back \"grab you kind of pains\" pt reports only when he moves certain ways. O:  Time 1039- 1117     Visit 10 Repeat outcome measure at mid point and end. Pain See above     ROM      Modalities      MH + ES            Exercise      ALL EXERCISE DONE WITH DRAW-IN TECHNIQUE                            Functional activities To aid in reaching , pushing, pulling tasks at home     ROWS: H  \"    ROWS: M 2 x 10  BTB    ROWS: L  \"    Obliques - high  \"    Obliques - low  \"     [address]   With gait belt to pull; added to HEP     Lat pulldowns 2 x 10 BTB    Bridge 2 x 5     Marching with core stability   BTB    Sit to stand  2 x 10 No UE, blue foam     Trunk ext seated with ball       Step ups  2 x 10  6 inch (support on one side)     Trunk extension standing       Supine abd bracing with marching  2 x 10  Added to HEP     Abdominal bracing 20 x 3s holds  Added to HEP     Hip abd 2 x 10     Hip EXT 2 x 10     SB roll out and lat R 10 x 5 s hold      Seated marches       SLR 2 x 10 B      Supine trunk rotation 10 x 3 sec hold each way Added to HEP     A:  Tolerated well. Pt reports feeling a \"clicking/popping\" sensation in L lower back with supine marches, but no pain. No increase in pain.      P: Continue with rehab plan  Ashlie March, PTA  [id number]    Treatment Charges: Mins Units   Initial Evaluation     Re-Evaluation     Ther Exercise         TE 38 3   Manual Therapy     MT     Ther Activities        TA     Gait Training GT     Neuro Re-education NR     Modalities     Non-Billable Service Time     Other     Total Time/Units 38 3

## 2021-12-30 ENCOUNTER — TREATMENT (OUTPATIENT)
Dept: PHYSICAL THERAPY | Age: 66
End: 2021-12-30
Payer: MEDICARE

## 2021-12-30 DIAGNOSIS — G89.29 CHRONIC BILATERAL LOW BACK PAIN WITHOUT SCIATICA: Primary | ICD-10-CM

## 2021-12-30 DIAGNOSIS — M54.50 CHRONIC BILATERAL LOW BACK PAIN WITHOUT SCIATICA: Primary | ICD-10-CM

## 2021-12-30 PROCEDURE — 97110 THERAPEUTIC EXERCISES: CPT

## 2022-01-03 ENCOUNTER — TREATMENT (OUTPATIENT)
Dept: PHYSICAL THERAPY | Age: 67
End: 2022-01-03
Payer: MEDICARE

## 2022-01-03 DIAGNOSIS — M54.50 CHRONIC BILATERAL LOW BACK PAIN WITHOUT SCIATICA: Primary | ICD-10-CM

## 2022-01-03 DIAGNOSIS — G89.29 CHRONIC BILATERAL LOW BACK PAIN WITHOUT SCIATICA: Primary | ICD-10-CM

## 2022-01-03 PROCEDURE — 97164 PT RE-EVAL EST PLAN CARE: CPT | Performed by: PHYSICAL THERAPIST

## 2022-01-03 NOTE — PROGRESS NOTES
[address] [facility]   Phone: [phone]   Fax: [phone]        Referring Provider:   Monalisa Manzo MD  [address] Diagnosis:    M54.50, Q96.84 (ICD-10-CM) - Chronic bilateral low back pain without sciatica    CERTIFICATION PERIOD:  11/22/2021  to 1/7/2021 . ATTENDANCE:  Patient has attended 15 of 12 scheduled treatments from 11/22/2021  to 1/3/2022. TREATMENTS RECEIVED:  Therapeutic exercise, therapeutic activity      INITIAL STATUS:  Observations: well nourished male                            Gait: antalgic gait     Functional Strength:   Not Able to toe walk, heel walk, and able to mini squat.     Range of Motion:     Trunk:               Flexion:                       []? Normal   [x]? Limited 40% reports pain with               Extension:                   []? Normal   [x]? Limited 30%               Right Rotation:            []? Normal   [x]? Limited 20%              Left Rotation:               []? Normal   [x]? Limited 20%              Right Side Bending:    []? Normal   [x]? Limited 20%              Left Side Bending:      []? Normal   [x]? Limited 20%     Lower Extremity:              Right:                           [x]? Normal   []? Limited               Left:                             [x]? Normal   []? Limited         Strength:                 Trunk:  decreased ROM, decreased strength              R LE:   4/5              L LE:    4/5     Palpation: Tender to palpation over lower lumbar, Non-tender to palpation B ASIS ,  B PSIS, over sacrum     Sensation: pt reports numbness/tingling in feet.      Special Tests:   []? Nerve Root Compression           Right []?+ / []? -    Left []?+ / []? -  []? Slump           Right []?+ / [x]? -    Left []?+ / [x]? -  []? FADIR          Right []?+ / []? -    Left []?+ / []? -  []? S-I Distraction          Right []?+ / []? -    Left []?+ / []? -      []?  SLR Right []?+ / [x]? -    Left []?+ / [x]? -     []? VALARIE          Right []?+ / [x]? -    Left []?+ / []? -  []? S-I Compression          Right []?+ / []? -    Left []?+ / []? -   []? Other: []?+ / []? -                       CURRENT STATUS:  Observations: well nourished male                            Gait: slight antalgic gait     Functional Strength:   Not Able to heel walk,   Able to toe walk, and able to mini squat.       Range of Motion:     Trunk:               Flexion:                       []? Normal   [x]? Limited 30%               Extension:                   []? Normal   [x]? Limited 20%               Right Rotation:            []? Normal   [x]? Limited 10%              Left Rotation:               []? Normal   [x]? Limited 10%              Right Side Bending:    []? Normal   [x]? Limited 10%              Left Side Bending:      []? Normal   [x]? Limited 10%     Lower Extremity:              Right:                           [x]? Normal   []? Limited               Left:                             [x]? Normal   []? Limited         Strength:                 Trunk:  decreased ROM, decreased strength              R LE:   4/5 DF, all others 5-/5               L LE:    5-/5     Palpation: NonTender to palpation over lower lumbar, B SI , and over sacrum      Sensation: pt reports sometimes numbness/tingling in feet R worse than L. .      Special Tests:   []? Nerve Root Compression           Right []?+ / []? -    Left []?+ / []? -  []? Slump           Right []?+ / []? -    Left []?+ / []? -  []? FADIR          Right []?+ / []? -    Left []?+ / []? -  []? S-I Distraction          Right []?+ / []? -    Left []?+ / []? -      []? SLR           Right []?+ / [x]? -    Left [x]?+ / []? -     []? VALARIE          Right []?+ / [x]? -    Left [x]?+ / []? -  []? S-I Compression          Right []?+ / []? -    Left []?+ / []? -   []?  Other: []?+ / []? -               Comments: pt reports slight back pain with L SLR and Abundio Singh

## 2022-01-03 NOTE — PROGRESS NOTES
[address] [facility]   Phone: [phone]   Fax: [phone]      Physical Therapy Treatment Note    Date: 1/3/2022  Patient Name: León Cintron  : 1955   MRN: [id number]  DOInjury: years  DOSx: NA  Referring Provider: No referring provider defined for this encounter. Medical Diagnosis:   M54.50, G89.29 (ICD-10-CM) - Chronic bilateral low back pain without sciatica    Outcome Measure:  Oswestry 20%     S: Pt reports 'just barely' any pain and when asked a number reports 0/10. O:  Time 1030 - 1058     Visit 12 Repeat outcome measure at mid point and end. Pain See above     ROM      Modalities      MH + ES            Exercise      ALL EXERCISE DONE WITH DRAW-IN TECHNIQUE                            Functional activities To aid in reaching , pushing, pulling tasks at home     ROWS: H  \"    ROWS: M BTB    ROWS: L \"    Obliques - high \"    Obliques - low \"         Bike     SKTC  With gait belt to pull; added to HEP     Lat pulldowns BTB    [employer] with core stability  BTB    Sit to stand  No UE, blue foam     Trunk ext seated with ball      Step ups  6 inch (support on one side)     Trunk extension standing      Supine abd bracing with marching  Added to HEP     Abdominal bracing Added to HEP     Hip abd     Hip EXT     SB roll out and lat R     Seated marches      SLR     Supine trunk rotation Added to HEP     A:  Tolerated well. Reassessment completed today. See note for details. P:  Will discharge pt at this time.    Evangelina Alvarez, PT DPT  [id number]    Treatment Charges: Mins Units   Initial Evaluation     Re-Evaluation 28 1   Ther Exercise         TE     Manual Therapy     MT     Ther Activities        TA     Gait Training          GT     Neuro Re-education NR     Modalities     Non-Billable Service Time     Other     Total Time/Units 28 1

## 2022-01-13 RX ORDER — CARVEDILOL 25 MG/1
TABLET ORAL
Qty: 180 TABLET | Refills: 1 | Status: SHIPPED
Start: 2022-01-13 | End: 2022-07-07

## 2022-01-13 NOTE — TELEPHONE ENCOUNTER
Last Appointment:  11/11/2021  Future Appointments   Date Time Provider Edmar Reyna   2/11/2022 10:20 AM Magalys Walton MD Cathrine Lanes [facility]   2/14/2022 10:40 AM Lorrie Cockayne, MD [facility]   2/15/2022 10:15 AM Sulma Vargas DPM [facility]   8/11/2022 10:00 AM Magalys Walton  [address] [place] [address]

## 2022-02-11 ENCOUNTER — OFFICE VISIT (OUTPATIENT)
Dept: FAMILY MEDICINE CLINIC | Age: 67
End: 2022-02-11
Payer: MEDICARE

## 2022-02-11 VITALS
SYSTOLIC BLOOD PRESSURE: 130 MMHG | HEIGHT: 72 IN | BODY MASS INDEX: 42.66 KG/M2 | OXYGEN SATURATION: 98 % | TEMPERATURE: 97.8 F | WEIGHT: 315 LBS | DIASTOLIC BLOOD PRESSURE: 84 MMHG | HEART RATE: 85 BPM

## 2022-02-11 DIAGNOSIS — I48.19 PERSISTENT ATRIAL FIBRILLATION (HCC): ICD-10-CM

## 2022-02-11 DIAGNOSIS — E11.69 TYPE 2 DIABETES MELLITUS WITH OTHER SPECIFIED COMPLICATION, WITH LONG-TERM CURRENT USE OF INSULIN (HCC): ICD-10-CM

## 2022-02-11 DIAGNOSIS — Z79.4 TYPE 2 DIABETES MELLITUS WITH OTHER SPECIFIED COMPLICATION, WITH LONG-TERM CURRENT USE OF INSULIN (HCC): ICD-10-CM

## 2022-02-11 DIAGNOSIS — Z79.4 TYPE 2 DIABETES MELLITUS WITH OTHER SPECIFIED COMPLICATION, WITH LONG-TERM CURRENT USE OF INSULIN (HCC): Primary | ICD-10-CM

## 2022-02-11 DIAGNOSIS — E11.69 TYPE 2 DIABETES MELLITUS WITH OTHER SPECIFIED COMPLICATION, WITH LONG-TERM CURRENT USE OF INSULIN (HCC): Primary | ICD-10-CM

## 2022-02-11 DIAGNOSIS — Z12.11 COLON CANCER SCREENING: ICD-10-CM

## 2022-02-11 DIAGNOSIS — E66.01 OBESITY, CLASS III, BMI 40-49.9 (MORBID OBESITY) (HCC): ICD-10-CM

## 2022-02-11 DIAGNOSIS — Z12.5 SCREENING FOR MALIGNANT NEOPLASM OF PROSTATE: ICD-10-CM

## 2022-02-11 LAB
ALBUMIN SERPL-MCNC: 4.1 G/DL (ref 3.5–5.2)
ALP BLD-CCNC: 77 U/L (ref 40–129)
ALT SERPL-CCNC: 13 U/L (ref 0–40)
ANION GAP SERPL CALCULATED.3IONS-SCNC: 14 MMOL/L (ref 7–16)
AST SERPL-CCNC: 21 U/L (ref 0–39)
BASOPHILS ABSOLUTE: 0.05 E9/L (ref 0–0.2)
BASOPHILS RELATIVE PERCENT: 0.7 % (ref 0–2)
BILIRUB SERPL-MCNC: 0.5 MG/DL (ref 0–1.2)
BUN BLDV-MCNC: 16 MG/DL (ref 6–23)
CALCIUM SERPL-MCNC: 9.3 MG/DL (ref 8.6–10.2)
CHLORIDE BLD-SCNC: 105 MMOL/L (ref 98–107)
CHOLESTEROL, TOTAL: 147 MG/DL (ref 0–199)
CO2: 23 MMOL/L (ref 22–29)
CREAT SERPL-MCNC: 1.1 MG/DL (ref 0.7–1.2)
CREATININE URINE: 59 MG/DL (ref 40–278)
EOSINOPHILS ABSOLUTE: 0.18 E9/L (ref 0.05–0.5)
EOSINOPHILS RELATIVE PERCENT: 2.7 % (ref 0–6)
GFR AFRICAN AMERICAN: >60
GFR NON-AFRICAN AMERICAN: >60 ML/MIN/1.73
GLUCOSE BLD-MCNC: 155 MG/DL (ref 74–99)
HBA1C MFR BLD: 8.6 %
HCT VFR BLD CALC: 44.9 % (ref 37–54)
HDLC SERPL-MCNC: 34 MG/DL
HEMOGLOBIN: 14.6 G/DL (ref 12.5–16.5)
IMMATURE GRANULOCYTES #: 0.01 E9/L
IMMATURE GRANULOCYTES %: 0.1 % (ref 0–5)
LDL CHOLESTEROL CALCULATED: 95 MG/DL (ref 0–99)
LYMPHOCYTES ABSOLUTE: 1.52 E9/L (ref 1.5–4)
LYMPHOCYTES RELATIVE PERCENT: 22.5 % (ref 20–42)
MCH RBC QN AUTO: 27.8 PG (ref 26–35)
MCHC RBC AUTO-ENTMCNC: 32.5 % (ref 32–34.5)
MCV RBC AUTO: 85.5 FL (ref 80–99.9)
MICROALBUMIN UR-MCNC: <12 MG/L
MICROALBUMIN/CREAT UR-RTO: ABNORMAL (ref 0–30)
MONOCYTES ABSOLUTE: 0.66 E9/L (ref 0.1–0.95)
MONOCYTES RELATIVE PERCENT: 9.7 % (ref 2–12)
NEUTROPHILS ABSOLUTE: 4.35 E9/L (ref 1.8–7.3)
NEUTROPHILS RELATIVE PERCENT: 64.3 % (ref 43–80)
PDW BLD-RTO: 15.6 FL (ref 11.5–15)
PLATELET # BLD: 259 E9/L (ref 130–450)
PMV BLD AUTO: 11.5 FL (ref 7–12)
POTASSIUM SERPL-SCNC: 3.6 MMOL/L (ref 3.5–5)
PROSTATE SPECIFIC ANTIGEN: 1.15 NG/ML (ref 0–4)
RBC # BLD: 5.25 E12/L (ref 3.8–5.8)
SODIUM BLD-SCNC: 142 MMOL/L (ref 132–146)
TOTAL PROTEIN: 7.2 G/DL (ref 6.4–8.3)
TRIGL SERPL-MCNC: 92 MG/DL (ref 0–149)
VLDLC SERPL CALC-MCNC: 18 MG/DL
WBC # BLD: 6.8 E9/L (ref 4.5–11.5)

## 2022-02-11 PROCEDURE — 1123F ACP DISCUSS/DSCN MKR DOCD: CPT | Performed by: FAMILY MEDICINE

## 2022-02-11 PROCEDURE — G8417 CALC BMI ABV UP PARAM F/U: HCPCS | Performed by: FAMILY MEDICINE

## 2022-02-11 PROCEDURE — 2022F DILAT RTA XM EVC RTNOPTHY: CPT | Performed by: FAMILY MEDICINE

## 2022-02-11 PROCEDURE — 4040F PNEUMOC VAC/ADMIN/RCVD: CPT | Performed by: FAMILY MEDICINE

## 2022-02-11 PROCEDURE — 83036 HEMOGLOBIN GLYCOSYLATED A1C: CPT | Performed by: FAMILY MEDICINE

## 2022-02-11 PROCEDURE — G8427 DOCREV CUR MEDS BY ELIG CLIN: HCPCS | Performed by: FAMILY MEDICINE

## 2022-02-11 PROCEDURE — 1036F TOBACCO NON-USER: CPT | Performed by: FAMILY MEDICINE

## 2022-02-11 PROCEDURE — 3017F COLORECTAL CA SCREEN DOC REV: CPT | Performed by: FAMILY MEDICINE

## 2022-02-11 PROCEDURE — G8484 FLU IMMUNIZE NO ADMIN: HCPCS | Performed by: FAMILY MEDICINE

## 2022-02-11 PROCEDURE — 3052F HG A1C>EQUAL 8.0%<EQUAL 9.0%: CPT | Performed by: FAMILY MEDICINE

## 2022-02-11 PROCEDURE — 99214 OFFICE O/P EST MOD 30 MIN: CPT | Performed by: FAMILY MEDICINE

## 2022-02-11 RX ORDER — INSULIN DETEMIR 100 [IU]/ML
45 INJECTION, SOLUTION SUBCUTANEOUS NIGHTLY
Qty: 5 PEN | Refills: 3 | Status: SHIPPED
Start: 2022-02-11 | End: 2022-02-17 | Stop reason: SDUPTHER

## 2022-02-11 NOTE — PROGRESS NOTES
[facility]  Office Progress Note - Dr. Chen Rascon  2/11/22    CC:   Chief Complaint   Patient presents with    Diabetes        /84 (Site: Left Upper Arm, Position: Sitting, Cuff Size: Large Adult)   Pulse 85   Temp 97.8 °F (36.6 °C) (Temporal)   Ht 6' (1.829 m)   Wt (!) 326 lb (147.9 kg)   SpO2 98%   BMI 44.21 kg/m²   Wt Readings from Last 3 Encounters:   02/11/22 (!) 326 lb (147.9 kg)   12/14/21 (!) 325 lb (147.4 kg)   11/11/21 (!) 325 lb (147.4 kg)       HPI:   Diabetes mellitus  Follow-up  Lab Results   Component Value Date    LABA1C 8.6 02/11/2022    LABA1C 8.2 11/11/2021    LABA1C 8.3 04/02/2021     Lab Results   Component Value Date    LABMICR 29.9 (H) 08/06/2021    LDLCALC 106 (H) 08/06/2021    CREATININE 0.9 08/06/2021     Wt Readings from Last 3 Encounters:   02/11/22 (!) 326 lb (147.9 kg)   12/14/21 (!) 325 lb (147.4 kg)   11/11/21 (!) 325 lb (147.4 kg)   numbers past 2 days not too bad. 38 units insulin. Lowest 115,  Patient continues diabetic medication regimen. Compliance is good. No side effects of medications noted. Diabetes is not adequately controlled. Peripheral neuropathy is present. Regular foot exams encouraged. Vision changes are not present. Yearly eye exam encouraged. Therapy for back pain seemed to help quite a bit. 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 Since going back to the shop lately its hurting again      A fib  \" I never feel it. \"  occ diziness spins with rolling over in bed  No pass out feelings. Couple bloody noses lately, maybe dry air. No other bleeding problems. Both happened after blowing nose. Obesity  Has not lately been working on weight   not Restricting diet. not Exercising   not Making progress. Barriers: joint and back pains.    Wt Readings from Last 3 Encounters:   02/11/22 (!) 326 lb (147.9 kg)   12/14/21 (!) 325 lb (147.4 kg)   11/11/21 (!) 325 lb (147.4 kg)     BMI Readings from Last 3 Encounters: 22 44.21 kg/m²   21 44.08 kg/m²   21 44.08 kg/m²       Son  of covid within the past couple months.     _________________________________________________________    Assessment / Si Eboni was seen today for diabetes. Diagnoses and all orders for this visit:    Type 2 diabetes mellitus with other specified complication, with long-term current use of insulin (HCC)  -     POCT glycosylated hemoglobin (Hb A1C)  -     insulin detemir (LEVEMIR FLEXTOUCH) 100 UNIT/ML injection pen; Inject 45 Units into the skin nightly  -     CBC Auto Differential; Future  -     Comprehensive Metabolic Panel; Future  -     Lipid Panel; Future  -     Microalbumin / Creatinine Urine Ratio; Future  Slightly worsened control. Work up to about 45 units of insulin to aim for fasting blood sugar regularly between 100 - 120. Stop titration if any readings below 90    Persistent atrial fibrillation (HCC)  Rate controlled. Asymptomatic. Anticoagulated. Obesity, Class III, BMI 40-49.9 (morbid obesity) (HCC)  Stable. Fairly sedentary. Back pains and joint pains limit mobility. He does golf in the summertime. Colon cancer screening  -     [address], MD, General Surgery, [facility]  Last colonoscopy . He had a few 5 mm and smaller polyps removed at that time. I do not have pathology results on that    Screening for malignant neoplasm of prostate  -     PSA screening; Future    6 months or as scheduled. Sooner if needed. Patient counseled to follow up sooner or seek more acute care if symptoms worsening or not improving according to plan.      Electronically signed by Kirby Rhodes MD on 2022    _________________________________________________________  Current Outpatient Medications on File Prior to Visit   Medication Sig Dispense Refill    benazepril (LOTENSIN) 20 MG tablet Take 1 tablet by mouth 2 times daily 180 tablet 1    glipiZIDE (GLUCOTROL) 10 MG tablet Take 1 tablet by mouth 2 times daily (before meals) 180 tablet 1    carvedilol (COREG) 25 MG tablet Take 1 tablet by mouth twice daily 180 tablet 1    dilTIAZem (CARDIZEM CD) 240 MG extended release capsule Take 1 capsule by mouth once daily 90 capsule 1    metFORMIN (GLUCOPHAGE) 1000 MG tablet TAKE 1 TABLET BY MOUTH TWICE DAILY WITH MEALS 180 tablet 1    rivaroxaban (XARELTO) 20 MG TABS tablet Take 1 tablet by mouth daily (with breakfast) 90 tablet 1    blood glucose monitor strips 1 strip by Other route daily Test 1 time a day & as needed for symptoms of irregular blood glucose. Dispense sufficient amount for indicated testing frequency plus additional to accommodate PRN testing needs. ReliOn prime test strips 100 strip 10    furosemide (LASIX) 20 MG tablet Take 1 tablet by mouth once daily 90 tablet 1    hydroCHLOROthiazide (HYDRODIURIL) 25 MG tablet Take 1 tablet by mouth once daily for blood pressure 90 tablet 1    Insulin Pen Needle (PEN NEEDLES 5/16\") 30G X 8 MM MISC 1 each by Does not apply route daily 100 each 3    Alpha-Lipoic Acid 600 MG TABS Take 1 Bottle by mouth daily 60 tablet 1    CPAP Machine MISC by Does not apply route nightly       No current facility-administered medications on file prior to visit.        Patient Active Problem List   Diagnosis Code    Degenerative joint disease of left hip M16.12    Mixed hyperlipidemia E78.2    Anticoagulated on Coumadin Z79.01    Essential hypertension I10    Morbid obesity due to excess calories ([address] E66.01    Diabetes mellitus ([facility] [address] E11.9    AMIRAH on CPAP G47.33, Z99.89    Radiculopathy of lumbosacral region M54.17    Statin intolerance Z78.9    Persistent atrial fibrillation (HCC) I48.19     _________________________________________________________  Past Medical History:   Diagnosis Date    Anticoagulant long-term use     Atrial fibrillation (HCC)     CHF (congestive heart failure) (HCC)     Degenerative joint disease of left hip 1/28/2015  Diabetes mellitus ([facility] [address]     Hyperlipidemia     Hypertension     Obesity     PONV (postoperative nausea and vomiting)     Radiculopathy of lumbosacral region 7/25/2017    Radiculopathy of lumbosacral region 7/25/2017    Sleep apnea     Type II or unspecified type diabetes mellitus without mention of complication, not stated as uncontrolled        Family History   Problem Relation Age of Onset    High Blood Pressure Mother     Cancer Mother         lymphoma    Stroke Mother     Diabetes Father     Heart Surgery Sister         heart valve replacement    Heart Disease Brother         pt was waiting for a heart transplant    Cancer Maternal Grandfather     Cancer Paternal Grandmother     Cancer Paternal Grandfather        Past Surgical History:   Procedure Laterality Date    APPENDECTOMY      CHOLECYSTECTOMY      CYST INCISION AND DRAINAGE  06/08/2017    abd wall cyst    HERNIA REPAIR      HIP ARTHROPLASTY Left        Social History     Tobacco Use    Smoking status: Never Smoker    Smokeless tobacco: Never Used   Substance Use Topics    Alcohol use: No     Alcohol/week: 0.0 standard drinks     Comment: drinks occasional iced tea/coffee    Drug use: No       Chart reviewed and updated where appropriate for PMH, Fam, and Soc Hx.  _________________________________________________________  ROS: POSITIVE: As in the HPI. Otherwise Pertinent negatives are negative.    __________________________________________________________  Physical Exam   Constitutional:    He is oriented to person, place, and time. He appears well-developed and well-nourished. Eyes:    Conjunctivae are normal.    Pupils are equal, round, and reactive to light. EOMI. Neck:    Normal range of motion. No thyromegaly or nodules noted. No bruit. No LAD. Cardiovascular:    Normal rate, irregularly irregular rhythm and normal heart sounds. No murmur. No gallop and no friction rub.    Pulmonary/Chest:    Effort normal and breath sounds normal.    No wheezes. No rales or rhonchi. Abdominal:    Soft. Obese. Bowel sounds are normal.    No distension. No tenderness. Musculoskeletal:    Normal range of motion. No joint swelling noted. +peripheral edema. Skin:    Skin is warm and dry. No rashes, lesions. Psychiatric:    He has a normal mood and affect. Normal groom and dress. No SI or HI.   ________________________________________________________    This note may have been created using dictation software.  Efforts were made to reduce errors, but some may persist.

## 2022-02-14 ENCOUNTER — OFFICE VISIT (OUTPATIENT)
Dept: PHYSICAL MEDICINE AND REHAB | Age: 67
End: 2022-02-14
Payer: MEDICARE

## 2022-02-14 VITALS
HEART RATE: 67 BPM | HEIGHT: 72 IN | TEMPERATURE: 96.6 F | OXYGEN SATURATION: 97 % | BODY MASS INDEX: 42.66 KG/M2 | SYSTOLIC BLOOD PRESSURE: 130 MMHG | WEIGHT: 315 LBS | DIASTOLIC BLOOD PRESSURE: 70 MMHG

## 2022-02-14 DIAGNOSIS — G89.29 CHRONIC BILATERAL LOW BACK PAIN WITHOUT SCIATICA: Primary | ICD-10-CM

## 2022-02-14 DIAGNOSIS — Z96.642 HISTORY OF TOTAL LEFT HIP ARTHROPLASTY: ICD-10-CM

## 2022-02-14 DIAGNOSIS — M25.551 BILATERAL HIP PAIN: ICD-10-CM

## 2022-02-14 DIAGNOSIS — M51.36 DDD (DEGENERATIVE DISC DISEASE), LUMBAR: ICD-10-CM

## 2022-02-14 DIAGNOSIS — M25.552 BILATERAL HIP PAIN: ICD-10-CM

## 2022-02-14 DIAGNOSIS — E66.01 MORBID OBESITY WITH BMI OF 40.0-44.9, ADULT (HCC): ICD-10-CM

## 2022-02-14 DIAGNOSIS — M54.50 CHRONIC BILATERAL LOW BACK PAIN WITHOUT SCIATICA: Primary | ICD-10-CM

## 2022-02-14 PROCEDURE — 3017F COLORECTAL CA SCREEN DOC REV: CPT | Performed by: PHYSICAL MEDICINE & REHABILITATION

## 2022-02-14 PROCEDURE — 1123F ACP DISCUSS/DSCN MKR DOCD: CPT | Performed by: PHYSICAL MEDICINE & REHABILITATION

## 2022-02-14 PROCEDURE — G8417 CALC BMI ABV UP PARAM F/U: HCPCS | Performed by: PHYSICAL MEDICINE & REHABILITATION

## 2022-02-14 PROCEDURE — 4040F PNEUMOC VAC/ADMIN/RCVD: CPT | Performed by: PHYSICAL MEDICINE & REHABILITATION

## 2022-02-14 PROCEDURE — G8428 CUR MEDS NOT DOCUMENT: HCPCS | Performed by: PHYSICAL MEDICINE & REHABILITATION

## 2022-02-14 PROCEDURE — 1036F TOBACCO NON-USER: CPT | Performed by: PHYSICAL MEDICINE & REHABILITATION

## 2022-02-14 PROCEDURE — 99204 OFFICE O/P NEW MOD 45 MIN: CPT | Performed by: PHYSICAL MEDICINE & REHABILITATION

## 2022-02-14 PROCEDURE — G8484 FLU IMMUNIZE NO ADMIN: HCPCS | Performed by: PHYSICAL MEDICINE & REHABILITATION

## 2022-02-14 RX ORDER — LIDOCAINE 50 MG/G
1 PATCH TOPICAL DAILY
Qty: 30 PATCH | Refills: 3 | Status: SHIPPED
Start: 2022-02-14 | End: 2022-04-19

## 2022-02-14 NOTE — PROGRESS NOTES
Jose Mitchell M.D. [address] PHYSICAL MEDICINE AND REHABILITAION  [facility] [address]  [address] [facility]  Dept: [phone]  Dept Fax: [phone]: [phone]    PCP: Ryan Wang MD  Date of visit: 2/14/22    Chief Complaint   Patient presents with    Back Pain     pt here for back pain, ongoing for years , PT completed which helped some, Tylenol for pain,  x-rays done Nov 2021,        Gonzalez Mendes is a 77 y.o.  man with gradual onset of low back pain after no known injury years ago. Now, the pain is intermittent and occurs at some point every day. Patient does not give a pain rating, states it is variable. Pain is described as sharp, and is located in the bilateral low back without radiation to the lower extremities. He does have occasional radiation of pain to the lateral right hip and lateral right thigh. He also reports pain occasionally goes into the left posterior hip area. Pain used to be worse in the left low back, now is worse on the right side. He reports occasional tingling in both hands and feet. He does have history of DM. No associated focal weakness. No incontinence reported. The symptoms have been worse since onset. The pain is better with PT, tylenol, laying down. The pain is worse with prolonged standing and walking. He states that getting up from sitting position does increase left hip pain to some extent. The prior workup has included:  -Lumbar spine xray 11/11/2021     FINDINGS:   There are moderate-advanced degenerative disc changes at L5-S1 with evidence   of vacuum disc phenomenon.  Mild degenerative changes are present elsewhere.    No acute fracture is identified.  Grade 1 retrolisthesis at L5-S1 is similar   to the previous exam.  A left hip replacement is partially imaged.  There are   calcified phleboliths in the pelvis.           Impression   1.  No acute osseous abnormality is identified. 2. Degenerative changes appear most prominent at L5-S1 with grade 1   retrolisthesis, similar to the previous exam.       -SI joint xray 11/11/2021  FINDINGS:   There is no evidence of acute fracture.  There is normal alignment.  No acute   joint abnormality.  No focal osseous lesion. No focal soft tissue   abnormality.  Left hip arthroplasty.  Degenerative changes L5-S1           Impression   No acute osseous abnormality.       Grossly normal appearing SI joints.       Degenerative changes L5-S1. The prior treatment has included:  PT: Prior PT completed winter 2203-6561. He reports improvement in pain with therapy, but states pain is now returning. He is doing HEP 4-5 days per week. Chiropractic: Tried with variable relief   Modalities: Ice without relief   Topicals: Tried an OTC patch, he doesn't remember what it was, did not help.    OTC Tylenol: Takes PRN with partial relief   NSAIDS: None - on OAC  Opioids: Took tramadol in the past, none currently  Membrane stabilizers: Tried gabapentin for neuropathy pain in feet, didn't help, no longer taking   Muscle relaxers: None  Previous injections: None  Previous surgery at this site: Left hip replacement September 2015 with partial improvement in pain      Past Medical History:   Diagnosis Date    Anticoagulant long-term use     Atrial fibrillation (HCC)     [facility]F (congestive heart failure) ([facility]     Degenerative joint disease of left hip 1/28/2015    Diabetes mellitus ([facility]     Hyperlipidemia     Hypertension     Obesity     PONV (postoperative nausea and vomiting)     Radiculopathy of lumbosacral region 7/25/2017    Radiculopathy of lumbosacral region 7/25/2017    Sleep apnea     Type II or unspecified type diabetes mellitus without mention of complication, not stated as uncontrolled        Past Surgical History:   Procedure Laterality Date    APPENDECTOMY      CHOLECYSTECTOMY      CYST INCISION AND DRAINAGE  06/08/2017    abd wall cyst    HERNIA REPAIR      HIP ARTHROPLASTY Left        Social History     Socioeconomic History    Marital status:      Spouse name: Not on file    Number of children: Not on file    Years of education: Not on file    Highest education level: Not on file   Occupational History    Not on file   Tobacco Use    Smoking status: Never Smoker    Smokeless tobacco: Never Used   Substance and Sexual Activity    Alcohol use: No     Alcohol/week: 0.0 standard drinks     Comment: drinks occasional iced tea/coffee    Drug use: No    Sexual activity: Not on file   Other Topics Concern    Not on file   Social History Narrative    Not on file     Social Determinants of Health     Financial Resource Strain:     Difficulty of Paying Living Expenses: Not on file   Food Insecurity:     Worried About Running Out of Food in the Last Year: Not on file    Samia of Food in the Last Year: Not on file   Transportation Needs:     Lack of Transportation (Medical): Not on file    Lack of Transportation (Non-Medical):  Not on file   Physical Activity:     Days of Exercise per Week: Not on file    Minutes of Exercise per Session: Not on file   Stress:     Feeling of Stress : Not on file   Social Connections:     Frequency of Communication with Friends and Family: Not on file    Frequency of Social Gatherings with Friends and Family: Not on file    Attends Nondenominational Services: Not on file    Active Member of [address] [place] [address] or Organizations: Not on file    Attends Club or Organization Meetings: Not on file    Marital Status: Not on file   Intimate Partner Violence:     Fear of Current or Ex-Partner: Not on file    Emotionally Abused: Not on file    Physically Abused: Not on file    Sexually Abused: Not on file   Housing Stability:     Unable to Pay for Housing in the Last Year: Not on file    Number of [place] in the Last Year: Not on file    Unstable Housing in the Last Year: Not on file         Family History   Problem Relation Age of Onset    High Blood Pressure Mother     Cancer Mother         lymphoma    Stroke Mother     Diabetes Father     Heart Surgery Sister         heart valve replacement    Heart Disease Brother         pt was waiting for a heart transplant    Cancer Maternal Grandfather     Cancer Paternal Grandmother     Cancer Paternal Grandfather        Allergies   Allergen Reactions    Lipitor      Body pain, DIARRHEA ETC. HAS PROBS WITH ALL CHOLESTEROL MEDS-MOST PROB WITH LIPITOR    Other Itching     Throat itches when he eats magnus nuts       Current Outpatient Medications   Medication Sig Dispense Refill    insulin detemir (LEVEMIR FLEXTOUCH) 100 UNIT/ML injection pen Inject 45 Units into the skin nightly 5 pen 3    benazepril (LOTENSIN) 20 MG tablet Take 1 tablet by mouth 2 times daily 180 tablet 1    glipiZIDE (GLUCOTROL) 10 MG tablet Take 1 tablet by mouth 2 times daily (before meals) 180 tablet 1    carvedilol (COREG) 25 MG tablet Take 1 tablet by mouth twice daily 180 tablet 1    dilTIAZem (CARDIZEM CD) 240 MG extended release capsule Take 1 capsule by mouth once daily 90 capsule 1    metFORMIN (GLUCOPHAGE) 1000 MG tablet TAKE 1 TABLET BY MOUTH TWICE DAILY WITH MEALS 180 tablet 1    rivaroxaban (XARELTO) 20 MG TABS tablet Take 1 tablet by mouth daily (with breakfast) 90 tablet 1    blood glucose monitor strips 1 strip by Other route daily Test 1 time a day & as needed for symptoms of irregular blood glucose. Dispense sufficient amount for indicated testing frequency plus additional to accommodate PRN testing needs.  ReliOn prime test strips 100 strip 10    furosemide (LASIX) 20 MG tablet Take 1 tablet by mouth once daily 90 tablet 1    hydroCHLOROthiazide (HYDRODIURIL) 25 MG tablet Take 1 tablet by mouth once daily for blood pressure 90 tablet 1    Insulin Pen Needle (PEN NEEDLES 5/16\") 30G X 8 MM MISC 1 each by Does not apply route daily 100 each 3    Alpha-Lipoic Acid 600 MG TABS Take 1 Bottle by mouth daily 60 tablet 1    CPAP Machine MISC by Does not apply route nightly       No current facility-administered medications for this visit. Review of Systems  General: No chills, fatigue, fever, malaise, night sweats, weight gain,  weight loss. Psychological: No anxiety, depression, suicidal ideation   Ophthalmic: No blurry vision, decreased vision, double vision, loss of vision  Ear Nose Throat: No hearing loss, tinnitus, phonophobia, sensitivity to smells, vertigo, or vocal changes. Allergy/Immunology: No watery eyes, itchy eyes, frequent infections. Hematological and Lymphatic: No bleeding problems, blood clots, bruising  Endocrine:  No polydypsia, polyuria, temperature intolerance. Respiratory: No cough, shortness of breath, wheezing. Cardiovascular: No syncope, chest pain, dyspnea on exertion,palpitations. Gastrointestinal: No abdominal pain, hematemesis, melena, nausea, vomiting, stool incontinence  Genito-Urinary: No dysuria, hematuria, incontinence   Musculoskeletal: Per HPI  Neurological: Per HPI  Dermatological:  No rash      Physical Exam:   Blood pressure 130/70, pulse 67, temperature 96.6 °F (35.9 °C), temperature source Temporal, height 6' (1.829 m), weight (!) 325 lb 9.6 oz (147.7 kg), SpO2 97 %. BMI 44. General: The patient is in no apparent distress. Body habitus is morbidly obese, BMI 44. HEENT: No rhinorrhea, sneezing, yawning, or lacrimation. No scleral icterus or conjunctival injection. SKIN: No piloerection. No track marks. No rash. Normal turgor. No erythema or ecchymosis. Psychological: Mood and affect are appropriate. Hygiene is appropriate. Cardiovascular:  Heart is regular rate. Peripheral pulses are palpable and symmetric. There is mild lymphedema in lower extremities. Respiratory: Respirations are regular and unlabored. There is no cyanosis.      Gastrointestinal: no abdominal distention   Genitourinary: No costovertebral angle tenderness. MSK: Lumbar:  Thoracic kyphosis normal and lumbar lordosis decreased. No hairy patch, cafe au lait, nevi, hemangioma or dimpling over lumbar area. Pelvis level. Seated and standing flexion tests negative. There is no step off deformity. No superficial or bony tenderness. Lumbar AROM is moderately decreased with flexion and extension. He reports increase in pain with lumbar flexion. No tenderness to palpation at bilateral lumbosacral paraspinal muscles, SIJ, gluteal muscles, PSIS, ischial tuberosity, greater trochanter, ASIS, iliac crest.  No trigger points. There is bilateral lumbar paraspinal spasm. No edema, erythema, ecchymosis,  mass or deformity. Straight leg raise is negative bilaterally. VALARIE is negative on the left, caused right groin pain on the right. Hip: Full painless AROM left hip. Right hip with mildly decreased flexion and internal/external rotation. R groin pain with internal/external rotation. Neurologic: Awake, alert and oriented in three planes. Speech is fluent. No facial weakness. Hearing is intact for conversation. Pupils are equal and round. Extraocular muscles are intact. Strength:   R  L  Hip Flex  5  5  Knee Ext  5  5  Ankle dorsi  5  5  EHL   5 5  Ankle Plantar  5  5    Sensory:  Intact for light touch in all lower extremity dermatomes. Reflexes:   R  L   Patellar  2 2   Ankle Jerk  2 2    No Babinski     Gait is normal       Impression:   Chronic low back pain  Lumbar DDD  Bilateral hip pain  History of left MALCOM   Morbid obesity, BMI 40      Plan:   · Patient completed PT. Continue home exercise program.   · Right hip xray. Will also get left hip xray to check hardware. · Home TENs trial  · Add Lidoderm  · He may also try topical compound cream, Rx provided   · Discussed additional treatment options. Patient would like to consider trial of injection therapy if felt to be appropriate, as he had limited improvement with PT.  Referral placed for Pain management for consideration of injection therapy. The patient was educated about the diagnosis, prognosis, indications, risks and benefits of treatment. An opportunity to ask questions was given to the patient and questions were answered. The patient agreed to proceed with the recommended treatment as described above. Follow up  8 weeks       Thank you for the consultation and for allowing me to participate in the care of this patient. Sincerely,         Griselda Nguyen M.D.   Physical Medicine and Rehabilitation

## 2022-02-15 ENCOUNTER — PROCEDURE VISIT (OUTPATIENT)
Dept: PODIATRY | Age: 67
End: 2022-02-15
Payer: MEDICARE

## 2022-02-15 VITALS — HEIGHT: 72 IN | BODY MASS INDEX: 42.66 KG/M2 | WEIGHT: 315 LBS

## 2022-02-15 DIAGNOSIS — L84 CORNS AND CALLOSITIES: ICD-10-CM

## 2022-02-15 DIAGNOSIS — Z79.01 ENCOUNTER FOR CURRENT LONG-TERM USE OF ANTICOAGULANTS: ICD-10-CM

## 2022-02-15 DIAGNOSIS — E11.9 TYPE 2 DIABETES MELLITUS WITHOUT COMPLICATION, UNSPECIFIED WHETHER LONG TERM INSULIN USE (HCC): ICD-10-CM

## 2022-02-15 DIAGNOSIS — B35.1 TINEA UNGUIUM: Primary | ICD-10-CM

## 2022-02-15 PROCEDURE — 11056 PARNG/CUTG B9 HYPRKR LES 2-4: CPT | Performed by: PODIATRIST

## 2022-02-15 PROCEDURE — 11721 DEBRIDE NAIL 6 OR MORE: CPT | Performed by: PODIATRIST

## 2022-02-15 NOTE — PROGRESS NOTES
Debbie Hamilton is here today for a diabetic foot exam and nail care. his PCP is Ramírez Ramirez MD    last OV was 2/11/2022. CC:   Follow-up diabetic foot and ankle exam    HPI:   Presents today follow-up diabetic foot and ankle exam.  Continue Xarelto. Continues low-dose compression stockings. No calf pain. No additional pedal complaints. ROS:  Const: Denies constitutional symptoms  Musculo: Denies symptoms other than stated above  Skin: Denies symptoms other than stated above      Current Outpatient Medications:     Tens Unit MISC, by Does not apply route, Disp: 1 each, Rfl: 0    lidocaine (LIDODERM) 5 %, Place 1 patch onto the skin daily 12 hours on, 12 hours off., Disp: 30 patch, Rfl: 3    insulin detemir (LEVEMIR FLEXTOUCH) 100 UNIT/ML injection pen, Inject 45 Units into the skin nightly, Disp: 5 pen, Rfl: 3    benazepril (LOTENSIN) 20 MG tablet, Take 1 tablet by mouth 2 times daily, Disp: 180 tablet, Rfl: 1    glipiZIDE (GLUCOTROL) 10 MG tablet, Take 1 tablet by mouth 2 times daily (before meals), Disp: 180 tablet, Rfl: 1    carvedilol (COREG) 25 MG tablet, Take 1 tablet by mouth twice daily, Disp: 180 tablet, Rfl: 1    dilTIAZem (CARDIZEM CD) 240 MG extended release capsule, Take 1 capsule by mouth once daily, Disp: 90 capsule, Rfl: 1    metFORMIN (GLUCOPHAGE) 1000 MG tablet, TAKE 1 TABLET BY MOUTH TWICE DAILY WITH MEALS, Disp: 180 tablet, Rfl: 1    rivaroxaban (XARELTO) 20 MG TABS tablet, Take 1 tablet by mouth daily (with breakfast), Disp: 90 tablet, Rfl: 1    blood glucose monitor strips, 1 strip by Other route daily Test 1 time a day & as needed for symptoms of irregular blood glucose. Dispense sufficient amount for indicated testing frequency plus additional to accommodate PRN testing needs.  ReliOn prime test strips, Disp: 100 strip, Rfl: 10    furosemide (LASIX) 20 MG tablet, Take 1 tablet by mouth once daily, Disp: 90 tablet, Rfl: 1    hydroCHLOROthiazide (HYDRODIURIL) 25 MG tablet, Take 1 tablet by mouth once daily for blood pressure, Disp: 90 tablet, Rfl: 1    Insulin Pen Needle (PEN NEEDLES 5/16\") 30G X 8 MM MISC, 1 each by Does not apply route daily, Disp: 100 each, Rfl: 3    Alpha-Lipoic Acid 600 MG TABS, Take 1 Bottle by mouth daily, Disp: 60 tablet, Rfl: 1    CPAP Machine MISC, by Does not apply route nightly, Disp: , Rfl:   Allergies   Allergen Reactions    Lipitor      Body pain, DIARRHEA ETC. HAS PROBS WITH ALL CHOLESTEROL MEDS-MOST PROB WITH LIPITOR    Other Itching     Throat itches when he eats magnus nuts       Past Medical History:   Diagnosis Date    Anticoagulant long-term use     Atrial fibrillation (HCC)     CHF (congestive heart failure) (HCC)     Degenerative joint disease of left hip 1/28/2015    Diabetes mellitus ([facility] [address]     Hyperlipidemia     Hypertension     Obesity     PONV (postoperative nausea and vomiting)     Radiculopathy of lumbosacral region 7/25/2017    Radiculopathy of lumbosacral region 7/25/2017    Sleep apnea     Type II or unspecified type diabetes mellitus without mention of complication, not stated as uncontrolled        There were no vitals filed for this visit. Work History/Social History: [employer]  Orthopedic history: EMG testing June 2020, Alpha-lipoic acid 600mg      Focused Lower Extremity Physical Exam:      Neurovascular examination:    Dorsalis Pedis palpable bilateral.  Posterior tibialis non-palpable bilateral.    Capillary Refill Time:  Immediate return  Hair growth:  Symmetrical and bilateral   Skin:  Not atrophic  Edema: Mild edema bilateral feet. Mild edema bilateral ankles.   Neurologic:  Light touch diminished bilateral.  Warm to coolness bilateral distal toes  Decreased epicritic sensation     Musculoskeletal/ Orthopedic examination:    Equinis: present bilateral  Dorsiflexion, plantarflexion, inversion, eversion bilateral 5 out of 5 muscle strength  Wiggling toes  Negative Homans  No pain plantar fascial bilateral.  Negative calcaneal squeeze test.    Dermatology examination:    Toenails 1 through 5 bilateral thickened, elongated, dystrophic, mycotic with subungual debris. Web spaces 1 through 4 bilateral clean dry and intact. Hyperkeratotic tissue plantar fifth metatarsal head bilateral.    No erythema. Decreased overall edema bilateral lower legs. Assessment and Plan:  Laverne Banuelos was seen today for callouses, nail problem and diabetes. Diagnoses and all orders for this visit:    Tinea unguium    Corns and callosities    Type 2 diabetes mellitus without complication, unspecified whether long term insulin use ([facility]    Encounter for current long-term use of anticoagulants      Follow-up diabetic foot ankle exam  Continue Xarelto anticoagulant therapy. Manual debridement with standard technique toenails 1 through 5 right and left in thickness and length. Patient tolerated procedure well. Paring hyperkeratotic tissue plantar fifth metatarsal head bilateral #15 blade. Continue off lipoic acid 600 mg daily. Avoid barefoot. Continue low-dose compression stockings bilateral lower legs during the day. Remove at night any calf pain go emergency room. Follow-up 2 months. Return in about 2 months (around 4/15/2022). Seen By:  Marah Rivers DPM      Document was created using voice recognition software. Note was reviewed however may contain grammatical errors.

## 2022-02-16 DIAGNOSIS — Z79.4 TYPE 2 DIABETES MELLITUS WITH OTHER SPECIFIED COMPLICATION, WITH LONG-TERM CURRENT USE OF INSULIN (HCC): ICD-10-CM

## 2022-02-16 DIAGNOSIS — E11.69 TYPE 2 DIABETES MELLITUS WITH OTHER SPECIFIED COMPLICATION, WITH LONG-TERM CURRENT USE OF INSULIN (HCC): ICD-10-CM

## 2022-02-17 RX ORDER — INSULIN DETEMIR 100 [IU]/ML
45 INJECTION, SOLUTION SUBCUTANEOUS NIGHTLY
Qty: 5 PEN | Refills: 3 | Status: SHIPPED
Start: 2022-02-17 | End: 2022-06-20

## 2022-02-17 RX ORDER — HYDROCHLOROTHIAZIDE 25 MG/1
TABLET ORAL
Qty: 90 TABLET | Refills: 1 | Status: SHIPPED
Start: 2022-02-17 | End: 2022-08-16

## 2022-02-17 RX ORDER — INSULIN DETEMIR 100 [IU]/ML
INJECTION, SOLUTION SUBCUTANEOUS
Qty: 15 ML | OUTPATIENT
Start: 2022-02-17

## 2022-03-09 ENCOUNTER — HOSPITAL ENCOUNTER (OUTPATIENT)
Age: 67
Discharge: HOME | End: 2022-03-11
Payer: MEDICARE

## 2022-03-09 ENCOUNTER — HOSPITAL ENCOUNTER (OUTPATIENT)
Dept: GENERAL RADIOLOGY | Age: 67
Discharge: HOME | End: 2022-03-11
Payer: MEDICARE

## 2022-03-09 ENCOUNTER — OFFICE VISIT (OUTPATIENT)
Dept: PAIN MANAGEMENT | Age: 67
End: 2022-03-09
Payer: MEDICARE

## 2022-03-09 VITALS
SYSTOLIC BLOOD PRESSURE: 141 MMHG | BODY MASS INDEX: 41.75 KG/M2 | OXYGEN SATURATION: 97 % | RESPIRATION RATE: 16 BRPM | HEIGHT: 73 IN | WEIGHT: 315 LBS | TEMPERATURE: 97.2 F | DIASTOLIC BLOOD PRESSURE: 97 MMHG | HEART RATE: 81 BPM

## 2022-03-09 DIAGNOSIS — M51.9 LUMBAR DISC DISORDER: ICD-10-CM

## 2022-03-09 DIAGNOSIS — M54.16 LUMBAR RADICULOPATHY: ICD-10-CM

## 2022-03-09 DIAGNOSIS — M43.16 SPONDYLOLISTHESIS OF LUMBAR REGION: ICD-10-CM

## 2022-03-09 DIAGNOSIS — M21.371 RIGHT FOOT DROP: ICD-10-CM

## 2022-03-09 DIAGNOSIS — G89.4 CHRONIC PAIN SYNDROME: Primary | ICD-10-CM

## 2022-03-09 PROCEDURE — 99204 OFFICE O/P NEW MOD 45 MIN: CPT | Performed by: PAIN MEDICINE

## 2022-03-09 PROCEDURE — 1036F TOBACCO NON-USER: CPT | Performed by: PAIN MEDICINE

## 2022-03-09 PROCEDURE — G8427 DOCREV CUR MEDS BY ELIG CLIN: HCPCS | Performed by: PAIN MEDICINE

## 2022-03-09 PROCEDURE — 1123F ACP DISCUSS/DSCN MKR DOCD: CPT | Performed by: PAIN MEDICINE

## 2022-03-09 PROCEDURE — G1010 CDSM STANSON: HCPCS

## 2022-03-09 PROCEDURE — 3017F COLORECTAL CA SCREEN DOC REV: CPT | Performed by: PAIN MEDICINE

## 2022-03-09 PROCEDURE — G8417 CALC BMI ABV UP PARAM F/U: HCPCS | Performed by: PAIN MEDICINE

## 2022-03-09 PROCEDURE — 4040F PNEUMOC VAC/ADMIN/RCVD: CPT | Performed by: PAIN MEDICINE

## 2022-03-09 PROCEDURE — G8484 FLU IMMUNIZE NO ADMIN: HCPCS | Performed by: PAIN MEDICINE

## 2022-03-09 NOTE — PROGRESS NOTES
[facility]        [address]  [facility], [address] [place] [address]  Dept: [phone]        Patient:  Nava Stahl,  1955    Date of Service:  22     Requesting Physician:  Milka Mendoza MD    Reason for Consult:      Patient presents with complaints of bilateral, lower back pain that started 6 months ago, the left side has been painful for several years    HISTORY OF PRESENT ILLNESS:      Pain is intermittent and is described as aching. Pain does radiate to left leg but has not done this for quite some time. He  has numbness, tingling of the b/l feet and does not have bladder or bowel dysfunction. Alleviating factors include: PT, tylenol, laying down. Aggravating factors include:  walking, standing. He has been on anticoagulation medications to include Aleda Barges and has not been on herbal supplements. He is diabetic. Imagin/2022 L hip xray -  FINDINGS:   Anatomically aligned left MALCOM with no abnormal bone or soft tissue findings.           Impression   Anatomically aligned left MALCOM with no unexpected findings. 2022 rt hip xray -  FINDINGS:   Mild osteoarthritis at the right hip.  No fracture or dislocation.  Normal   soft tissues.           Impression   Mild osteoarthritis at the right hip.     2021 xray SIJ -  FINDINGS:   There is no evidence of acute fracture.  There is normal alignment.  No acute   joint abnormality.  No focal osseous lesion. No focal soft tissue   abnormality.  Left hip arthroplasty.  Degenerative changes L5-S1           Impression   No acute osseous abnormality.       Grossly normal appearing SI joints.       Degenerative changes L5-S1.     2021 xray lumbar -  FINDINGS:   There are moderate-advanced degenerative disc changes at L5-S1 with evidence   of vacuum disc phenomenon.  Mild degenerative changes are present elsewhere.    No acute fracture is identified.  Grade 1 retrolisthesis at L5-S1 is similar   to the previous exam.  A left hip replacement is partially imaged.  There are   calcified phleboliths in the pelvis.           Impression   1.  No acute osseous abnormality is identified. 2. Degenerative changes appear most prominent at L5-S1 with grade 1   retrolisthesis, similar to the previous exam.     Previous treatments: Physical Therapy and medications. Past Medical History:   Diagnosis Date    Anticoagulant long-term use     Atrial fibrillation (HCC)     CHF (congestive heart failure) (HCC)     Degenerative joint disease of left hip 1/28/2015    Diabetes mellitus ([address]     Hyperlipidemia     Hypertension     Hypertension     Obesity     PONV (postoperative nausea and vomiting)     Radiculopathy of lumbosacral region 7/25/2017    Radiculopathy of lumbosacral region 7/25/2017    Sleep apnea     Sleep apnea     Type II or unspecified type diabetes mellitus without mention of complication, not stated as uncontrolled        Past Surgical History:   Procedure Laterality Date    APPENDECTOMY      CHOLECYSTECTOMY      CYST INCISION AND DRAINAGE  06/08/2017    abd wall cyst    HERNIA REPAIR      HIP ARTHROPLASTY Left     JOINT REPLACEMENT         Prior to Admission medications    Medication Sig Start Date End Date Taking?  Authorizing Provider   hydroCHLOROthiazide (HYDRODIURIL) 25 MG tablet Take 1 tablet by mouth once daily for blood pressure 2/17/22  Yes Arpita Avery MD   insulin detemir (LEVEMIR FLEXTOUCH) 100 UNIT/ML injection pen Inject 45 Units into the skin nightly 2/17/22  Yes Arpita Avery MD   benazepril (LOTENSIN) 20 MG tablet Take 1 tablet by mouth 2 times daily 2/7/22  Yes Arpita Avery MD   glipiZIDE (GLUCOTROL) 10 MG tablet Take 1 tablet by mouth 2 times daily (before meals) 2/7/22  Yes Arpita Avery MD   carvedilol (COREG) 25 MG tablet Take 1 tablet by mouth twice daily 1/13/22  Yes Arpita Avery MD   dilTIAZem (CARDIZEM CD) 240 MG extended release capsule Take 1 capsule by mouth once daily 11/11/21  Yes Stefan Walker MD   metFORMIN (GLUCOPHAGE) 1000 MG tablet TAKE 1 TABLET BY MOUTH TWICE DAILY WITH MEALS 9/14/21  Yes Stefan Walker MD   rivaroxaban (XARELTO) 20 MG TABS tablet Take 1 tablet by mouth daily (with breakfast) 8/16/21  Yes Joss Mendoza MD   blood glucose monitor strips 1 strip by Other route daily Test 1 time a day & as needed for symptoms of irregular blood glucose. Dispense sufficient amount for indicated testing frequency plus additional to accommodate PRN testing needs. ReliOn prime test strips 8/6/21  Yes Stefan Walker MD   furosemide (LASIX) 20 MG tablet Take 1 tablet by mouth once daily 8/6/21  Yes Stefan Walker MD   Insulin Pen Needle (PEN NEEDLES 5/16\") 30G X 8 MM MISC 1 each by Does not apply route daily 8/6/21  Yes Stefan Walker MD   Alpha-Lipoic Acid 600 MG TABS Take 1 Bottle by mouth daily 7/8/20  Yes Crissy Casiano DPM   CPAP Machine MISC by Does not apply route nightly   Yes Historical Provider, MD   Tens Unit MISC by Does not apply route  Patient not taking: Reported on 3/9/2022 2/14/22   Kilo Soto MD   lidocaine (LIDODERM) 5 % Place 1 patch onto the skin daily 12 hours on, 12 hours off.   Patient not taking: Reported on 3/9/2022 2/14/22   Kilo Soto MD       Allergies   Allergen Reactions    Lipitor      Body pain, DIARRHEA ETC. HAS PROBS WITH ALL CHOLESTEROL MEDS-MOST PROB WITH LIPITOR    Other Itching     Throat itches when he eats magnus nuts       Social History     Socioeconomic History    Marital status:      Spouse name: Not on file    Number of children: Not on file    Years of education: Not on file    Highest education level: Not on file   Occupational History    Not on file   Tobacco Use    Smoking status: Never Smoker    Smokeless tobacco: Never Used   Substance and Sexual Activity    Alcohol use: No     Alcohol/week: 0.0 standard drinks     Comment: drinks occasional iced tea/coffee    Drug use: No    Sexual activity: Not on file   Other Topics Concern    Not on file   Social History Narrative    Not on file     Social Determinants of Health     Financial Resource Strain:     Difficulty of Paying Living Expenses: Not on file   Food Insecurity:     Worried About Running Out of Food in the Last Year: Not on file    Samia of Food in the Last Year: Not on file   Transportation Needs:     Lack of Transportation (Medical): Not on file    Lack of Transportation (Non-Medical):  Not on file   Physical Activity:     Days of Exercise per Week: Not on file    Minutes of Exercise per Session: Not on file   Stress:     Feeling of Stress : Not on file   Social Connections:     Frequency of Communication with Friends and Family: Not on file    Frequency of Social Gatherings with Friends and Family: Not on file    Attends Holiness Services: Not on file    Active Member of [address] [place] [address] or Organizations: Not on file    Attends Club or Organization Meetings: Not on file    Marital Status: Not on file   Intimate Partner Violence:     Fear of Current or Ex-Partner: Not on file    Emotionally Abused: Not on file    Physically Abused: Not on file    Sexually Abused: Not on file   Housing Stability:     Unable to Pay for Housing in the Last Year: Not on file    Number of [place] in the Last Year: Not on file    Unstable Housing in the Last Year: Not on file       Family History   Problem Relation Age of Onset    High Blood Pressure Mother     Cancer Mother         lymphoma    Stroke Mother     Diabetes Father     Heart Surgery Sister         heart valve replacement    Heart Disease Brother         pt was waiting for a heart transplant    Cancer Maternal Grandfather     Cancer Paternal Grandmother     Cancer Paternal Grandfather        REVIEW OF SYSTEMS:     Patient specifically denies fever/chills, chest pain, shortness of breath, new bowel or bladder complaints. All other review of systems was negative. PHYSICAL EXAMINATION:      BP (!) 141/97   Pulse 81   Temp 97.2 °F (36.2 °C) (Infrared)   Resp 16   Ht 6' 1\" (1.854 m)   Wt (!) 325 lb (147.4 kg)   SpO2 97%   BMI 42.88 kg/m²     General:      General appearance:pleasant and well-hydrated, in no distress and A & O x3  Build:Obese  Function:Rises from a seated position with difficulty    HEENT:    Head:normocephalic, atraumatic  Pupils:regular, round, equal  Sclera: icterus absent    Lungs:    Breathing:normal breathing pattern    Abdomen:    Shape:obese and non-distended  Tenderness:none  Guarding:none    Lumbar spine:    Spine inspection:normal   CVA tenderness:No   Palpation:tenderness paravertebral muscles, left, right positive. Range of motion:abnormal mildly in lateral bending, flexion, extension rotation bilateral and is painful. Musculoskeletal:    Trigger points in Paraveteral:absent bilaterally  SI joint tenderness:negative right, negative left              VALARIE test:not done right, not done left  Piriformis tenderness:negative right, negative left  Trochanteric bursa tenderness:negative right, negative left  SLR:negative right, negative left, sitting     Extremities:    Tremors:None bilaterally upper and lower  Range of motion:pain with internal rotation of hips negative.   Intact:Yes  Varicose veins:absent   Pulses:present Lt radial  Cyanosis:none  Edema:none x all 4 extremities    Neurological:    Sensory:normal to light touch BLE in stocking distribution  Motor:                Right Quadriceps5/5          Left Quadriceps5/5           Right Gastrocnemius5/5    Left Gastrocnemius5/5  Right Ant Tibialis4/5  Left Ant Tibialis5/5    Reflexes:    Right Quadriceps reflex0-1+  Left Quadriceps reflex0-1+  Right Achilles reflexdeferred  Left Achilles reflexdeferred  Gait:right foot drop    Dermatology:    Skin:no rashes or lesions noted    Impression:    LBP radiating into b/l buttocks/hip in L5 distribution  + Rt foot drop  Lumbar xrays as above  PMHX: a. Fib (on ), CHF, DM, DLD, HTN, besity, AMIRAH (on cpap)  His son  in  of covid    Plan:    Reviewed referral documents and imaging  Urine screen deferred  OARRS report reviewed  On  for a. Fib  MRI lumbar w/o  Lumbar f/e films  Consider EMG prn  PT done until 2022  Consider rt AFO based upon response to injections  Patient encouraged to stay active and to lose weight  Treatment plan discussed with the patient including medication and procedure side effects     CC:  Referring physician    LESLIE Monterroso.

## 2022-03-09 NOTE — PROGRESS NOTES
Do you currently have any of the following:    Fever: No  Headache:  No  Cough: No  Shortness of breath: No  Exposed to anyone with these symptoms: No         Suzanne Sanchez [facility] to the [facility]. Srini Lyn is complaining of pain lower back. The pain is intermittent. The pain is described as aching. Pain is rated on his best day at a 1, on his worst day at a 9, and on average at a 4 on the VAS scale. He took his last dose of Tylenol PRN. Any procedures since your last visit: No, with  % relief. Pacemaker or defibrillator: No managed by . He is not on NSAIDS and is  on anticoagulation medications to include Xarelto and is managed by Sarah Alexandre MD  .     Medication Contract and Consent for Opioid Use Documents Filed      No documents found                BP (!) 141/97   Pulse 81   Temp 97.2 °F (36.2 °C) (Infrared)   Resp 16   Ht 6' 1\" (1.854 m)   Wt (!) 325 lb (147.4 kg)   SpO2 97%   BMI 42.88 kg/m²      No LMP for male patient.

## 2022-03-28 ENCOUNTER — HOSPITAL ENCOUNTER (OUTPATIENT)
Dept: MRI IMAGING | Age: 67
Discharge: HOME | End: 2022-03-30
Payer: MEDICARE

## 2022-03-28 ENCOUNTER — HOSPITAL ENCOUNTER (OUTPATIENT)
Dept: GENERAL RADIOLOGY | Age: 67
Discharge: HOME | End: 2022-03-30
Payer: MEDICARE

## 2022-03-28 DIAGNOSIS — M51.9 LUMBAR DISC DISORDER: ICD-10-CM

## 2022-03-28 DIAGNOSIS — M43.16 SPONDYLOLISTHESIS OF LUMBAR REGION: ICD-10-CM

## 2022-03-28 DIAGNOSIS — M79.5 FOREIGN BODY (FB) IN SOFT TISSUE: ICD-10-CM

## 2022-03-28 DIAGNOSIS — M54.16 LUMBAR RADICULOPATHY: ICD-10-CM

## 2022-03-28 DIAGNOSIS — M21.371 RIGHT FOOT DROP: ICD-10-CM

## 2022-03-28 PROCEDURE — 70030 X-RAY EYE FOR FOREIGN BODY: CPT

## 2022-03-28 PROCEDURE — 72148 MRI LUMBAR SPINE W/O DYE: CPT

## 2022-03-30 NOTE — PROGRESS NOTES
[address] [facility]        [address]  [place], [address] [facility]  Dept: [phone]        Follow up Note      Ayleen Baron     Date of Visit:  03/31/22     CC:  Patient presents for follow up   Chief Complaint   Patient presents with    Follow-up    Lower Back Pain       HPI:    Pain is unchanged. Change in quality of symptoms:no. Medication side effects:not applicable . Recent diagnostic testing:lumbar mri and xray. Recent interventional procedures:none. He has been on anticoagulation medications to include Fredick Du and has not been on herbal supplements. He is diabetic.     Imaging:   3/2022 lumbar MRI -  FINDINGS:   BONES/ALIGNMENT: There is loss of the normal lumbar lordosis.  The vertebral   body heights are maintained.  There is a ritual body hemangioma in the L2 and   L4 vertebral bodies.  There is multilevel degenerative disc disease with loss   of disc signal.  There is mild disc space narrowing at L5-S1.  There is no   significant spondylolisthesis.       SPINAL CORD: The conus is normal in caliber and signal and terminates at the   L1 level.  The cauda equina is unremarkable.       SOFT TISSUES: The posterior paraspinal soft tissues are unremarkable.  The   visualized abdominal structures are unremarkable.       L1-L2: There is a circumferential disc bulge.  There is no canal stenosis or   foraminal narrowing.       L2-L3: There is a circumferential disc bulge.  There is no canal stenosis or   foraminal narrowing.       L3-L4: There is a circumferential disc bulge.  There is no canal stenosis or   significant foraminal narrowing.       L4-L5: There is a circumferential disc bulge.  There is no canal stenosis or   significant foraminal narrowing.       L5-S1: There is a circumferential disc bulge.  There is no canal stenosis.    There is moderate to severe bilateral foraminal narrowing.  There is   narrowing of the lateral recesses.           Impression Multilevel degenerative change most pronounced at L5-S1 where there is   moderate to severe bilateral foraminal narrowing and narrowing of the lateral   recesses. 3/2022 lumbar f/e films -  FINDINGS:   No instability on flexion or extension.  Moderate degenerative disc disease   at L5-S1.  Nothing else active.           Impression   No instability evident.  Degenerative disc disease at L5-S1.     2/2022 L hip xray -  FINDINGS:   Anatomically aligned left MLACOM with no abnormal bone or soft tissue findings.           Impression   Anatomically aligned left MALCOM with no unexpected findings.      2/2022 rt hip xray -  FINDINGS:   Mild osteoarthritis at the right hip.  No fracture or dislocation.  Normal   soft tissues.           Impression   Mild osteoarthritis at the right hip.      11/2021 xray SIJ -  FINDINGS:   There is no evidence of acute fracture.  There is normal alignment.  No acute   joint abnormality.  No focal osseous lesion. No focal soft tissue   abnormality.  Left hip arthroplasty.  Degenerative changes L5-S1           Impression   No acute osseous abnormality.       Grossly normal appearing SI joints.       Degenerative changes L5-S1.      11/2021 xray lumbar -  FINDINGS:   There are moderate-advanced degenerative disc changes at L5-S1 with evidence   of vacuum disc phenomenon.  Mild degenerative changes are present elsewhere. No acute fracture is identified.  Grade 1 retrolisthesis at L5-S1 is similar   to the previous exam.  A left hip replacement is partially imaged.  There are   calcified phleboliths in the pelvis.           Impression   1.  No acute osseous abnormality is identified.    2. Degenerative changes appear most prominent at L5-S1 with grade 1   retrolisthesis, similar to the previous exam.        Potential Aberrant Drug-Related Behavior:      Urine Drug Screening:    OARRS report:    Past Medical History:   Diagnosis Date    Anticoagulant long-term use     Atrial fibrillation ([facility] [address]  CHF (congestive heart failure) (HCC)     Degenerative joint disease of left hip 1/28/2015    Diabetes mellitus ([facility] [address]     Hyperlipidemia     Hypertension     Hypertension     Obesity     PONV (postoperative nausea and vomiting)     Radiculopathy of lumbosacral region 7/25/2017    Radiculopathy of lumbosacral region 7/25/2017    Sleep apnea     Sleep apnea     Type II or unspecified type diabetes mellitus without mention of complication, not stated as uncontrolled        Past Surgical History:   Procedure Laterality Date    APPENDECTOMY      CHOLECYSTECTOMY      CYST INCISION AND DRAINAGE  06/08/2017    abd wall cyst    HERNIA REPAIR      HIP ARTHROPLASTY Left     JOINT REPLACEMENT         Prior to Admission medications    Medication Sig Start Date End Date Taking?  Authorizing Provider   hydroCHLOROthiazide (HYDRODIURIL) 25 MG tablet Take 1 tablet by mouth once daily for blood pressure 2/17/22  Yes Isaac Conway MD   insulin detemir (LEVEMIR FLEXTOUCH) 100 UNIT/ML injection pen Inject 45 Units into the skin nightly 2/17/22  Yes Isaac Conway MD   benazepril (LOTENSIN) 20 MG tablet Take 1 tablet by mouth 2 times daily 2/7/22  Yes Isaac Conway MD   glipiZIDE (GLUCOTROL) 10 MG tablet Take 1 tablet by mouth 2 times daily (before meals) 2/7/22  Yes Isaac Conway MD   carvedilol (COREG) 25 MG tablet Take 1 tablet by mouth twice daily 1/13/22  Yes Isaac Conway MD   dilTIAZem (CARDIZEM CD) 240 MG extended release capsule Take 1 capsule by mouth once daily 11/11/21  Yes Isaac Conway MD   metFORMIN (GLUCOPHAGE) 1000 MG tablet TAKE 1 TABLET BY MOUTH TWICE DAILY WITH MEALS 9/14/21  Yes Isaac Conway MD   rivaroxaban (XARELTO) 20 MG TABS tablet Take 1 tablet by mouth daily (with breakfast) 8/16/21  Yes Sandra Lange MD   blood glucose monitor strips 1 strip by Other route daily Test 1 time a day & as needed for symptoms of irregular blood glucose. Dispense sufficient amount for indicated testing frequency plus additional to accommodate PRN testing needs. ReliOn prime test strips 8/6/21  Yes Ren Zavala MD   furosemide (LASIX) 20 MG tablet Take 1 tablet by mouth once daily 8/6/21  Yes Ren Zavala MD   Insulin Pen Needle (PEN NEEDLES 5/16\") 30G X 8 MM MISC 1 each by Does not apply route daily 8/6/21  Yes Ren Zavala MD   Alpha-Lipoic Acid 600 MG TABS Take 1 Bottle by mouth daily 7/8/20  Yes Wu Mcdonough DPJAYDEN   CPAP Machine MISC by Does not apply route nightly   Yes Historical Provider, MD   Tens Unit MISC by Does not apply route  Patient not taking: Reported on 3/31/2022 2/14/22   Alvaro Bell MD   lidocaine (LIDODERM) 5 % Place 1 patch onto the skin daily 12 hours on, 12 hours off.   Patient not taking: Reported on 3/9/2022 2/14/22   Alvaro Bell MD       Allergies   Allergen Reactions    Lipitor      Body pain, DIARRHEA ETC. HAS PROBS WITH ALL CHOLESTEROL MEDS-MOST PROB WITH LIPITOR    Other Itching     Throat itches when he eats magnus nuts       Social History     Socioeconomic History    Marital status:      Spouse name: Not on file    Number of children: Not on file    Years of education: Not on file    Highest education level: Not on file   Occupational History    Not on file   Tobacco Use    Smoking status: Never Smoker    Smokeless tobacco: Never Used   Substance and Sexual Activity    Alcohol use: No     Alcohol/week: 0.0 standard drinks     Comment: drinks occasional iced tea/coffee    Drug use: No    Sexual activity: Not on file   Other Topics Concern    Not on file   Social History Narrative    Not on file     Social Determinants of Health     Financial Resource Strain:     Difficulty of Paying Living Expenses: Not on file   Food Insecurity:     Worried About Running Out of Food in the Last Year: Not on file    Samia of Food in the Last Year: Not on file Transportation Needs:     Lack of Transportation (Medical): Not on file    Lack of Transportation (Non-Medical): Not on file   Physical Activity:     Days of Exercise per Week: Not on file    Minutes of Exercise per Session: Not on file   Stress:     Feeling of Stress : Not on file   Social Connections:     Frequency of Communication with Friends and Family: Not on file    Frequency of Social Gatherings with Friends and Family: Not on file    Attends Gnosticism Services: Not on file    Active Member of [address] [place] [address] [employer] or Organizations: Not on file    Attends Club or Organization Meetings: Not on file    Marital Status: Not on file   Intimate Partner Violence:     Fear of Current or Ex-Partner: Not on file    Emotionally Abused: Not on file    Physically Abused: Not on file    Sexually Abused: Not on file   Housing Stability:     Unable to Pay for Housing in the Last Year: Not on file    Number of [place] in the Last Year: Not on file    Unstable Housing in the Last Year: Not on file       Family History   Problem Relation Age of Onset    High Blood Pressure Mother     Cancer Mother         lymphoma    Stroke Mother     Diabetes Father     Heart Surgery Sister         heart valve replacement    Heart Disease Brother         pt was waiting for a heart transplant    Cancer Maternal Grandfather     Cancer Paternal Grandmother     Cancer Paternal Grandfather        REVIEW OF SYSTEMS:     Rosalia Minor denies fever/chills, chest pain, shortness of breath, new bowel or bladder complaints. All other review of systems was negative.     PHYSICAL EXAMINATION:      /85   Pulse 57   Temp 96.6 °F (35.9 °C) (Infrared)   Resp 16   Ht 6' 1\" (1.854 m)   Wt (!) 325 lb (147.4 kg)   SpO2 97%   BMI 42.88 kg/m²     General:       General appearance:pleasant and well-hydrated, in no distress and A & O x3  Build:Obese  Function:Rises from a seated position with difficulty     HEENT:     Head:normocephalic, atraumatic  Pupils:regular, round, equal  Sclera: icterus absent     Lungs:     Breathing:normal breathing pattern     Abdomen:     Shape:obese and non-distended  Tenderness:none  Guarding:none     Lumbar spine:     Spine inspection:normal   CVA tenderness:No   Palpation:tenderness paravertebral muscles, left, right positive. Range of motion:abnormal mildly in lateral bending, flexion, extension rotation bilateral and is painful.     Musculoskeletal:     Trigger points in Paraveteral:absent bilaterally  SI joint tenderness:negative right, negative left              VALARIE test:not done right, not done left  Piriformis tenderness:negative right, negative left  Trochanteric bursa tenderness:negative right, negative left  SLR:negative right, negative left, sitting      Extremities:     Tremors:None bilaterally upper and lower  Range of motion:pain with internal rotation of hips negative. Intact:Yes  Varicose veins:absent   Pulses:present Lt radial  Cyanosis:none  Edema:none x all 4 extremities     Neurological:     Sensory:normal to light touch BLE in stocking distribution  Motor:                Right Quadriceps5/5          Left Quadriceps5/5           Right Gastrocnemius5/5    Left Gastrocnemius5/5  Right Ant Tibialis4/5  Left Ant Tibialis5/5     Reflexes: (not assessed today)   Right Quadriceps reflex0-1+  Left Quadriceps reflex0-1+  Right Achilles reflexdeferred  Left Achilles reflexdeferred  Gait:right foot drop     Dermatology:     Skin:no rashes or lesions noted     Impression:     LBP radiating into b/l buttocks/hip in L5 distribution  + Rt foot drop  Lumbar xrays as above  [facility] CHF, DM, DLD, HTN, besity, AMIRAH (on cpap)  His son  in  of covid     Plan:     Reviewed referral documents and imaging  Urine screen deferred  OARRS report reviewed  On [facility] for a.  Fib  MRI lumbar w/o reviewed as above  Lumbar f/e films reviewed as above  Consider EMG prn  PT done until 1/2022  Consider rt AFO based upon response to injections (will coordinate through [employer] prn)  b/l L5 TFESI #1 - r/b and procedure discussed (needs permission to hold xarelto per sarah guidelines)  Patient encouraged to stay active and to lose weight  Treatment plan discussed with the patient including medication and procedure side effects     Cc:  Referring physician    LESLIE Calvillo.

## 2022-03-31 ENCOUNTER — OFFICE VISIT (OUTPATIENT)
Dept: PAIN MANAGEMENT | Age: 67
End: 2022-03-31
Payer: MEDICARE

## 2022-03-31 ENCOUNTER — PREP FOR PROCEDURE (OUTPATIENT)
Dept: PAIN MANAGEMENT | Age: 67
End: 2022-03-31

## 2022-03-31 VITALS
BODY MASS INDEX: 41.75 KG/M2 | WEIGHT: 315 LBS | HEIGHT: 73 IN | TEMPERATURE: 96.6 F | OXYGEN SATURATION: 97 % | SYSTOLIC BLOOD PRESSURE: 123 MMHG | DIASTOLIC BLOOD PRESSURE: 85 MMHG | HEART RATE: 57 BPM | RESPIRATION RATE: 16 BRPM

## 2022-03-31 DIAGNOSIS — M51.9 LUMBAR DISC DISORDER: ICD-10-CM

## 2022-03-31 DIAGNOSIS — G89.4 CHRONIC PAIN SYNDROME: Primary | ICD-10-CM

## 2022-03-31 DIAGNOSIS — M21.371 RIGHT FOOT DROP: ICD-10-CM

## 2022-03-31 DIAGNOSIS — M54.16 LUMBAR RADICULOPATHY: Primary | ICD-10-CM

## 2022-03-31 DIAGNOSIS — M54.16 LUMBAR RADICULOPATHY: ICD-10-CM

## 2022-03-31 DIAGNOSIS — M43.16 SPONDYLOLISTHESIS OF LUMBAR REGION: ICD-10-CM

## 2022-03-31 PROCEDURE — 99213 OFFICE O/P EST LOW 20 MIN: CPT | Performed by: PAIN MEDICINE

## 2022-03-31 PROCEDURE — 1036F TOBACCO NON-USER: CPT | Performed by: PAIN MEDICINE

## 2022-03-31 PROCEDURE — G8417 CALC BMI ABV UP PARAM F/U: HCPCS | Performed by: PAIN MEDICINE

## 2022-03-31 PROCEDURE — 99203 OFFICE O/P NEW LOW 30 MIN: CPT | Performed by: PAIN MEDICINE

## 2022-03-31 PROCEDURE — 4040F PNEUMOC VAC/ADMIN/RCVD: CPT | Performed by: PAIN MEDICINE

## 2022-03-31 PROCEDURE — G8427 DOCREV CUR MEDS BY ELIG CLIN: HCPCS | Performed by: PAIN MEDICINE

## 2022-03-31 PROCEDURE — G8484 FLU IMMUNIZE NO ADMIN: HCPCS | Performed by: PAIN MEDICINE

## 2022-03-31 PROCEDURE — 1123F ACP DISCUSS/DSCN MKR DOCD: CPT | Performed by: PAIN MEDICINE

## 2022-03-31 PROCEDURE — 3017F COLORECTAL CA SCREEN DOC REV: CPT | Performed by: PAIN MEDICINE

## 2022-03-31 NOTE — PROGRESS NOTES
Do you currently have any of the following:    Fever: No  Headache:  No  Cough: No  Shortness of breath: No  Exposed to anyone with these symptoms: No         Juana Greene presents to the [facility] on 3/31/2022. Anderson Pruett is complaining of pain lower back. The pain is constant. The pain is described as aching. Pain is rated on his best day at a 1, on his worst day at a 9, and on average at a 5 on the VAS scale. He took his last dose of compound cream PRN. Any procedures since your last visit: No, with  % relief. Pacemaker or defibrillator: No managed by . He is not on NSAIDS and is  on anticoagulation medications to include Xarelto and is managed by Kisha Wilcox MD  .     Medication Contract and Consent for Opioid Use Documents Filed      No documents found                There were no vitals taken for this visit. No LMP for male patient.

## 2022-04-13 ENCOUNTER — TELEPHONE (OUTPATIENT)
Dept: ADMINISTRATIVE | Age: 67
End: 2022-04-13

## 2022-04-19 ENCOUNTER — PROCEDURE VISIT (OUTPATIENT)
Dept: PODIATRY | Age: 67
End: 2022-04-19
Payer: MEDICARE

## 2022-04-19 VITALS — HEIGHT: 73 IN | WEIGHT: 315 LBS | BODY MASS INDEX: 41.75 KG/M2

## 2022-04-19 DIAGNOSIS — Z79.01 ENCOUNTER FOR CURRENT LONG-TERM USE OF ANTICOAGULANTS: ICD-10-CM

## 2022-04-19 DIAGNOSIS — B35.1 TINEA UNGUIUM: Primary | ICD-10-CM

## 2022-04-19 DIAGNOSIS — R60.0 LOCALIZED EDEMA: ICD-10-CM

## 2022-04-19 DIAGNOSIS — L84 CORNS AND CALLOSITIES: ICD-10-CM

## 2022-04-19 DIAGNOSIS — E11.9 TYPE 2 DIABETES MELLITUS WITHOUT COMPLICATION, UNSPECIFIED WHETHER LONG TERM INSULIN USE (HCC): ICD-10-CM

## 2022-04-19 PROCEDURE — 11056 PARNG/CUTG B9 HYPRKR LES 2-4: CPT | Performed by: PODIATRIST

## 2022-04-19 PROCEDURE — 11721 DEBRIDE NAIL 6 OR MORE: CPT | Performed by: PODIATRIST

## 2022-04-19 NOTE — PROGRESS NOTES
Betsy Ugarte is here today for diabetic foot exam and nail care. his PCP is Pavel Mendoza MD last OV was 02/11/2022. CC:   Follow-up diabetic foot and ankle exam    HPI:   Follow-up diabetic foot ankle exam  Continues long-term anticoagulant therapy Xarelto. No open wounds. No calf pain. No additional pedal complaints today. ROS:  Const: Denies constitutional symptoms  Musculo: Denies symptoms other than stated above  Skin: Denies symptoms other than stated above      Current Outpatient Medications:     Handicap Placard MISC, by Does not apply route Please provide a handicap placard through 05/31/2027, Disp: 1 each, Rfl: 0    hydroCHLOROthiazide (HYDRODIURIL) 25 MG tablet, Take 1 tablet by mouth once daily for blood pressure, Disp: 90 tablet, Rfl: 1    insulin detemir (LEVEMIR FLEXTOUCH) 100 UNIT/ML injection pen, Inject 45 Units into the skin nightly, Disp: 5 pen, Rfl: 3    Tens Unit MISC, by Does not apply route (Patient not taking: Reported on 3/31/2022), Disp: 1 each, Rfl: 0    benazepril (LOTENSIN) 20 MG tablet, Take 1 tablet by mouth 2 times daily, Disp: 180 tablet, Rfl: 1    glipiZIDE (GLUCOTROL) 10 MG tablet, Take 1 tablet by mouth 2 times daily (before meals), Disp: 180 tablet, Rfl: 1    carvedilol (COREG) 25 MG tablet, Take 1 tablet by mouth twice daily, Disp: 180 tablet, Rfl: 1    dilTIAZem (CARDIZEM CD) 240 MG extended release capsule, Take 1 capsule by mouth once daily, Disp: 90 capsule, Rfl: 1    metFORMIN (GLUCOPHAGE) 1000 MG tablet, TAKE 1 TABLET BY MOUTH TWICE DAILY WITH MEALS, Disp: 180 tablet, Rfl: 1    rivaroxaban (XARELTO) 20 MG TABS tablet, Take 1 tablet by mouth daily (with breakfast), Disp: 90 tablet, Rfl: 1    blood glucose monitor strips, 1 strip by Other route daily Test 1 time a day & as needed for symptoms of irregular blood glucose. Dispense sufficient amount for indicated testing frequency plus additional to accommodate PRN testing needs.  ReliOn prime test strips, Disp: 100 strip, Rfl: 10    furosemide (LASIX) 20 MG tablet, Take 1 tablet by mouth once daily, Disp: 90 tablet, Rfl: 1    Insulin Pen Needle (PEN NEEDLES 5/16\") 30G X 8 MM MISC, 1 each by Does not apply route daily, Disp: 100 each, Rfl: 3    Alpha-Lipoic Acid 600 MG TABS, Take 1 Bottle by mouth daily, Disp: 60 tablet, Rfl: 1    CPAP Machine MISC, by Does not apply route nightly, Disp: , Rfl:   Allergies   Allergen Reactions    Lipitor      Body pain, DIARRHEA ETC. HAS PROBS WITH ALL CHOLESTEROL MEDS-MOST PROB WITH LIPITOR    Other Itching     Throat itches when he eats magnus nuts       Past Medical History:   Diagnosis Date    Anticoagulant long-term use     Atrial fibrillation (HCC)     [facility]     Degenerative joint disease of left hip 1/28/2015    Diabetes mellitus ([facility] [address]     Hyperlipidemia     Hypertension     Hypertension     Obesity     PONV (postoperative nausea and vomiting)     Radiculopathy of lumbosacral region 7/25/2017    Radiculopathy of lumbosacral region 7/25/2017    Sleep apnea     Sleep apnea     Type II or unspecified type diabetes mellitus without mention of complication, not stated as uncontrolled        There were no vitals filed for this visit. Work History/Social History: [employer]  Orthopedic history: EMG testing June 2020, Alpha-lipoic acid 600mg      Focused Lower Extremity Physical Exam:      Neurovascular examination:    Dorsalis Pedis palpable bilateral.  Posterior tibialis non-palpable bilateral.    Capillary Refill Time:  Immediate return  Hair growth:  Symmetrical and bilateral   Skin:  Not atrophic  Edema: Mild edema bilateral feet. Mild edema bilateral ankles.   Neurologic:  Light touch diminished bilateral.  Warm to coolness bilateral distal toes  Decreased epicritic sensation     Musculoskeletal/ Orthopedic examination:    Equinis: present bilateral  Dorsiflexion, plantarflexion, inversion, eversion bilateral 5 out of 5 muscle strength  Wiggling toes  Negative Homans  No pain foot or ankle. Dermatology examination:    Toenails 1 through 5 bilateral thickened, elongated, dystrophic, mycotic with subungual debris. Web spaces 1 through 4 bilateral clean dry and intact. Hyperkeratotic tissue plantar fifth metatarsal bilateral.  No open wounds. Assessment and Plan:  Mars Cruz was seen today for callouses, nail problem and diabetes. Diagnoses and all orders for this visit:    Tinea unguium    Corns and callosities    Type 2 diabetes mellitus without complication, unspecified whether long term insulin use ([facility] [address]    Encounter for current long-term use of anticoagulants    Localized edema    Follow-up diabetic exam  Continue Xarelto long-term anticoagulant therapy  Manual debridement with standard technique toenails 1 through 5 right and left in thickness and length. Patient tolerated procedure well. I did pare hyperkeratotic tissue fifth metatarsal head bilateral with #15 blade. Avoid barefoot. Continue alpha lipoic acid 600 mg daily. Follow-up 2 months diabetic exam        Return in about 2 months (around 6/19/2022). Seen By:  Lev Patel DPM      Document was created using voice recognition software. Note was reviewed however may contain grammatical errors.

## 2022-05-04 ENCOUNTER — TELEPHONE (OUTPATIENT)
Dept: PAIN MANAGEMENT | Age: 67
End: 2022-05-04

## 2022-05-04 NOTE — TELEPHONE ENCOUNTER
Call to Jacob Julia that procedure was approved for 05.12.2022 and that the surgery center should call him a few days before for the pre op call and after 3:00 PM the business day before with the arrival time. Instructed Jesus to hold ibuprofen for 24 hours, XARELTO for 3 days  naprosyn for 4 days and any aspirin containing products or fish oil for 7 days. Instructed to call office back if any questions. Jesus verbalized understanding.

## 2022-05-05 NOTE — PROGRESS NOTES
Have you been tested for COVID  No           Have you been told you were positive for COVID No  Have you had any known exposure to someone that is positive for COVID No  Do you have a cough                   No              Do you have shortness of breath No                 Do you have a sore throat            No                Are you having chills                    No                Are you having muscle aches. No                    Please come to the hospital wearing a mask and have your significant other wear a mask as well. Both of you should check your temperature before leaving to come here,  if it is 100 or higher please call [phone] for instruction. [facility] PAIN MANAGEMENT  INSTRUCTIONS  . .......................................................................................................................................... [x] Parking the day of Surgery is located in the [facility].   Upon entering the door, make immediate right into the surgery reception room    [x]  Bring photo ID and insurance card     [x] You may have a light breakfast day of procedure    [x]  Wear loose comfortable clothing    [x]  Please follow instructions for medications as given per Dr's office    [x] You can expect a call the business day prior to procedure to notify you of your arrival time     [x] Please arrange for

## 2022-05-12 ENCOUNTER — HOSPITAL ENCOUNTER (OUTPATIENT)
Age: 67
Setting detail: OUTPATIENT SURGERY
Discharge: HOME | End: 2022-05-12
Attending: PAIN MEDICINE | Admitting: PAIN MEDICINE
Payer: MEDICARE

## 2022-05-12 ENCOUNTER — HOSPITAL ENCOUNTER (OUTPATIENT)
Dept: GENERAL RADIOLOGY | Age: 67
Discharge: HOME | End: 2022-05-14
Attending: PAIN MEDICINE
Payer: MEDICARE

## 2022-05-12 VITALS
OXYGEN SATURATION: 97 % | TEMPERATURE: 97 F | SYSTOLIC BLOOD PRESSURE: 132 MMHG | WEIGHT: 315 LBS | DIASTOLIC BLOOD PRESSURE: 76 MMHG | BODY MASS INDEX: 42.66 KG/M2 | HEART RATE: 72 BPM | HEIGHT: 72 IN | RESPIRATION RATE: 18 BRPM

## 2022-05-12 DIAGNOSIS — R52 PAIN MANAGEMENT: ICD-10-CM

## 2022-05-12 LAB — METER GLUCOSE: 221 MG/DL (ref 74–99)

## 2022-05-12 PROCEDURE — 2709999900 HC NON-CHARGEABLE SUPPLY: Performed by: PAIN MEDICINE

## 2022-05-12 PROCEDURE — 3600000002 HC SURGERY LEVEL 2 BASE: Performed by: PAIN MEDICINE

## 2022-05-12 PROCEDURE — 64483 NJX AA&/STRD TFRM EPI L/S 1: CPT | Performed by: PAIN MEDICINE

## 2022-05-12 PROCEDURE — 3209999900 FLUORO FOR SURGICAL PROCEDURES

## 2022-05-12 PROCEDURE — 7100000011 HC PHASE II RECOVERY - ADDTL 15 MIN: Performed by: PAIN MEDICINE

## 2022-05-12 PROCEDURE — 7100000010 HC PHASE II RECOVERY - FIRST 15 MIN: Performed by: PAIN MEDICINE

## 2022-05-12 PROCEDURE — 82962 GLUCOSE BLOOD TEST: CPT

## 2022-05-12 PROCEDURE — 6360000004 HC RX CONTRAST MEDICATION: Performed by: PAIN MEDICINE

## 2022-05-12 PROCEDURE — 2500000003 HC RX 250 WO HCPCS: Performed by: PAIN MEDICINE

## 2022-05-12 PROCEDURE — 6360000002 HC RX W HCPCS: Performed by: PAIN MEDICINE

## 2022-05-12 RX ORDER — LIDOCAINE HYDROCHLORIDE 5 MG/ML
INJECTION, SOLUTION INFILTRATION; INTRAVENOUS PRN
Status: DISCONTINUED | OUTPATIENT
Start: 2022-05-12 | End: 2022-05-12 | Stop reason: ALTCHOICE

## 2022-05-12 RX ORDER — METHYLPREDNISOLONE ACETATE 40 MG/ML
INJECTION, SUSPENSION INTRA-ARTICULAR; INTRALESIONAL; INTRAMUSCULAR; SOFT TISSUE PRN
Status: DISCONTINUED | OUTPATIENT
Start: 2022-05-12 | End: 2022-05-12 | Stop reason: ALTCHOICE

## 2022-05-12 ASSESSMENT — PAIN - FUNCTIONAL ASSESSMENT: PAIN_FUNCTIONAL_ASSESSMENT: 0-10

## 2022-05-12 ASSESSMENT — PAIN DESCRIPTION - DESCRIPTORS: DESCRIPTORS: ACHING;SORE

## 2022-05-12 NOTE — OP NOTE
2022    Patient: Mayte Dickerson  :  1955  Age:  77 y.o. Sex:  male     PRE-OPERATIVE DIAGNOSIS: Lumbar disc displacement, lumbar neural foraminal stenosis, lumbar radiculopathy. POST-OPERATIVE DIAGNOSIS: Same. PROCEDURE: Bilateral Transforaminal epidural steroid injection under fluoroscopic guidance at foraminal level L5 (#1). SURGEON: LESLIE Trent. ANESTHESIA: local    ESTIMATED BLOOD LOSS: None.  ______________________________________________________________________  BRIEF HISTORY: Mayte Dickerson comes in today for the first Bilateral transforaminal epidural steroid injection under fluoroscopic guidance at foraminal level L5. The potential complications of this procedure were discussed with him again today. He has elected to undergo the aforementioned procedure. Stuart complete History & Physical examination were reviewed in depth, a copy of which is in the chart. DESCRIPTION OF PROCEDURE:    After confirming written and informed consent, a time-out was performed and Stuart name and date of birth, the procedure to be performed as well as the plan for the location of the needle insertion were confirmed. The patient was brought into the procedure room and placed in the prone position on the fluoroscopy table. Standard monitors were placed and vital signs were observed throughout the procedure. The area of the lumbar spine was prepped with chloraprep and draped in a sterile manner. The vertebral body was identified with AP fluoroscopy. An oblique view was obtained to better visualize the inferior junction of the pedicle and transverse process . The 6 o'clock position of the pedicle was marked and identified. The skin and subcutaneous tissue were anesthetized with 0.5% lidocaine. A # 22 gauge pencil point needle was directed toward the targeted point under fluoroscopy until bone was contacted. The needle was then walked inferiorly until the neural foramen was entered .  A lateral fluoroscopic view was then used to place the needle tip in the middle to anterior aspect of the foramen. Negative aspiration was confirmed for blood and CSF and 1 cc of Omnipaque 240 contrast was injected at each level under live AP fluoroscopy. Appropriate neurograms were observed under live AP fluoroscopy. Then after negative aspiration, a solution of the 2 cc of 0.5% lidocaine and 40 mg DepoMedrol was easily injected between each level. The needles were removed with constant aspiration technique. The patient's back was cleaned and a bandage was placed over the needle insertion points    Disposition the patient tolerated the procedure well and there were no complications . Vital signs remained stable throughout the procedure. The patient was escorted to the recovery area where they remained until discharge and written discharge instructions for the procedure were given. Plan: Manuel Batista will return to our pain management center as scheduled.      Terri Judd DO

## 2022-05-12 NOTE — H&P
[facility] - [facility]AVIOR[facility] Pain Management        [address] [facility], [address]  Dept: [phone]    Procedure History & Physical      Tilman Fast     HPI:    Patient  is here for LBP BLE pain for b/l L5 TFESI  Labs/imaging studies reviewed   All question and concerns addressed including R/B/A associated with the procedure    Past Medical History:   Diagnosis Date    Anticoagulant long-term use     At[facility] ([facility] [address]     CHF (congestive heart failure) ([facility] [address] [facility]     Degenerative joint disease of left hip 1/28/2015    Diabetes mellitus ([facility] [facility]     Hyperlipidemia     Hypertension     Hypertension     Obesity     PONV (postoperative nausea and vomiting)     Radiculopathy of lumbosacral region 07/25/2017    for procedure 5/5/22    Sleep apnea     Type II or unspecified type diabetes mellitus without mention of complication, not stated as uncontrolled        Past Surgical History:   Procedure Laterality Date    APPENDECTOMY      CHOLECYSTECTOMY      CYST INCISION AND DRAINAGE  06/08/2017    abd wall cyst    HERNIA REPAIR      HIP ARTHROPLASTY Left     JOINT REPLACEMENT         Prior to Admission medications    Medication Sig Start Date End Date Taking?  Authorizing Provider   Handicap Placard MISC by Does not apply route Please provide a handicap placard through 05/31/2027 3/31/22   Lucrecia Chamorro DO   hydroCHLOROthiazide (HYDRODIURIL) 25 MG tablet Take 1 tablet by mouth once daily for blood pressure 2/17/22   Oscar Whittaker MD   insulin detemir (LEVEMIR FLEXTOUCH) 100 UNIT/ML injection pen Inject 45 Units into the skin nightly 2/17/22   Oscar Whittaker MD   benazepril (LOTENSIN) 20 MG tablet Take 1 tablet by mouth 2 times daily 2/7/22   Oscar Whittaker MD   glipiZIDE (GLUCOTROL) 10 MG tablet Take 1 tablet by mouth 2 times daily (before meals) 2/7/22   Oscar Whittaker MD   carvedilol (COREG) 25 MG tablet Take 1 tablet by mouth twice daily 1/13/22   Oscar Whittaker MD   dilTIAZem (CARDIZEM CD) 240 MG extended release capsule Take 1 capsule by mouth once daily 11/11/21   Teresa Abbasi MD   metFORMIN (GLUCOPHAGE) 1000 MG tablet TAKE 1 TABLET BY MOUTH TWICE DAILY WITH MEALS 9/14/21   Teresa Abbasi MD   rivaroxaban (XARELTO) 20 MG TABS tablet Take 1 tablet by mouth daily (with breakfast) 8/16/21   Brody Mehta MD   blood glucose monitor strips 1 strip by Other route daily Test 1 time a day & as needed for symptoms of irregular blood glucose. Dispense sufficient amount for indicated testing frequency plus additional to accommodate PRN testing needs.  ReliOn prime test strips 8/6/21   Teresa Abbasi MD   furosemide (LASIX) 20 MG tablet Take 1 tablet by mouth once daily 8/6/21   Teresa Abbasi MD   Insulin Pen Needle (PEN NEEDLES 5/16\") 30G X 8 MM MISC 1 each by Does not apply route daily 8/6/21   Teresa Abbasi MD   Alpha-Lipoic Acid 600 MG TABS Take 1 Bottle by mouth daily 7/8/20   Jenifer Vital DPM   CPAP Machine MISC by Does not apply route nightly    Historical Provider, MD       Allergies   Allergen Reactions    Other Itching     Throat itches when he eats magnus nuts    Lipitor      Body pain, DIARRHEA ETC. HAS PROBS WITH ALL CHOLESTEROL MEDS-MOST PROB WITH LIPITOR       Social History     Socioeconomic History    Marital status:      Spouse name: Not on file    Number of children: Not on file    Years of education: Not on file    Highest education level: Not on file   Occupational History    Not on file   Tobacco Use    Smoking status: Never Smoker    Smokeless tobacco: Never Used   Substance and Sexual Activity    Alcohol use: No     Alcohol/week: 0.0 standard drinks    Drug use: No    Sexual activity: Not on file   Other Topics Concern    Not on file   Social History Narrative    Not on file     Social Determinants of Health     Financial Resource Strain:     Difficulty of Paying Living Expenses: Not on file   Food Insecurity:     Worried About Running Out of Food in the Last Year: Not on file    Samia of Food in the Last Year: Not on file   Transportation Needs:     Lack of Transportation (Medical): Not on file    Lack of Transportation (Non-Medical):  Not on file   Physical Activity:     Days of Exercise per Week: Not on file    Minutes of Exercise per Session: Not on file   Stress:     Feeling of Stress : Not on file   Social Connections:     Frequency of Communication with Friends and Family: Not on file    Frequency of Social Gatherings with Friends and Family: Not on file    Attends Buddhist Services: Not on file    Active Member of [address] [place] [address] [employer] or Organizations: Not on file    Attends Club or Organization Meetings: Not on file    Marital Status: Not on file   Intimate Partner Violence:     Fear of Current or Ex-Partner: Not on file    Emotionally Abused: Not on file    Physically Abused: Not on file    Sexually Abused: Not on file   Housing Stability:     Unable to Pay for Housing in the Last Year: Not on file    Number of [place] in the Last Year: Not on file    Unstable Housing in the Last Year: Not on file       Family History   Problem Relation Age of Onset    High Blood Pressure Mother     Cancer Mother         lymphoma    Stroke Mother     Diabetes Father     Heart Surgery Sister         heart valve replacement    Heart Disease Brother         pt was waiting for a heart transplant    Cancer Maternal Grandfather     Cancer Paternal Grandmother     Cancer Paternal Grandfather          REVIEW OF SYSTEMS:    CONSTITUTIONAL:  negative for  fevers, chills, sweats and fatigue    RESPIRATORY:  negative for  dry cough, cough with sputum, dyspnea, wheezing and chest pain    CARDIOVASCULAR:  negative for chest pain, dyspnea, palpitations, syncope    GASTROINTESTINAL:  negative for nausea, vomiting, change in bowel habits, diarrhea, constipation and abdominal pain    MUSCULOSKELETAL: negative for muscle weakness    SKIN: negative for itching or rashes. BEHAVIOR/PSYCH:  negative for poor appetite, increased appetite, decreased sleep and poor concentration    All other systems negative      PHYSICAL EXAM:    VITALS:  BP (!) 154/84   Pulse 80   Temp 97 °F (36.1 °C) (Temporal)   Resp 18   Ht 6' (1.829 m)   Wt (!) 325 lb (147.4 kg)   SpO2 96%   BMI 44.08 kg/m²     CONSTITUTIONAL:  awake, alert, cooperative, no apparent distress, and appears stated age    EYES: PERRLA, EOMI    LUNGS:  No increased work of breathing, no audible wheezing    CARDIOVASCULAR:  regular rate and rhythm    ABDOMEN:  Soft non tender non distended     EXTREMITIES: no signs of clubbing or cyanosis. MUSCULOSKELETAL: negative for flaccid muscle tone or spastic movements. SKIN: gross examination reveals no signs of rashes, or diaphoresis. NEURO: Cranial nerves II-XII grossly intact. No signs of agitated mood.        Assessment/Plan:    LBP BLE pain for b/l L5 TFESI

## 2022-05-16 RX ORDER — DILTIAZEM HYDROCHLORIDE 240 MG/1
CAPSULE, EXTENDED RELEASE ORAL
Qty: 90 CAPSULE | Refills: 1 | Status: SHIPPED
Start: 2022-05-16 | End: 2022-06-28 | Stop reason: CLARIF

## 2022-05-16 NOTE — TELEPHONE ENCOUNTER
Last Appointment:  2/11/2022  Future Appointments   Date Time Provider Edmar Méndezi   5/24/2022  1:45 PM DO EMILIANA ObrienM PAIN [facility]   6/21/2022 10:00 AM Vikas Robles DPM [facility]   8/15/2022 10:40 AM Joan Hawkins  [address] [facility] [address]

## 2022-06-18 DIAGNOSIS — Z79.4 TYPE 2 DIABETES MELLITUS WITH OTHER SPECIFIED COMPLICATION, WITH LONG-TERM CURRENT USE OF INSULIN (HCC): ICD-10-CM

## 2022-06-18 DIAGNOSIS — E11.69 TYPE 2 DIABETES MELLITUS WITH OTHER SPECIFIED COMPLICATION, WITH LONG-TERM CURRENT USE OF INSULIN (HCC): ICD-10-CM

## 2022-06-20 RX ORDER — INSULIN DETEMIR 100 [IU]/ML
INJECTION, SOLUTION SUBCUTANEOUS
Qty: 15 ML | Refills: 3 | Status: SHIPPED | OUTPATIENT
Start: 2022-06-20

## 2022-06-21 ENCOUNTER — PROCEDURE VISIT (OUTPATIENT)
Dept: PODIATRY | Age: 67
End: 2022-06-21
Payer: MEDICARE

## 2022-06-21 VITALS — HEIGHT: 72 IN | BODY MASS INDEX: 42.66 KG/M2 | WEIGHT: 315 LBS

## 2022-06-21 DIAGNOSIS — Z79.01 ENCOUNTER FOR CURRENT LONG-TERM USE OF ANTICOAGULANTS: ICD-10-CM

## 2022-06-21 DIAGNOSIS — E11.9 TYPE 2 DIABETES MELLITUS WITHOUT COMPLICATION, UNSPECIFIED WHETHER LONG TERM INSULIN USE (HCC): ICD-10-CM

## 2022-06-21 DIAGNOSIS — B35.1 TINEA UNGUIUM: Primary | ICD-10-CM

## 2022-06-21 DIAGNOSIS — L84 CORNS AND CALLOSITIES: ICD-10-CM

## 2022-06-21 PROCEDURE — 11721 DEBRIDE NAIL 6 OR MORE: CPT | Performed by: PODIATRIST

## 2022-06-21 PROCEDURE — 11056 PARNG/CUTG B9 HYPRKR LES 2-4: CPT | Performed by: PODIATRIST

## 2022-06-21 NOTE — PROGRESS NOTES
Iraida Stockton is here today for diabetic foot exam and nail care. his PCP is Raj Granda MD last OV was 02/11/2022. CC:   Follow-up diabetic foot and ankle exam    HPI:   Follow-up foot and ankle exam.  Follow-up diabetic exam.  Relative. No open skin lesions or abrasions. No recent injury. No foot or ankle pain today. ROS:  Const: Denies constitutional symptoms  Musculo: Denies symptoms other than stated above  Skin: Denies symptoms other than stated above      Current Outpatient Medications:     LEVEMIR FLEXTOUCH 100 UNIT/ML injection pen, ADMINISTER 45 UNITS UNDER THE SKIN EVERY NIGHT, Disp: 15 mL, Rfl: 3    dilTIAZem (TIAZAC) 240 MG extended release capsule, TAKE 1 CAPSULE BY MOUTH EVERY DAY, Disp: 90 capsule, Rfl: 1    [facility], by Does not apply route Please provide a handicap placard through 05/31/2027, Disp: 1 each, Rfl: 0    hydroCHLOROthiazide (HYDRODIURIL) 25 MG tablet, Take 1 tablet by mouth once daily for blood pressure, Disp: 90 tablet, Rfl: 1    benazepril (LOTENSIN) 20 MG tablet, Take 1 tablet by mouth 2 times daily, Disp: 180 tablet, Rfl: 1    glipiZIDE (GLUCOTROL) 10 MG tablet, Take 1 tablet by mouth 2 times daily (before meals), Disp: 180 tablet, Rfl: 1    carvedilol (COREG) 25 MG tablet, Take 1 tablet by mouth twice daily, Disp: 180 tablet, Rfl: 1    dilTIAZem (CARDIZEM CD) 240 MG extended release capsule, Take 1 capsule by mouth once daily, Disp: 90 capsule, Rfl: 1    metFORMIN (GLUCOPHAGE) 1000 MG tablet, TAKE 1 TABLET BY MOUTH TWICE DAILY WITH MEALS, Disp: 180 tablet, Rfl: 1    rivaroxaban (XARELTO) 20 MG TABS tablet, Take 1 tablet by mouth daily (with breakfast), Disp: 90 tablet, Rfl: 1    blood glucose monitor strips, 1 strip by Other route daily Test 1 time a day & as needed for symptoms of irregular blood glucose. Dispense sufficient amount for indicated testing frequency plus additional to accommodate PRN testing needs.  ReliOn prime test strips, Disp: 100 strip, Rfl: 10    furosemide (LASIX) 20 MG tablet, Take 1 tablet by mouth once daily, Disp: 90 tablet, Rfl: 1    Insulin Pen Needle (PEN NEEDLES 5/16\") 30G X 8 MM MISC, 1 each by Does not apply route daily, Disp: 100 each, Rfl: 3    Alpha-Lipoic Acid 600 MG TABS, Take 1 Bottle by mouth daily, Disp: 60 tablet, Rfl: 1    CPAP Machine MISC, by Does not apply route nightly, Disp: , Rfl:   Allergies   Allergen Reactions    Other Itching     Throat itches when he eats magnus nuts    Lipitor      Body pain, DIARRHEA ETC. HAS PROBS WITH ALL CHOLESTEROL MEDS-MOST PROB WITH LIPITOR       Past Medical History:   Diagnosis Date    Anticoagulant long-term use     Atrial fibrillation (HCC)     CHF (congestive heart failure) (HCC)     Degenerative joint disease of left hip 1/28/2015    Diabetes mellitus ([facility] [address]     Hyperlipidemia     Hypertension     Hypertension     Obesity     PONV (postoperative nausea and vomiting)     Radiculopathy of lumbosacral region 07/25/2017    for procedure 5/5/22    Sleep apnea     Type II or unspecified type diabetes mellitus without mention of complication, not stated as uncontrolled        There were no vitals filed for this visit. Work History/Social History: [employer]  Orthopedic history: EMG testing June 2020, Alpha-lipoic acid 600mg      Focused Lower Extremity Physical Exam:      Neurovascular examination:    Dorsalis Pedis palpable bilateral.  Posterior tibialis non-palpable bilateral.    Capillary Refill Time:  Immediate return  Hair growth:  Symmetrical and bilateral   Skin:  Not atrophic  Edema: Mild edema bilateral feet. Mild edema bilateral ankles.   Neurologic:  Light touch diminished bilateral.  Warm to coolness bilateral distal toes  Decreased epicritic sensation     Musculoskeletal/ Orthopedic examination:    Equinis: present bilateral  Dorsiflexion, plantarflexion, inversion, eversion bilateral 5 out of 5 muscle strength  Wiggling toes  Negative Homans  No pain foot or ankle    Dermatology examination:    Toenails 1 through 5 bilateral thickened, elongated, dystrophic, mycotic with subungual debris. Web spaces 1 through 4 bilateral clean dry and intact. Hyperkeratotic tissue plantar fifth metatarsal bilateral.      Assessment and Plan:  Ilene Warren was seen today for callouses, nail problem and diabetes. Diagnoses and all orders for this visit:    Tinea unguium    Corns and callosities    Type 2 diabetes mellitus without complication, unspecified whether long term insulin use ([facility] [address]    Encounter for current long-term use of anticoagulants      Follow-up diabetic foot ankle exam  Continue Xarelto  Hemoglobin A1c from 2/11/2022.  8.6. Manual debridement with standard technique toenails 1 through 5 right and left in thickness and length. Patient tolerated procedure well. Paring hyperkeratotic tissue fifth metatarsal head bilateral 15 blade. Continue alpha lipoic acid 600 mg. Follow-up 2 months for diabetic exam.          Return in about 2 months (around 8/21/2022). Seen By:  Rusty Mariee DPM      Document was created using voice recognition software. Note was reviewed however may contain grammatical errors. No

## 2022-06-28 ENCOUNTER — OFFICE VISIT (OUTPATIENT)
Dept: FAMILY MEDICINE CLINIC | Age: 67
End: 2022-06-28
Payer: MEDICARE

## 2022-06-28 VITALS
OXYGEN SATURATION: 97 % | TEMPERATURE: 97.7 F | WEIGHT: 315 LBS | HEIGHT: 72 IN | DIASTOLIC BLOOD PRESSURE: 90 MMHG | SYSTOLIC BLOOD PRESSURE: 136 MMHG | HEART RATE: 76 BPM | BODY MASS INDEX: 42.66 KG/M2

## 2022-06-28 DIAGNOSIS — R35.0 URINARY FREQUENCY: ICD-10-CM

## 2022-06-28 DIAGNOSIS — R10.9 LEFT FLANK PAIN: ICD-10-CM

## 2022-06-28 DIAGNOSIS — I48.19 PERSISTENT ATRIAL FIBRILLATION (HCC): ICD-10-CM

## 2022-06-28 DIAGNOSIS — R35.0 URINARY FREQUENCY: Primary | ICD-10-CM

## 2022-06-28 LAB
BILIRUBIN, POC: NORMAL
BLOOD URINE, POC: NORMAL
CLARITY, POC: NORMAL
COLOR, POC: NORMAL
GLUCOSE URINE, POC: NORMAL
KETONES, POC: 15
LEUKOCYTE EST, POC: NORMAL
NITRITE, POC: NORMAL
PH, POC: 6
PROTEIN, POC: >=300
SPECIFIC GRAVITY, POC: >=1.03
UROBILINOGEN, POC: 1

## 2022-06-28 PROCEDURE — 81002 URINALYSIS NONAUTO W/O SCOPE: CPT | Performed by: FAMILY MEDICINE

## 2022-06-28 PROCEDURE — G8427 DOCREV CUR MEDS BY ELIG CLIN: HCPCS | Performed by: FAMILY MEDICINE

## 2022-06-28 PROCEDURE — 1036F TOBACCO NON-USER: CPT | Performed by: FAMILY MEDICINE

## 2022-06-28 PROCEDURE — G8417 CALC BMI ABV UP PARAM F/U: HCPCS | Performed by: FAMILY MEDICINE

## 2022-06-28 PROCEDURE — 99213 OFFICE O/P EST LOW 20 MIN: CPT | Performed by: FAMILY MEDICINE

## 2022-06-28 PROCEDURE — 3017F COLORECTAL CA SCREEN DOC REV: CPT | Performed by: FAMILY MEDICINE

## 2022-06-28 PROCEDURE — 1123F ACP DISCUSS/DSCN MKR DOCD: CPT | Performed by: FAMILY MEDICINE

## 2022-06-28 RX ORDER — FUROSEMIDE 20 MG/1
TABLET ORAL
Qty: 90 TABLET | Refills: 1 | Status: SHIPPED | OUTPATIENT
Start: 2022-06-28

## 2022-06-28 RX ORDER — CIPROFLOXACIN 500 MG/1
500 TABLET, FILM COATED ORAL 2 TIMES DAILY
Qty: 14 TABLET | Refills: 0 | Status: SHIPPED | OUTPATIENT
Start: 2022-06-28 | End: 2022-07-05

## 2022-06-28 RX ORDER — TAMSULOSIN HYDROCHLORIDE 0.4 MG/1
0.4 CAPSULE ORAL DAILY
Qty: 14 CAPSULE | Refills: 0 | Status: SHIPPED
Start: 2022-06-28 | End: 2022-08-15 | Stop reason: ALTCHOICE

## 2022-06-28 ASSESSMENT — ENCOUNTER SYMPTOMS
BACK PAIN: 1
RESPIRATORY NEGATIVE: 1
ABDOMINAL PAIN: 0

## 2022-06-28 NOTE — PROGRESS NOTES
[id number] [facility] Pain Management        [address]  [facility], [facility]  Dept: [phone]        Follow up Note      Loretta Ugarte     Date of Visit:  06/29/22     CC:  Patient presents for follow up   Chief Complaint   Patient presents with    Lower Back Pain    Follow Up After Procedure     BILATERAL L5 TRANSFORAMINAL EPIDURAL STEROID INJECTION #1       HPI:    Pain is better. Change in quality of symptoms:no. Medication side effects:not applicable . Recent diagnostic testing:none. Recent interventional procedures:b/l L5 TFESI #1 with 90% relief. He has been on anticoagulation medications to include Toula Swann and has not been on herbal supplements. He is diabetic.     Imaging:   3/2022 lumbar MRI -  FINDINGS:   BONES/ALIGNMENT: There is loss of the normal lumbar lordosis.  The vertebral   body heights are maintained.  There is a ritual body hemangioma in the L2 and   L4 vertebral bodies.  There is multilevel degenerative disc disease with loss   of disc signal.  There is mild disc space narrowing at L5-S1.  There is no   significant spondylolisthesis.       SPINAL CORD: The conus is normal in caliber and signal and terminates at the   L1 level.  The cauda equina is unremarkable.       SOFT TISSUES: The posterior paraspinal soft tissues are unremarkable.  The   visualized abdominal structures are unremarkable.       L1-L2: There is a circumferential disc bulge.  There is no canal stenosis or   foraminal narrowing.       L2-L3: There is a circumferential disc bulge.  There is no canal stenosis or   foraminal narrowing.       L3-L4: There is a circumferential disc bulge.  There is no canal stenosis or   significant foraminal narrowing.       L4-L5: There is a circumferential disc bulge.  There is no canal stenosis or   significant foraminal narrowing.       L5-S1: There is a circumferential disc bulge.  There is no canal stenosis.    There is moderate to severe bilateral foraminal narrowing.  There is   narrowing of the lateral recesses.           Impression   Multilevel degenerative change most pronounced at L5-S1 where there is   moderate to severe bilateral foraminal narrowing and narrowing of the lateral   recesses. 3/2022 lumbar f/e films -  FINDINGS:   No instability on flexion or extension.  Moderate degenerative disc disease   at L5-S1.  Nothing else active.           Impression   No instability evident.  Degenerative disc disease at L5-S1.     2/2022 L hip xray -  FINDINGS:   Anatomically aligned left MALCOM with no abnormal bone or soft tissue findings.           Impression   Anatomically aligned left MALCOM with no unexpected findings.      2/2022 rt hip xray -  FINDINGS:   Mild osteoarthritis at the right hip.  No fracture or dislocation.  Normal   soft tissues.           Impression   Mild osteoarthritis at the right hip.      11/2021 xray SIJ -  FINDINGS:   There is no evidence of acute fracture.  There is normal alignment.  No acute   joint abnormality.  No focal osseous lesion. No focal soft tissue   abnormality.  Left hip arthroplasty.  Degenerative changes L5-S1           Impression   No acute osseous abnormality.       Grossly normal appearing SI joints.       Degenerative changes L5-S1.      11/2021 xray lumbar -  FINDINGS:   There are moderate-advanced degenerative disc changes at L5-S1 with evidence   of vacuum disc phenomenon.  Mild degenerative changes are present elsewhere. No acute fracture is identified.  Grade 1 retrolisthesis at L5-S1 is similar   to the previous exam.  A left hip replacement is partially imaged.  There are   calcified phleboliths in the pelvis.           Impression   1.  No acute osseous abnormality is identified.    2. Degenerative changes appear most prominent at L5-S1 with grade 1   retrolisthesis, similar to the previous exam.        Potential Aberrant Drug-Related Behavior:      Urine Drug Screening:    OARRS report:    Past Medical History: Diagnosis Date    Anticoagulant long-term use     Atrial fibrillation (HCC)     CHF (congestive heart failure) (HCC)     Degenerative joint disease of left hip 1/28/2015    Diabetes mellitus ([address]     Hyperlipidemia     Hypertension     Hypertension     Obesity     PONV (postoperative nausea and vomiting)     Radiculopathy of lumbosacral region 07/25/2017    for procedure 5/5/22    Sleep apnea     Type II or unspecified type diabetes mellitus without mention of complication, not stated as uncontrolled        Past Surgical History:   Procedure Laterality Date    APPENDECTOMY      CHOLECYSTECTOMY      CYST INCISION AND DRAINAGE  06/08/2017    abd wall cyst    HERNIA REPAIR      HIP ARTHROPLASTY Left     JOINT REPLACEMENT      PAIN MANAGEMENT PROCEDURE Bilateral 5/12/2022    BILATERAL L5 TRANSFORAMINAL EPIDURAL STEROID INJECTION #1 performed by Winsome Roberto DO at [address]       Prior to Admission medications    Medication Sig Start Date End Date Taking?  Authorizing Provider   furosemide (LASIX) 20 MG tablet Take 1 tablet by mouth once daily 6/28/22  Yes Corey Chase DO   ciprofloxacin (CIPRO) 500 MG tablet Take 1 tablet by mouth 2 times daily for 7 days 6/28/22 7/5/22 Yes Corey Chase DO   tamsulosin (FLOMAX) 0.4 MG capsule Take 1 capsule by mouth daily 6/28/22  Yes Corey Chase DO   LEVEMIR FLEXTOUCH 100 UNIT/ML injection pen ADMINISTER [address] NIGHT 6/20/22  Yes Chris Boston MD   Handicap Placard MISC by Does not apply route Please provide a handicap placard through 05/31/2027 3/31/22  Yes Winsome Roberto DO   hydroCHLOROthiazide (HYDRODIURIL) 25 MG tablet Take 1 tablet by mouth once daily for blood pressure 2/17/22  Yes Chris Boston MD   benazepril (LOTENSIN) 20 MG tablet Take 1 tablet by mouth 2 times daily 2/7/22  Yes Chris Boston MD   glipiZIDE (GLUCOTROL) 10 MG tablet Take 1 tablet by mouth 2 times daily (before meals) 2/7/22 Yes Ang Rankin MD   carvedilol (COREG) 25 MG tablet Take 1 tablet by mouth twice daily 1/13/22  Yes Ang Rankin MD   dilTIAZem [facility]) 240 MG extended release capsule Take 1 capsule by mouth once daily 11/11/21  Yes Ang Rankin MD   metFORMIN (GLUCOPHAGE) 1000 MG tablet TAKE 1 TABLET BY MOUTH TWICE DAILY WITH MEALS 9/14/21  Yes Ang Rankin MD   rivaroxaban (XARELTO) 20 MG TABS tablet Take 1 tablet by mouth daily (with breakfast) 8/16/21  Yes Tian Benjamin MD   blood glucose monitor strips 1 strip by Other route daily Test 1 time a day & as needed for symptoms of irregular blood glucose. Dispense sufficient amount for indicated testing frequency plus additional to accommodate PRN testing needs.  ReliOn prime test strips 8/6/21  Yes Ang Rankin MD   Insulin Pen Needle (PEN NEEDLES 5/16\") 30G X 8 MM MISC 1 each by Does not apply route daily 8/6/21  Yes Ang Rankin MD   Alpha-Lipoic Acid 600 MG TABS Take 1 Bottle by mouth daily 7/8/20  Yes Edna Browne DPM   CPAP Machine MISC by Does not apply route nightly   Yes Historical Provider, MD       Allergies   Allergen Reactions    Other Itching     Throat itches when he eats magnus nuts    Lipitor      Body pain, DIARRHEA ETC. HAS PROBS WITH ALL CHOLESTEROL MEDS-MOST PROB WITH LIPITOR       Social History     Socioeconomic History    Marital status:      Spouse name: Not on file    Number of children: Not on file    Years of education: Not on file    Highest education level: Not on file   Occupational History    Not on file   Tobacco Use    Smoking status: Never Smoker    Smokeless tobacco: Never Used   Substance and Sexual Activity    Alcohol use: No     Alcohol/week: 0.0 standard drinks    Drug use: No    Sexual activity: Not on file   Other Topics Concern    Not on file   Social History Narrative    Not on file     Social Determinants of Health     Financial Resource Strain:     Difficulty of Paying Living Expenses: Not on file   Food Insecurity:     Worried About Running Out of Food in the Last Year: Not on file    Samia of Food in the Last Year: Not on file   Transportation Needs:     Lack of Transportation (Medical): Not on file    Lack of Transportation (Non-Medical): Not on file   Physical Activity:     Days of Exercise per Week: Not on file    Minutes of Exercise per Session: Not on file   Stress:     Feeling of Stress : Not on file   Social Connections:     Frequency of Communication with Friends and Family: Not on file    Frequency of Social Gatherings with Friends and Family: Not on file    Attends Hindu Services: Not on file    Active Member of [address] [place] [address] [employer] or Organizations: Not on file    Attends Club or Organization Meetings: Not on file    Marital Status: Not on file   Intimate Partner Violence:     Fear of Current or Ex-Partner: Not on file    Emotionally Abused: Not on file    Physically Abused: Not on file    Sexually Abused: Not on file   Housing Stability:     Unable to Pay for Housing in the Last Year: Not on file    Number of [place] in the Last Year: Not on file    Unstable Housing in the Last Year: Not on file       Family History   Problem Relation Age of Onset    High Blood Pressure Mother     Cancer Mother         lymphoma    Stroke Mother     Diabetes Father     Heart Surgery Sister         heart valve replacement    Heart Disease Brother         pt was waiting for a heart transplant    Cancer Maternal Grandfather     Cancer Paternal Grandmother     Cancer Paternal Grandfather        REVIEW OF SYSTEMS:     Mague Hernandez denies fever/chills, chest pain, shortness of breath, new bowel or bladder complaints. All other review of systems was negative. PHYSICAL EXAMINATION:      There were no vitals taken for this visit.     General:       General appearance:pleasant and well-hydrated, in no distress and A & O x3  Build:Obese  Function:Rises from a seated position with difficulty     HEENT:     Head:normocephalic, atraumatic  Pupils:regular, round, equal  Sclera: icterus absent     Lungs:     Breathing:normal breathing pattern     Abdomen:     Shape:obese and non-distended  Tenderness:none  Guarding:none     Lumbar spine:     Spine inspection:normal   CVA tenderness:No   Palpation:tenderness paravertebral muscles, left positive, right negative. Range of motion:abnormal mildly in lateral bending, flexion, extension rotation bilateral and is painful.     Musculoskeletal:     Trigger points in Paraveteral:absent bilaterally  SI joint tenderness:negative right, negative left              VALARIE test:not done right, not done left  Piriformis tenderness:negative right, negative left  Trochanteric bursa tenderness:negative right, negative left  SLR:negative right, negative left, sitting      Extremities:     Tremors:None bilaterally upper and lower  Range of motion:pain with internal rotation of hips negative. Intact:Yes  Varicose veins:absent   Pulses:present Lt radial  Cyanosis:none  Edema:none x all 4 extremities     Neurological:     Sensory:normal to light touch BLE in stocking distribution  Motor:                Right Quadriceps5/5          Left Quadriceps5/5           Right Gastrocnemius5/5    Left Gastrocnemius5/5  Right Ant Tibialis4/5  Left Ant Tibialis5/5     Reflexes: (not assessed today)   Right Quadriceps reflex0-1+  Left Quadriceps reflex0-1+  Right Achilles reflexdeferred  Left Achilles reflexdeferred  Gait:right foot drop     Dermatology:     Skin:no rashes or lesions noted     Impression:     LBP radiating into b/l buttocks/hip in L5 distribution  + Rt foot drop  Lumbar xrays as above  PMHX: a.  Fib (on [employer]), CHF, DM, DLD, HTN, besity, AMIRAH (on cpap)  His son  in  of covid    Currently he's only having left hip radicular pain intermittently  He will call if his pain returns significantly and he needs another injection  Currently being tx for a UTI with PO abx     Plan:     Urine screen deferred  OARRS report reviewed  On Lauren Meo for a. Fib  MRI lumbar w/o reviewed as above  Lumbar f/e films reviewed as above  Consider EMG prn  PT done until 1/2022  Consider rt AFO based upon response to injections (will coordinate through [employer] prn)  b/l L5 TFESI #1 with 90% relief - r/b and procedure discussed (needs permission to hold xarelto per sarah guidelines)  Patient encouraged to stay active and to lose weight  Treatment plan discussed with the patient including medication and procedure side effects     Cc:  Referring physician    LESLIE Chaidez.

## 2022-06-28 NOTE — PROGRESS NOTES
Sukhwinder Shields (:  1955) is a 77 y.o. male,Established patient, here for evaluation of the following chief complaint(s):  Hematuria (Onset x1 day), Urinary Frequency (Pt states when he does urinate, it is not much), and Lower Back Pain (Left side)         ASSESSMENT/PLAN:  1. Urinary frequency  -     POCT Urinalysis no Micro  -     Culture, Urine; Future  -     XR ACUTE ABD SERIES CHEST 1 VW; Future  2. Persistent atrial fibrillation (HCC)  -     furosemide (LASIX) 20 MG tablet; Take 1 tablet by mouth once daily, Disp-90 tablet, R-1Normal  3. Left flank pain  -     ciprofloxacin (CIPRO) 500 MG tablet; Take 1 tablet by mouth 2 times daily for 7 days, Disp-14 tablet, R-0Normal  -     tamsulosin (FLOMAX) 0.4 MG capsule; Take 1 capsule by mouth daily, Disp-14 capsule, R-0Normal  Initial review of x-ray shows no radial opaque abnormality. Final read per radiologist.  Treat empirically at this time. Red flags discussed with patient. If these occur he is to go directly to the nearest emergency department. No follow-ups on file. Subjective   SUBJECTIVE/OBJECTIVE:  HPI  Patient presents today for 1 day history of worsening back pain, urinary frequency, and hematuria. Denies any fever or chills. Denies any trauma or injury prior to symptoms beginning. Patient denies any recent changes to medications. Review of Systems   Constitutional: Positive for fatigue. HENT: Negative. Respiratory: Negative. Cardiovascular: Negative. Gastrointestinal: Negative for abdominal pain. Genitourinary: Positive for flank pain, frequency, hematuria and urgency. Negative for dysuria. Musculoskeletal: Positive for back pain. Skin: Negative. Neurological: Negative. All other systems reviewed and are negative.          Current Outpatient Medications:     furosemide (LASIX) 20 MG tablet, Take 1 tablet by mouth once daily, Disp: 90 tablet, Rfl: 1    LEVEMIR FLEXTOUCH 100 UNIT/ML injection pen, ADMINISTER 45 UNITS UNDER THE SKIN EVERY NIGHT, Disp: 15 mL, Rfl: 3    Handicap Placard MISC, by Does not apply route Please provide a handicap placard through 05/31/2027, Disp: 1 each, Rfl: 0    hydroCHLOROthiazide (HYDRODIURIL) 25 MG tablet, Take 1 tablet by mouth once daily for blood pressure, Disp: 90 tablet, Rfl: 1    benazepril (LOTENSIN) 20 MG tablet, Take 1 tablet by mouth 2 times daily, Disp: 180 tablet, Rfl: 1    glipiZIDE (GLUCOTROL) 10 MG tablet, Take 1 tablet by mouth 2 times daily (before meals), Disp: 180 tablet, Rfl: 1    carvedilol (COREG) 25 MG tablet, Take 1 tablet by mouth twice daily, Disp: 180 tablet, Rfl: 1    dilTIAZem (CARDIZEM CD) 240 MG extended release capsule, Take 1 capsule by mouth once daily, Disp: 90 capsule, Rfl: 1    metFORMIN (GLUCOPHAGE) 1000 MG tablet, TAKE 1 TABLET BY MOUTH TWICE DAILY WITH MEALS, Disp: 180 tablet, Rfl: 1    rivaroxaban (XARELTO) 20 MG TABS tablet, Take 1 tablet by mouth daily (with breakfast), Disp: 90 tablet, Rfl: 1    blood glucose monitor strips, 1 strip by Other route daily Test 1 time a day & as needed for symptoms of irregular blood glucose. Dispense sufficient amount for indicated testing frequency plus additional to accommodate PRN testing needs.  ReliOn prime test strips, Disp: 100 strip, Rfl: 10    Insulin Pen Needle (PEN NEEDLES 5/16\") 30G X 8 MM MISC, 1 each by Does not apply route daily, Disp: 100 each, Rfl: 3    Alpha-Lipoic Acid 600 MG TABS, Take 1 Bottle by mouth daily, Disp: 60 tablet, Rfl: 1    CPAP Machine MISC, by Does not apply route nightly, Disp: , Rfl:     ciprofloxacin (CIPRO) 500 MG tablet, Take 1 tablet by mouth 2 times daily for 7 days, Disp: 14 tablet, Rfl: 0    tamsulosin (FLOMAX) 0.4 MG capsule, Take 1 capsule by mouth daily, Disp: 14 capsule, Rfl: 0   Patient Active Problem List   Diagnosis    Degenerative joint disease of left hip    Mixed hyperlipidemia    Anticoagulated on Coumadin    Essential hypertension  Morbid obesity due to excess calories (HCC)    Diabetes mellitus ([address]    AMIRAH on CPAP    Radiculopathy of lumbosacral region    Statin intolerance    Persistent atrial fibrillation (HCC)    Chronic pain syndrome    Lumbar disc disorder    Lumbar radiculopathy    Right foot drop    Spondylolisthesis of lumbar region     Past Medical History:   Diagnosis Date    Anticoagulant long-term use     Atrial fibrillation ([address]     CHF (congestive heart failure) ([address]     Degenerative joint disease of left hip 1/28/2015    Diabetes mellitus ([address]     Hyperlipidemia     Hypertension     Hypertension     Obesity     PONV (postoperative nausea and vomiting)     Radiculopathy of lumbosacral region 07/25/2017    for procedure 5/5/22    Sleep apnea     Type II or unspecified type diabetes mellitus without mention of complication, not stated as uncontrolled      Past Surgical History:   Procedure Laterality Date    APPENDECTOMY      CHOLECYSTECTOMY      CYST INCISION AND DRAINAGE  06/08/2017    abd wall cyst    HERNIA REPAIR      HIP ARTHROPLASTY Left     JOINT REPLACEMENT      PAIN MANAGEMENT PROCEDURE Bilateral 5/12/2022    BILATERAL L5 TRANSFORAMINAL EPIDURAL STEROID INJECTION #1 performed by Leila Sampson DO at [facility] OR     Social History     Socioeconomic History    Marital status:      Spouse name: Not on file    Number of children: Not on file    Years of education: Not on file    Highest education level: Not on file   Occupational History    Not on file   Tobacco Use    Smoking status: Never Smoker    Smokeless tobacco: Never Used   Substance and Sexual Activity    Alcohol use: No     Alcohol/week: 0.0 standard drinks    Drug use: No    Sexual activity: Not on file   Other Topics Concern    Not on file   Social History Narrative    Not on file     Social Determinants of Health     Financial Resource Strain:     Difficulty of Paying Living Expenses: Not on file   Food Insecurity:     Worried About Running Out of Food in the Last Year: Not on file    Samia of Food in the Last Year: Not on file   Transportation Needs:     Lack of Transportation (Medical): Not on file    Lack of Transportation (Non-Medical): Not on file   Physical Activity:     Days of Exercise per Week: Not on file    Minutes of Exercise per Session: Not on file   Stress:     Feeling of Stress : Not on file   Social Connections:     Frequency of Communication with Friends and Family: Not on file    Frequency of Social Gatherings with Friends and Family: Not on file    Attends Anabaptism Services: Not on file    Active Member of [address] [place] [address] [employer] or Organizations: Not on file    Attends Club or Organization Meetings: Not on file    Marital Status: Not on file   Intimate Partner Violence:     Fear of Current or Ex-Partner: Not on file    Emotionally Abused: Not on file    Physically Abused: Not on file    Sexually Abused: Not on file   Housing Stability:     Unable to Pay for Housing in the Last Year: Not on file    Number of [place] in the Last Year: Not on file    Unstable Housing in the Last Year: Not on file     Family History   Problem Relation Age of Onset    High Blood Pressure Mother     Cancer Mother         lymphoma    Stroke Mother     Diabetes Father     Heart Surgery Sister         heart valve replacement    Heart Disease Brother         pt was waiting for a heart transplant    Cancer Maternal Grandfather     Cancer Paternal Grandmother     Cancer Paternal Grandfather       There are no preventive care reminders to display for this patient. There are no preventive care reminders to display for this patient.    Diabetes Management   Topic Date Due    Diabetic foot exam  Never done    Diabetic retinal exam  Never done      Health Maintenance Due   Topic    Shingles vaccine (1 of 2)      Health Maintenance   Topic Date Due    COVID-19 Vaccine (1) Never done    Diabetic foot exam Never done    Diabetic retinal exam  Never done    Hepatitis C screen  Never done    Shingles vaccine (1 of 2) Never done    Depression Screen  08/06/2022    Pneumococcal 65+ years Vaccine (2 - PCV) 08/06/2022    Annual Wellness Visit (AWV)  08/07/2022    Flu vaccine (Season Ended) 09/01/2022    A1C test (Diabetic or Prediabetic)  02/11/2023    Diabetic microalbuminuria test  02/11/2023    Lipids  02/11/2023    Prostate Specific Antigen (PSA) Screening or Monitoring  02/11/2023    Colorectal Cancer Screen  03/07/2023    DTaP/Tdap/Td vaccine (2 - Td or Tdap) 08/09/2025    Hepatitis A vaccine  Aged Out    Hib vaccine  Aged Out    Meningococcal (ACWY) vaccine  Aged Out      There are no preventive care reminders to display for this patient. There are no preventive care reminders to display for this patient. BP (!) 136/90 (Site: Left Upper Arm)   Pulse 76   Temp 97.7 °F (36.5 °C) (Temporal)   Ht 6' (1.829 m)   Wt (!) 326 lb 12.8 oz (148.2 kg)   SpO2 97%   BMI 44.32 kg/m²       Objective   Physical Exam  Vitals reviewed. Constitutional:       Appearance: He is well-developed. He is obese. HENT:      Head: Normocephalic and atraumatic. Eyes:      Conjunctiva/sclera: Conjunctivae normal.      Pupils: Pupils are equal, round, and reactive to light. Cardiovascular:      Rate and Rhythm: Normal rate and regular rhythm. Heart sounds: Normal heart sounds. Pulmonary:      Effort: Pulmonary effort is normal.      Breath sounds: Normal breath sounds. Abdominal:      General: Bowel sounds are normal.      Palpations: Abdomen is soft. Tenderness: There is abdominal tenderness. Musculoskeletal:         General: Tenderness present. Lumbar back: Spasms and tenderness present. Skin:     General: Skin is warm and dry. Neurological:      Mental Status: He is alert and oriented to person, place, and time.    Psychiatric:         Behavior: Behavior normal.         Judgment: Judgment normal.                  An electronic signature was used to authenticate this note.     --Minna Chase, DO

## 2022-06-29 ENCOUNTER — OFFICE VISIT (OUTPATIENT)
Dept: PAIN MANAGEMENT | Age: 67
End: 2022-06-29
Payer: MEDICARE

## 2022-06-29 DIAGNOSIS — M43.16 SPONDYLOLISTHESIS OF LUMBAR REGION: ICD-10-CM

## 2022-06-29 DIAGNOSIS — G89.4 CHRONIC PAIN SYNDROME: Primary | ICD-10-CM

## 2022-06-29 DIAGNOSIS — M21.371 RIGHT FOOT DROP: ICD-10-CM

## 2022-06-29 DIAGNOSIS — M54.16 LUMBAR RADICULOPATHY: ICD-10-CM

## 2022-06-29 DIAGNOSIS — M51.9 LUMBAR DISC DISORDER: ICD-10-CM

## 2022-06-29 PROCEDURE — 99213 OFFICE O/P EST LOW 20 MIN: CPT | Performed by: PAIN MEDICINE

## 2022-06-29 PROCEDURE — G8427 DOCREV CUR MEDS BY ELIG CLIN: HCPCS | Performed by: PAIN MEDICINE

## 2022-06-29 PROCEDURE — G8417 CALC BMI ABV UP PARAM F/U: HCPCS | Performed by: PAIN MEDICINE

## 2022-06-29 PROCEDURE — 3017F COLORECTAL CA SCREEN DOC REV: CPT | Performed by: PAIN MEDICINE

## 2022-06-29 PROCEDURE — 1123F ACP DISCUSS/DSCN MKR DOCD: CPT | Performed by: PAIN MEDICINE

## 2022-06-29 PROCEDURE — 1036F TOBACCO NON-USER: CPT | Performed by: PAIN MEDICINE

## 2022-06-29 NOTE — PROGRESS NOTES
Do you currently have any of the following:    Fever: No  Headache:  No  Cough: No  Shortness of breath: No  Exposed to anyone with these symptoms: No         Nathalie Blunt presents to the [facility] on 6/29/2022. Demian Lopez is complaining of pain lower back. The pain is intermittent. The pain is described as sharp. Pain is rated on his best day at a 2, on his worst day at a 9, and on average at a 5 on the VAS scale. He took his last dose of Tylenol PRN. Any procedures since your last visit: Yes, with 90 % relief. Pacemaker or defibrillator: No    He is not on NSAIDS and is  on anticoagulation medications to include Xarelto and is managed by Wily Claire MD  .     Medication Contract and Consent for Opioid Use Documents Filed      No documents found                There were no vitals taken for this visit. No LMP for male patient.

## 2022-07-01 LAB — URINE CULTURE, ROUTINE: NORMAL

## 2022-07-07 RX ORDER — CARVEDILOL 25 MG/1
TABLET ORAL
Qty: 180 TABLET | Refills: 1 | Status: SHIPPED | OUTPATIENT
Start: 2022-07-07

## 2022-07-07 NOTE — TELEPHONE ENCOUNTER
Last Appointment:  2/11/2022  Future Appointments   Date Time Provider Edmar Méndezi   8/15/2022 10:40 AM Val Bah MD [address] [place] [address]   8/23/2022 10:00 AM Saul Fontenot DPM Col Podiatry [facility]   8/31/2022 11:15 AM Ailyn Chauhan DO BDM PAIN [facility]

## 2022-07-09 DIAGNOSIS — R10.9 LEFT FLANK PAIN: ICD-10-CM

## 2022-07-11 RX ORDER — TAMSULOSIN HYDROCHLORIDE 0.4 MG/1
0.4 CAPSULE ORAL DAILY
Qty: 14 CAPSULE | Refills: 0 | OUTPATIENT
Start: 2022-07-11

## 2022-07-11 NOTE — TELEPHONE ENCOUNTER
Last Appointment:  6/28/2022  Future Appointments   Date Time Provider Edmar Maribell   8/15/2022 10:40 AM Geovanni Olivas MD [address] [place] [address]   8/23/2022 10:00 AM Chavo Lane DPM Col Podiatry [facility]   8/31/2022 11:15 AM Franki Herrera DO BDM PAIN [facility]

## 2022-08-05 DIAGNOSIS — E11.69 TYPE 2 DIABETES MELLITUS WITH OTHER SPECIFIED COMPLICATION, WITH LONG-TERM CURRENT USE OF INSULIN (HCC): ICD-10-CM

## 2022-08-05 DIAGNOSIS — Z79.4 TYPE 2 DIABETES MELLITUS WITH OTHER SPECIFIED COMPLICATION, WITH LONG-TERM CURRENT USE OF INSULIN (HCC): ICD-10-CM

## 2022-08-05 RX ORDER — GLIPIZIDE 10 MG/1
TABLET ORAL
Qty: 180 TABLET | Refills: 1 | Status: SHIPPED | OUTPATIENT
Start: 2022-08-05

## 2022-08-05 RX ORDER — BENAZEPRIL HYDROCHLORIDE 20 MG/1
TABLET ORAL
Qty: 180 TABLET | Refills: 1 | Status: SHIPPED | OUTPATIENT
Start: 2022-08-05

## 2022-08-05 NOTE — TELEPHONE ENCOUNTER
Last Appointment:  2/11/2022  Future Appointments   Date Time Provider Edmar Hendricksonisti   8/15/2022 10:40 AM Tasia Matthews MD [address] [place] [address]   8/23/2022 10:00 AM Royer Rojas DPM Col Podiatry [facility]   8/31/2022 11:15 AM Mariama Dan DO BDM PAIN [facility] stable

## 2022-08-10 DIAGNOSIS — I48.19 PERSISTENT ATRIAL FIBRILLATION (HCC): ICD-10-CM

## 2022-08-10 NOTE — TELEPHONE ENCOUNTER
Last Appointment:  2/11/2022  Future Appointments   Date Time Provider Edmar Reyna   8/15/2022 10:40 AM MD Chayito Moreno [facility]   8/23/2022 10:00 AM Imer Toro DPM Col Podiatry [facility]   8/31/2022 11:15 AM Jim Pedro,  BDM PAIN [facility]

## 2022-08-11 RX ORDER — RIVAROXABAN 20 MG/1
TABLET, FILM COATED ORAL
Qty: 90 TABLET | Refills: 1 | Status: SHIPPED | OUTPATIENT
Start: 2022-08-11

## 2022-08-12 NOTE — TELEPHONE ENCOUNTER
Last Appointment:  2/11/2022  Future Appointments   Date Time Provider Edmar Méndezi   8/15/2022 10:40 AM Payam Almeida MD [address] [place] [address]   8/23/2022 10:00 AM Anam Bueno DPM Col Podiatry [facility]   8/31/2022 11:15 AM Maryan Solo DO BDM PAIN [facility]

## 2022-08-14 SDOH — HEALTH STABILITY: PHYSICAL HEALTH: ON AVERAGE, HOW MANY DAYS PER WEEK DO YOU ENGAGE IN MODERATE TO STRENUOUS EXERCISE (LIKE A BRISK WALK)?: 0 DAYS

## 2022-08-14 ASSESSMENT — LIFESTYLE VARIABLES
HOW OFTEN DO YOU HAVE SIX OR MORE DRINKS ON ONE OCCASION: 1
HOW OFTEN DO YOU HAVE A DRINK CONTAINING ALCOHOL: NEVER
HOW MANY STANDARD DRINKS CONTAINING ALCOHOL DO YOU HAVE ON A TYPICAL DAY: 0
HOW OFTEN DO YOU HAVE A DRINK CONTAINING ALCOHOL: 1
HOW MANY STANDARD DRINKS CONTAINING ALCOHOL DO YOU HAVE ON A TYPICAL DAY: PATIENT DOES NOT DRINK

## 2022-08-14 ASSESSMENT — PATIENT HEALTH QUESTIONNAIRE - PHQ9
SUM OF ALL RESPONSES TO PHQ QUESTIONS 1-9: 0
SUM OF ALL RESPONSES TO PHQ9 QUESTIONS 1 & 2: 0
SUM OF ALL RESPONSES TO PHQ QUESTIONS 1-9: 0
1. LITTLE INTEREST OR PLEASURE IN DOING THINGS: 0
2. FEELING DOWN, DEPRESSED OR HOPELESS: 0
SUM OF ALL RESPONSES TO PHQ QUESTIONS 1-9: 0
SUM OF ALL RESPONSES TO PHQ QUESTIONS 1-9: 0

## 2022-08-15 ENCOUNTER — TELEPHONE (OUTPATIENT)
Dept: FAMILY MEDICINE CLINIC | Age: 67
End: 2022-08-15

## 2022-08-15 ENCOUNTER — OFFICE VISIT (OUTPATIENT)
Dept: FAMILY MEDICINE CLINIC | Age: 67
End: 2022-08-15
Payer: MEDICARE

## 2022-08-15 VITALS
WEIGHT: 315 LBS | SYSTOLIC BLOOD PRESSURE: 134 MMHG | BODY MASS INDEX: 42.66 KG/M2 | HEART RATE: 84 BPM | TEMPERATURE: 97.6 F | OXYGEN SATURATION: 98 % | DIASTOLIC BLOOD PRESSURE: 88 MMHG | HEIGHT: 72 IN

## 2022-08-15 DIAGNOSIS — Z00.00 INITIAL MEDICARE ANNUAL WELLNESS VISIT: Primary | ICD-10-CM

## 2022-08-15 DIAGNOSIS — Z79.4 TYPE 2 DIABETES MELLITUS WITH DIABETIC POLYNEUROPATHY, WITH LONG-TERM CURRENT USE OF INSULIN (HCC): ICD-10-CM

## 2022-08-15 DIAGNOSIS — E11.42 TYPE 2 DIABETES MELLITUS WITH DIABETIC POLYNEUROPATHY, WITH LONG-TERM CURRENT USE OF INSULIN (HCC): ICD-10-CM

## 2022-08-15 LAB — HBA1C MFR BLD: 8.8 %

## 2022-08-15 PROCEDURE — 1123F ACP DISCUSS/DSCN MKR DOCD: CPT | Performed by: FAMILY MEDICINE

## 2022-08-15 PROCEDURE — 3052F HG A1C>EQUAL 8.0%<EQUAL 9.0%: CPT | Performed by: FAMILY MEDICINE

## 2022-08-15 PROCEDURE — 3017F COLORECTAL CA SCREEN DOC REV: CPT | Performed by: FAMILY MEDICINE

## 2022-08-15 PROCEDURE — 83036 HEMOGLOBIN GLYCOSYLATED A1C: CPT | Performed by: FAMILY MEDICINE

## 2022-08-15 PROCEDURE — G0438 PPPS, INITIAL VISIT: HCPCS | Performed by: FAMILY MEDICINE

## 2022-08-15 NOTE — PROGRESS NOTES
safety tips provided            General Health and ACP:  General  In general, how would you say your health is?: Good  In the past 7 days, have you experienced any of the following: New or Increased Pain, New or Increased Fatigue, Loneliness, Social Isolation, Stress or Anger?: No  Do you get the social and emotional support that you need?: Yes  Do you have a Living Will?: (!) No - wishes consistent with FULL CODE. Spouse is MDM    Advance Directives       Power of  Living Will ACP-Advance Directive ACP-Power of     Not on File [place] Not on File        General Health Risk Interventions:  No Living Will: Advance Care Planning addressed with patient today    Health Habits/Nutrition:  Physical Activity: Unknown    Days of Exercise per Week: 0 days    Minutes of Exercise per Session: Not on file     Have you lost any weight without trying in the past 3 months?: No  Body mass index: (!) 44.21  Have you seen the dentist within the past year?: (!) No  Health Habits/Nutrition Interventions:  Dental exam overdue:  patient encouraged to make appointment with his/her dentist    Hearing/Vision:  Do you or your family notice any trouble with your hearing that hasn't been managed with hearing aids?: No  Do you have difficulty driving, watching TV, or doing any of your daily activities because of your eyesight?: No  Have you had an eye exam within the past year?: (!) No  No results found.   Hearing/Vision Interventions:  Vision concerns:  patient encouraged to make appointment with his/her eye specialist    Safety:  Do you have working smoke detectors?: Yes  Do you have any tripping hazards - loose or unsecured carpets or rugs?: No  Do you have any tripping hazards - clutter in doorways, halls, or stairs?: No  Do you have either shower bars, grab bars, non-slip mats or non-slip surfaces in your shower or bathtub?: Yes  Do all of your stairways have a railing or banister?: Yes  Do you always fasten your TABS tablet TAKE 1 TABLET BY MOUTH EVERY MORNING WITH BREAKFAST Yes Christopher Vazquez MD   glipiZIDE (GLUCOTROL) 10 MG tablet TAKE 1 TABLET BY MOUTH TWICE DAILY BEFORE MEALS Yes Christopher Vazquez MD   benazepril (LOTENSIN) 20 MG tablet TAKE 1 TABLET BY MOUTH TWICE DAILY Yes Christopher Vazquez MD   carvedilol (COREG) 25 MG tablet TAKE 1 TABLET BY MOUTH TWICE DAILY Yes Christopher Vazquez MD   furosemide (LASIX) 20 MG tablet Take 1 tablet by mouth once daily Yes Corey Chase,    LEVEMIR FLEXTOUCH 100 UNIT/ML injection pen ADMINISTER [facility] Yes Christopher Vazquez MD   Handicap Placard [address] [facility] by Does not apply route Please provide a handicap placard through 05/31/2027 Yes Mirna Arrington,    hydroCHLOROthiazide (HYDRODIURIL) 25 MG tablet Take 1 tablet by mouth once daily for blood pressure Yes Christopher Vazquez MD   dilTIAZem (CARDIZEM CD) 240 MG extended release capsule Take 1 capsule by mouth once daily Yes Christopher Vazquez MD   metFORMIN (GLUCOPHAGE) 1000 MG tablet TAKE 1 TABLET BY MOUTH TWICE DAILY WITH MEALS Yes Christopher Vazquez MD   blood glucose monitor strips 1 strip by Other route daily Test 1 time a day & as needed for symptoms of irregular blood glucose. Dispense sufficient amount for indicated testing frequency plus additional to accommodate PRN testing needs.  ReliOn prime test strips Yes Christopher Vazquez MD   Insulin Pen Needle (PEN NEEDLES 5/16\") 30G X 8 MM MISC 1 each by Does not apply route daily Yes Christopher Vazquez MD   Alpha-Lipoic Acid 600 MG TABS Take 1 Bottle by mouth daily Yes Ayde Jensen DPM   CPAP Machine MISC by Does not apply route nightly Yes Historical Provider, MD Dominguez (Including outside providers/suppliers regularly involved in providing care):   Patient Care Team:  Christopher Vazquez MD as PCP - General (Family Medicine)  Christopher Vazquez MD as PCP - [facility]  Ashwin Guerin Kaley Egan MD as Surgeon (Orthopedic Surgery)  Shyam Donato MD as Consulting Physician (Cardiology)     Reviewed and updated this visit:  Tobacco  Allergies  Meds  Problems  Med Hx  Surg Hx  Soc Hx  Fam Hx

## 2022-08-16 RX ORDER — HYDROCHLOROTHIAZIDE 25 MG/1
TABLET ORAL
Qty: 90 TABLET | Refills: 1 | Status: SHIPPED | OUTPATIENT
Start: 2022-08-16

## 2022-08-23 ENCOUNTER — PROCEDURE VISIT (OUTPATIENT)
Dept: PODIATRY | Age: 67
End: 2022-08-23
Payer: MEDICARE

## 2022-08-23 VITALS — WEIGHT: 315 LBS | BODY MASS INDEX: 42.66 KG/M2 | HEIGHT: 72 IN

## 2022-08-23 DIAGNOSIS — Z79.01 ENCOUNTER FOR CURRENT LONG-TERM USE OF ANTICOAGULANTS: ICD-10-CM

## 2022-08-23 DIAGNOSIS — B35.1 TINEA UNGUIUM: ICD-10-CM

## 2022-08-23 DIAGNOSIS — L84 CORNS AND CALLOSITIES: ICD-10-CM

## 2022-08-23 DIAGNOSIS — E11.9 TYPE 2 DIABETES MELLITUS WITHOUT COMPLICATION, UNSPECIFIED WHETHER LONG TERM INSULIN USE (HCC): Primary | ICD-10-CM

## 2022-08-23 PROCEDURE — 11056 PARNG/CUTG B9 HYPRKR LES 2-4: CPT | Performed by: PODIATRIST

## 2022-08-23 PROCEDURE — 11721 DEBRIDE NAIL 6 OR MORE: CPT | Performed by: PODIATRIST

## 2022-08-23 NOTE — PROGRESS NOTES
Patient in today for DM foot exam and nail care. Patient does not have any complaints of pain at this time. Patient's PCP is Julia Pérez MD date of last ov 8/15/2022        Mark Cornejo LPN     CC:   Follow-up diabetic foot and ankle exam    HPI:   Follow-up diabetic foot ankle exam.  No open wounds. No recent injury. Continue Xarelto anticoagulant therapy. ROS:  Const: Denies constitutional symptoms  Musculo: Denies symptoms other than stated above  Skin: Denies symptoms other than stated above      Current Outpatient Medications:     hydroCHLOROthiazide (HYDRODIURIL) 25 MG tablet, TAKE 1 TABLET BY MOUTH EVERY DAY FOR BLOOD PRESSURE, Disp: 90 tablet, Rfl: 1    XARELTO 20 MG TABS tablet, TAKE 1 TABLET BY MOUTH EVERY MORNING WITH BREAKFAST, Disp: 90 tablet, Rfl: 1    glipiZIDE (GLUCOTROL) 10 MG tablet, TAKE 1 TABLET BY MOUTH TWICE DAILY BEFORE MEALS, Disp: 180 tablet, Rfl: 1    benazepril (LOTENSIN) 20 MG tablet, TAKE 1 TABLET BY MOUTH TWICE DAILY, Disp: 180 tablet, Rfl: 1    carvedilol (COREG) 25 MG tablet, TAKE 1 TABLET BY MOUTH TWICE DAILY, Disp: 180 tablet, Rfl: 1    furosemide (LASIX) 20 MG tablet, Take 1 tablet by mouth once daily, Disp: 90 tablet, Rfl: 1    LEVEMIR FLEXTOUCH 100 UNIT/ML injection pen, ADMINISTER 45 UNITS UNDER THE SKIN EVERY NIGHT, Disp: 15 mL, Rfl: 3    Handicap Placard MISC, by Does not apply route Please provide a handicap placard through 05/31/2027, Disp: 1 each, Rfl: 0    dilTIAZem (CARDIZEM CD) 240 MG extended release capsule, Take 1 capsule by mouth once daily, Disp: 90 capsule, Rfl: 1    metFORMIN (GLUCOPHAGE) 1000 MG tablet, TAKE 1 TABLET BY MOUTH TWICE DAILY WITH MEALS, Disp: 180 tablet, Rfl: 1    blood glucose monitor strips, 1 strip by Other route daily Test 1 time a day & as needed for symptoms of irregular blood glucose. Dispense sufficient amount for indicated testing frequency plus additional to accommodate PRN testing needs.  ReliOn prime test strips, Disp: 100 strip, Rfl: 10    Insulin Pen Needle (PEN NEEDLES 5/16\") 30G X 8 MM MISC, 1 each by Does not apply route daily, Disp: 100 each, Rfl: 3    Alpha-Lipoic Acid 600 MG TABS, Take 1 Bottle by mouth daily, Disp: 60 tablet, Rfl: 1    CPAP Machine MISC, by Does not apply route nightly, Disp: , Rfl:   Allergies   Allergen Reactions    Other Itching     Throat itches when he eats magnus nuts    Lipitor      Body pain, DIARRHEA ETC. HAS PROBS WITH ALL CHOLESTEROL MEDS-MOST PROB WITH LIPITOR       Past Medical History:   Diagnosis Date    Anticoagulant long-term use     Atrial fibrillation (HCC)     CHF (congestive heart failure) ([facility])     Degenerative joint disease of left hip 1/28/2015    Diabetes mellitus ([facility] [address]     Hyperlipidemia     Hypertension     Hypertension     Obesity     PONV (postoperative nausea and vomiting)     Radiculopathy of lumbosacral region 07/25/2017    for procedure 5/5/22    Sleep apnea     Type II or unspecified type diabetes mellitus without mention of complication, not stated as uncontrolled        There were no vitals filed for this visit. Work History/Social History: [employer]  Orthopedic history: EMG testing June 2020, Alpha-lipoic acid 600mg      Focused Lower Extremity Physical Exam:      Neurovascular examination:    Dorsalis Pedis palpable bilateral.  Posterior tibialis non-palpable bilateral.    Capillary Refill Time:  Immediate return  Hair growth:  Symmetrical and bilateral   Skin:  Not atrophic  Edema: Mild edema bilateral feet. Mild edema bilateral ankles.   Neurologic:  Light touch diminished bilateral.  Warm to coolness bilateral distal toes  Decreased epicritic sensation     Musculoskeletal/ Orthopedic examination:    Equinis: present bilateral  Dorsiflexion, plantarflexion, inversion, eversion bilateral 5 out of 5 muscle strength  Wiggling toes  [facility]  Dermatology examination:    Toenails 1 through 5 bilateral thickened, elongated, dystrophic, mycotic with subungual debris. Web spaces 1 through 4 bilateral clean dry and intact. Hyperkeratotic tissue fifth metatarsal bilateral.  No open wounds. No erythema. Assessment and Plan:  Aurora Leigh was seen today for diabetes and nail problem. Diagnoses and all orders for this visit:    Type 2 diabetes mellitus without complication, unspecified whether long term insulin use ([facility])  -      DIABETES FOOT EXAM    Tinea unguium    Corns and callosities    Encounter for current long-term use of anticoagulants    Follow-up diabetic exam.  History anticoagulant therapy with Xarelto. Most recent hemoglobin A1c from 8/15/2023.  8.8. Manual debridement with standard technique toenails 1 through 5 right and left in thickness and length. Patient tolerated procedure well. Paring hyperkeratotic tissue fifth metatarsal head bilateral 15 blade. Continue alpha lipoic acid 600 mg once daily. Risk and benefits discussed. Follow-up 2 months. Return in about 2 months (around 10/23/2022). Seen By:  Shilpa Abbott DPM      Document was created using voice recognition software. Note was reviewed however may contain grammatical errors.

## 2022-08-30 NOTE — PROGRESS NOTES
[id number] [facility]        [address]  [facility], [address] [facility]  Dept: [phone]        Follow up Note      Suzanna Weiner     Date of Visit:  08/31/22     CC:  Patient presents for follow up   Chief Complaint   Patient presents with    Follow-up    Lower Back Pain         HPI:    Pain is  controlled . Change in quality of symptoms:no. Medication side effects:not applicable . Recent diagnostic testing:none. Recent interventional procedures:none. He has been on anticoagulation medications to include Yamilet Hover and has not been on herbal supplements. He is diabetic. Imaging:   3/2022 lumbar MRI -  FINDINGS:   BONES/ALIGNMENT: There is loss of the normal lumbar lordosis. The vertebral   body heights are maintained. There is a ritual body hemangioma in the L2 and   L4 vertebral bodies. There is multilevel degenerative disc disease with loss   of disc signal.  There is mild disc space narrowing at L5-S1. There is no   significant spondylolisthesis. SPINAL CORD: The conus is normal in caliber and signal and terminates at the   L1 level. The cauda equina is unremarkable. SOFT TISSUES: The posterior paraspinal soft tissues are unremarkable. The   visualized abdominal structures are unremarkable. L1-L2: There is a circumferential disc bulge. There is no canal stenosis or   foraminal narrowing. L2-L3: There is a circumferential disc bulge. There is no canal stenosis or   foraminal narrowing. L3-L4: There is a circumferential disc bulge. There is no canal stenosis or   significant foraminal narrowing. L4-L5: There is a circumferential disc bulge. There is no canal stenosis or   significant foraminal narrowing. L5-S1: There is a circumferential disc bulge. There is no canal stenosis. There is moderate to severe bilateral foraminal narrowing. There is   narrowing of the lateral recesses.            Impression   Multilevel degenerative change most pronounced at L5-S1 where there is   moderate to severe bilateral foraminal narrowing and narrowing of the lateral   recesses. 3/2022 lumbar f/e films -  FINDINGS:   No instability on flexion or extension. Moderate degenerative disc disease   at L5-S1. Nothing else active. Impression   No instability evident. Degenerative disc disease at L5-S1.     2/2022 L hip xray -  FINDINGS:   Anatomically aligned left MALCOM with no abnormal bone or soft tissue findings. Impression   Anatomically aligned left MALCOM with no unexpected findings. 2/2022 rt hip xray -  FINDINGS:   Mild osteoarthritis at the right hip. No fracture or dislocation. Normal   soft tissues. Impression   Mild osteoarthritis at the right hip.      11/2021 xray SIJ -  FINDINGS:   There is no evidence of acute fracture. There is normal alignment. No acute   joint abnormality. No focal osseous lesion. No focal soft tissue   abnormality. Left hip arthroplasty. Degenerative changes L5-S1           Impression   No acute osseous abnormality. Grossly normal appearing SI joints. Degenerative changes L5-S1.      11/2021 xray lumbar -  FINDINGS:   There are moderate-advanced degenerative disc changes at L5-S1 with evidence   of vacuum disc phenomenon. Mild degenerative changes are present elsewhere. No acute fracture is identified. Grade 1 retrolisthesis at L5-S1 is similar   to the previous exam.  A left hip replacement is partially imaged. There are   calcified phleboliths in the pelvis. Impression   1. No acute osseous abnormality is identified.    2. Degenerative changes appear most prominent at L5-S1 with grade 1   retrolisthesis, similar to the previous exam.        Potential Aberrant Drug-Related Behavior:      Urine Drug Screening:    OARRS report:    Past Medical History:   Diagnosis Date    Anticoagulant long-term use     Atrial fibrillation ([facility] [address]     CHF (congestive heart failure) (HCC)     Degenerative joint disease of left hip 1/28/2015    Diabetes mellitus ([address]     Hyperlipidemia     Hypertension     Hypertension     Obesity     PONV (postoperative nausea and vomiting)     Radiculopathy of lumbosacral region 07/25/2017    for procedure 5/5/22    Sleep apnea     Type II or unspecified type diabetes mellitus without mention of complication, not stated as uncontrolled        Past Surgical History:   Procedure Laterality Date    APPENDECTOMY      CHOLECYSTECTOMY      CYST INCISION AND DRAINAGE  06/08/2017    abd wall cyst    HERNIA REPAIR      HIP ARTHROPLASTY Left     JOINT REPLACEMENT      PAIN MANAGEMENT PROCEDURE Bilateral 5/12/2022    BILATERAL L5 TRANSFORAMINAL EPIDURAL STEROID INJECTION #1 performed by Rafael Mc DO at [address] [facility]       Prior to Admission medications    Medication Sig Start Date End Date Taking?  Authorizing Provider   hydroCHLOROthiazide (HYDRODIURIL) 25 MG tablet TAKE 1 TABLET BY MOUTH EVERY DAY FOR BLOOD PRESSURE 8/16/22  Yes Nyasia Martel MD   XARELTO 20 MG TABS tablet TAKE 1 TABLET BY MOUTH EVERY MORNING WITH BREAKFAST 8/11/22  Yes Nyasia Martel MD   glipiZIDE (GLUCOTROL) 10 MG tablet TAKE 1 TABLET BY MOUTH TWICE DAILY BEFORE MEALS 8/5/22  Yes Nyasia Martel MD   benazepril (LOTENSIN) 20 MG tablet TAKE 1 TABLET BY MOUTH TWICE DAILY 8/5/22  Yes Nyasia Martel MD   carvedilol (COREG) 25 MG tablet TAKE 1 TABLET BY MOUTH TWICE DAILY 7/7/22  Yes Nyasia Martel MD   furosemide (LASIX) 20 MG tablet Take 1 tablet by mouth once daily 6/28/22  Yes Shankar Chase DO   LEVEMIR FLEXTOUCH 100 UNIT/ML injection pen ADMINISTER [address] SKIN EVERY NIGHT 6/20/22  Yes Nyasia Martel MD   Handicap Placard [address] [facility] by Does not apply route Please provide a handicap placard through 05/31/2027 3/31/22  Yes Rafael Mc DO   dilTIAZem (CARDIZEM CD) 240 MG extended release capsule Take 1 capsule by mouth once daily 11/11/21  Yes Andrew Nagy MD   metFORMIN (GLUCOPHAGE) 1000 MG tablet TAKE 1 TABLET BY MOUTH TWICE DAILY WITH MEALS 9/14/21  Yes Andrew Nagy MD   blood glucose monitor strips 1 strip by Other route daily Test 1 time a day & as needed for symptoms of irregular blood glucose. Dispense sufficient amount for indicated testing frequency plus additional to accommodate PRN testing needs.  ReliOn prime test strips 8/6/21  Yes Andrew Nagy MD   Insulin Pen Needle (PEN NEEDLES 5/16\") 30G X 8 MM MISC 1 each by Does not apply route daily 8/6/21  Yes Andrew Nagy MD   Alpha-Lipoic Acid 600 MG TABS Take 1 Bottle by mouth daily 7/8/20  Yes Luis A Jackson DPM   CPAP Machine MISC by Does not apply route nightly   Yes Historical Provider, MD       Allergies   Allergen Reactions    Other Itching     Throat itches when he eats magnus nuts    Lipitor      Body pain, DIARRHEA ETC. HAS PROBS WITH ALL CHOLESTEROL MEDS-MOST PROB WITH LIPITOR       Social History     Socioeconomic History    Marital status:      Spouse name: Not on file    Number of children: Not on file    Years of education: Not on file    Highest education level: Not on file   Occupational History    Not on file   Tobacco Use    Smoking status: Never    Smokeless tobacco: Never   Substance and Sexual Activity    Alcohol use: No     Alcohol/week: 0.0 standard drinks    Drug use: No    Sexual activity: Not on file   Other Topics Concern    Not on file   Social History Narrative    Not on file     Social Determinants of Health     Financial Resource Strain: Not on file   Food Insecurity: Not on file   Transportation Needs: Not on file   Physical Activity: Unknown    Days of Exercise per Week: 0 days    Minutes of Exercise per Session: Not on file   Stress: Not on file   Social Connections: Not on file   Intimate Partner Violence: Not on file   Housing Stability: Not on file       Family History   Problem Relation

## 2022-08-31 ENCOUNTER — OFFICE VISIT (OUTPATIENT)
Dept: PAIN MANAGEMENT | Age: 67
End: 2022-08-31
Payer: MEDICARE

## 2022-08-31 VITALS
RESPIRATION RATE: 16 BRPM | DIASTOLIC BLOOD PRESSURE: 88 MMHG | BODY MASS INDEX: 42.66 KG/M2 | OXYGEN SATURATION: 98 % | HEART RATE: 77 BPM | TEMPERATURE: 98.3 F | SYSTOLIC BLOOD PRESSURE: 156 MMHG | HEIGHT: 72 IN | WEIGHT: 315 LBS

## 2022-08-31 DIAGNOSIS — M51.9 LUMBAR DISC DISORDER: ICD-10-CM

## 2022-08-31 DIAGNOSIS — M43.16 SPONDYLOLISTHESIS OF LUMBAR REGION: ICD-10-CM

## 2022-08-31 DIAGNOSIS — M54.17 RADICULOPATHY OF LUMBOSACRAL REGION: ICD-10-CM

## 2022-08-31 DIAGNOSIS — M54.16 LUMBAR RADICULOPATHY: ICD-10-CM

## 2022-08-31 DIAGNOSIS — G89.4 CHRONIC PAIN SYNDROME: Primary | ICD-10-CM

## 2022-08-31 PROCEDURE — 99213 OFFICE O/P EST LOW 20 MIN: CPT | Performed by: PAIN MEDICINE

## 2022-08-31 PROCEDURE — G8417 CALC BMI ABV UP PARAM F/U: HCPCS | Performed by: PAIN MEDICINE

## 2022-08-31 PROCEDURE — G8427 DOCREV CUR MEDS BY ELIG CLIN: HCPCS | Performed by: PAIN MEDICINE

## 2022-08-31 PROCEDURE — 1036F TOBACCO NON-USER: CPT | Performed by: PAIN MEDICINE

## 2022-08-31 PROCEDURE — 1123F ACP DISCUSS/DSCN MKR DOCD: CPT | Performed by: PAIN MEDICINE

## 2022-08-31 PROCEDURE — 3017F COLORECTAL CA SCREEN DOC REV: CPT | Performed by: PAIN MEDICINE

## 2022-08-31 NOTE — PROGRESS NOTES
Do you currently have any of the following:    Fever: No  Headache:  No  Cough: No  Shortness of breath: No  Exposed to anyone with these symptoms: No         Noemi Leonard presents to the [facility] on 8/31/2022. Gareth Gómez is complaining of pain lower back. The pain is intermittent. The pain is described as aching. Pain is rated on his best day at a 0, on his worst day at a 6, and on average at a 4 on the VAS scale. He took his last dose of Tylenol last week. Any procedures since your last visit: No.    Pacemaker or defibrillator: No.    He is not on NSAIDS and is  on anticoagulation medications to include Xarelto and is managed by Julia Pérez MD  .     Medication Contract and Consent for Opioid Use Documents Filed        No documents found                    Pulse 77   Temp 98.3 °F (36.8 °C) (Infrared)   Resp 16   Ht 6' (1.829 m)   Wt (!) 326 lb (147.9 kg)   SpO2 98%   BMI 44.21 kg/m²      No LMP for male patient. Jesus's blood pressure was elevated today. He was instructed to contact his primary care provider as soon as possible.

## 2022-09-03 DIAGNOSIS — E11.69 TYPE 2 DIABETES MELLITUS WITH OTHER SPECIFIED COMPLICATION, WITH LONG-TERM CURRENT USE OF INSULIN (HCC): ICD-10-CM

## 2022-09-03 DIAGNOSIS — Z79.4 TYPE 2 DIABETES MELLITUS WITH OTHER SPECIFIED COMPLICATION, WITH LONG-TERM CURRENT USE OF INSULIN (HCC): ICD-10-CM

## 2022-09-06 ENCOUNTER — APPOINTMENT (OUTPATIENT)
Dept: CT IMAGING | Age: 67
End: 2022-09-06
Payer: MEDICARE

## 2022-09-06 ENCOUNTER — OFFICE VISIT (OUTPATIENT)
Dept: FAMILY MEDICINE CLINIC | Age: 67
End: 2022-09-06
Payer: MEDICARE

## 2022-09-06 ENCOUNTER — HOSPITAL ENCOUNTER (EMERGENCY)
Age: 67
Discharge: HOME | End: 2022-09-06
Attending: EMERGENCY MEDICINE
Payer: MEDICARE

## 2022-09-06 ENCOUNTER — APPOINTMENT (OUTPATIENT)
Dept: GENERAL RADIOLOGY | Age: 67
End: 2022-09-06
Payer: MEDICARE

## 2022-09-06 VITALS
SYSTOLIC BLOOD PRESSURE: 139 MMHG | BODY MASS INDEX: 44.48 KG/M2 | TEMPERATURE: 97.7 F | HEART RATE: 76 BPM | RESPIRATION RATE: 18 BRPM | WEIGHT: 315 LBS | DIASTOLIC BLOOD PRESSURE: 89 MMHG | OXYGEN SATURATION: 100 %

## 2022-09-06 VITALS
HEIGHT: 72 IN | HEART RATE: 78 BPM | WEIGHT: 315 LBS | RESPIRATION RATE: 18 BRPM | BODY MASS INDEX: 42.66 KG/M2 | OXYGEN SATURATION: 96 % | TEMPERATURE: 97.5 F | DIASTOLIC BLOOD PRESSURE: 90 MMHG | SYSTOLIC BLOOD PRESSURE: 142 MMHG

## 2022-09-06 DIAGNOSIS — R31.9 URINARY TRACT INFECTION WITH HEMATURIA, SITE UNSPECIFIED: Primary | ICD-10-CM

## 2022-09-06 DIAGNOSIS — N39.0 URINARY TRACT INFECTION WITH HEMATURIA, SITE UNSPECIFIED: Primary | ICD-10-CM

## 2022-09-06 DIAGNOSIS — R07.9 CHEST PAIN, UNSPECIFIED TYPE: ICD-10-CM

## 2022-09-06 DIAGNOSIS — M54.6 ACUTE LEFT-SIDED THORACIC BACK PAIN: Primary | ICD-10-CM

## 2022-09-06 DIAGNOSIS — K59.00 CONSTIPATION, UNSPECIFIED CONSTIPATION TYPE: ICD-10-CM

## 2022-09-06 LAB
ALBUMIN SERPL-MCNC: 4.4 G/DL (ref 3.5–5.2)
ALP BLD-CCNC: 102 U/L (ref 40–129)
ALT SERPL-CCNC: 15 U/L (ref 0–40)
ANION GAP SERPL CALCULATED.3IONS-SCNC: 10 MMOL/L (ref 7–16)
AST SERPL-CCNC: 18 U/L (ref 0–39)
BACTERIA: ABNORMAL /HPF
BASOPHILS ABSOLUTE: 0.05 E9/L (ref 0–0.2)
BASOPHILS RELATIVE PERCENT: 0.6 % (ref 0–2)
BILIRUB SERPL-MCNC: 0.4 MG/DL (ref 0–1.2)
BILIRUBIN URINE: ABNORMAL
BLOOD, URINE: ABNORMAL
BUN BLDV-MCNC: 18 MG/DL (ref 6–23)
CALCIUM SERPL-MCNC: 9.6 MG/DL (ref 8.6–10.2)
CHLORIDE BLD-SCNC: 100 MMOL/L (ref 98–107)
CLARITY: ABNORMAL
CO2: 26 MMOL/L (ref 22–29)
COLOR: ABNORMAL
CREAT SERPL-MCNC: 0.9 MG/DL (ref 0.7–1.2)
EOSINOPHILS ABSOLUTE: 0.24 E9/L (ref 0.05–0.5)
EOSINOPHILS RELATIVE PERCENT: 3 % (ref 0–6)
GFR AFRICAN AMERICAN: >60
GFR NON-AFRICAN AMERICAN: >60 ML/MIN/1.73
GLUCOSE BLD-MCNC: 232 MG/DL (ref 74–99)
GLUCOSE URINE: NEGATIVE MG/DL
HCT VFR BLD CALC: 46.9 % (ref 37–54)
HEMOGLOBIN: 15.6 G/DL (ref 12.5–16.5)
IMMATURE GRANULOCYTES #: 0.04 E9/L
IMMATURE GRANULOCYTES %: 0.5 % (ref 0–5)
KETONES, URINE: NEGATIVE MG/DL
LACTIC ACID: 2 MMOL/L (ref 0.5–2.2)
LEUKOCYTE ESTERASE, URINE: ABNORMAL
LIPASE: 23 U/L (ref 13–60)
LYMPHOCYTES ABSOLUTE: 1.61 E9/L (ref 1.5–4)
LYMPHOCYTES RELATIVE PERCENT: 19.8 % (ref 20–42)
MCH RBC QN AUTO: 28.1 PG (ref 26–35)
MCHC RBC AUTO-ENTMCNC: 33.3 % (ref 32–34.5)
MCV RBC AUTO: 84.4 FL (ref 80–99.9)
MONOCYTES ABSOLUTE: 0.79 E9/L (ref 0.1–0.95)
MONOCYTES RELATIVE PERCENT: 9.7 % (ref 2–12)
NEUTROPHILS ABSOLUTE: 5.39 E9/L (ref 1.8–7.3)
NEUTROPHILS RELATIVE PERCENT: 66.4 % (ref 43–80)
NITRITE, URINE: POSITIVE
PDW BLD-RTO: 15 FL (ref 11.5–15)
PH UA: 5.5 (ref 5–9)
PLATELET # BLD: 239 E9/L (ref 130–450)
PMV BLD AUTO: 10.8 FL (ref 7–12)
POTASSIUM SERPL-SCNC: 3.8 MMOL/L (ref 3.5–5)
PRO-BNP: 1734 PG/ML (ref 0–125)
PROTEIN UA: 100 MG/DL
RBC # BLD: 5.56 E12/L (ref 3.8–5.8)
RBC UA: >20 /HPF (ref 0–2)
SODIUM BLD-SCNC: 136 MMOL/L (ref 132–146)
SPECIFIC GRAVITY UA: >=1.03 (ref 1–1.03)
TOTAL PROTEIN: 7.7 G/DL (ref 6.4–8.3)
TROPONIN, HIGH SENSITIVITY: 16 NG/L (ref 0–11)
TROPONIN, HIGH SENSITIVITY: 16 NG/L (ref 0–11)
UROBILINOGEN, URINE: 0.2 E.U./DL
WBC # BLD: 8.1 E9/L (ref 4.5–11.5)
WBC UA: ABNORMAL /HPF (ref 0–5)

## 2022-09-06 PROCEDURE — 71046 X-RAY EXAM CHEST 2 VIEWS: CPT

## 2022-09-06 PROCEDURE — 1036F TOBACCO NON-USER: CPT | Performed by: NURSE PRACTITIONER

## 2022-09-06 PROCEDURE — 83605 ASSAY OF LACTIC ACID: CPT

## 2022-09-06 PROCEDURE — 93005 ELECTROCARDIOGRAM TRACING: CPT | Performed by: PHYSICIAN ASSISTANT

## 2022-09-06 PROCEDURE — 6370000000 HC RX 637 (ALT 250 FOR IP)

## 2022-09-06 PROCEDURE — 83880 ASSAY OF NATRIURETIC PEPTIDE: CPT

## 2022-09-06 PROCEDURE — 74176 CT ABD & PELVIS W/O CONTRAST: CPT

## 2022-09-06 PROCEDURE — 84484 ASSAY OF TROPONIN QUANT: CPT

## 2022-09-06 PROCEDURE — 1123F ACP DISCUSS/DSCN MKR DOCD: CPT | Performed by: NURSE PRACTITIONER

## 2022-09-06 PROCEDURE — 80053 COMPREHEN METABOLIC PANEL: CPT

## 2022-09-06 PROCEDURE — 85025 COMPLETE CBC W/AUTO DIFF WBC: CPT

## 2022-09-06 PROCEDURE — 99285 EMERGENCY DEPT VISIT HI MDM: CPT

## 2022-09-06 PROCEDURE — 81001 URINALYSIS AUTO W/SCOPE: CPT

## 2022-09-06 PROCEDURE — 83690 ASSAY OF LIPASE: CPT

## 2022-09-06 PROCEDURE — G8417 CALC BMI ABV UP PARAM F/U: HCPCS | Performed by: NURSE PRACTITIONER

## 2022-09-06 PROCEDURE — 99214 OFFICE O/P EST MOD 30 MIN: CPT | Performed by: NURSE PRACTITIONER

## 2022-09-06 PROCEDURE — G8427 DOCREV CUR MEDS BY ELIG CLIN: HCPCS | Performed by: NURSE PRACTITIONER

## 2022-09-06 PROCEDURE — 3017F COLORECTAL CA SCREEN DOC REV: CPT | Performed by: NURSE PRACTITIONER

## 2022-09-06 RX ORDER — CEFDINIR 300 MG/1
300 CAPSULE ORAL ONCE
Status: COMPLETED | OUTPATIENT
Start: 2022-09-06 | End: 2022-09-06

## 2022-09-06 RX ORDER — CEFDINIR 300 MG/1
300 CAPSULE ORAL 2 TIMES DAILY
Qty: 14 CAPSULE | Refills: 0 | Status: SHIPPED | OUTPATIENT
Start: 2022-09-06 | End: 2022-09-13

## 2022-09-06 RX ORDER — DILTIAZEM HYDROCHLORIDE 240 MG/1
CAPSULE, EXTENDED RELEASE ORAL
COMMUNITY
Start: 2022-08-13

## 2022-09-06 RX ORDER — SENNA AND DOCUSATE SODIUM 50; 8.6 MG/1; MG/1
1 TABLET, FILM COATED ORAL DAILY
Qty: 3 TABLET | Refills: 0 | Status: SHIPPED | OUTPATIENT
Start: 2022-09-06

## 2022-09-06 RX ADMIN — CEFDINIR 300 MG: 300 CAPSULE ORAL at 20:25

## 2022-09-06 ASSESSMENT — ENCOUNTER SYMPTOMS
SHORTNESS OF BREATH: 0
CONSTIPATION: 0
BACK PAIN: 1
NAUSEA: 0
COUGH: 0
CHEST TIGHTNESS: 0
ABDOMINAL PAIN: 1
EYE PAIN: 0
DIARRHEA: 0
VOMITING: 0
BACK PAIN: 1
COUGH: 0
DIARRHEA: 0
VOMITING: 0
SHORTNESS OF BREATH: 0
RHINORRHEA: 0
NAUSEA: 0
CONSTIPATION: 0
WHEEZING: 0
ABDOMINAL PAIN: 0

## 2022-09-06 NOTE — PROGRESS NOTES
Chief Complaint:   Back Pain (Patient states onset two weeks ago he had left sided pain in back that patient believes may be muscular. . he also has a tender spot on front of abdomen to the left. . more on top than inner. . not sure if two are related )    History of Present Illness   HPI:  Ever Rivero is a 77 y.o. male who presents to [facility] today for left mid back pain and left lower chest pain for the last 2 weeks. Back Pain  This is a new problem. The current episode started 1 to 4 weeks ago. The problem occurs constantly. The problem has been waxing and waning since onset. The pain is present in the thoracic spine. The quality of the pain is described as cramping. The pain does not radiate. The pain is at a severity of 3/10. The symptoms are aggravated by bending and twisting. Associated symptoms include chest pain. Pertinent negatives include no abdominal pain, dysuria, fever, numbness or weakness. Risk factors include obesity, sedentary lifestyle and lack of exercise. He has tried nothing for the symptoms. Chest Pain   This is a new problem. The current episode started 1 to 4 weeks ago. The onset quality is sudden. The problem occurs constantly. The problem has been unchanged. The pain is present in the lateral region (left lower chest). The pain is at a severity of 3/10. The quality of the pain is described as sharp. The pain does not radiate. Associated symptoms include back pain. Pertinent negatives include no abdominal pain, cough, dizziness, fever, nausea, numbness, palpitations, shortness of breath, vomiting or weakness. His past medical history is significant for arrhythmia (atrial fibrillation), diabetes, hyperlipidemia and hypertension. Prior to Visit Medications    Medication Sig Taking?  Authorizing Provider   metFORMIN (GLUCOPHAGE) 1000 MG tablet TAKE 1 TABLET BY MOUTH TWICE DAILY WITH MEALS Yes Ethel Cardenas MD   dilTIAZem [facility]) 240 MG extended release capsule TAKE 1 CAPSULE BY MOUTH EVERY DAY Yes Historical Provider, MD   hydroCHLOROthiazide (HYDRODIURIL) 25 MG tablet TAKE 1 TABLET BY MOUTH EVERY DAY FOR BLOOD PRESSURE Yes Judi Whiting MD   XARELTO 20 MG TABS tablet TAKE 1 TABLET BY MOUTH EVERY MORNING WITH BREAKFAST Yes Judi Whiting MD   glipiZIDE (GLUCOTROL) 10 MG tablet TAKE 1 TABLET BY MOUTH TWICE DAILY BEFORE MEALS Yes Judi Whiting MD   benazepril (LOTENSIN) 20 MG tablet TAKE 1 TABLET BY MOUTH TWICE DAILY Yes Judi Whiting MD   carvedilol (COREG) 25 MG tablet TAKE 1 TABLET BY MOUTH TWICE DAILY Yes Judi Whiting MD   furosemide (LASIX) 20 MG tablet Take 1 tablet by mouth once daily Yes Corey Chase,    LEVEMIR FLEXTOUCH 100 UNIT/ML injection pen ADMINISTER [address] SKIN EVERY NIGHT Yes Judi Whiting MD   Handicap Placard [address] [facility] by Does not apply route Please provide a handicap placard through 05/31/2027 Yes Jim Brewer,    dilTIAZem (CARDIZEM CD) 240 MG extended release capsule Take 1 capsule by mouth once daily Yes Judi Whiting MD   blood glucose monitor strips 1 strip by Other route daily Test 1 time a day & as needed for symptoms of irregular blood glucose. Dispense sufficient amount for indicated testing frequency plus additional to accommodate PRN testing needs. ReliOn prime test strips Yes Judi Whiting MD   Insulin Pen Needle (PEN NEEDLES 5/16\") 30G X 8 MM MISC 1 each by Does not apply route daily Yes Judi Whiting MD   Alpha-Lipoic Acid 600 MG TABS Take 1 Bottle by mouth daily Yes Imer Toro DPM   CPAP Machine MISC by Does not apply route nightly Yes Historical Provider, MD       Review of Systems   Review of Systems   Constitutional:  Negative for activity change, chills and fever. HENT:  Negative for congestion. Respiratory:  Negative for cough, chest tightness, shortness of breath and wheezing. Cardiovascular:  Positive for chest pain. Negative for palpitations and leg swelling. Gastrointestinal:  Negative for abdominal pain, constipation, diarrhea, nausea and vomiting. Genitourinary:  Negative for dysuria and urgency. Musculoskeletal:  Positive for back pain. Negative for myalgias and neck pain. Neurological:  Negative for dizziness, weakness, light-headedness and numbness. Psychiatric/Behavioral:  The patient is not nervous/anxious. Patient's medical, social, and family history reviewed    Past Medical History:  has a past medical history of Anticoagulant long-term use, Atrial fibrillation ([address] CHF (congestive heart failure) ([facility] [address] Degenerative joint disease of left hip, Diabetes mellitus ([facility] [address] Hyperlipidemia, Hypertension, Hypertension, Obesity, PONV (postoperative nausea and vomiting), Radiculopathy of lumbosacral region, Sleep apnea, and Type II or unspecified type diabetes mellitus without mention of complication, not stated as uncontrolled. Past Surgical History:  has a past surgical history that includes Cholecystectomy; Appendectomy; hernia repair; Hip Arthroplasty (Left); Breast cyst incision and drainage (06/08/2017); joint replacement; and Pain management procedure (Bilateral, 5/12/2022). Social History:  reports that he has never smoked. He has never used smokeless tobacco. He reports that he does not drink alcohol and does not use drugs. Family History: family history includes Cancer in his maternal grandfather, mother, paternal grandfather, and paternal grandmother; Diabetes in his father; Heart Disease in his brother; Heart Surgery in his sister; High Blood Pressure in his mother; Stroke in his mother. Allergies: Other and Lipitor    Physical Exam   Vital Signs:  BP (!) 142/90   Pulse 78   Temp 97.5 °F (36.4 °C)   Resp 18   Ht 6' (1.829 m)   Wt (!) 321 lb 9.6 oz (145.9 kg)   SpO2 96%   BMI 43.62 kg/m²    Oxygen Saturation Interpretation: Normal.    Physical Exam  Vitals and nursing note reviewed. Constitutional:       Appearance: Normal appearance. He is well-developed. He is not toxic-appearing. HENT:      Head: Normocephalic and atraumatic. Right Ear: Hearing normal.      Left Ear: Hearing normal.      Nose: Nose normal.      Mouth/Throat:      Lips: Pink. Mouth: Mucous membranes are moist.      Pharynx: Oropharynx is clear. Uvula midline. Eyes:      General: Lids are normal.      Conjunctiva/sclera: Conjunctivae normal.      Pupils: Pupils are equal, round, and reactive to light. Neck:      Trachea: Trachea normal.   Cardiovascular:      Rate and Rhythm: Normal rate and regular rhythm. Pulses: Normal pulses. Heart sounds: Normal heart sounds. Comments: Left lower chest pain that is not reproducible to palpation  Pulmonary:      Effort: Pulmonary effort is normal.      Breath sounds: Normal breath sounds. Abdominal:      General: Abdomen is flat. Bowel sounds are normal.      Palpations: Abdomen is soft. Musculoskeletal:         General: Normal range of motion. Cervical back: Normal range of motion. Thoracic back: Tenderness (lateral left mid thoracic TTP) present. Lymphadenopathy:      Cervical: No cervical adenopathy. Skin:     General: Skin is warm and dry. Capillary Refill: Capillary refill takes less than 2 seconds. Neurological:      Mental Status: He is alert and oriented to person, place, and time. Psychiatric:         Attention and Perception: Attention normal.         Mood and Affect: Mood normal.         Speech: Speech normal.         Behavior: Behavior normal.         Thought Content: Thought content normal.         Cognition and Memory: Cognition normal.         Judgment: Judgment normal.       Test Results Section   (All laboratory and radiology results have been personally reviewed by myself)  Labs:  No results found for this visit on 09/06/22. Imaging:   All Radiology results interpreted by Radiologist unless otherwise noted. No results found. Medical Decision Making   MDM:   77-year-old male who presents today for left lateral back pain as well as left lower chest pain for the last 2 weeks. Upon chart review he has a past medical history of hypertension, hyperlipidemia, diabetes, obesity and atrial fibrillation. With where his pain is located I do have concern this could be cardiac related and do think he needs to be evaluated further in the ER. Back pain appears to be more muscular but cannot rule out cardiac event as well. Patient was advised he needs to go to the ER for further evaluation and treatment. Patient and wife are agreeable to go straight to the ER by private car. Further disposition per ER. All questions answered. Assessment / Plan   Impression(s):  Yolande Soulier was seen today for back pain. Diagnoses and all orders for this visit:    Acute left-sided thoracic back pain    Chest pain, unspecified type    Transferred to [facility] Emergency Department. New Medications     New Prescriptions    No medications on file       Electronically signed by YARITZA Melendez CNP   DD: 9/6/22    **This report was transcribed using voice recognition software. Every effort was made to ensure accuracy; however, inadvertent computerized transcription errors may be present.

## 2022-09-06 NOTE — ED PROVIDER NOTES
fibrillation  Interpretation: atrial fibrillation (chronic)  Comparison: stable as compared to patient's most recent EKG   [CF]      ED Course User Index  [CF] Rhona Calles DO  [KM] Nancy Quiroga MD       --------------------------------------------- PAST HISTORY ---------------------------------------------  Past Medical History:  has a past medical history of Anticoagulant long-term use, Atrial fibrillation ([facility] [address] CHF (congestive heart failure) ([facility] [address] Degenerative joint disease of left hip, Diabetes mellitus ([facility] [address] Hyperlipidemia, Hypertension, Hypertension, Obesity, PONV (postoperative nausea and vomiting), Radiculopathy of lumbosacral region, Sleep apnea, and Type II or unspecified type diabetes mellitus without mention of complication, not stated as uncontrolled. Past Surgical History:  has a past surgical history that includes Cholecystectomy; Appendectomy; hernia repair; Hip Arthroplasty (Left); Breast cyst incision and drainage (06/08/2017); joint replacement; and Pain management procedure (Bilateral, 5/12/2022). Social History:  reports that he has never smoked. He has never used smokeless tobacco. He reports that he does not drink alcohol and does not use drugs. Family History: family history includes Cancer in his maternal grandfather, mother, paternal grandfather, and paternal grandmother; Diabetes in his father; Heart Disease in his brother; Heart Surgery in his sister; High Blood Pressure in his mother; Stroke in his mother. The patients home medications have been reviewed. Allergies:  Other and Lipitor    -------------------------------------------------- RESULTS -------------------------------------------------  Labs:  Results for orders placed or performed during the hospital encounter of 09/06/22   CBC with Auto Differential   Result Value Ref Range    WBC 8.1 4.5 - 11.5 E9/L    RBC 5.56 3.80 - 5.80 E12/L    Hemoglobin 15.6 12.5 - 16.5 g/dL    Hematocrit 46.9 37.0 - 54.0 % MCV 84.4 80.0 - 99.9 fL    MCH 28.1 26.0 - 35.0 pg    MCHC 33.3 32.0 - 34.5 %    RDW 15.0 11.5 - 15.0 fL    Platelets 625 360 - 391 E9/L    MPV 10.8 7.0 - 12.0 fL    Neutrophils % 66.4 43.0 - 80.0 %    Immature Granulocytes % 0.5 0.0 - 5.0 %    Lymphocytes % 19.8 (L) 20.0 - 42.0 %    Monocytes % 9.7 2.0 - 12.0 %    Eosinophils % 3.0 0.0 - 6.0 %    Basophils % 0.6 0.0 - 2.0 %    Neutrophils Absolute 5.39 1.80 - 7.30 E9/L    Immature Granulocytes # 0.04 E9/L    Lymphocytes Absolute 1.61 1.50 - 4.00 E9/L    Monocytes Absolute 0.79 0.10 - 0.95 E9/L    Eosinophils Absolute 0.24 0.05 - 0.50 E9/L    Basophils Absolute 0.05 0.00 - 0.20 E9/L   Comprehensive Metabolic Panel   Result Value Ref Range    Sodium 136 132 - 146 mmol/L    Potassium 3.8 3.5 - 5.0 mmol/L    Chloride 100 98 - 107 mmol/L    CO2 26 22 - 29 mmol/L    Anion Gap 10 7 - 16 mmol/L    Glucose 232 (H) 74 - 99 mg/dL    BUN 18 6 - 23 mg/dL    Creatinine 0.9 0.7 - 1.2 mg/dL    GFR Non-African American >60 >=60 mL/min/1.73    GFR African American >60     Calcium 9.6 8.6 - 10.2 mg/dL    Total Protein 7.7 6.4 - 8.3 g/dL    Albumin 4.4 3.5 - 5.2 g/dL    Total Bilirubin 0.4 0.0 - 1.2 mg/dL    Alkaline Phosphatase 102 40 - 129 U/L    ALT 15 0 - 40 U/L    AST 18 0 - 39 U/L   Troponin   Result Value Ref Range    Troponin, High Sensitivity 16 (H) 0 - 11 ng/L   Lipase   Result Value Ref Range    Lipase 23 13 - 60 U/L   Lactic Acid   Result Value Ref Range    Lactic Acid 2.0 0.5 - 2.2 mmol/L   Brain Natriuretic Peptide   Result Value Ref Range    Pro-BNP 1,734 (H) 0 - 125 pg/mL   Troponin   Result Value Ref Range    Troponin, High Sensitivity 16 (H) 0 - 11 ng/L   Urinalysis with Microscopic   Result Value Ref Range    Color, UA RED (A) Straw/Yellow    Clarity, UA CLOUDY (A) Clear    Glucose, Ur Negative Negative mg/dL    Bilirubin Urine SMALL (A) Negative    Ketones, Urine Negative Negative mg/dL    Specific Gravity, UA >=1.030 1.005 - 1.030    Blood, Urine LARGE (A) or inpatient management. They have remained hemodynamically stable throughout their entire ED visit and are stable for discharge with outpatient follow-up. The plan has been discussed in detail and they are aware of the specific conditions for emergent return, as well as the importance of follow-up. New Prescriptions    CEFDINIR (OMNICEF) 300 MG CAPSULE    Take 1 capsule by mouth 2 times daily for 7 days    SENNOSIDES-DOCUSATE SODIUM (SENOKOT-S) 8.6-50 MG TABLET    Take 1 tablet by mouth daily       Diagnosis:  1. Urinary tract infection with hematuria, site unspecified    2. Constipation, unspecified constipation type        Disposition:  Patient's disposition: Discharge to home  Patient's condition is stable. Strict return precautions were discussed including but not limited too new or worsening symtpoms. They verbalized understanding and were agreeable with the plan. All questions were answered and patient was discharged.        Nancy Quiroga MD  Resident  09/06/22 1130

## 2022-09-06 NOTE — ED NOTES
Department of Emergency Medicine  FIRST PROVIDER TRIAGE NOTE             Independent MLP           9/6/22  3:13 PM EDT    Date of Encounter: 9/6/22   MRN: [id number]      HPI: Suzanna Weiner is a 77 y.o. male who presents to the ED for Chest Pain (Left sided, pain to the touch) and Flank Pain (Left sided, denies painful urination)     Patient is a 26-year-old presenting with left-sided upper abdominal pain that radiates to his back. Going on for 2 weeks patient denies any history of heart attacks or strokes. Patient did have some blood in the urine. ROS: Negative for vomiting, diarrhea, urinary complaints, or rash. PE: Gen Appearance/Constitutional: alert  HEENT: NC/NT. PERRLA,  Airway patent. Neck: supple     Initial Plan of Care: All treatment areas with department are currently occupied. Plan to order/Initiate the following while awaiting opening in ED: labs, EKG, and imaging studies.   Initiate Treatment-Testing, Proceed toTreatment Area When Bed Available for ED Attending/MLP to Continue Care    Electronically signed by Juany Hdz PA-C   DD: 9/6/22       Juany Hdz PA-C  09/06/22 1190

## 2022-09-07 LAB
EKG ATRIAL RATE: 48 BPM
EKG Q-T INTERVAL: 410 MS
EKG QRS DURATION: 90 MS
EKG QTC CALCULATION (BAZETT): 419 MS
EKG R AXIS: 45 DEGREES
EKG T AXIS: 10 DEGREES
EKG VENTRICULAR RATE: 63 BPM

## 2022-09-12 ENCOUNTER — PATIENT MESSAGE (OUTPATIENT)
Dept: FAMILY MEDICINE CLINIC | Age: 67
End: 2022-09-12

## 2022-09-12 NOTE — TELEPHONE ENCOUNTER
From: Gillian Bliss  To: Dr. Olga Mott  Sent: 9/12/2022 11:23 AM EDT  Subject: Emergency room visit     I saw Sara Horn Tuesday for pain in back and abdomen. He sent me to the ER. When I was leaving the office I used the restroom and had blood in my urine. The Er x-rayed by side, did a cat scan and blood tests. They gave me antibiotics for a urinary tract infection but did nothing for the pains which was my original reason for the visit. I'm having quite a bit of discomfort and can't get in to see you until the 23rd. If you have time could you please look at my results and see if you see anything. I don't really want to go back to the ER.  Thanks [facility]

## 2022-09-13 NOTE — TELEPHONE ENCOUNTER
Please call patient. I didn't see him for this problem so am only reading notes of others. Looks like he was having left upper abdominal and flank pain. Is that where he is hurting? Is he still hurting? Better/worse/same? Any other new symptoms. Is he having any bladder pain or pain with urination? Has he ever had a kidney stone before? I'm wondering if he passed one.

## 2022-09-13 NOTE — TELEPHONE ENCOUNTER
Spoke w/ Rich, said that he had left sided abdominal, flank, and back pain. Pain in the back is on the other side of where he has abdominal pain. Pain in back does not move. Has pain when he touches skin, even if he lightly touches abdominal area. Abdominal pain is \"shooting\" at times. States that pain is worse now compared to when he was seen on 9/6. Denies pain when he urinates and said that he only had mild discomfort initially. Had one instance of visible blood in urine. Denies fever, N&V. Taking  cefdinir and will take his last dose tomorrow night. Took all three doses of senokot.

## 2022-09-13 NOTE — TELEPHONE ENCOUNTER
Because I havent seen him for this, I cant diagnose and treat him over the computer. I know that's not helpful but I'd need to see him in office and I'm not in office tomorrow. If pain is worsening, he needs to go back bc the treatment is obviously not helping and they may have not made the correct diagnosis.

## 2022-09-19 ENCOUNTER — OFFICE VISIT (OUTPATIENT)
Dept: FAMILY MEDICINE CLINIC | Age: 67
End: 2022-09-19
Payer: MEDICARE

## 2022-09-19 VITALS
HEIGHT: 72 IN | HEART RATE: 94 BPM | BODY MASS INDEX: 42.66 KG/M2 | OXYGEN SATURATION: 97 % | DIASTOLIC BLOOD PRESSURE: 86 MMHG | SYSTOLIC BLOOD PRESSURE: 134 MMHG | WEIGHT: 315 LBS | TEMPERATURE: 98 F

## 2022-09-19 DIAGNOSIS — R07.89 CHEST WALL PAIN: Primary | ICD-10-CM

## 2022-09-19 PROCEDURE — 99213 OFFICE O/P EST LOW 20 MIN: CPT | Performed by: FAMILY MEDICINE

## 2022-09-19 PROCEDURE — 3017F COLORECTAL CA SCREEN DOC REV: CPT | Performed by: FAMILY MEDICINE

## 2022-09-19 PROCEDURE — 1123F ACP DISCUSS/DSCN MKR DOCD: CPT | Performed by: FAMILY MEDICINE

## 2022-09-19 PROCEDURE — G8427 DOCREV CUR MEDS BY ELIG CLIN: HCPCS | Performed by: FAMILY MEDICINE

## 2022-09-19 PROCEDURE — G8417 CALC BMI ABV UP PARAM F/U: HCPCS | Performed by: FAMILY MEDICINE

## 2022-09-19 PROCEDURE — 1036F TOBACCO NON-USER: CPT | Performed by: FAMILY MEDICINE

## 2022-09-19 NOTE — PROGRESS NOTES
[facility]  Office Progress Note - Dr. Alex Shetty  9/19/22    CC:   Chief Complaint   Patient presents with    Follow-up     Seen in [facility] ED on 09/06/22 Treated for a UTI and constipation. Pt currently having left sided epigastric pain         /86   Pulse 94   Temp 98 °F (36.7 °C) (Temporal)   Ht 6' (1.829 m)   Wt (!) 322 lb (146.1 kg)   SpO2 97%   BMI 43.67 kg/m²   Wt Readings from Last 3 Encounters:   09/19/22 (!) 322 lb (146.1 kg)   09/06/22 (!) 328 lb (148.8 kg)   09/06/22 (!) 321 lb 9.6 oz (145.9 kg)       HPI:   Pain under left breast / lower chest wall. With radiation to back, alternating, lasting seconds, sharp. Soreness to touch. Sharp/stabbing pain, constant pain but sometimes will have shooting pain with that. Exacerbated with movement such as twisting and when moving to lying down. No pain when lying flat. Some days are worse than others. Advil alleviates the pain, a couple times a day. Sneezing and twisting will exacerbate the pain. Denies trauma. Not angina. Initially pain started in the back around a month and then pain started in the front a couple of days later, with radiation. States that back feels like a pulled muscle that he's experienced in the past but denies having shooting pains before. Last BM yesterday, has not had firm stool since cholecystectomy (before 2012) but recently having firm stool. Loose stool a second time. Eating out will exacerbate urgency for BM. Denies greasy stool. Never drank alcohol. Not a smoker. No N/V. He was in Ed with episode of hematuria, urinary urgency and frequency concurrently with this chest pain, and they seemed to pay ore attention to the UTIs. Chest wall pain has not resolved with improvement in UTI.   _________________________________________________________    Assessment / Syed Warren was seen today for follow-up.     Diagnoses and all orders for this visit:    Chest wall pain  This seems to be of irregular blood glucose. Dispense sufficient amount for indicated testing frequency plus additional to accommodate PRN testing needs. ReliOn prime test strips 100 strip 10    Insulin Pen Needle (PEN NEEDLES 5/16\") 30G X 8 MM MISC 1 each by Does not apply route daily 100 each 3    Alpha-Lipoic Acid 600 MG TABS Take 1 Bottle by mouth daily 60 tablet 1    CPAP Machine MISC by Does not apply route nightly       No current facility-administered medications on file prior to visit.        Patient Active Problem List   Diagnosis Code    Degenerative joint disease of left hip M16.12    Mixed hyperlipidemia E78.2    Anticoagulated on Coumadin Z79.01    Essential hypertension I10    Morbid obesity due to excess calories (HCC) E66.01    Diabetes mellitus ([address] E11.9    AMIRAH on CPAP G47.33, Z99.89    Radiculopathy of lumbosacral region M54.17    Statin intolerance Z78.9    Persistent atrial fibrillation (HCC) I48.19    Chronic pain syndrome G89.4    Lumbar disc disorder M51.9    Lumbar radiculopathy M54.16    Right foot drop M21.371    Spondylolisthesis of lumbar region M43.16     _________________________________________________________  Past Medical History:   Diagnosis Date    Anticoagulant long-term use     Atrial fibrillation (HCC)     CHF (congestive heart failure) (HCC)     Degenerative joint disease of left hip 1/28/2015    Diabetes mellitus (HCC)     Hyperlipidemia     Hypertension     Hypertension     Obesity     PONV (postoperative nausea and vomiting)     Radiculopathy of lumbosacral region 07/25/2017    for procedure 5/5/22    Sleep apnea     Type II or unspecified type diabetes mellitus without mention of complication, not stated as uncontrolled        Family History   Problem Relation Age of Onset    High Blood Pressure Mother     Cancer Mother         lymphoma    Stroke Mother     Diabetes Father     Heart Surgery Sister         heart valve replacement    Heart Disease Brother         pt was waiting for a heart transplant    Cancer Maternal Grandfather     Cancer Paternal Grandmother     Cancer Paternal Grandfather        Past Surgical History:   Procedure Laterality Date    APPENDECTOMY      CHOLECYSTECTOMY      CYST INCISION AND DRAINAGE  06/08/2017    abd wall cyst    HERNIA REPAIR      HIP ARTHROPLASTY Left     JOINT REPLACEMENT      PAIN MANAGEMENT PROCEDURE Bilateral 5/12/2022    BILATERAL L5 TRANSFORAMINAL EPIDURAL STEROID INJECTION #1 performed by Reid Buitrago DO at [facility] OR       Social History     Tobacco Use    Smoking status: Never    Smokeless tobacco: Never   Substance Use Topics    Alcohol use: No     Alcohol/week: 0.0 standard drinks    Drug use: No       Chart reviewed and updated where appropriate for PMH, Fam, and Soc Hx.  _________________________________________________________  ROS: POSITIVE: As in the HPI. Otherwise Pertinent negatives are negative.    __________________________________________________________  Physical Exam   Constitutional:    He is oriented to person, place, and time. He appears well-developed and well-nourished. Cardiovascular:    Normal rate, regular rhythm and normal heart sounds. No murmur. No gallop and no friction rub. Pulmonary/Chest:    Effort normal and breath sounds normal.    No wheezes. No rales or rhonchi. Left lower rib tenderness and superficial soft tissue tenderness on palpation without masses or skin abnormalities. Abdominal:    Soft. Bowel sounds are normal.    No distension. No abd pain on exam  Musculoskeletal:    Normal range of motion. No joint swelling noted. No peripheral edema. Pain on palpation of left sided mid-thoracic back  Skin:    Skin is warm and dry. No rashes, lesions. Psychiatric:    He has a normal mood and affect. Normal groom and dress. No SI or HI.   ________________________________________________________    This note may have been created using dictation software.  Efforts were made to reduce errors, but

## 2022-09-21 RX ORDER — PEN NEEDLE, DIABETIC 31 GX5/16"
NEEDLE, DISPOSABLE MISCELLANEOUS
Qty: 100 EACH | Refills: 3 | Status: SHIPPED | OUTPATIENT
Start: 2022-09-21

## 2022-09-21 NOTE — TELEPHONE ENCOUNTER
Last Appointment:  9/19/2022  Future Appointments   Date Time Provider Edmar Méndezi   10/27/2022 10:00 AM Shilpa Abbott DPM Col Podiatry [facility]   11/8/2022 11:30 AM Amadou Mathias DO BDM PAIN [facility]   2/16/2023 10:40 AM Anibal Pearson  [address] [facility] [address]

## 2022-09-27 ENCOUNTER — HOSPITAL ENCOUNTER (OUTPATIENT)
Dept: HOSPITAL 83 - RESCLI | Age: 67
Discharge: HOME | End: 2022-09-27
Attending: INTERNAL MEDICINE
Payer: MEDICARE

## 2022-09-27 DIAGNOSIS — I48.91: ICD-10-CM

## 2022-09-27 DIAGNOSIS — Z79.899: ICD-10-CM

## 2022-09-27 DIAGNOSIS — G62.9: ICD-10-CM

## 2022-09-27 DIAGNOSIS — G47.30: ICD-10-CM

## 2022-09-27 DIAGNOSIS — Z98.890: ICD-10-CM

## 2022-09-27 DIAGNOSIS — I11.0: ICD-10-CM

## 2022-09-27 DIAGNOSIS — Z88.8: ICD-10-CM

## 2022-09-27 DIAGNOSIS — Z79.01: ICD-10-CM

## 2022-09-27 DIAGNOSIS — E11.9: Primary | ICD-10-CM

## 2022-09-27 DIAGNOSIS — I89.0: ICD-10-CM

## 2022-09-27 DIAGNOSIS — I50.9: ICD-10-CM

## 2022-10-03 ENCOUNTER — TELEPHONE (OUTPATIENT)
Dept: FAMILY MEDICINE CLINIC | Age: 67
End: 2022-10-03

## 2022-10-03 DIAGNOSIS — R07.89 CHEST WALL PAIN: Primary | ICD-10-CM

## 2022-10-03 NOTE — TELEPHONE ENCOUNTER
Patient called. He said he has a Pain on left side to the center of the upper part of abdomen. Its sore all the time. Its like a muscle soreness. Sometimes its a sharp shooting pain. Its not always in the same spot. Please advise.

## 2022-10-03 NOTE — TELEPHONE ENCOUNTER
Flor Em  : 1971  Primary:   Secondary:  [id number] [facility] [address]  Phone: [phone]  Fax: [phone] Plan Frequency: 2 times per week for 8 weeks    Plan of Care/Certification Expiration Date: 22 (Start of [address] 2022)      PT Visit Info:    No data recorded    OUTPATIENT PHYSICAL THERAPY:OP NOTE TYPE: Treatment Note 2022       Episode   Appt Desk       Treatment Diagnosis:  ICD-10: Treatment Diagnosis: Right knee pain [M25.561]                Treatment Diagnosis 2: Other chronic pain [G89.29]                Treatment Diagnosis 3: Difficulty walking, not elsewhere classified [R26.2]  Medical/Referring Diagnosis:  Chronic pain of right knee [T97.432, G89.29]  Referring Physician:  JUAN DANIEL Louise - *  MD Orders:  PT Eval and Treat   Date of Onset:  Onset Date: 21 (Original injury from fall 2021)     Allergies:  Olmesartan, Amlodipine, Lisinopril, and Sulfa antibiotics  Restrictions/Precautions:    Restrictions/Precautions: Other (comment) (DM II, h/o fall.)    Interventions Planned (Treatment may consist of any combination of the following):    Balance Exercise  Bed Mobility  Cold  Electrical Stimulation   Family Education  Gait Training  Heat  Home Exercise Program (HEP)  Iontophoresis  Manual Therapy  Neuromuscular Re-education/Strengthening  Range of Motion (ROM)  Therapeutic Activites  Therapeutic Exercise/Strengthening  Transcutaneous Electrical Nerve Stimulation (TENS)  Transfer Training  Ultrasound (US)    Subjective Comments:  Per pt has had busy day running errands today, minimal knee pain reported despite increased activity today. Initial: Right Knee 1/10 Post Session: Right Knee 0/10  Medications Last Reviewed:  2022  Updated Objective Findings:  Gait: No deviations to gait pattern observed today.   Treatment   THERAPEUTIC EXERCISE: (45 minutes):  Exercises per grid below to I am assuming this is the same pain hes had for probably a month or so, correct? Any changes or just not going away? Any new symptoms? improve mobility, strength, balance and coordination. Required moderate visual, verbal, manual and tactile cues to promote proper body alignment, promote proper body posture and promote proper body mechanics. Progressed resistance, range, repetitions and complexity of movement as indicated.       Date:  5/18/22 Date:  5/25/22 Date:  5/27/22   Activity/Exercise Parameters Parameters Parameters   Nu step  Level 4 x  10 mins Level 4 x 10 mins Level 4 x 10 mins    Clam shells  2x10 reps BLE's green t-band  2x10 reps each LE 2x10 reps each LE   Quad set ----- X 20 reps 5 sec hold  X 20 reps 5 sec hold    Heel slide ------ Supine strap 2 x 10 Supine strap 2x10    Gastroc stretch Slantboard, [id number] Slantboard, [id number] Strap 4x30 sec hold    Ankle pumps x30 2 x 20 Heel/toe raise standing x20   Straight leg raise 3x10 3 x 10 3x10 reps   Calf raises 3x10 -- 2x10    Weight shifts --------- -- --   Standing hip flexion X 20 reps BLE's at bar 2.5 lb wt. s  X 20 reps  2x10 at bar    Standing hamstring curls  X 20 reps BLE's 2.5 lb wt. s X 20 reps BLE's at bar X 20 reps BLE at bar   Standing hip abduction 2x10 reps BLE's 2.5 lb wt.s 2x10 reps BLE's  --    I T band stretch -----    -- [id number]   Long arc  quads Hold -- --   Sit to stand  X 10 reps using bilateral hand rails  x10 from table 2x10 from table   Lunge Forward, partial lunge, x10 each Forward, partial lunge, x10 each Forward, partial lunge, x10 each   Step up 6 in x10 fwd  -- 6 in 2x10 each   Lateral stepping    30 ft x2  30 ft x2, yellow   Monster walks  30 ft x2 30 ft x2, yellow            Time spent with patient reviewing proper muscle recruitment and technique with exercises.     MANUAL THERAPY: (10 minutes, no charge): Joint mobilization, Soft tissue mobilization and PROM, manual stretching was utilized and necessary because of the patient's restricted joint motion, painful spasm, loss of articular motion and restricted motion of soft tissue   Soft tissue mobilization: popliteal region, hamstring, gastroc. Joint mobs: patella to tolerance all planes.     MODALITIES: (0 minutes, none today):  none today.     HEP: As above; handouts given to patient for all exercises. Treatment/Session Summary:    Treatment Assessment:   Good understanding of exercise program observed today. Communication/Consultation:  None today  Equipment provided today:  None today. Recommendations/Intent for next treatment session: Next visit will focus on pain control, improve right knee mobility, improve RLE strength, progress to gait/balance training.     Total Treatment Billable 10Duration:  55 minutes  Time In: 7844  Time Out: [address], PT       Post Session Pain  Charge Capture  MedBridge Portal  MD Guidelines  Scanned Media  Benefits  MyChart    Future Appointments   Date Time Provider Mary Lou Mcdonald   5/31/2022 12:30 PM Luis Hector PT [facility]   7/7/2022  9:00 AM [facility]   7/11/2022  8:40 AM Crispin Canavan, MD [facility]

## 2022-10-04 NOTE — TELEPHONE ENCOUNTER
I placed xray orders for his thoracic spine and left ribs. Please got those done. If unrevealing, Im going to recommend he see pain mgmt again (like he did for his leg) to see if they think this might actually be coming from higher up in his back.

## 2022-10-07 DIAGNOSIS — M54.6 ACUTE LEFT-SIDED THORACIC BACK PAIN: ICD-10-CM

## 2022-10-07 DIAGNOSIS — R07.89 CHEST WALL PAIN: Primary | ICD-10-CM

## 2022-10-27 ENCOUNTER — PROCEDURE VISIT (OUTPATIENT)
Dept: PODIATRY | Age: 67
End: 2022-10-27
Payer: MEDICARE

## 2022-10-27 VITALS — BODY MASS INDEX: 42.66 KG/M2 | HEIGHT: 72 IN | WEIGHT: 315 LBS

## 2022-10-27 DIAGNOSIS — Z79.01 ENCOUNTER FOR CURRENT LONG-TERM USE OF ANTICOAGULANTS: ICD-10-CM

## 2022-10-27 DIAGNOSIS — L84 CORNS AND CALLOSITIES: ICD-10-CM

## 2022-10-27 DIAGNOSIS — E11.9 TYPE 2 DIABETES MELLITUS WITHOUT COMPLICATION, UNSPECIFIED WHETHER LONG TERM INSULIN USE (HCC): ICD-10-CM

## 2022-10-27 DIAGNOSIS — B35.1 TINEA UNGUIUM: Primary | ICD-10-CM

## 2022-10-27 PROCEDURE — 11056 PARNG/CUTG B9 HYPRKR LES 2-4: CPT | Performed by: PODIATRIST

## 2022-10-27 PROCEDURE — 11721 DEBRIDE NAIL 6 OR MORE: CPT | Performed by: PODIATRIST

## 2022-10-27 NOTE — PROGRESS NOTES
Ryan Angel is here today for a diabetic foot exam and nail care. his PCP is Ashley Tovar MD last OV was 09/19/2022. CC:   Follow-up diabetic foot and ankle exam    HPI:   Follow-up diabetic foot ankle exam.  No open wounds. Continues alpha lipoic acid daily. Denies any additional pedal complaints today. History anticoagulant therapy with Xarelto.       ROS:  Const: Denies constitutional symptoms  Musculo: Denies symptoms other than stated above  Skin: Denies symptoms other than stated above      Current Outpatient Medications:     B-D UF III MINI PEN NEEDLES 31G X 5 MM MISC, USE TO TEST BLOOD SUGAR ONCE A DAY, Disp: 100 each, Rfl: 3    metFORMIN (GLUCOPHAGE) 1000 MG tablet, TAKE 1 TABLET BY MOUTH TWICE DAILY WITH MEALS, Disp: 180 tablet, Rfl: 1    dilTIAZem (TIAZAC) 240 MG extended release capsule, TAKE 1 CAPSULE BY MOUTH EVERY DAY, Disp: , Rfl:     sennosides-docusate sodium (SENOKOT-S) 8.6-50 MG tablet, Take 1 tablet by mouth daily, Disp: 3 tablet, Rfl: 0    hydroCHLOROthiazide (HYDRODIURIL) 25 MG tablet, TAKE 1 TABLET BY MOUTH EVERY DAY FOR BLOOD PRESSURE, Disp: 90 tablet, Rfl: 1    XARELTO 20 MG TABS tablet, TAKE 1 TABLET BY MOUTH EVERY MORNING WITH BREAKFAST, Disp: 90 tablet, Rfl: 1    glipiZIDE (GLUCOTROL) 10 MG tablet, TAKE 1 TABLET BY MOUTH TWICE DAILY BEFORE MEALS, Disp: 180 tablet, Rfl: 1    benazepril (LOTENSIN) 20 MG tablet, TAKE 1 TABLET BY MOUTH TWICE DAILY, Disp: 180 tablet, Rfl: 1    carvedilol (COREG) 25 MG tablet, TAKE 1 TABLET BY MOUTH TWICE DAILY, Disp: 180 tablet, Rfl: 1    furosemide (LASIX) 20 MG tablet, Take 1 tablet by mouth once daily, Disp: 90 tablet, Rfl: 1    LEVEMIR FLEXTOUCH 100 UNIT/ML injection pen, ADMINISTER 45 UNITS UNDER THE SKIN EVERY NIGHT, Disp: 15 mL, Rfl: 3    Handicap Placard [facility] Does not apply route Please provide a handicap placard through 05/31/2027, Disp: 1 each, Rfl: 0    dilTIAZem (CARDIZEM CD) 240 MG extended release capsule, Take 1 capsule by mouth once daily, Disp: 90 capsule, Rfl: 1    blood glucose monitor strips, 1 strip by Other route daily Test 1 time a day & as needed for symptoms of irregular blood glucose. Dispense sufficient amount for indicated testing frequency plus additional to accommodate PRN testing needs. ReliOn prime test strips, Disp: 100 strip, Rfl: 10    Insulin Pen Needle (PEN NEEDLES 5/16\") 30G X 8 MM MISC, 1 each by Does not apply route daily, Disp: 100 each, Rfl: 3    Alpha-Lipoic Acid 600 MG TABS, Take 1 Bottle by mouth daily, Disp: 60 tablet, Rfl: 1    CPAP Machine MISC, by Does not apply route nightly, Disp: , Rfl:   Allergies   Allergen Reactions    Other Itching     Throat itches when he eats magnus nuts    Lipitor      Body pain, DIARRHEA ETC. HAS PROBS WITH ALL CHOLESTEROL MEDS-MOST PROB WITH LIPITOR       Past Medical History:   Diagnosis Date    Anticoagulant long-term use     Atrial fibrillation (HCC)     CHF (congestive heart failure) (HCC)     Degenerative joint disease of left hip 1/28/2015    Diabetes mellitus ([facility] [address]     Hyperlipidemia     Hypertension     Hypertension     Obesity     PONV (postoperative nausea and vomiting)     Radiculopathy of lumbosacral region 07/25/2017    for procedure 5/5/22    Sleep apnea     Type II or unspecified type diabetes mellitus without mention of complication, not stated as uncontrolled        There were no vitals filed for this visit. Work History/Social History: [employer]  Orthopedic history: EMG testing June 2020, Alpha-lipoic acid 600mg      Focused Lower Extremity Physical Exam:      Neurovascular examination:    Dorsalis Pedis palpable bilateral.  Posterior tibialis non-palpable bilateral.    Capillary Refill Time:  Immediate return  Hair growth:  Symmetrical and bilateral   Skin:  Not atrophic  Edema: Mild edema bilateral feet. Mild edema bilateral ankles.   Neurologic:  Light touch diminished bilateral.  Warm to coolness bilateral distal toes  Decreased epicritic sensation     Musculoskeletal/ Orthopedic examination:    Equinis: present bilateral  Dorsiflexion, plantarflexion, inversion, eversion bilateral 5 out of 5 muscle strength  Wiggling toes  Negative Homans    Dermatology examination:    Toenails 1 through 5 bilateral thickened, elongated, dystrophic, mycotic with subungual debris. Web spaces 1 through 4 bilateral clean dry and intact. Hyperkeratotic tissue fifth metatarsal bilateral.  No open wounds. No erythema. Assessment and Plan:  Ab Dumont was seen today for diabetes, callouses and nail problem. Diagnoses and all orders for this visit:    Tinea unguium    Corns and callosities    Type 2 diabetes mellitus without complication, unspecified whether long term insulin use ([facility] [address]    Encounter for current long-term use of anticoagulants    Follow-up diabetic foot ankle exam  History anticoagulant therapy with Xarelto  Manual debridement with standard technique toenails 1 through 5 right and left in thickness and length. Patient tolerated procedure well. Continue alpha lipoic acid 600 mg daily. Paring hyperkeratotic tissue fifth metatarsal bilateral.  Avoid barefoot. Follow-up 2 months          Return in about 2 months (around 12/27/2022). Seen By:  Neena De La Cruz DPM      Document was created using voice recognition software. Note was reviewed however may contain grammatical errors.

## 2022-11-07 DIAGNOSIS — Z79.4 TYPE 2 DIABETES MELLITUS WITH OTHER SPECIFIED COMPLICATION, WITH LONG-TERM CURRENT USE OF INSULIN (HCC): ICD-10-CM

## 2022-11-07 DIAGNOSIS — E11.69 TYPE 2 DIABETES MELLITUS WITH OTHER SPECIFIED COMPLICATION, WITH LONG-TERM CURRENT USE OF INSULIN (HCC): ICD-10-CM

## 2022-11-07 NOTE — TELEPHONE ENCOUNTER
Last Appointment:  9/19/2022  Future Appointments   Date Time Provider Edmar Méndezi   11/9/2022  1:00 PM DO EMILIANA SamayoaM PAIN [facility]   12/29/2022 10:00 AM Karin Ley DPM [facility]   2/16/2023 10:40 AM Zoie Cahrles  [address] [facility] [address]

## 2022-11-08 RX ORDER — BENAZEPRIL HYDROCHLORIDE 20 MG/1
TABLET ORAL
Qty: 180 TABLET | Refills: 1 | Status: SHIPPED | OUTPATIENT
Start: 2022-11-08

## 2022-11-08 RX ORDER — GLIPIZIDE 10 MG/1
TABLET ORAL
Qty: 180 TABLET | Refills: 1 | Status: SHIPPED | OUTPATIENT
Start: 2022-11-08

## 2022-11-09 ENCOUNTER — OFFICE VISIT (OUTPATIENT)
Dept: PAIN MANAGEMENT | Age: 67
End: 2022-11-09
Payer: MEDICARE

## 2022-11-09 ENCOUNTER — PREP FOR PROCEDURE (OUTPATIENT)
Dept: PAIN MANAGEMENT | Age: 67
End: 2022-11-09

## 2022-11-09 VITALS
RESPIRATION RATE: 16 BRPM | TEMPERATURE: 97.6 F | HEIGHT: 72 IN | DIASTOLIC BLOOD PRESSURE: 85 MMHG | HEART RATE: 80 BPM | WEIGHT: 315 LBS | BODY MASS INDEX: 42.66 KG/M2 | SYSTOLIC BLOOD PRESSURE: 136 MMHG | OXYGEN SATURATION: 95 %

## 2022-11-09 DIAGNOSIS — M54.16 LUMBAR RADICULOPATHY: ICD-10-CM

## 2022-11-09 DIAGNOSIS — M54.16 LUMBAR RADICULOPATHY: Primary | ICD-10-CM

## 2022-11-09 DIAGNOSIS — G89.4 CHRONIC PAIN SYNDROME: Primary | ICD-10-CM

## 2022-11-09 DIAGNOSIS — M21.371 RIGHT FOOT DROP: ICD-10-CM

## 2022-11-09 DIAGNOSIS — M43.16 SPONDYLOLISTHESIS OF LUMBAR REGION: ICD-10-CM

## 2022-11-09 DIAGNOSIS — M54.17 RADICULOPATHY OF LUMBOSACRAL REGION: ICD-10-CM

## 2022-11-09 DIAGNOSIS — M51.9 LUMBAR DISC DISORDER: ICD-10-CM

## 2022-11-09 PROCEDURE — G8417 CALC BMI ABV UP PARAM F/U: HCPCS | Performed by: PAIN MEDICINE

## 2022-11-09 PROCEDURE — 1036F TOBACCO NON-USER: CPT | Performed by: PAIN MEDICINE

## 2022-11-09 PROCEDURE — 3074F SYST BP LT 130 MM HG: CPT | Performed by: PAIN MEDICINE

## 2022-11-09 PROCEDURE — 99213 OFFICE O/P EST LOW 20 MIN: CPT | Performed by: PAIN MEDICINE

## 2022-11-09 PROCEDURE — G8484 FLU IMMUNIZE NO ADMIN: HCPCS | Performed by: PAIN MEDICINE

## 2022-11-09 PROCEDURE — 3078F DIAST BP <80 MM HG: CPT | Performed by: PAIN MEDICINE

## 2022-11-09 PROCEDURE — G8427 DOCREV CUR MEDS BY ELIG CLIN: HCPCS | Performed by: PAIN MEDICINE

## 2022-11-09 PROCEDURE — 1123F ACP DISCUSS/DSCN MKR DOCD: CPT | Performed by: PAIN MEDICINE

## 2022-11-09 PROCEDURE — 3017F COLORECTAL CA SCREEN DOC REV: CPT | Performed by: PAIN MEDICINE

## 2022-11-09 NOTE — PROGRESS NOTES
Do you currently have any of the following:    Fever: No  Headache:  No  Cough: No  Shortness of breath: No  Exposed to anyone with these symptoms: No         Amy Hernandez [facility] to [facility] on 11[facility]22. Nila Parish is complaining of pain lower back. The pain is intermittent. The pain is described as aching. Pain is rated on his best day at a 2, on his worst day at a 6, and on average at a 2 on the VAS scale. He took his last dose of Motrin this morning. Any procedures since your last visit: No    Pacemaker or defibrillator: No     He is not on NSAIDS and is  on anticoagulation medications to include Xarelto and is managed by Yakelin Carreno MD  .     Medication Contract and Consent for Opioid Use Documents Filed        No documents found                    /85   Pulse 80   Temp 97.6 °F (36.4 °C) (Infrared)   Resp 16   Ht 6' (1.829 m)   Wt (!) 322 lb (146.1 kg)   SpO2 95%   BMI 43.67 kg/m²      No LMP for male patient.

## 2022-11-09 NOTE — PROGRESS NOTES
[facility]        [address]  [facility], [address] [facility]  Dept: [phone]        Follow up Note      Steven Payne     Date of Visit:  11/09/22     CC:  Patient presents for follow up   Chief Complaint   Patient presents with    Follow-up    Lower Back Pain       HPI:    Pain is  controlled . Change in quality of symptoms:no. Medication side effects:not applicable . Recent diagnostic testing:none. Recent interventional procedures:none. He has been on anticoagulation medications to include Reese Colla and has not been on herbal supplements. He is diabetic. Imaging:   3/2022 lumbar MRI -  FINDINGS:   BONES/ALIGNMENT: There is loss of the normal lumbar lordosis. The vertebral   body heights are maintained. There is a ritual body hemangioma in the L2 and   L4 vertebral bodies. There is multilevel degenerative disc disease with loss   of disc signal.  There is mild disc space narrowing at L5-S1. There is no   significant spondylolisthesis. SPINAL CORD: The conus is normal in caliber and signal and terminates at the   L1 level. The cauda equina is unremarkable. SOFT TISSUES: The posterior paraspinal soft tissues are unremarkable. The   visualized abdominal structures are unremarkable. L1-L2: There is a circumferential disc bulge. There is no canal stenosis or   foraminal narrowing. L2-L3: There is a circumferential disc bulge. There is no canal stenosis or   foraminal narrowing. L3-L4: There is a circumferential disc bulge. There is no canal stenosis or   significant foraminal narrowing. L4-L5: There is a circumferential disc bulge. There is no canal stenosis or   significant foraminal narrowing. L5-S1: There is a circumferential disc bulge. There is no canal stenosis. There is moderate to severe bilateral foraminal narrowing. There is   narrowing of the lateral recesses.            Impression   Multilevel degenerative change most pronounced at L5-S1 where there is   moderate to severe bilateral foraminal narrowing and narrowing of the lateral   recesses. 3/2022 lumbar f/e films -  FINDINGS:   No instability on flexion or extension. Moderate degenerative disc disease   at L5-S1. Nothing else active. Impression   No instability evident. Degenerative disc disease at L5-S1.     2/2022 L hip xray -  FINDINGS:   Anatomically aligned left MALCOM with no abnormal bone or soft tissue findings. Impression   Anatomically aligned left MALCOM with no unexpected findings. 2/2022 rt hip xray -  FINDINGS:   Mild osteoarthritis at the right hip. No fracture or dislocation. Normal   soft tissues. Impression   Mild osteoarthritis at the right hip.      11/2021 xray SIJ -  FINDINGS:   There is no evidence of acute fracture. There is normal alignment. No acute   joint abnormality. No focal osseous lesion. No focal soft tissue   abnormality. Left hip arthroplasty. Degenerative changes L5-S1           Impression   No acute osseous abnormality. Grossly normal appearing SI joints. Degenerative changes L5-S1.      11/2021 xray lumbar -  FINDINGS:   There are moderate-advanced degenerative disc changes at L5-S1 with evidence   of vacuum disc phenomenon. Mild degenerative changes are present elsewhere. No acute fracture is identified. Grade 1 retrolisthesis at L5-S1 is similar   to the previous exam.  A left hip replacement is partially imaged. There are   calcified phleboliths in the pelvis. Impression   1. No acute osseous abnormality is identified.    2. Degenerative changes appear most prominent at L5-S1 with grade 1   retrolisthesis, similar to the previous exam.        Potential Aberrant Drug-Related Behavior:      Urine Drug Screening:    OARRS report:    Past Medical History:   Diagnosis Date    Anticoagulant long-term use     Atrial fibrillation ([facility] [address]     CHF (congestive heart failure) (HCC)     Degenerative joint disease of left hip 1/28/2015    Diabetes mellitus ([facility] [address]     Hyperlipidemia     Hypertension     Hypertension     Obesity     PONV (postoperative nausea and vomiting)     Radiculopathy of lumbosacral region 07/25/2017    for procedure 5/5/22    Sleep apnea     Type II or unspecified type diabetes mellitus without mention of complication, not stated as uncontrolled        Past Surgical History:   Procedure Laterality Date    APPENDECTOMY      CHOLECYSTECTOMY      CYST INCISION AND DRAINAGE  06/08/2017    abd wall cyst    HERNIA REPAIR      HIP ARTHROPLASTY Left     JOINT REPLACEMENT      PAIN MANAGEMENT PROCEDURE Bilateral 5/12/2022    BILATERAL L5 TRANSFORAMINAL EPIDURAL STEROID INJECTION #1 performed by Fausto Chakraborty DO at [address]       Prior to Admission medications    Medication Sig Start Date End Date Taking?  Authorizing Provider   benazepril (LOTENSIN) 20 MG tablet TAKE 1 TABLET BY MOUTH TWICE DAILY 11/8/22  Yes Ailyn Esquivel MD   glipiZIDE (GLUCOTROL) 10 MG tablet TAKE 1 TABLET BY MOUTH TWICE DAILY BEFORE MEALS 11/8/22  Yes Ailyn Esquivel MD   B-D UF III MINI PEN NEEDLES 31G X 5 MM MISC USE TO TEST BLOOD SUGAR ONCE A DAY 9/21/22  Yes Ailyn Esquivel MD   metFORMIN (GLUCOPHAGE) 1000 MG tablet TAKE 1 TABLET BY MOUTH TWICE DAILY WITH MEALS 9/6/22  Yes Ailyn Esquivel MD   hydroCHLOROthiazide (HYDRODIURIL) 25 MG tablet TAKE 1 TABLET BY MOUTH EVERY DAY FOR BLOOD PRESSURE 8/16/22  Yes Ailyn Esquivel MD   XARELTO 20 MG TABS tablet TAKE 1 TABLET BY MOUTH EVERY MORNING WITH BREAKFAST 8/11/22  Yes Ailyn Esquivel MD   carvedilol (COREG) 25 MG tablet TAKE 1 TABLET BY MOUTH TWICE DAILY 7/7/22  Yes Ailyn Esquivel MD   furosemide (LASIX) 20 MG tablet Take 1 tablet by mouth once daily 6/28/22  Yes DO CHAZ Redd FLEXTOUCH 100 UNIT/ML injection pen ADMINISTER 45 UNITS UNDER THE SKIN EVERY NIGHT 6/20/22 Yes Nani Jalloh MD   Handicap Placard [address] [facility] by Does not apply route Please provide a handicap placard through 05/31/2027 3/31/22  Yes Sabine Rainey DO   dilTIAZem (CARDIZEM CD) 240 MG extended release capsule Take 1 capsule by mouth once daily 11/11/21  Yes Nani Jalloh MD   blood glucose monitor strips 1 strip by Other route daily Test 1 time a day & as needed for symptoms of irregular blood glucose. Dispense sufficient amount for indicated testing frequency plus additional to accommodate PRN testing needs.  ReliOn prime test strips 8/6/21  Yes Nani Jalloh MD   Insulin Pen Needle (PEN NEEDLES 5/16\") 30G X 8 MM MISC 1 each by Does not apply route daily 8/6/21  Yes Nani Jalloh MD   Alpha-Lipoic Acid 600 MG TABS Take 1 Bottle by mouth daily 7/8/20  Yes Allison Mckay DPM   CPAP Machine MISC by Does not apply route nightly   Yes Historical Provider, MD   dilTIAZem (TIAZAC) 240 MG extended release capsule TAKE 1 CAPSULE BY MOUTH EVERY DAY  Patient not taking: Reported on 11/9/2022 8/13/22   Historical Provider, MD   sennosides-docusate sodium (SENOKOT-S) 8.6-50 MG tablet Take 1 tablet by mouth daily  Patient not taking: Reported on 11/9/2022 9/6/22   Tutu Avalos MD       Allergies   Allergen Reactions    Other Itching     Throat itches when he eats magnus nuts    Lipitor      Body pain, DIARRHEA ETC. HAS PROBS WITH ALL CHOLESTEROL MEDS-MOST PROB WITH LIPITOR       Social History     Socioeconomic History    Marital status:      Spouse name: Not on file    Number of children: Not on file    Years of education: Not on file    Highest education level: Not on file   Occupational History    Not on file   Tobacco Use    Smoking status: Never    Smokeless tobacco: Never   Substance and Sexual Activity    Alcohol use: No     Alcohol/week: 0.0 standard drinks    Drug use: No    Sexual activity: Not on file   Other Topics Concern    Not on file   Social History Narrative Not on file     Social Determinants of Health     Financial Resource Strain: Not on file   Food Insecurity: Not on file   Transportation Needs: Not on file   Physical Activity: Unknown    Days of Exercise per Week: 0 days    Minutes of Exercise per Session: Not on file   Stress: Not on file   Social Connections: Not on file   Intimate Partner Violence: Not on file   Housing Stability: Not on file       Family History   Problem Relation Age of Onset    High Blood Pressure Mother     Cancer Mother         lymphoma    Stroke Mother     Diabetes Father     Heart Surgery Sister         heart valve replacement    Heart Disease Brother         pt was waiting for a heart transplant    Cancer Maternal Grandfather     Cancer Paternal Grandmother     Cancer Paternal Grandfather        REVIEW OF SYSTEMS:     Sola Lee denies fever/chills, chest pain, shortness of breath, new bowel or bladder complaints. All other review of systems was negative. PHYSICAL EXAMINATION:      /85   Pulse 80   Temp 97.6 °F (36.4 °C) (Infrared)   Resp 16   Ht 6' (1.829 m)   Wt (!) 322 lb (146.1 kg)   SpO2 95%   BMI 43.67 kg/m²     General:       General appearance:pleasant and well-hydrated, in no distress and A & O x3  Build:Obese  Function:Rises from a seated position with difficulty     HEENT:     Head:normocephalic, atraumatic  Pupils:regular, round, equal  Sclera: icterus absent     Lungs:     Breathing:normal breathing pattern     Abdomen:     Shape:obese and non-distended  Tenderness:none  Guarding:none     Lumbar spine:     Spine inspection:normal   CVA tenderness:No   Palpation:tenderness paravertebral muscles, left negative, right positive. Range of motion:abnormal mildly in lateral bending, flexion, extension rotation bilateral and is painful.      Musculoskeletal:     Trigger points in Paraveteral:absent bilaterally  SI joint tenderness:negative right, negative left              VALARIE test:not done right, not done left  Piriformis tenderness:negative right, negative left  Trochanteric bursa tenderness:negative right, negative left  SLR:negative right, negative left, sitting      Extremities:     Tremors:None bilaterally upper and lower  Range of motion:pain with internal rotation of hips negative. Intact:Yes  Varicose veins:absent   Pulses:present Lt radial  Cyanosis:none  Edema:+ BLE     Neurological:     Sensory:normal to light touch BLE in stocking distribution  Motor:                Right Quadriceps5/5          Left Quadriceps5/5           Right Gastrocnemius5/5    Left Gastrocnemius5/5  Right Ant Tibialis4+/5  Left Ant Tibialis5/5     Reflexes: (not assessed today)   Right Quadriceps reflex0-1+  Left Quadriceps reflex0-1+  Right Achilles reflexdeferred  Left Achilles reflexdeferred  Gait:normal station     Dermatology:     Skin:no rashes or lesions noted     Impression:     LBP radiating into b/l buttocks/hip in L5 distribution  + Rt foot drop  Lumbar xrays as above  PMHX: a. Margret (on Óscar Palmer), CHF, DM, DLD, HTN, besity, AMIRAH (on cpap)  His son  in  of covid    Currently he's only having right lower lumbar pain and left hip radicular pain intermittently  He will call if his pain returns significantly and he needs another injection but feels like he is doing \"OK\" at this point  His rt foot drop is 4+ at this time and only mildly bothersome at this time - he continues to declines AFO    He was referred to [facility] for what sounds like a thoracic injection vs intercostal nerve block with mild improvement     Plan:     Urine screen deferred  OARRS report reviewed  On Óscar Palmer for mandi Oswald  Consider EMG prn  PT done until 2022  Consider rt AFO based upon response to injections (will coordinate through Mona Prophet prn but his lymphedema braces may be inhibitive)  b/l L5 TFESI #1 with 90% relief 2022, repeat prn- r/b and procedure discussed (needs permission to hold xarelto per sarah guidelines)  Patient encouraged to stay active and to lose weight  Treatment plan discussed with the patient including medication and procedure side effects     Cc:  Referring physician    LESLIE Perla

## 2022-11-10 NOTE — TELEPHONE ENCOUNTER
Last Appointment:  9/19/2022  Future Appointments   Date Time Provider Edmar Reyna   12/29/2022 10:00 AM Nava Esquivel DPM Col Podiatry [facility]   2/16/2023 10:40 AM Della Chew  W [address] [place] [address]

## 2022-11-11 RX ORDER — DILTIAZEM HYDROCHLORIDE 240 MG/1
CAPSULE, EXTENDED RELEASE ORAL
Qty: 90 CAPSULE | Refills: 1 | Status: SHIPPED | OUTPATIENT
Start: 2022-11-11

## 2022-12-21 DIAGNOSIS — I48.19 PERSISTENT ATRIAL FIBRILLATION (HCC): ICD-10-CM

## 2022-12-21 RX ORDER — FUROSEMIDE 20 MG/1
TABLET ORAL
Qty: 90 TABLET | Refills: 1 | Status: SHIPPED | OUTPATIENT
Start: 2022-12-21

## 2022-12-21 NOTE — TELEPHONE ENCOUNTER
Last Appointment:  6/28/2022  Future Appointments   Date Time Provider Edmar Maribell   12/29/2022 10:00 AM Fab Estrada DPM Col Podiatry [facility]   2/16/2023 10:40 AM Modesta Hernandez  W [address] [place] [address]

## 2022-12-30 ENCOUNTER — PROCEDURE VISIT (OUTPATIENT)
Dept: PODIATRY | Age: 67
End: 2022-12-30

## 2022-12-30 DIAGNOSIS — E11.9 TYPE 2 DIABETES MELLITUS WITHOUT COMPLICATION, UNSPECIFIED WHETHER LONG TERM INSULIN USE (HCC): ICD-10-CM

## 2022-12-30 DIAGNOSIS — B35.1 TINEA UNGUIUM: Primary | ICD-10-CM

## 2022-12-30 DIAGNOSIS — R60.0 LOCALIZED EDEMA: ICD-10-CM

## 2022-12-30 DIAGNOSIS — Z79.01 ENCOUNTER FOR CURRENT LONG-TERM USE OF ANTICOAGULANTS: ICD-10-CM

## 2022-12-30 DIAGNOSIS — L84 CORNS AND CALLOSITIES: ICD-10-CM

## 2022-12-30 NOTE — PROGRESS NOTES
Lesley Prado is here today for a diabetic foot exam and nail care. his PCP is Alicia Moore MD last OV was 09/19/2022. CC:   Follow-up diabetic foot and ankle exam    HPI:   Follow-up foot and ankle exam.  No foot or ankle pain today. History anticoagulant therapy with Xarelto. No additional pedal complaints today.       ROS:  Const: Denies constitutional symptoms  Musculo: Denies symptoms other than stated above  Skin: Denies symptoms other than stated above      Current Outpatient Medications:     furosemide (LASIX) 20 MG tablet, TAKE 1 TABLET BY MOUTH EVERY DAY, Disp: 90 tablet, Rfl: 1    dilTIAZem (TIAZAC) 240 MG extended release capsule, TAKE 1 CAPSULE BY MOUTH EVERY DAY, Disp: 90 capsule, Rfl: 1    benazepril (LOTENSIN) 20 MG tablet, TAKE 1 TABLET BY MOUTH TWICE DAILY, Disp: 180 tablet, Rfl: 1    glipiZIDE (GLUCOTROL) 10 MG tablet, TAKE 1 TABLET BY MOUTH TWICE DAILY BEFORE MEALS, Disp: 180 tablet, Rfl: 1    B-D UF III MINI PEN NEEDLES 31G X 5 MM MISC, USE TO TEST BLOOD SUGAR ONCE A DAY, Disp: 100 each, Rfl: 3    metFORMIN (GLUCOPHAGE) 1000 MG tablet, TAKE 1 TABLET BY MOUTH TWICE DAILY WITH MEALS, Disp: 180 tablet, Rfl: 1    sennosides-docusate sodium (SENOKOT-S) 8.6-50 MG tablet, Take 1 tablet by mouth daily (Patient not taking: Reported on 11/9/2022), Disp: 3 tablet, Rfl: 0    hydroCHLOROthiazide (HYDRODIURIL) 25 MG tablet, TAKE 1 TABLET BY MOUTH EVERY DAY FOR BLOOD PRESSURE, Disp: 90 tablet, Rfl: 1    XARELTO 20 MG TABS tablet, TAKE 1 TABLET BY MOUTH EVERY MORNING WITH BREAKFAST, Disp: 90 tablet, Rfl: 1    carvedilol (COREG) 25 MG tablet, TAKE 1 TABLET BY MOUTH TWICE DAILY, Disp: 180 tablet, Rfl: 1    LEVEMIR FLEXTOUCH 100 UNIT/ML injection pen, ADMINISTER 45 UNITS UNDER THE SKIN EVERY NIGHT, Disp: 15 mL, Rfl: 3    Handicap Placard [facility], by Does not apply route Please provide a handicap placard through 05/31/2027, Disp: 1 each, Rfl: 0    dilTIAZem (CARDIZEM CD) 240 MG extended release capsule, Take 1 capsule by mouth once daily, Disp: 90 capsule, Rfl: 1    blood glucose monitor strips, 1 strip by Other route daily Test 1 time a day & as needed for symptoms of irregular blood glucose. Dispense sufficient amount for indicated testing frequency plus additional to accommodate PRN testing needs. Reli[employer] prime test strips, Disp: 100 strip, Rfl: 10    Insulin Pen Needle (PEN NEEDLES 5/16\") 30G X 8 MM MISC, 1 each by Does not apply route daily, Disp: 100 each, Rfl: 3    Alpha-Lipoic Acid 600 MG TABS, Take 1 Bottle by mouth daily, Disp: 60 tablet, Rfl: 1    CPAP Machine MISC, by Does not apply route nightly, Disp: , Rfl:   Allergies   Allergen Reactions    Other Itching     Throat itches when he eats magnus nuts    Lipitor      Body pain, DIARRHEA ETC. HAS PROBS WITH ALL CHOLESTEROL MEDS-MOST PROB WITH LIPITOR       Past Medical History:   Diagnosis Date    Anticoagulant long-term use     Atrial fibrillation (HCC)     CHF (congestive heart failure) ([facility])     Degenerative joint disease of left hip 1/28/2015    Diabetes mellitus ([facility] [address]     Hyperlipidemia     Hypertension     Hypertension     Obesity     PONV (postoperative nausea and vomiting)     Radiculopathy of lumbosacral region 07/25/2017    for procedure 5/5/22    Sleep apnea     Type II or unspecified type diabetes mellitus without mention of complication, not stated as uncontrolled        There were no vitals filed for this visit. Work History/Social History: [employer]  Orthopedic history: EMG testing June 2020, Alpha-lipoic acid 600mg      Focused Lower Extremity Physical Exam:      Neurovascular examination:    Dorsalis Pedis palpable bilateral.  Posterior tibialis non-palpable bilateral.    Capillary Refill Time:  Immediate return  Hair growth:  Symmetrical and bilateral   Skin:  Not atrophic  Edema: Mild edema bilateral feet. Mild edema bilateral ankles.   Neurologic:  Light touch diminished bilateral.  Warm to coolness bilateral distal toes  Decreased epicritic sensation     Musculoskeletal/ Orthopedic examination:    Equinis: present bilateral  Dorsiflexion, plantarflexion, inversion, eversion bilateral 5 out of 5 muscle strength  Wiggling toes  Negative Homans  No pain foot or ankle    Dermatology examination:    Toenails 1 through 5 bilateral thickened, elongated, dystrophic, mycotic with subungual debris. Web spaces 1 through 4 bilateral clean dry and intact. Hyperkeratotic tissue fifth metatarsal bilateral.  No open wounds. No erythema. Assessment and Plan:  Heena Perkins was seen today for callouses, nail problem and diabetes. Diagnoses and all orders for this visit:    Tinea unguium    Corns and callosities    Type 2 diabetes mellitus without complication, unspecified whether long term insulin use (HCC)    Localized edema    Encounter for current long-term use of anticoagulants    Follow-up diabetic foot ankle exam  History anticoagulant therapy with Xarelto  Manual debridement with standard technique toenails 1 through 5 right and left in thickness and length. Patient tolerated procedure well. Paring hyperkeratotic tissue fifth metatarsal bilateral 15 blade. Avoid barefoot. As tolerated alpha lipoic acid 600 mg daily very well. No additional pedal complaints today. Follow-up 2 months diabetic foot ankle exam.        Return in about 2 months (around 2/28/2023). Seen By:  Rohini Stone DPM      Document was created using voice recognition software. Note was reviewed however may contain grammatical errors.

## 2023-01-06 RX ORDER — CARVEDILOL 25 MG/1
TABLET ORAL
Qty: 180 TABLET | Refills: 1 | Status: SHIPPED | OUTPATIENT
Start: 2023-01-06

## 2023-01-06 NOTE — TELEPHONE ENCOUNTER
Last Appointment:  9/19/2022  Future Appointments   Date Time Provider Edmar Méndezi   2/16/2023 10:40 AM MD MEÑO Navas [facility]   3/3/2023 11:00 AM Gracia Amado DPM Col Podiatry [facility]

## 2023-01-17 ENCOUNTER — TELEPHONE (OUTPATIENT)
Dept: PAIN MANAGEMENT | Age: 68
End: 2023-01-17

## 2023-01-17 NOTE — TELEPHONE ENCOUNTER
Call to Casie Aguilar that procedure was approved for 1/31/2023 and that Andreea should call him a few days before for the pre op call and between 2:00 PM and 4:00 PM  the business day before with the arrival time. Instructed Jesus to hold ibuprofen for 24 hours, Xarelto for 3 days, naprosyn for 4 days and any aspirin containing products or fish oil for 7 days. Instructed to call office back if any questions. Jesus verbalized understanding.     Electronically signed by Ramírez Carrington RN on 1/17/2023 at 9:42 AM

## 2023-01-26 NOTE — PROGRESS NOTES
[facility]  INSTRUCTIONS  . .......................................................................................................................................... [x] Parking the day of Surgery is located in the [facility].   Upon entering the door, make immediate right into the surgery reception room    [x]  Bring photo ID and insurance card     [x] You may have a light breakfast day of procedure    [x]  Wear loose comfortable clothing    [x]  Please follow instructions for medications as given per Dr's office    [x] You can expect a call the business day prior to procedure to notify you of your arrival time     [x] Please arrange for     []  Other instructions

## 2023-01-26 NOTE — TELEPHONE ENCOUNTER
Last Appointment:  2/11/2022  Future Appointments   Date Time Provider Edmar Méndezi   6/21/2022 10:00 AM Magdalene Posadas DPM Col Podiatry [facility]   6/29/2022  2:45 PM Araceli Luna DO BDM PAIN [facility]   8/15/2022 10:40 AM Delia Ramos  W [address] [place] [address] Spiritual care visit conducted. Patient is lying in bed and alert. Family and
friends are bedside. Patient tells me about her medical condition and the
recent move to comfort care status. She also shares about her life growing up
in [place], her 's death (many years ago) and the nicole she has had
to get the medical care that she needs. I provide therapeutic listening and
prayer. I will continue to remain available to patient and family.

## 2023-01-29 ENCOUNTER — PATIENT MESSAGE (OUTPATIENT)
Dept: FAMILY MEDICINE CLINIC | Age: 68
End: 2023-01-29

## 2023-01-29 DIAGNOSIS — G47.33 OSA ON CPAP: Primary | ICD-10-CM

## 2023-01-29 DIAGNOSIS — Z99.89 OSA ON CPAP: Primary | ICD-10-CM

## 2023-01-30 NOTE — TELEPHONE ENCOUNTER
From: Christy Britton  To: Dr. Callie Gutiérrez  Sent: 1/29/2023 2:10 PM EST  Subject: Cpap machine    Hi my cpap stopped working properly. It shoots high pressure spurts repeatedly as soon as I turn it on and I can't use it. Are you able to write a prescription for a new one? I have an resmed airsense 10 and I would like the same thing. Please let me know what I need to do.  Thanks [employer]

## 2023-01-31 ENCOUNTER — HOSPITAL ENCOUNTER (OUTPATIENT)
Dept: GENERAL RADIOLOGY | Age: 68
Setting detail: OUTPATIENT SURGERY
Discharge: HOME | End: 2023-02-02
Attending: PAIN MEDICINE
Payer: MEDICARE

## 2023-01-31 ENCOUNTER — HOSPITAL ENCOUNTER (OUTPATIENT)
Age: 68
Setting detail: OUTPATIENT SURGERY
Discharge: HOME | End: 2023-01-31
Attending: PAIN MEDICINE | Admitting: PAIN MEDICINE
Payer: MEDICARE

## 2023-01-31 VITALS
SYSTOLIC BLOOD PRESSURE: 109 MMHG | HEIGHT: 72 IN | BODY MASS INDEX: 42.66 KG/M2 | TEMPERATURE: 98.5 F | WEIGHT: 315 LBS | OXYGEN SATURATION: 98 % | HEART RATE: 65 BPM | RESPIRATION RATE: 18 BRPM | DIASTOLIC BLOOD PRESSURE: 72 MMHG

## 2023-01-31 DIAGNOSIS — R52 PAIN MANAGEMENT: ICD-10-CM

## 2023-01-31 LAB — METER GLUCOSE: 140 MG/DL (ref 74–99)

## 2023-01-31 PROCEDURE — 3600000002 HC SURGERY LEVEL 2 BASE: Performed by: PAIN MEDICINE

## 2023-01-31 PROCEDURE — 2500000003 HC RX 250 WO HCPCS: Performed by: PAIN MEDICINE

## 2023-01-31 PROCEDURE — 2709999900 HC NON-CHARGEABLE SUPPLY: Performed by: PAIN MEDICINE

## 2023-01-31 PROCEDURE — 7100000010 HC PHASE II RECOVERY - FIRST 15 MIN: Performed by: PAIN MEDICINE

## 2023-01-31 PROCEDURE — 64483 NJX AA&/STRD TFRM EPI L/S 1: CPT | Performed by: PAIN MEDICINE

## 2023-01-31 PROCEDURE — 82962 GLUCOSE BLOOD TEST: CPT

## 2023-01-31 PROCEDURE — 7100000011 HC PHASE II RECOVERY - ADDTL 15 MIN: Performed by: PAIN MEDICINE

## 2023-01-31 PROCEDURE — 3209999900 FLUORO FOR SURGICAL PROCEDURES

## 2023-01-31 PROCEDURE — 6360000002 HC RX W HCPCS: Performed by: PAIN MEDICINE

## 2023-01-31 PROCEDURE — 6360000004 HC RX CONTRAST MEDICATION: Performed by: PAIN MEDICINE

## 2023-01-31 RX ORDER — METHYLPREDNISOLONE ACETATE 40 MG/ML
INJECTION, SUSPENSION INTRA-ARTICULAR; INTRALESIONAL; INTRAMUSCULAR; SOFT TISSUE PRN
Status: DISCONTINUED | OUTPATIENT
Start: 2023-01-31 | End: 2023-01-31 | Stop reason: ALTCHOICE

## 2023-01-31 RX ORDER — LIDOCAINE HYDROCHLORIDE 5 MG/ML
INJECTION, SOLUTION INFILTRATION; INTRAVENOUS PRN
Status: DISCONTINUED | OUTPATIENT
Start: 2023-01-31 | End: 2023-01-31 | Stop reason: ALTCHOICE

## 2023-01-31 ASSESSMENT — PAIN DESCRIPTION - DESCRIPTORS: DESCRIPTORS: DISCOMFORT;NAGGING

## 2023-01-31 ASSESSMENT — PAIN - FUNCTIONAL ASSESSMENT: PAIN_FUNCTIONAL_ASSESSMENT: 0-10

## 2023-01-31 NOTE — DISCHARGE INSTRUCTIONS
Benoit Mccartney Block/Radiofrequency  Home Going Instructions    1-Go home, rest for the remainder of the day  2-Please do not lift over 20 pounds the day of the injection  3-If you received sedation No: alcohol, driving, operating lawn mowers, plows, tractors or other dangerous equipment until next morning. Do not make important decisions or sign legal documents for 24 hours. You may experience light headedness, dizziness, nausea or sleepiness after sedation. Do not stay alone. A responsible adult must be with you for 24 hours. You could be nauseated from the medications you have received. Your IV site may be sore and bruised. 4-No dietary restrictions     5-Resume all medications the same day, blood thinners to be resumed 24 hours after injection if you were instructed to stop any. 6-Keep the surgical site clean and dry, you may shower the next morning and remove the      dressing. 7- No sitz baths, tub baths or hot tubs/swimming for 24 hours. 8- If you have any pain at the injection site(s), application of an ice pack to the area should be       helpful, 20 minutes on/20 minutes off for next 48 hours. 9- Call [facility] immediately at if you develop.   Fever greater than 100.4 F  Have bleeding or drainage from the puncture site  Have progressive Leg/arm numbness and or weakness  Loss of control of bowel and or bladder (wet/soil yourself)  Severe headache with inability to lift head  10-You may return to work the next day

## 2023-01-31 NOTE — OP NOTE
2023    Patient: Zuleima Magallanes  :  1955  Age:  79 y.o. Sex:  male     PRE-OPERATIVE DIAGNOSIS: Lumbar disc displacement, lumbar neural foraminal stenosis, lumbar radiculopathy. POST-OPERATIVE DIAGNOSIS: Same. PROCEDURE: Bilateral Transforaminal epidural steroid injection under fluoroscopic guidance at foraminal level L5.  SURGEON: LESLIE King. ANESTHESIA: local    ESTIMATED BLOOD LOSS: None.  ______________________________________________________________________  BRIEF HISTORY: Zuleima Magallanes comes in today for the Bilateral transforaminal epidural steroid injection under fluoroscopic guidance at foraminal level L5. The potential complications of this procedure were discussed with him again today. He has elected to undergo the aforementioned procedure. Stuart complete History & Physical examination were reviewed in depth, a copy of which is in the chart. DESCRIPTION OF PROCEDURE:    After confirming written and informed consent, a time-out was performed and Stuart name and date of birth, the procedure to be performed as well as the plan for the location of the needle insertion were confirmed. The patient was brought into the procedure room and placed in the prone position on the fluoroscopy table. Standard monitors were placed and vital signs were observed throughout the procedure. The area of the lumbar spine was prepped with chloraprep and draped in a sterile manner. The vertebral body was identified with AP fluoroscopy. An oblique view was obtained to better visualize the inferior junction of the pedicle and transverse process . The 6 o'clock position of the pedicle was marked and identified. The skin and subcutaneous tissue were anesthetized with 0.5% lidocaine. A # 22 gauge pencil point needle was directed toward the targeted point under fluoroscopy until bone was contacted. The needle was then walked inferiorly until the neural foramen was entered .  A lateral fluoroscopic view was then used to place the needle tip in the middle to anterior aspect of the foramen. Negative aspiration was confirmed for blood and CSF and 1 cc of Isovue-M 300 contrast was injected at each level under live AP fluoroscopy. Appropriate neurograms were observed under live AP fluoroscopy. Then after negative aspiration, a solution of the 2 cc of 0.5% lidocaine and 40 mg DepoMedrol was easily injected between each level. The needles were removed with constant aspiration technique. The patient's back was cleaned and a bandage was placed over the needle insertion points    Disposition the patient tolerated the procedure well and there were no complications . Vital signs remained stable throughout the procedure. The patient was escorted to the recovery area where they remained until discharge and written discharge instructions for the procedure were given. Plan: [id number] will return to our pain management center as scheduled.      Edouard Green DO

## 2023-01-31 NOTE — H&P
[facility]        [address] [facility], [address]  Dept: [phone]    Procedure History & Physical      Mayda Holliday     HPI:    Patient  is here for LBP BLE pain for b/l L5 TFESI  Labs/imaging studies reviewed   All question and concerns addressed including R/B/A associated with the procedure    Past Medical History:   Diagnosis Date    Anticoagulant long-term use     Atrial fibrillation (HCC)     CHF (congestive heart failure) ([facility] [address]     Degenerative joint disease of left hip 01/28/2015    Diabetes mellitus ([facility] [address]     Hyperlipidemia     Hypertension     Hypertension     Obesity     AMIRAH on CPAP     PONV (postoperative nausea and vomiting)     Radiculopathy of lumbosacral region 07/25/2017    for procedure 5/5/22    Type II or unspecified type diabetes mellitus without mention of complication, not stated as uncontrolled        Past Surgical History:   Procedure Laterality Date    APPENDECTOMY      CHOLECYSTECTOMY      CYST INCISION AND DRAINAGE  06/08/2017    abd wall cyst    HERNIA REPAIR      HIP ARTHROPLASTY Left     JOINT REPLACEMENT      PAIN MANAGEMENT PROCEDURE Bilateral 5/12/2022    BILATERAL L5 TRANSFORAMINAL EPIDURAL STEROID INJECTION #1 performed by Patt Luna DO at [address] [facility] [address]       Prior to Admission medications    Medication Sig Start Date End Date Taking?  Authorizing Provider   carvedilol (COREG) 25 MG tablet TAKE 1 TABLET BY MOUTH TWICE DAILY 1/6/23   Tonya Mcfadden MD   furosemide (LASIX) 20 MG tablet TAKE 1 TABLET BY MOUTH EVERY DAY 12/21/22   Tonya Mcfadden MD   dilTIAZem [facility]) 240 MG extended release capsule TAKE 1 CAPSULE BY MOUTH EVERY DAY 11/11/22   Tonya Mcfadden MD   benazepril (LOTENSIN) 20 MG tablet TAKE 1 TABLET BY MOUTH TWICE DAILY 11/8/22   Tonya Mcfadden MD   glipiZIDE (GLUCOTROL) 10 MG tablet TAKE 1 TABLET BY MOUTH TWICE DAILY BEFORE MEALS 11/8/22   MD HIRAM Culver UF III MINI PEN NEEDLES 31G X 5 MM MISC USE TO TEST BLOOD SUGAR ONCE A DAY 9/21/22   Kg Kruse MD   metFORMIN (GLUCOPHAGE) 1000 MG tablet TAKE 1 TABLET BY MOUTH TWICE DAILY WITH MEALS 9/6/22   Kg Kruse MD   hydroCHLOROthiazide (HYDRODIURIL) 25 MG tablet TAKE 1 TABLET BY MOUTH EVERY DAY FOR BLOOD PRESSURE 8/16/22   Kg Kruse MD   XARELTO 20 MG TABS tablet TAKE 1 TABLET BY MOUTH EVERY MORNING WITH BREAKFAST 8/11/22   Kg Kruse MD   LEVEMIR FLEXTOUCH 100 UNIT/ML injection pen ADMINISTER [address] SKIN EVERY NIGHT 6/20/22   Kg Kruse MD   Handicap Placard [facility] by Does not apply route Please provide a handicap placard through 05/31/2027 3/31/22   Chayito Mayer DO   dilTIAZem (CARDIZEM CD) 240 MG extended release capsule Take 1 capsule by mouth once daily 11/11/21   Kg Kruse MD   blood glucose monitor strips 1 strip by Other route daily Test 1 time a day & as needed for symptoms of irregular blood glucose. Dispense sufficient amount for indicated testing frequency plus additional to accommodate PRN testing needs.  ReliOn prime test strips 8/6/21   Kg Kruse MD   Insulin Pen Needle (PEN NEEDLES 5/16\") 30G X 8 MM MISC 1 each by Does not apply route daily 8/6/21   Kg Kruse MD   Alpha-Lipoic Acid 600 MG TABS Take 1 Bottle by mouth daily 7/8/20   Shahnaz Choe DPM   CPAP Machine MISC by Does not apply route nightly    Historical Provider, MD       Allergies   Allergen Reactions    Other Itching     Throat itches when he eats magnus nuts    Lipitor      Body pain, DIARRHEA ETC. HAS PROBS WITH ALL CHOLESTEROL MEDS-MOST PROB WITH LIPITOR       Social History     Socioeconomic History    Marital status:      Spouse name: Not on file    Number of children: Not on file    Years of education: Not on file    Highest education level: Not on file   Occupational History    Not on file   Tobacco Use    Smoking status: Never    Smokeless tobacco: Never   Vaping Use    Vaping Use: Never used   Substance and Sexual Activity    Alcohol use: No     Alcohol/week: 0.0 standard drinks    Drug use: No    Sexual activity: Not on file   Other Topics Concern    Not on file   Social History Narrative    Not on file     Social Determinants of Health     Financial Resource Strain: Not on file   Food Insecurity: Not on file   Transportation Needs: Not on file   Physical Activity: Unknown    Days of Exercise per Week: 0 days    Minutes of Exercise per Session: Not on file   Stress: Not on file   Social Connections: Not on file   Intimate Partner Violence: Not on file   Housing Stability: Not on file       Family History   Problem Relation Age of Onset    High Blood Pressure Mother     Cancer Mother         lymphoma    Stroke Mother     Diabetes Father     Heart Surgery Sister         heart valve replacement    Heart Disease Brother         pt was waiting for a heart transplant    Cancer Maternal Grandfather     Cancer Paternal Grandmother     Cancer Paternal Grandfather          REVIEW OF SYSTEMS:    CONSTITUTIONAL:  negative for  fevers, chills, sweats and fatigue    RESPIRATORY:  negative for  dry cough, cough with sputum, dyspnea, wheezing and chest pain    CARDIOVASCULAR:  negative for chest pain, dyspnea, palpitations, syncope    GASTROINTESTINAL:  negative for nausea, vomiting, change in bowel habits, diarrhea, constipation and abdominal pain    MUSCULOSKELETAL: negative for muscle weakness    SKIN: negative for itching or rashes.     BEHAVIOR/PSYCH:  negative for poor appetite, increased appetite, decreased sleep and poor concentration    All other systems negative      PHYSICAL EXAM:    VITALS:  BP (!) 143/76   Pulse 60   Temp 98.5 °F (36.9 °C) (Temporal)   Resp 18   Ht 6' (1.829 m)   Wt (!) 325 lb (147.4 kg)   SpO2 98%   BMI 44.08 kg/m²     CONSTITUTIONAL:  awake, alert, cooperative, no apparent distress, and appears stated age    EYES: PERRLA, EOMI    LUNGS: No increased work of breathing, no audible wheezing    CARDIOVASCULAR:  regular rate and rhythm    ABDOMEN:  Soft non tender non distended     EXTREMITIES: no signs of clubbing or cyanosis. MUSCULOSKELETAL: negative for flaccid muscle tone or spastic movements. SKIN: gross examination reveals no signs of rashes, or diaphoresis. NEURO: Cranial nerves II-XII grossly intact. No signs of agitated mood.        Assessment/Plan:    LBP BLE pain for b/l L5 TFESI

## 2023-02-06 DIAGNOSIS — E11.69 TYPE 2 DIABETES MELLITUS WITH OTHER SPECIFIED COMPLICATION, WITH LONG-TERM CURRENT USE OF INSULIN (HCC): ICD-10-CM

## 2023-02-06 DIAGNOSIS — Z79.4 TYPE 2 DIABETES MELLITUS WITH OTHER SPECIFIED COMPLICATION, WITH LONG-TERM CURRENT USE OF INSULIN (HCC): ICD-10-CM

## 2023-02-07 RX ORDER — BENAZEPRIL HYDROCHLORIDE 20 MG/1
TABLET ORAL
Qty: 180 TABLET | Refills: 1 | OUTPATIENT
Start: 2023-02-07

## 2023-02-07 RX ORDER — GLIPIZIDE 10 MG/1
TABLET ORAL
Qty: 180 TABLET | Refills: 1 | OUTPATIENT
Start: 2023-02-07

## 2023-02-13 NOTE — TELEPHONE ENCOUNTER
Last Appointment:  9/19/2022  Future Appointments   Date Time Provider Edmar Méndezi   2/17/2023 11:00 AM Tasia Matthews MD [facility][1]   2/21/2023  3:00 PM DO EMILIANA LaraM PAIN [facility]   3/3/2023 11:00 AM Royer Rojas DPM Col Podiatry [facility]

## 2023-02-14 RX ORDER — HYDROCHLOROTHIAZIDE 25 MG/1
TABLET ORAL
Qty: 90 TABLET | Refills: 1 | Status: SHIPPED | OUTPATIENT
Start: 2023-02-14

## 2023-02-17 ENCOUNTER — OFFICE VISIT (OUTPATIENT)
Dept: FAMILY MEDICINE CLINIC | Age: 68
End: 2023-02-17

## 2023-02-17 VITALS
DIASTOLIC BLOOD PRESSURE: 86 MMHG | HEART RATE: 77 BPM | SYSTOLIC BLOOD PRESSURE: 134 MMHG | HEIGHT: 72 IN | WEIGHT: 315 LBS | BODY MASS INDEX: 42.66 KG/M2 | OXYGEN SATURATION: 98 % | TEMPERATURE: 97.8 F

## 2023-02-17 DIAGNOSIS — G62.9 PERIPHERAL POLYNEUROPATHY: ICD-10-CM

## 2023-02-17 DIAGNOSIS — Z79.4 TYPE 2 DIABETES MELLITUS WITH OTHER SPECIFIED COMPLICATION, WITH LONG-TERM CURRENT USE OF INSULIN (HCC): ICD-10-CM

## 2023-02-17 DIAGNOSIS — Z79.4 TYPE 2 DIABETES MELLITUS WITH OTHER SPECIFIED COMPLICATION, WITH LONG-TERM CURRENT USE OF INSULIN (HCC): Primary | ICD-10-CM

## 2023-02-17 DIAGNOSIS — E66.01 OBESITY, CLASS III, BMI 40-49.9 (MORBID OBESITY) (HCC): ICD-10-CM

## 2023-02-17 DIAGNOSIS — E11.69 TYPE 2 DIABETES MELLITUS WITH OTHER SPECIFIED COMPLICATION, WITH LONG-TERM CURRENT USE OF INSULIN (HCC): Primary | ICD-10-CM

## 2023-02-17 DIAGNOSIS — E11.69 TYPE 2 DIABETES MELLITUS WITH OTHER SPECIFIED COMPLICATION, WITH LONG-TERM CURRENT USE OF INSULIN (HCC): ICD-10-CM

## 2023-02-17 LAB
ALBUMIN SERPL-MCNC: 4.1 G/DL (ref 3.5–5.2)
ALP BLD-CCNC: 95 U/L (ref 40–129)
ALT SERPL-CCNC: 19 U/L (ref 0–40)
ANION GAP SERPL CALCULATED.3IONS-SCNC: 18 MMOL/L (ref 7–16)
AST SERPL-CCNC: 20 U/L (ref 0–39)
BASOPHILS ABSOLUTE: 0.06 E9/L (ref 0–0.2)
BASOPHILS RELATIVE PERCENT: 0.6 % (ref 0–2)
BILIRUB SERPL-MCNC: 0.5 MG/DL (ref 0–1.2)
BUN BLDV-MCNC: 20 MG/DL (ref 6–23)
CALCIUM SERPL-MCNC: 9.4 MG/DL (ref 8.6–10.2)
CHLORIDE BLD-SCNC: 104 MMOL/L (ref 98–107)
CHOLESTEROL, TOTAL: 164 MG/DL (ref 0–199)
CO2: 20 MMOL/L (ref 22–29)
CREAT SERPL-MCNC: 0.9 MG/DL (ref 0.7–1.2)
CREATININE URINE: 119 MG/DL (ref 40–278)
EOSINOPHILS ABSOLUTE: 0.21 E9/L (ref 0.05–0.5)
EOSINOPHILS RELATIVE PERCENT: 2.3 % (ref 0–6)
FOLATE: 16.9 NG/ML (ref 4.8–24.2)
GFR SERPL CREATININE-BSD FRML MDRD: >60 ML/MIN/1.73
GLUCOSE BLD-MCNC: 195 MG/DL (ref 74–99)
HBA1C MFR BLD: 9.7 %
HCT VFR BLD CALC: 48.3 % (ref 37–54)
HDLC SERPL-MCNC: 42 MG/DL
HEMOGLOBIN: 16 G/DL (ref 12.5–16.5)
IMMATURE GRANULOCYTES #: 0.05 E9/L
IMMATURE GRANULOCYTES %: 0.5 % (ref 0–5)
LDL CHOLESTEROL CALCULATED: 101 MG/DL (ref 0–99)
LYMPHOCYTES ABSOLUTE: 1.47 E9/L (ref 1.5–4)
LYMPHOCYTES RELATIVE PERCENT: 15.9 % (ref 20–42)
MCH RBC QN AUTO: 27.9 PG (ref 26–35)
MCHC RBC AUTO-ENTMCNC: 33.1 % (ref 32–34.5)
MCV RBC AUTO: 84.3 FL (ref 80–99.9)
MICROALBUMIN UR-MCNC: 70 MG/L
MICROALBUMIN/CREAT UR-RTO: 58.8 (ref 0–30)
MONOCYTES ABSOLUTE: 0.86 E9/L (ref 0.1–0.95)
MONOCYTES RELATIVE PERCENT: 9.3 % (ref 2–12)
NEUTROPHILS ABSOLUTE: 6.59 E9/L (ref 1.8–7.3)
NEUTROPHILS RELATIVE PERCENT: 71.4 % (ref 43–80)
PDW BLD-RTO: 15.5 FL (ref 11.5–15)
PLATELET # BLD: 236 E9/L (ref 130–450)
PMV BLD AUTO: 11.6 FL (ref 7–12)
POTASSIUM SERPL-SCNC: 4.2 MMOL/L (ref 3.5–5)
RBC # BLD: 5.73 E12/L (ref 3.8–5.8)
SODIUM BLD-SCNC: 142 MMOL/L (ref 132–146)
TOTAL PROTEIN: 7.9 G/DL (ref 6.4–8.3)
TRIGL SERPL-MCNC: 105 MG/DL (ref 0–149)
VITAMIN B-12: 356 PG/ML (ref 211–946)
VLDLC SERPL CALC-MCNC: 21 MG/DL
WBC # BLD: 9.2 E9/L (ref 4.5–11.5)

## 2023-02-17 RX ORDER — INSULIN DETEMIR 100 [IU]/ML
INJECTION, SOLUTION SUBCUTANEOUS
Qty: 15 ML | Refills: 3 | Status: CANCELLED | OUTPATIENT
Start: 2023-02-17

## 2023-02-17 ASSESSMENT — PATIENT HEALTH QUESTIONNAIRE - PHQ9
SUM OF ALL RESPONSES TO PHQ QUESTIONS 1-9: 0
SUM OF ALL RESPONSES TO PHQ9 QUESTIONS 1 & 2: 0
SUM OF ALL RESPONSES TO PHQ QUESTIONS 1-9: 0
2. FEELING DOWN, DEPRESSED OR HOPELESS: 0
1. LITTLE INTEREST OR PLEASURE IN DOING THINGS: 0

## 2023-02-17 NOTE — PROGRESS NOTES
[facility]  Office Progress Note - Dr. Giselle Siddiqi  2/17/23    CC:   Chief Complaint   Patient presents with    Diabetes        /86   Pulse 77   Temp 97.8 °F (36.6 °C) (Temporal)   Ht 6' (1.829 m)   Wt (!) 322 lb (146.1 kg)   SpO2 98%   BMI 43.67 kg/m²   Wt Readings from Last 3 Encounters:   02/17/23 (!) 322 lb (146.1 kg)   01/31/23 (!) 325 lb (147.4 kg)   11/09/22 (!) 322 lb (146.1 kg)       HPI:     Diabetes  Last A1c 8.8 % 08/2022, A1c 9.7% today  Current regimen metformin 1000 mg BID, glipizide 10 mg qd and Levemir 15   Hes had a couple injections in his back or steroid which likely are contributing to A1c injcrease. FBS has been 200 or so lately. Doesn't seem to matter what he eats. 50 georgie units levemir. Looking good besides his glucose and microalbuminuria    Hes now finally back on his CPAP machine and sleeping so much better and more restful  Using nightly, all night long. He sleeps at least 7 hours nightly. Decrease snoring .  _________________________________________________________    Assessment / Hina Canalese was seen today for diabetes. Diagnoses and all orders for this visit:    Type 2 diabetes mellitus with other specified complication, with long-term current use of insulin ([address] worsening  -     POCT glycosylated hemoglobin (Hb A1C)  -Continue   metFORMIN (GLUCOPHAGE) 1000 MG tablet; TAKE 1 TABLET BY MOUTH TWICE DAILY WITH MEALS  -     Start dulaglutide 0.75 MG/0.5ML SOPN; Inject 0.75 mg into the skin once a week  -     CBC with Auto Differential; Future  -     Comprehensive Metabolic Panel; Future  -     Lipid Panel; Future  -     Microalbumin / Creatinine Urine Ratio; Future  Continue insulin unchanged. He is not yet ready to add mealtime insulin. After this appointment he found the Trulicity was too expensive, [place] is too scarce, and we are waiting to hear back from him whether he would like to try Victoza.   Already on ACE inhibitor regarding microalbuminuria. Peripheral polyneuropathy  -     Vitamin B12 & Folate; Future    Obesity, Class III, BMI 40-49.9 (morbid obesity) ([facility] [address]  Weight loss needed      Return in about 3 months (around 5/17/2023). or as scheduled. Patient counseled to follow up sooner or seek more acute care if symptoms worsening or not improving according to plan. Electronically signed by Nasrin David MD on 2/19/2023    _________________________________________________________  Current Outpatient Medications on File Prior to Visit   Medication Sig Dispense Refill    hydroCHLOROthiazide (HYDRODIURIL) 25 MG tablet TAKE 1 TABLET BY MOUTH EVERY DAY FOR BLOOD PRESSURE 90 tablet 1    carvedilol (COREG) 25 MG tablet TAKE 1 TABLET BY MOUTH TWICE DAILY 180 tablet 1    furosemide (LASIX) 20 MG tablet TAKE 1 TABLET BY MOUTH EVERY DAY 90 tablet 1    dilTIAZem (TIAZAC) 240 MG extended release capsule TAKE 1 CAPSULE BY MOUTH EVERY DAY 90 capsule 1    benazepril (LOTENSIN) 20 MG tablet TAKE 1 TABLET BY MOUTH TWICE DAILY 180 tablet 1    glipiZIDE (GLUCOTROL) 10 MG tablet TAKE 1 TABLET BY MOUTH TWICE DAILY BEFORE MEALS 180 tablet 1    B-D UF III MINI PEN NEEDLES 31G X 5 MM MISC USE TO TEST BLOOD SUGAR ONCE A  each 3    XARELTO 20 MG TABS tablet TAKE 1 TABLET BY MOUTH EVERY MORNING WITH BREAKFAST 90 tablet 1    LEVEMIR FLEXTOUCH 100 UNIT/ML injection pen ADMINISTER 45 UNITS UNDER THE SKIN EVERY NIGHT 15 mL 3    Handicap Placard MISC by Does not apply route Please provide a handicap placard through 05/31/2027 1 each 0    dilTIAZem (CARDIZEM CD) 240 MG extended release capsule Take 1 capsule by mouth once daily 90 capsule 1    blood glucose monitor strips 1 strip by Other route daily Test 1 time a day & as needed for symptoms of irregular blood glucose. Dispense sufficient amount for indicated testing frequency plus additional to accommodate PRN testing needs.  ReliOn prime test strips 100 strip 10    Insulin Pen Needle (PEN NEEDLES 5/16\") 30G X 8 MM MISC 1 each by Does not apply route daily 100 each 3    Alpha-Lipoic Acid 600 MG TABS Take 1 Bottle by mouth daily 60 tablet 1    CPAP Machine MISC by Does not apply route nightly       No current facility-administered medications on file prior to visit.        Patient Active Problem List   Diagnosis Code    Degenerative joint disease of left hip M16.12    Mixed hyperlipidemia E78.2    Anticoagulated on Coumadin Z79.01    Essential hypertension I10    Morbid obesity due to excess calories (HCC) E66.01    Diabetes mellitus ([address] E11.9    AMIRAH on CPAP G47.33, Z99.89    Radiculopathy of lumbosacral region M54.17    Statin intolerance Z78.9    Persistent atrial fibrillation (HCC) I48.19    Chronic pain syndrome G89.4    Lumbar disc disorder M51.9    Lumbar radiculopathy M54.16    Right foot drop M21.371    Spondylolisthesis of lumbar region M43.16     _________________________________________________________  Past Medical History:   Diagnosis Date    Anticoagulant long-term use     Atrial fibrillation (HCC)     CHF (congestive heart failure) (HCC)     Degenerative joint disease of left hip 01/28/2015    Diabetes mellitus (HCC)     Hyperlipidemia     Hypertension     Hypertension     Obesity     AMIRAH on CPAP     PONV (postoperative nausea and vomiting)     Radiculopathy of lumbosacral region 07/25/2017    for procedure 5/5/22    Type II or unspecified type diabetes mellitus without mention of complication, not stated as uncontrolled        Family History   Problem Relation Age of Onset    High Blood Pressure Mother     Cancer Mother         lymphoma    Stroke Mother     Diabetes Father     Heart Surgery Sister         heart valve replacement    Heart Disease Brother         pt was waiting for a heart transplant    Cancer Maternal Grandfather     Cancer Paternal Grandmother     Cancer Paternal Grandfather        Past Surgical History:   Procedure Laterality Date    APPENDECTOMY      CHOLECYSTECTOMY CYST INCISION AND DRAINAGE  06/08/2017    abd wall cyst    HERNIA REPAIR      HIP ARTHROPLASTY Left     JOINT REPLACEMENT      PAIN MANAGEMENT PROCEDURE Bilateral 5/12/2022    BILATERAL L5 TRANSFORAMINAL EPIDURAL STEROID INJECTION #1 performed by Rosalia Ochoa DO at [address] Bilateral 1/31/2023    BILATERAL L5 TRANSFORAMINAL EPIDURAL STEROID INJECTION performed by Rosalia Ochoa DO at [address] History     Tobacco Use    Smoking status: Never    Smokeless tobacco: Never   Vaping Use    Vaping Use: Never used   Substance Use Topics    Alcohol use: No     Alcohol/week: 0.0 standard drinks    Drug use: No       Chart reviewed and updated where appropriate for PMH, Fam, and Soc Hx.  _________________________________________________________  ROS: POSITIVE: As in the HPI. Otherwise Pertinent negatives are negative.    __________________________________________________________  Physical Exam   Constitutional:    He is oriented to person, place, and time. He appears well-developed and well-nourished. Eyes:    Conjunctivae are normal.    Pupils are equal, round, and reactive to light. EOMI. Neck:    Normal range of motion. No thyromegaly or nodules noted. No bruit. No LAD. Cardiovascular:    Normal rate, regular rhythm and normal heart sounds. No murmur. No gallop and no friction rub. Pulmonary/Chest:    Effort normal and breath sounds normal.    No wheezes. No rales or rhonchi. Abdominal:    Soft. Obese. Bowel sounds are normal.    No distension. No tenderness. Musculoskeletal:    Normal range of motion. No joint swelling noted. Chronic stable peripheral edema. Skin:    Skin is warm and dry. No rashes, lesions. Psychiatric:    He has a normal mood and affect. Normal groom and dress. No SI or HI.   ________________________________________________________    This note may have been created using dictation software.  Efforts were made to reduce errors, but some may persist.

## 2023-02-20 RX ORDER — LIRAGLUTIDE 6 MG/ML
INJECTION SUBCUTANEOUS
Qty: 6 ML | Refills: 2 | Status: SHIPPED | OUTPATIENT
Start: 2023-02-20 | End: 2023-03-22

## 2023-02-21 ENCOUNTER — OFFICE VISIT (OUTPATIENT)
Dept: PAIN MANAGEMENT | Age: 68
End: 2023-02-21
Payer: MEDICARE

## 2023-02-21 ENCOUNTER — PREP FOR PROCEDURE (OUTPATIENT)
Dept: PAIN MANAGEMENT | Age: 68
End: 2023-02-21

## 2023-02-21 VITALS
RESPIRATION RATE: 16 BRPM | BODY MASS INDEX: 42.66 KG/M2 | DIASTOLIC BLOOD PRESSURE: 79 MMHG | WEIGHT: 315 LBS | TEMPERATURE: 97.2 F | SYSTOLIC BLOOD PRESSURE: 126 MMHG | HEIGHT: 72 IN | HEART RATE: 73 BPM | OXYGEN SATURATION: 97 %

## 2023-02-21 DIAGNOSIS — M54.16 LUMBAR RADICULOPATHY: Primary | ICD-10-CM

## 2023-02-21 DIAGNOSIS — M43.16 SPONDYLOLISTHESIS OF LUMBAR REGION: ICD-10-CM

## 2023-02-21 DIAGNOSIS — M54.16 LUMBAR RADICULOPATHY: ICD-10-CM

## 2023-02-21 DIAGNOSIS — M51.9 LUMBAR DISC DISORDER: ICD-10-CM

## 2023-02-21 DIAGNOSIS — M21.371 RIGHT FOOT DROP: ICD-10-CM

## 2023-02-21 DIAGNOSIS — G89.4 CHRONIC PAIN SYNDROME: Primary | ICD-10-CM

## 2023-02-21 PROCEDURE — 1123F ACP DISCUSS/DSCN MKR DOCD: CPT | Performed by: PAIN MEDICINE

## 2023-02-21 PROCEDURE — 3074F SYST BP LT 130 MM HG: CPT | Performed by: PAIN MEDICINE

## 2023-02-21 PROCEDURE — 1036F TOBACCO NON-USER: CPT | Performed by: PAIN MEDICINE

## 2023-02-21 PROCEDURE — 3078F DIAST BP <80 MM HG: CPT | Performed by: PAIN MEDICINE

## 2023-02-21 PROCEDURE — 99213 OFFICE O/P EST LOW 20 MIN: CPT | Performed by: PAIN MEDICINE

## 2023-02-21 PROCEDURE — 3017F COLORECTAL CA SCREEN DOC REV: CPT | Performed by: PAIN MEDICINE

## 2023-02-21 PROCEDURE — G8484 FLU IMMUNIZE NO ADMIN: HCPCS | Performed by: PAIN MEDICINE

## 2023-02-21 PROCEDURE — G8427 DOCREV CUR MEDS BY ELIG CLIN: HCPCS | Performed by: PAIN MEDICINE

## 2023-02-21 PROCEDURE — G8417 CALC BMI ABV UP PARAM F/U: HCPCS | Performed by: PAIN MEDICINE

## 2023-02-21 NOTE — PROGRESS NOTES
[facility]        [address]  [facility], [address] [facility]  Dept: [phone]        Follow up Note      Ryan Angel     Date of Visit:  02/21/23     CC:  Patient presents for follow up   Chief Complaint   Patient presents with    Follow-up     Lower back        HPI:    Pain is better. Change in quality of symptoms:no. Medication side effects:not applicable . Recent diagnostic testing:none. Recent interventional procedures:b/l L5 TFESI with 80-90% relief. He has been on anticoagulation medications to include Raul Foster and has not been on herbal supplements. He is diabetic. Imaging:   3/2022 lumbar MRI -  FINDINGS:   BONES/ALIGNMENT: There is loss of the normal lumbar lordosis. The vertebral   body heights are maintained. There is a ritual body hemangioma in the L2 and   L4 vertebral bodies. There is multilevel degenerative disc disease with loss   of disc signal.  There is mild disc space narrowing at L5-S1. There is no   significant spondylolisthesis. SPINAL CORD: The conus is normal in caliber and signal and terminates at the   L1 level. The cauda equina is unremarkable. SOFT TISSUES: The posterior paraspinal soft tissues are unremarkable. The   visualized abdominal structures are unremarkable. L1-L2: There is a circumferential disc bulge. There is no canal stenosis or   foraminal narrowing. L2-L3: There is a circumferential disc bulge. There is no canal stenosis or   foraminal narrowing. L3-L4: There is a circumferential disc bulge. There is no canal stenosis or   significant foraminal narrowing. L4-L5: There is a circumferential disc bulge. There is no canal stenosis or   significant foraminal narrowing. L5-S1: There is a circumferential disc bulge. There is no canal stenosis. There is moderate to severe bilateral foraminal narrowing. There is   narrowing of the lateral recesses.            Impression Multilevel degenerative change most pronounced at L5-S1 where there is   moderate to severe bilateral foraminal narrowing and narrowing of the lateral   recesses. 3/2022 lumbar f/e films -  FINDINGS:   No instability on flexion or extension. Moderate degenerative disc disease   at L5-S1. Nothing else active. Impression   No instability evident. Degenerative disc disease at L5-S1.     2/2022 L hip xray -  FINDINGS:   Anatomically aligned left MALCOM with no abnormal bone or soft tissue findings. Impression   Anatomically aligned left MALCOM with no unexpected findings. 2/2022 rt hip xray -  FINDINGS:   Mild osteoarthritis at the right hip. No fracture or dislocation. Normal   soft tissues. Impression   Mild osteoarthritis at the right hip.      11/2021 xray SIJ -  FINDINGS:   There is no evidence of acute fracture. There is normal alignment. No acute   joint abnormality. No focal osseous lesion. No focal soft tissue   abnormality. Left hip arthroplasty. Degenerative changes L5-S1           Impression   No acute osseous abnormality. Grossly normal appearing SI joints. Degenerative changes L5-S1.      11/2021 xray lumbar -  FINDINGS:   There are moderate-advanced degenerative disc changes at L5-S1 with evidence   of vacuum disc phenomenon. Mild degenerative changes are present elsewhere. No acute fracture is identified. Grade 1 retrolisthesis at L5-S1 is similar   to the previous exam.  A left hip replacement is partially imaged. There are   calcified phleboliths in the pelvis. Impression   1. No acute osseous abnormality is identified.    2. Degenerative changes appear most prominent at L5-S1 with grade 1   retrolisthesis, similar to the previous exam.        Potential Aberrant Drug-Related Behavior:      Urine Drug Screening:    OARRS report:    Past Medical History:   Diagnosis Date    Anticoagulant long-term use     Atrial fibrillation ([facility] [address] CHF (congestive heart failure) (HCC)     Degenerative joint disease of left hip 01/28/2015    Diabetes mellitus ([facility] [address]     Hyperlipidemia     Hypertension     Hypertension     Obesity     AMIRAH on CPAP     PONV (postoperative nausea and vomiting)     Radiculopathy of lumbosacral region 07/25/2017    for procedure 5/5/22    Type II or unspecified type diabetes mellitus without mention of complication, not stated as uncontrolled        Past Surgical History:   Procedure Laterality Date    APPENDECTOMY      CHOLECYSTECTOMY      CYST INCISION AND DRAINAGE  06/08/2017    abd wall cyst    HERNIA REPAIR      HIP ARTHROPLASTY Left     JOINT REPLACEMENT      PAIN MANAGEMENT PROCEDURE Bilateral 5/12/2022    BILATERAL L5 TRANSFORAMINAL EPIDURAL STEROID INJECTION #1 performed by April Dennis DO at [address] Bilateral 1/31/2023    BILATERAL L5 TRANSFORAMINAL EPIDURAL STEROID INJECTION performed by April Dennis DO at [address] [facility]       Prior to Admission medications    Medication Sig Start Date End Date Taking? Authorizing Provider   Liraglutide (VICTOZA) 18 MG/3ML SOPN SC injection Inject 0.6 mg into the skin daily for 7 days, THEN 1.2 mg daily for 23 days.  2/20/23 3/22/23 Yes Della Chew MD   metFORMIN (GLUCOPHAGE) 1000 MG tablet TAKE 1 TABLET BY MOUTH TWICE DAILY WITH MEALS 2/17/23  Yes Della Chew MD   hydroCHLOROthiazide (HYDRODIURIL) 25 MG tablet TAKE 1 TABLET BY MOUTH EVERY DAY FOR BLOOD PRESSURE 2/14/23  Yes Della Chew MD   carvedilol (COREG) 25 MG tablet TAKE 1 TABLET BY MOUTH TWICE DAILY 1/6/23  Yes Della Chew MD   furosemide (LASIX) 20 MG tablet TAKE 1 TABLET BY MOUTH EVERY DAY 12/21/22  Yes Della Chew MD   dilTIAZem [facility]) 240 MG extended release capsule TAKE 1 CAPSULE BY MOUTH EVERY DAY 11/11/22  Yes Della Chew MD   benazepril (LOTENSIN) 20 MG tablet TAKE 1 TABLET BY MOUTH TWICE DAILY 11/8/22  Yes Cuco Lee Staci Taylor MD   glipiZIDE (GLUCOTROL) 10 MG tablet TAKE 1 TABLET BY MOUTH TWICE DAILY BEFORE MEALS 11/8/22  Yes Callie Gutiérrez MD   B-D UF III MINI PEN NEEDLES 31G X 5 MM MISC USE TO TEST BLOOD SUGAR ONCE A DAY 9/21/22  Yes Callie Gutiérrez MD   XARELTO 20 MG TABS tablet TAKE 1 TABLET BY MOUTH EVERY MORNING WITH BREAKFAST 8/11/22  Yes Callie Gutiérrez MD   LEVEMIR FLEXTOUCH 100 UNIT/ML injection pen ADMINISTER [address] SKIN EVERY NIGHT 6/20/22  Yes Callie Gutiérrez MD   Handicap Placard [phone] [facility] by Does not apply route Please provide a handicap placard through 05/31/2027 3/31/22  Yes Krystal Reynoso,    dilTIAZem (CARDIZEM CD) 240 MG extended release capsule Take 1 capsule by mouth once daily 11/11/21  Yes Callie Gutiérrez MD   blood glucose monitor strips 1 strip by Other route daily Test 1 time a day & as needed for symptoms of irregular blood glucose. Dispense sufficient amount for indicated testing frequency plus additional to accommodate PRN testing needs.  ReliOn prime test strips 8/6/21  Yes Callie Gutiérrez MD   Insulin Pen Needle (PEN NEEDLES 5/16\") 30G X 8 MM MISC 1 each by Does not apply route daily 8/6/21  Yes Callie Gutiérrez MD   Alpha-Lipoic Acid 600 MG TABS Take 1 Bottle by mouth daily 7/8/20  Yes Nettie Closs, DPM   CPAP Machine MISC by Does not apply route nightly   Yes Historical Provider, MD       Allergies   Allergen Reactions    Other Itching     Throat itches when he eats magnus nuts    Lipitor      Body pain, DIARRHEA ETC. HAS PROBS WITH ALL CHOLESTEROL MEDS-MOST PROB WITH LIPITOR       Social History     Socioeconomic History    Marital status:      Spouse name: Not on file    Number of children: Not on file    Years of education: Not on file    Highest education level: Not on file   Occupational History    Not on file   Tobacco Use    Smoking status: Never    Smokeless tobacco: Never   Vaping Use    Vaping Use: Never used Substance and Sexual Activity    Alcohol use: No     Alcohol/week: 0.0 standard drinks    Drug use: No    Sexual activity: Not on file   Other Topics Concern    Not on file   Social History Narrative    Not on file     Social Determinants of Health     Financial Resource Strain: Not on file   Food Insecurity: Not on file   Transportation Needs: Not on file   Physical Activity: Unknown    Days of Exercise per Week: 0 days    Minutes of Exercise per Session: Not on file   Stress: Not on file   Social Connections: Not on file   Intimate Partner Violence: Not on file   Housing Stability: Not on file       Family History   Problem Relation Age of Onset    High Blood Pressure Mother     Cancer Mother         lymphoma    Stroke Mother     Diabetes Father     Heart Surgery Sister         heart valve replacement    Heart Disease Brother         pt was waiting for a heart transplant    Cancer Maternal Grandfather     Cancer Paternal Grandmother     Cancer Paternal Grandfather        REVIEW OF SYSTEMS:     Shubham Service denies fever/chills, chest pain, shortness of breath, new bowel or bladder complaints. All other review of systems was negative. PHYSICAL EXAMINATION:      /79   Pulse 73   Temp 97.2 °F (36.2 °C) (Temporal)   Resp 16   Ht 6' (1.829 m)   Wt (!) 325 lb (147.4 kg)   SpO2 97%   BMI 44.08 kg/m²     General:       General appearance:pleasant and well-hydrated, in no distress and A & O x3  Build:Obese  Function:Rises from a seated position with difficulty     HEENT:     Head:normocephalic, atraumatic  Pupils:regular, round, equal  Sclera: icterus absent     Lungs:     Breathing:normal breathing pattern     Abdomen:     Shape:obese and non-distended  Tenderness:none  Guarding:none     Lumbar spine:     Spine inspection:normal   CVA tenderness:No   Palpation:tenderness paravertebral muscles, left negative, right positive.   Range of motion:abnormal mildly in lateral bending, flexion, extension rotation bilateral and is painful. Musculoskeletal:     Trigger points in Paraveteral:absent bilaterally  SI joint tenderness:negative right, negative left              VALARIE test:not done right, not done left  Piriformis tenderness:negative right, negative left  Trochanteric bursa tenderness:negative right, negative left  SLR:negative right, negative left, sitting      Extremities:     Tremors:None bilaterally upper and lower  Range of motion:pain with internal rotation of hips negative. Intact:Yes  Varicose veins:absent   Pulses:present Lt radial  Cyanosis:none  Edema:+ BLE     Neurological:     Sensory:normal to light touch BLE in stocking distribution  Motor:                Right Quadriceps5/5          Left Quadriceps5/5           Right Gastrocnemius5/5    Left Gastrocnemius5/5  Right Ant Tibialis4+/5  Left Ant Tibialis5/5     Reflexes: (not assessed today)   Right Quadriceps reflex0-1+  Left Quadriceps reflex0-1+  Right Achilles reflexdeferred  Left Achilles reflexdeferred  Gait:normal station     Dermatology:     Skin:no rashes or lesions noted     Impression:     LBP radiating into b/l buttocks/hip in L5 distribution  + Rt foot drop  Lumbar xrays as above  PMHX: a. Fib (on Tyra Brown), CHF, DM, DLD, HTN, besity, AIMRAH (on cpap)  His son  in  of covid    The second TFESI was helpful but not as much as the first  He continues to have pain that \"reminds\" him  His rt foot drop is 4+ at this time and only mildly bothersome at this time - he continues to declines AFO  Going on a trip at the end of May and would like to repeat the injection before his trip     Plan:     Urine screen deferred  OARRS report reviewed  On Tyra Brown for a.  Fib  Consider EMG prn  PT done until 2022  Consider rt AFO based upon response to injections (will coordinate through Tommy cole but his lymphedema braces may be inhibitive)  b/l L5 TFESI #1 with 90% relief 2022, repeat with 80-90% relief- r/b and procedure discussed (needs permission to hold xarelto per sarah guidelines)  Patient encouraged to stay active and to lose weight  Treatment plan discussed with the patient including medication and procedure side effects     Cc:  Referring physician    LESLIE Aly.

## 2023-02-21 NOTE — PROGRESS NOTES
Do you currently have any of the following:    Fever: No  Headache:  No  Cough: No  Shortness of breath: No  Exposed to anyone with these symptoms: No         Hayden Mitchell presents to the [facility] on [facility]/21/2023. Yudith Cintron is complaining of pain low back . The pain is constant. The pain is described as aching. Pain is rated on his best day at a 1, on his worst day at a 4, and on average at a 3 on the VAS scale. He took his last dose of Tylenol 3 weeks ago . Any procedures since your last visit: No    Pacemaker or defibrillator: No     He is not on NSAIDS and is  on anticoagulation medications to include Xarelto and is managed by Dr Alona Suarez . Medication Contract and Consent for Opioid Use Documents Filed        No documents found                    Resp 16   Ht 6' (1.829 m)   Wt (!) 325 lb (147.4 kg)   BMI 44.08 kg/m²      No LMP for male patient.

## 2023-03-03 ENCOUNTER — PROCEDURE VISIT (OUTPATIENT)
Dept: PODIATRY | Age: 68
End: 2023-03-03

## 2023-03-03 DIAGNOSIS — Z79.01 ENCOUNTER FOR CURRENT LONG-TERM USE OF ANTICOAGULANTS: ICD-10-CM

## 2023-03-03 DIAGNOSIS — E11.9 TYPE 2 DIABETES MELLITUS WITHOUT COMPLICATION, UNSPECIFIED WHETHER LONG TERM INSULIN USE (HCC): Primary | ICD-10-CM

## 2023-03-03 DIAGNOSIS — L84 CORNS AND CALLOSITIES: ICD-10-CM

## 2023-03-03 DIAGNOSIS — B35.1 TINEA UNGUIUM: ICD-10-CM

## 2023-03-03 NOTE — PROGRESS NOTES
Pat Renee is here today for nail care. his PCP is Ubaldo Solitario MD last OV was 02/17/2023. CC:   Follow-up diabetic foot and ankle exam    HPI:   Follow-up diabetic foot ankle exam.  No calf pain. History of metformin. Does have compression stockings that he does wear during the day has been tolerating well. History Xarelto. No additional pedal complaints today. ROS:  Const: Denies constitutional symptoms  Musculo: Denies symptoms other than stated above  Skin: Denies symptoms other than stated above      Current Outpatient Medications:     Liraglutide (VICTOZA) 18 MG/3ML SOPN SC injection, Inject 0.6 mg into the skin daily for 7 days, THEN 1.2 mg daily for 23 days. , Disp: 6 mL, Rfl: 2    metFORMIN (GLUCOPHAGE) 1000 MG tablet, TAKE 1 TABLET BY MOUTH TWICE DAILY WITH MEALS, Disp: 180 tablet, Rfl: 1    hydroCHLOROthiazide (HYDRODIURIL) 25 MG tablet, TAKE 1 TABLET BY MOUTH EVERY DAY FOR BLOOD PRESSURE, Disp: 90 tablet, Rfl: 1    carvedilol (COREG) 25 MG tablet, TAKE 1 TABLET BY MOUTH TWICE DAILY, Disp: 180 tablet, Rfl: 1    furosemide (LASIX) 20 MG tablet, TAKE 1 TABLET BY MOUTH EVERY DAY, Disp: 90 tablet, Rfl: 1    dilTIAZem (TIAZAC) 240 MG extended release capsule, TAKE 1 CAPSULE BY MOUTH EVERY DAY, Disp: 90 capsule, Rfl: 1    benazepril (LOTENSIN) 20 MG tablet, TAKE 1 TABLET BY MOUTH TWICE DAILY, Disp: 180 tablet, Rfl: 1    glipiZIDE (GLUCOTROL) 10 MG tablet, TAKE 1 TABLET BY MOUTH TWICE DAILY BEFORE MEALS, Disp: 180 tablet, Rfl: 1    B-D UF III MINI PEN NEEDLES 31G X 5 MM MISC, USE TO TEST BLOOD SUGAR ONCE A DAY, Disp: 100 each, Rfl: 3    XARELTO 20 MG TABS tablet, TAKE 1 TABLET BY MOUTH EVERY MORNING WITH BREAKFAST, Disp: 90 tablet, Rfl: 1    LEVEMIR FLEXTOUCH 100 UNIT/ML injection pen, ADMINISTER 45 UNITS UNDER THE SKIN EVERY NIGHT, Disp: 15 mL, Rfl: 3    Handicap Placard [facility] Does not apply route Please provide a handicap placard through 05/31/2027, Disp: 1 each, Rfl: 0    dilTIAZem (CARDIZEM CD) 240 MG extended release capsule, Take 1 capsule by mouth once daily, Disp: 90 capsule, Rfl: 1    blood glucose monitor strips, 1 strip by Other route daily Test 1 time a day & as needed for symptoms of irregular blood glucose. Dispense sufficient amount for indicated testing frequency plus additional to accommodate PRN testing needs. ReliOn prime test strips, Disp: 100 strip, Rfl: 10    Insulin Pen Needle (PEN NEEDLES 5/16\") 30G X 8 MM MISC, 1 each by Does not apply route daily, Disp: 100 each, Rfl: 3    Alpha-Lipoic Acid 600 MG TABS, Take 1 Bottle by mouth daily, Disp: 60 tablet, Rfl: 1    CPAP Machine MISC, by Does not apply route nightly, Disp: , Rfl:   Allergies   Allergen Reactions    Other Itching     Throat itches when he eats magnus nuts    Lipitor      Body pain, DIARRHEA ETC. HAS PROBS WITH ALL CHOLESTEROL MEDS-MOST PROB WITH LIPITOR       Past Medical History:   Diagnosis Date    Anticoagulant long-term use     Atrial fibrillation (HCC)     CHF (congestive heart failure) (HCC)     Degenerative joint disease of left hip 01/28/2015    Diabetes mellitus ([facility] [address]     Hyperlipidemia     Hypertension     Hypertension     Obesity     AMIRAH on CPAP     PONV (postoperative nausea and vomiting)     Radiculopathy of lumbosacral region 07/25/2017    for procedure 5/5/22    Type II or unspecified type diabetes mellitus without mention of complication, not stated as uncontrolled        There were no vitals filed for this visit. Work History/Social History: [employer]  Orthopedic history: EMG testing June 2020, Alpha-lipoic acid 600mg      Focused Lower Extremity Physical Exam:      Neurovascular examination:    Dorsalis Pedis palpable bilateral.  Posterior tibialis non-palpable bilateral.    Capillary Refill Time:  Immediate return  Hair growth:  Symmetrical and bilateral   Skin:  Not atrophic  Edema: Mild edema bilateral feet. Mild edema bilateral ankles.   Neurologic:  Light touch diminished bilateral.  Warm to coolness bilateral distal toes  Decreased epicritic sensation     Musculoskeletal/ Orthopedic examination:    Equinis: present bilateral  Dorsiflexion, plantarflexion, inversion, eversion bilateral 5 out of 5 muscle strength  Wiggling toes  Negative Homans. No pain foot or ankle. Dermatology examination:    Toenails 1 through 5 bilateral thickened, elongated, dystrophic, mycotic with subungual debris. Web spaces 1 through 4 bilateral clean dry and intact. Hyperkeratotic tissue noted fifth metatarsal bilateral.  No open wounds. Assessment and Plan:  Augustin Iqbal was seen today for callouses, nail problem and diabetes. Diagnoses and all orders for this visit:    Type 2 diabetes mellitus without complication, unspecified whether long term insulin use (HCC)    Tinea unguium    Corns and callosities    Encounter for current long-term use of anticoagulants      Follow-up diabetic foot ankle exam  History anticoagulant therapy Xarelto  History alpha lipoic acid 600 mg daily. Risk and benefits discussed. Tolerating well. Manual debridement with standard technique toenails 1 through 5 right and left in thickness and length. Patient tolerated procedure well. Paring of hyperkeratotic tissue with #15 blade fifth metatarsal bilateral.  Follow-up 2 months diabetic foot ankle exam      Return in about 2 months (around 5/3/2023). Seen By:  Chavo Lane DPM      Document was created using voice recognition software. Note was reviewed however may contain grammatical errors.

## 2023-03-28 SDOH — ECONOMIC STABILITY: TRANSPORTATION INSECURITY
IN THE PAST 12 MONTHS, HAS LACK OF TRANSPORTATION KEPT YOU FROM MEETINGS, WORK, OR FROM GETTING THINGS NEEDED FOR DAILY LIVING?: NO

## 2023-03-28 SDOH — ECONOMIC STABILITY: FOOD INSECURITY: WITHIN THE PAST 12 MONTHS, YOU WORRIED THAT YOUR FOOD WOULD RUN OUT BEFORE YOU GOT MONEY TO BUY MORE.: NEVER TRUE

## 2023-03-28 SDOH — ECONOMIC STABILITY: INCOME INSECURITY: HOW HARD IS IT FOR YOU TO PAY FOR THE VERY BASICS LIKE FOOD, HOUSING, MEDICAL CARE, AND HEATING?: NOT VERY HARD

## 2023-03-28 SDOH — ECONOMIC STABILITY: HOUSING INSECURITY
IN THE LAST 12 MONTHS, WAS THERE A TIME WHEN YOU DID NOT HAVE A STEADY PLACE TO SLEEP OR SLEPT IN A SHELTER (INCLUDING NOW)?: NO

## 2023-03-28 SDOH — ECONOMIC STABILITY: FOOD INSECURITY: WITHIN THE PAST 12 MONTHS, THE FOOD YOU BOUGHT JUST DIDN'T LAST AND YOU DIDN'T HAVE MONEY TO GET MORE.: NEVER TRUE

## 2023-03-31 ENCOUNTER — OFFICE VISIT (OUTPATIENT)
Dept: FAMILY MEDICINE CLINIC | Age: 68
End: 2023-03-31

## 2023-03-31 ENCOUNTER — TELEPHONE (OUTPATIENT)
Dept: FAMILY MEDICINE CLINIC | Age: 68
End: 2023-03-31

## 2023-03-31 VITALS
SYSTOLIC BLOOD PRESSURE: 130 MMHG | HEIGHT: 72 IN | BODY MASS INDEX: 42.66 KG/M2 | WEIGHT: 315 LBS | DIASTOLIC BLOOD PRESSURE: 82 MMHG | OXYGEN SATURATION: 99 % | HEART RATE: 77 BPM | TEMPERATURE: 97.9 F

## 2023-03-31 DIAGNOSIS — I48.19 PERSISTENT ATRIAL FIBRILLATION (HCC): ICD-10-CM

## 2023-03-31 DIAGNOSIS — G47.33 OSA (OBSTRUCTIVE SLEEP APNEA): Primary | ICD-10-CM

## 2023-03-31 RX ORDER — DIPHENHYDRAMINE HCL 25 MG
1 CAPSULE ORAL EVERY 4 HOURS PRN
Qty: 15 ML | Refills: 1 | Status: CANCELLED | OUTPATIENT
Start: 2023-03-31

## 2023-03-31 NOTE — PROGRESS NOTES
anticoagulation regimen. Eye irritation, suspect air leak from his CPAP machine. He is going to use a different mask tonight to see if this resolves the symptoms. In the meantime artificial tears would be appropriate. Keep follow-up in about 2 months for diabetes or as scheduled. Patient counseled to follow up sooner or seek more acute care if symptoms worsening or not improving according to plan. Electronically signed by Torito Vargas MD on 3/31/2023    _________________________________________________________  Current Outpatient Medications on File Prior to Visit   Medication Sig Dispense Refill    Liraglutide (VICTOZA) 18 MG/3ML SOPN SC injection Inject 0.6 mg into the skin daily for 7 days, THEN 1.2 mg daily for 23 days.  6 mL 2    metFORMIN (GLUCOPHAGE) 1000 MG tablet TAKE 1 TABLET BY MOUTH TWICE DAILY WITH MEALS 180 tablet 1    hydroCHLOROthiazide (HYDRODIURIL) 25 MG tablet TAKE 1 TABLET BY MOUTH EVERY DAY FOR BLOOD PRESSURE 90 tablet 1    carvedilol (COREG) 25 MG tablet TAKE 1 TABLET BY MOUTH TWICE DAILY 180 tablet 1    furosemide (LASIX) 20 MG tablet TAKE 1 TABLET BY MOUTH EVERY DAY 90 tablet 1    dilTIAZem (TIAZAC) 240 MG extended release capsule TAKE 1 CAPSULE BY MOUTH EVERY DAY 90 capsule 1    benazepril (LOTENSIN) 20 MG tablet TAKE 1 TABLET BY MOUTH TWICE DAILY 180 tablet 1    glipiZIDE (GLUCOTROL) 10 MG tablet TAKE 1 TABLET BY MOUTH TWICE DAILY BEFORE MEALS 180 tablet 1    B-D UF III MINI PEN NEEDLES 31G X 5 MM MISC USE TO TEST BLOOD SUGAR ONCE A  each 3    XARELTO 20 MG TABS tablet TAKE 1 TABLET BY MOUTH EVERY MORNING WITH BREAKFAST 90 tablet 1    LEVEMIR FLEXTOUCH 100 UNIT/ML injection pen ADMINISTER 45 UNITS UNDER THE SKIN EVERY NIGHT 15 mL 3    Handicap Placard MISC by Does not apply route Please provide a handicap placard through 05/31/2027 1 each 0    dilTIAZem (CARDIZEM CD) 240 MG extended release capsule Take 1 capsule by mouth once daily 90 capsule 1    blood glucose

## 2023-03-31 NOTE — TELEPHONE ENCOUNTER
----- Message from Johanny Lentz MD sent at 3/31/2023 12:49 PM EDT -----  Please send progress note to [address] [employer]

## 2023-05-03 ENCOUNTER — TELEPHONE (OUTPATIENT)
Dept: PAIN MANAGEMENT | Age: 68
End: 2023-05-03

## 2023-05-03 NOTE — TELEPHONE ENCOUNTER
Call to Elsie Ellis that procedure was approved for 5/11/2023 and that Andreea should call him a few days before for the pre op call and between 2:00 PM and 4:00 PM  the business day before with the arrival time. Instructed Jesus to hold ibuprofen for 24 hours, naprosyn for 4 days and any aspirin containing products or fish oil for 7 days and xarelto for 3 days. Instructed to call office back if any questions. Jesus verbalized understanding.     Electronically signed by Asaf Koehler RN on 5/3/2023 at 11:05 AM

## 2023-05-11 ENCOUNTER — HOSPITAL ENCOUNTER (OUTPATIENT)
Age: 68
Setting detail: OUTPATIENT SURGERY
Discharge: HOME | End: 2023-05-11
Attending: PAIN MEDICINE | Admitting: PAIN MEDICINE
Payer: MEDICARE

## 2023-05-11 ENCOUNTER — HOSPITAL ENCOUNTER (OUTPATIENT)
Dept: GENERAL RADIOLOGY | Age: 68
Setting detail: OUTPATIENT SURGERY
Discharge: HOME | End: 2023-05-13
Attending: PAIN MEDICINE
Payer: MEDICARE

## 2023-05-11 VITALS
DIASTOLIC BLOOD PRESSURE: 87 MMHG | TEMPERATURE: 97.2 F | SYSTOLIC BLOOD PRESSURE: 146 MMHG | OXYGEN SATURATION: 96 % | HEIGHT: 72 IN | HEART RATE: 67 BPM | BODY MASS INDEX: 42.66 KG/M2 | RESPIRATION RATE: 18 BRPM | WEIGHT: 315 LBS

## 2023-05-11 DIAGNOSIS — R52 PAIN MANAGEMENT: ICD-10-CM

## 2023-05-11 LAB — METER GLUCOSE: 198 MG/DL (ref 74–99)

## 2023-05-11 PROCEDURE — 7100000010 HC PHASE II RECOVERY - FIRST 15 MIN: Performed by: PAIN MEDICINE

## 2023-05-11 PROCEDURE — 2500000003 HC RX 250 WO HCPCS: Performed by: PAIN MEDICINE

## 2023-05-11 PROCEDURE — 3600000002 HC SURGERY LEVEL 2 BASE: Performed by: PAIN MEDICINE

## 2023-05-11 PROCEDURE — 3209999900 FLUORO FOR SURGICAL PROCEDURES

## 2023-05-11 PROCEDURE — 6360000004 HC RX CONTRAST MEDICATION: Performed by: PAIN MEDICINE

## 2023-05-11 PROCEDURE — 7100000011 HC PHASE II RECOVERY - ADDTL 15 MIN: Performed by: PAIN MEDICINE

## 2023-05-11 PROCEDURE — 64483 NJX AA&/STRD TFRM EPI L/S 1: CPT | Performed by: PAIN MEDICINE

## 2023-05-11 PROCEDURE — 6360000002 HC RX W HCPCS: Performed by: PAIN MEDICINE

## 2023-05-11 PROCEDURE — 82962 GLUCOSE BLOOD TEST: CPT

## 2023-05-11 PROCEDURE — 2709999900 HC NON-CHARGEABLE SUPPLY: Performed by: PAIN MEDICINE

## 2023-05-11 RX ORDER — LIDOCAINE HYDROCHLORIDE 5 MG/ML
INJECTION, SOLUTION INFILTRATION; INTRAVENOUS PRN
Status: DISCONTINUED | OUTPATIENT
Start: 2023-05-11 | End: 2023-05-11 | Stop reason: ALTCHOICE

## 2023-05-11 RX ORDER — METHYLPREDNISOLONE ACETATE 40 MG/ML
INJECTION, SUSPENSION INTRA-ARTICULAR; INTRALESIONAL; INTRAMUSCULAR; SOFT TISSUE PRN
Status: DISCONTINUED | OUTPATIENT
Start: 2023-05-11 | End: 2023-05-11 | Stop reason: ALTCHOICE

## 2023-05-11 ASSESSMENT — PAIN - FUNCTIONAL ASSESSMENT: PAIN_FUNCTIONAL_ASSESSMENT: 0-10

## 2023-05-11 NOTE — OP NOTE
2023    Patient: Calixto Cooper  :  1955  Age:  79 y.o. Sex:  male     PRE-OPERATIVE DIAGNOSIS: Lumbar disc displacement, lumbar neural foraminal stenosis, lumbar radiculopathy. POST-OPERATIVE DIAGNOSIS: Same. PROCEDURE: Bilateral Transforaminal epidural steroid injection under fluoroscopic guidance at foraminal level L5.  SURGEON: LESLIE Zuluaga. ANESTHESIA: local    ESTIMATED BLOOD LOSS: None.  ______________________________________________________________________  BRIEF HISTORY: Calixto Cooper comes in today for the Bilateral transforaminal epidural steroid injection under fluoroscopic guidance at foraminal level L5. The potential complications of this procedure were discussed with him again today. He has elected to undergo the aforementioned procedure. Stuart complete History & Physical examination were reviewed in depth, a copy of which is in the chart. DESCRIPTION OF PROCEDURE:    After confirming written and informed consent, a time-out was performed and Stuart name and date of birth, the procedure to be performed as well as the plan for the location of the needle insertion were confirmed. The patient was brought into the procedure room and placed in the prone position on the fluoroscopy table. Standard monitors were placed and vital signs were observed throughout the procedure. The area of the lumbar spine was prepped with chloraprep and draped in a sterile manner. The vertebral body was identified with AP fluoroscopy. An oblique view was obtained to better visualize the inferior junction of the pedicle and transverse process . The 6 o'clock position of the pedicle was marked and identified. The skin and subcutaneous tissue were anesthetized with 0.5% lidocaine. A # 22 gauge pencil point needle was directed toward the targeted point under fluoroscopy until bone was contacted. The needle was then walked inferiorly until the neural foramen was entered .  A lateral

## 2023-05-11 NOTE — DISCHARGE INSTRUCTIONS
Mardene Arnold Dr. Claudius Och Dr. Laney Sandifer Block/Radiofrequency  Home Going Instructions    1-Go home, rest for the remainder of the day  2-Please do not lift over 20 pounds the day of the injection  3-If you received sedation No: alcohol, driving, operating lawn mowers, plows, tractors or other dangerous equipment until next morning. Do not make important decisions or sign legal documents for 24 hours. You may experience light headedness, dizziness, nausea or sleepiness after sedation. Do not stay alone. A responsible adult must be with you for 24 hours. You could be nauseated from the medications you have received. Your IV site may be sore and bruised. 4-No dietary restrictions     5-Resume all medications the same day, blood thinners to be resumed 24 hours after injection if you were instructed to stop any. 6-Keep the surgical site clean and dry, you may shower the next morning and remove the      dressing. 7- No sitz baths, tub baths or hot tubs/swimming for 24 hours. 8- If you have any pain at the injection site(s), application of an ice pack to the area should be       helpful, 20 minutes on/20 minutes off for next 48 hours. 9- Call [facility] immediately at if you develop.   Fever greater than 100.4 F  Have bleeding or drainage from the puncture site  Have progressive Leg/arm numbness and or weakness  Loss of control of bowel and or bladder (wet/soil yourself)  Severe headache with inability to lift head  10-You may return to work the next day

## 2023-05-11 NOTE — H&P
[facility]        [address], [address]  Dept: [phone]    Procedure History & Physical      Ander Ramirez     HPI:    Patient  is here for low back pain bilateral lower extremity pain for bilateral L5 transforaminal epidural steroid injection  Labs/imaging studies reviewed   All question and concerns addressed including R/B/A associated with the procedure    Past Medical History:   Diagnosis Date    Anticoagulant long-term use     Atrial fibrillation (HCC)     CHF (congestive heart failure) ([facility] [address]     Degenerative joint disease of left hip 01/28/2015    Diabetes mellitus ([facility] [address]     Hyperlipidemia     Hypertension     Obesity     AMIRAH on CPAP     PONV (postoperative nausea and vomiting)     Radiculopathy of lumbosacral region 07/25/2017    for procedure 5/5/22    Type II or unspecified type diabetes mellitus without mention of complication, not stated as uncontrolled        Past Surgical History:   Procedure Laterality Date    APPENDECTOMY      CHOLECYSTECTOMY      CYST INCISION AND DRAINAGE  06/08/2017    abd wall cyst    HERNIA REPAIR      HIP ARTHROPLASTY Left     JOINT REPLACEMENT      PAIN MANAGEMENT PROCEDURE Bilateral 5/12/2022    BILATERAL L5 TRANSFORAMINAL EPIDURAL STEROID INJECTION #1 performed by Sakshi Kiser DO at [address] Bilateral 1/31/2023    BILATERAL L5 TRANSFORAMINAL EPIDURAL STEROID INJECTION performed by Sakshi Kiser DO at [address] [facility]       Prior to Admission medications    Medication Sig Start Date End Date Taking? Authorizing Provider   Liraglutide (VICTOZA) 18 MG/3ML SOPN SC injection Inject 0.6 mg into the skin daily for 7 days, THEN 1.2 mg daily for 23 days.  2/20/23 5/11/23  Rima Cisneros MD   metFORMIN (GLUCOPHAGE) 1000 MG tablet TAKE 1 TABLET BY MOUTH TWICE DAILY WITH MEALS 2/17/23   Rima Cisneros MD   hydroCHLOROthiazide (HYDRODIURIL) 25 MG tablet TAKE 1 TABLET BY MOUTH EVERY DAY FOR BLOOD PRESSURE

## 2023-05-15 RX ORDER — DILTIAZEM HYDROCHLORIDE 240 MG/1
CAPSULE, EXTENDED RELEASE ORAL
Qty: 90 CAPSULE | Refills: 3 | Status: SHIPPED | OUTPATIENT
Start: 2023-05-15

## 2023-05-15 NOTE — TELEPHONE ENCOUNTER
Last Appointment:  3/31/2023  Future Appointments   Date Time Provider Edmar Reyna   5/19/2023  9:40 AM MD MEÑO Alexis [facility]   6/1/2023 11:00 AM Leobardo Valladares,  BDM PAIN [facility]   6/2/2023 10:00 AM Fab Estrada DPM Col Podiatry [facility]

## 2023-05-19 ENCOUNTER — OFFICE VISIT (OUTPATIENT)
Dept: FAMILY MEDICINE CLINIC | Age: 68
End: 2023-05-19

## 2023-05-19 VITALS
DIASTOLIC BLOOD PRESSURE: 80 MMHG | HEIGHT: 72 IN | TEMPERATURE: 98.2 F | HEART RATE: 77 BPM | SYSTOLIC BLOOD PRESSURE: 130 MMHG | WEIGHT: 315 LBS | OXYGEN SATURATION: 97 % | BODY MASS INDEX: 42.66 KG/M2

## 2023-05-19 DIAGNOSIS — R10.9 RIGHT FLANK PAIN: ICD-10-CM

## 2023-05-19 DIAGNOSIS — Z12.11 COLON CANCER SCREENING: ICD-10-CM

## 2023-05-19 DIAGNOSIS — Z79.4 TYPE 2 DIABETES MELLITUS WITH OTHER SPECIFIED COMPLICATION, WITH LONG-TERM CURRENT USE OF INSULIN (HCC): Primary | ICD-10-CM

## 2023-05-19 DIAGNOSIS — I48.19 PERSISTENT ATRIAL FIBRILLATION (HCC): ICD-10-CM

## 2023-05-19 DIAGNOSIS — E11.69 TYPE 2 DIABETES MELLITUS WITH OTHER SPECIFIED COMPLICATION, WITH LONG-TERM CURRENT USE OF INSULIN (HCC): Primary | ICD-10-CM

## 2023-05-19 LAB — HBA1C MFR BLD: 7.7 %

## 2023-05-19 RX ORDER — FUROSEMIDE 20 MG/1
TABLET ORAL
Qty: 90 TABLET | Refills: 1 | Status: CANCELLED | OUTPATIENT
Start: 2023-05-19

## 2023-05-19 RX ORDER — GLIPIZIDE 10 MG/1
10 TABLET ORAL
Qty: 180 TABLET | Refills: 1 | Status: CANCELLED | OUTPATIENT
Start: 2023-05-19

## 2023-05-19 RX ORDER — BENAZEPRIL HYDROCHLORIDE 20 MG/1
20 TABLET ORAL 2 TIMES DAILY
Qty: 180 TABLET | Refills: 1 | Status: CANCELLED | OUTPATIENT
Start: 2023-05-19

## 2023-05-19 NOTE — PROGRESS NOTES
and breath sounds normal.    No wheezes. No rales or rhonchi. Abdominal:    Soft. Bowel sounds are normal.    No distension. No tenderness with palpation - actually improves the right flank tenderness. .   Musculoskeletal:    Normal range of motion. No joint swelling noted. No peripheral edema. Skin:    Skin is warm and dry. No rashes, lesions. Psychiatric:    He has a normal mood and affect. Normal groom and dress. No SI or HI.   ________________________________________________________    This note may have been created using dictation software.  Efforts were made to reduce errors, but some may persist.

## 2023-06-01 ENCOUNTER — PREP FOR PROCEDURE (OUTPATIENT)
Dept: PAIN MANAGEMENT | Age: 68
End: 2023-06-01

## 2023-06-01 ENCOUNTER — OFFICE VISIT (OUTPATIENT)
Dept: PAIN MANAGEMENT | Age: 68
End: 2023-06-01
Payer: MEDICARE

## 2023-06-01 VITALS
HEIGHT: 72 IN | OXYGEN SATURATION: 96 % | HEART RATE: 75 BPM | BODY MASS INDEX: 42.66 KG/M2 | DIASTOLIC BLOOD PRESSURE: 95 MMHG | WEIGHT: 315 LBS | SYSTOLIC BLOOD PRESSURE: 147 MMHG

## 2023-06-01 DIAGNOSIS — G89.4 CHRONIC PAIN SYNDROME: Primary | ICD-10-CM

## 2023-06-01 DIAGNOSIS — M51.9 LUMBAR DISC DISORDER: ICD-10-CM

## 2023-06-01 DIAGNOSIS — M43.16 SPONDYLOLISTHESIS OF LUMBAR REGION: ICD-10-CM

## 2023-06-01 DIAGNOSIS — M54.16 LUMBAR RADICULOPATHY: Primary | ICD-10-CM

## 2023-06-01 DIAGNOSIS — M21.371 RIGHT FOOT DROP: ICD-10-CM

## 2023-06-01 DIAGNOSIS — M54.16 LUMBAR RADICULOPATHY: ICD-10-CM

## 2023-06-01 DIAGNOSIS — M54.17 RADICULOPATHY OF LUMBOSACRAL REGION: ICD-10-CM

## 2023-06-01 DIAGNOSIS — M54.14 THORACIC RADICULOPATHY: ICD-10-CM

## 2023-06-01 PROCEDURE — 1036F TOBACCO NON-USER: CPT | Performed by: PAIN MEDICINE

## 2023-06-01 PROCEDURE — 3080F DIAST BP >= 90 MM HG: CPT | Performed by: PAIN MEDICINE

## 2023-06-01 PROCEDURE — 3017F COLORECTAL CA SCREEN DOC REV: CPT | Performed by: PAIN MEDICINE

## 2023-06-01 PROCEDURE — G8417 CALC BMI ABV UP PARAM F/U: HCPCS | Performed by: PAIN MEDICINE

## 2023-06-01 PROCEDURE — 3077F SYST BP >= 140 MM HG: CPT | Performed by: PAIN MEDICINE

## 2023-06-01 PROCEDURE — 1123F ACP DISCUSS/DSCN MKR DOCD: CPT | Performed by: PAIN MEDICINE

## 2023-06-01 PROCEDURE — G8427 DOCREV CUR MEDS BY ELIG CLIN: HCPCS | Performed by: PAIN MEDICINE

## 2023-06-01 PROCEDURE — 99213 OFFICE O/P EST LOW 20 MIN: CPT | Performed by: PAIN MEDICINE

## 2023-06-01 PROCEDURE — 99214 OFFICE O/P EST MOD 30 MIN: CPT | Performed by: PAIN MEDICINE

## 2023-06-01 NOTE — PROGRESS NOTES
Maddi Smith presents to the [facility] on 6/1/2023. Ailyn Moore is complaining of pain in back. The pain is constant. The pain is described as aching. Pain is rated on his best day at a 0, on his worst day at a 6, and on average at a 5 on the VAS scale. He took his last dose of Tylenol a few weeks ago. Any procedures since your last visit: Yes, with 75 % relief. Pacemaker or defibrillator: No.    He is not on NSAIDS and is  on anticoagulation medications to include Xarelto and is managed by Claudine Miranda MD  .     Medication Contract and Consent for Opioid Use Documents Filed        No documents found                    Ht 6' (1.829 m)   Wt (!) 323 lb (146.5 kg)   BMI 43.81 kg/m²      No LMP for male patient.
TABLET BY MOUTH EVERY DAY FOR BLOOD PRESSURE 2/14/23  Yes Josh Muse MD   carvedilol (COREG) 25 MG tablet TAKE 1 TABLET BY MOUTH TWICE DAILY 1/6/23  Yes Josh Muse MD   furosemide (LASIX) 20 MG tablet TAKE 1 TABLET BY MOUTH EVERY DAY 12/21/22  Yes Josh Muse MD   benazepril (LOTENSIN) 20 MG tablet TAKE 1 TABLET BY MOUTH TWICE DAILY 11/8/22  Yes Josh Muse MD   glipiZIDE (GLUCOTROL) 10 MG tablet TAKE 1 TABLET BY MOUTH TWICE DAILY BEFORE MEALS 11/8/22  Yes Josh Muse MD   B-D UF III MINI PEN NEEDLES 31G X 5 MM MISC USE TO TEST BLOOD SUGAR ONCE A DAY 9/21/22  Yes Josh Muse MD   XARELTO 20 MG TABS tablet TAKE 1 TABLET BY MOUTH EVERY MORNING WITH BREAKFAST 8/11/22  Yes Josh Muse MD   LEVEMIR FLEXTOUCH 100 UNIT/ML injection pen ADMINISTER [address] SKIN EVERY NIGHT 6/20/22  Yes Josh Muse MD   Handicap Placard [facility] by Does not apply route Please provide a handicap placard through 05/31/2027 3/31/22  Yes Bren Trujillo, DO   blood glucose monitor strips 1 strip by Other route daily Test 1 time a day & as needed for symptoms of irregular blood glucose. Dispense sufficient amount for indicated testing frequency plus additional to accommodate PRN testing needs. ReliOn prime test strips 8/6/21  Yes Josh Muse MD   Insulin Pen Needle (PEN NEEDLES 5/16\") 30G X 8 MM MISC 1 each by Does not apply route daily 8/6/21  Yes Josh Muse MD   Alpha-Lipoic Acid 600 MG TABS Take 1 Bottle by mouth daily 7/8/20  Yes Nilton May DPM   CPAP Machine MISC by Does not apply route nightly   Yes Vasile Atkins MD   Liraglutide (VICTOZA) 18 MG/3ML SOPN SC injection Inject 0.6 mg into the skin daily for 7 days, THEN 1.2 mg daily for 23 days.  2/20/23 5/19/23  Josh Muse MD       Allergies   Allergen Reactions    Other Itching     Throat itches when he eats magnus nuts    Lipitor      Body pain,
zac

## 2023-06-02 ENCOUNTER — PROCEDURE VISIT (OUTPATIENT)
Dept: PODIATRY | Age: 68
End: 2023-06-02

## 2023-06-02 VITALS — HEIGHT: 72 IN | BODY MASS INDEX: 42.66 KG/M2 | WEIGHT: 315 LBS

## 2023-06-02 DIAGNOSIS — L84 CORNS AND CALLOSITIES: ICD-10-CM

## 2023-06-02 DIAGNOSIS — B35.1 TINEA UNGUIUM: ICD-10-CM

## 2023-06-02 DIAGNOSIS — Z79.01 ENCOUNTER FOR CURRENT LONG-TERM USE OF ANTICOAGULANTS: ICD-10-CM

## 2023-06-02 DIAGNOSIS — E11.9 TYPE 2 DIABETES MELLITUS WITHOUT COMPLICATION, UNSPECIFIED WHETHER LONG TERM INSULIN USE (HCC): Primary | ICD-10-CM

## 2023-06-02 NOTE — PROGRESS NOTES
Missael Figueroa is here today for a diabetic foot exam and nail care. his PCP is An Kearns MD last OV was 05/19/2023. CC:   Follow-up diabetic foot and ankle exam    HPI:   Hellen Pitts presents follow-up diabetic foot ankle exam  History metformin  History Xarelto. No recent injury. No calf or foot pain. No additional pedal complaints. ROS:  Const: Denies constitutional symptoms  Musculo: Denies symptoms other than stated above  Skin: Denies symptoms other than stated above      Current Outpatient Medications:     dilTIAZem (TIAZAC) 240 MG extended release capsule, TAKE 1 CAPSULE BY MOUTH EVERY DAY, Disp: 90 capsule, Rfl: 3    Liraglutide (VICTOZA) 18 MG/3ML SOPN SC injection, Inject 0.6 mg into the skin daily for 7 days, THEN 1.2 mg daily for 23 days. , Disp: 6 mL, Rfl: 2    metFORMIN (GLUCOPHAGE) 1000 MG tablet, TAKE 1 TABLET BY MOUTH TWICE DAILY WITH MEALS, Disp: 180 tablet, Rfl: 1    hydroCHLOROthiazide (HYDRODIURIL) 25 MG tablet, TAKE 1 TABLET BY MOUTH EVERY DAY FOR BLOOD PRESSURE, Disp: 90 tablet, Rfl: 1    carvedilol (COREG) 25 MG tablet, TAKE 1 TABLET BY MOUTH TWICE DAILY, Disp: 180 tablet, Rfl: 1    furosemide (LASIX) 20 MG tablet, TAKE 1 TABLET BY MOUTH EVERY DAY, Disp: 90 tablet, Rfl: 1    benazepril (LOTENSIN) 20 MG tablet, TAKE 1 TABLET BY MOUTH TWICE DAILY, Disp: 180 tablet, Rfl: 1    glipiZIDE (GLUCOTROL) 10 MG tablet, TAKE 1 TABLET BY MOUTH TWICE DAILY BEFORE MEALS, Disp: 180 tablet, Rfl: 1    B-D UF III MINI PEN NEEDLES 31G X 5 MM MISC, USE TO TEST BLOOD SUGAR ONCE A DAY, Disp: 100 each, Rfl: 3    XARELTO 20 MG TABS tablet, TAKE 1 TABLET BY MOUTH EVERY MORNING WITH BREAKFAST, Disp: 90 tablet, Rfl: 1    LEVEMIR FLEXTOUCH 100 UNIT/ML injection pen, ADMINISTER 45 UNITS UNDER THE SKIN EVERY NIGHT, Disp: 15 mL, Rfl: 3    Handicap Placard [facility] by Does not apply route Please provide a handicap placard through 05/31/2027, Disp: 1 each, Rfl: 0    blood glucose monitor strips, 1 strip by Other

## 2023-06-08 LAB — NONINV COLON CA DNA+OCC BLD SCRN STL QL: NEGATIVE

## 2023-06-21 DIAGNOSIS — I48.19 PERSISTENT ATRIAL FIBRILLATION (HCC): ICD-10-CM

## 2023-06-21 RX ORDER — FUROSEMIDE 20 MG/1
TABLET ORAL
Qty: 90 TABLET | Refills: 1 | Status: SHIPPED | OUTPATIENT
Start: 2023-06-21

## 2023-06-21 NOTE — TELEPHONE ENCOUNTER
Last Appointment:  5/19/2023  Future Appointments   Date Time Provider Edmar Hendricksonisti   6/26/2023  2:00 PM MRI [facility] MOBILE SEYZ MRI [facility] Radiolo   7/14/2023  2:45 PM Felton Leblanc DO BDM PAIN [facility]   8/4/2023 10:00 AM Symone Palomino DPM Col Podiatry [facility]   9/19/2023  1:00 PM Lc Tesfaye  [address]

## 2023-06-26 ENCOUNTER — HOSPITAL ENCOUNTER (OUTPATIENT)
Dept: MRI IMAGING | Age: 68
Discharge: HOME | End: 2023-06-28
Attending: PAIN MEDICINE
Payer: MEDICARE

## 2023-06-26 DIAGNOSIS — M54.14 THORACIC RADICULOPATHY: ICD-10-CM

## 2023-06-26 PROCEDURE — 72146 MRI CHEST SPINE W/O DYE: CPT

## 2023-07-07 RX ORDER — CARVEDILOL 25 MG/1
TABLET ORAL
Qty: 180 TABLET | Refills: 1 | Status: SHIPPED | OUTPATIENT
Start: 2023-07-07

## 2023-07-14 ENCOUNTER — OFFICE VISIT (OUTPATIENT)
Dept: PAIN MANAGEMENT | Age: 68
End: 2023-07-14
Payer: MEDICARE

## 2023-07-14 ENCOUNTER — PREP FOR PROCEDURE (OUTPATIENT)
Dept: PAIN MANAGEMENT | Age: 68
End: 2023-07-14

## 2023-07-14 VITALS
RESPIRATION RATE: 16 BRPM | HEIGHT: 72 IN | SYSTOLIC BLOOD PRESSURE: 133 MMHG | DIASTOLIC BLOOD PRESSURE: 80 MMHG | BODY MASS INDEX: 42.66 KG/M2 | TEMPERATURE: 97.2 F | WEIGHT: 315 LBS | OXYGEN SATURATION: 95 % | HEART RATE: 80 BPM

## 2023-07-14 DIAGNOSIS — M54.16 LUMBAR RADICULOPATHY: ICD-10-CM

## 2023-07-14 DIAGNOSIS — M21.371 RIGHT FOOT DROP: ICD-10-CM

## 2023-07-14 DIAGNOSIS — M54.14 THORACIC RADICULOPATHY: ICD-10-CM

## 2023-07-14 DIAGNOSIS — M54.17 RADICULOPATHY OF LUMBOSACRAL REGION: ICD-10-CM

## 2023-07-14 DIAGNOSIS — M51.9 LUMBAR DISC DISORDER: ICD-10-CM

## 2023-07-14 DIAGNOSIS — M51.9 THORACIC DISC DISORDER: ICD-10-CM

## 2023-07-14 DIAGNOSIS — G89.4 CHRONIC PAIN SYNDROME: Primary | ICD-10-CM

## 2023-07-14 DIAGNOSIS — M54.14 THORACIC RADICULOPATHY: Primary | ICD-10-CM

## 2023-07-14 DIAGNOSIS — M43.16 SPONDYLOLISTHESIS OF LUMBAR REGION: ICD-10-CM

## 2023-07-14 PROCEDURE — G8417 CALC BMI ABV UP PARAM F/U: HCPCS | Performed by: PAIN MEDICINE

## 2023-07-14 PROCEDURE — 99213 OFFICE O/P EST LOW 20 MIN: CPT | Performed by: PAIN MEDICINE

## 2023-07-14 PROCEDURE — 99214 OFFICE O/P EST MOD 30 MIN: CPT | Performed by: PAIN MEDICINE

## 2023-07-14 PROCEDURE — 3017F COLORECTAL CA SCREEN DOC REV: CPT | Performed by: PAIN MEDICINE

## 2023-07-14 PROCEDURE — 3075F SYST BP GE 130 - 139MM HG: CPT | Performed by: PAIN MEDICINE

## 2023-07-14 PROCEDURE — 1123F ACP DISCUSS/DSCN MKR DOCD: CPT | Performed by: PAIN MEDICINE

## 2023-07-14 PROCEDURE — 1036F TOBACCO NON-USER: CPT | Performed by: PAIN MEDICINE

## 2023-07-14 PROCEDURE — G8427 DOCREV CUR MEDS BY ELIG CLIN: HCPCS | Performed by: PAIN MEDICINE

## 2023-07-14 PROCEDURE — 3079F DIAST BP 80-89 MM HG: CPT | Performed by: PAIN MEDICINE

## 2023-07-18 ENCOUNTER — TELEPHONE (OUTPATIENT)
Dept: PAIN MANAGEMENT | Age: 68
End: 2023-07-18

## 2023-07-18 NOTE — TELEPHONE ENCOUNTER
Call to Jennifer Starkey that procedure was approved for 7/27/2023 and that Edmar Way should call him a few days before for the pre op call and between 2:00 PM and 4:00 PM  the business day before with the arrival time. Instructed Jesus to hold ibuprofen for 24 hours, naprosyn for 4 days and any aspirin containing products or fish oil for 7 days, xarelto for 3 days. Instructed to call office back if any questions. Jesus verbalized understanding.     Electronically signed by Cindy Chacon RN on 7/18/2023 at 1:38 PM

## 2023-07-26 NOTE — PROGRESS NOTES
[address] [facility] PAIN MANAGEMENT  INSTRUCTIONS  . .......................................................................................................................................... [x] Parking the day of Surgery is located in the [facility].   Upon entering the door, make immediate right into the surgery reception room    [x]  Bring photo ID and insurance card     [x] You may have a light breakfast day of procedure    [x]  Wear loose comfortable clothing    [x]  Please follow instructions for medications as given per Dr's office    [x] You can expect a call the business day prior to procedure to notify you of your arrival time     [x] Please arrange for     []  Other instructions

## 2023-07-27 ENCOUNTER — HOSPITAL ENCOUNTER (OUTPATIENT)
Dept: GENERAL RADIOLOGY | Age: 68
Setting detail: OUTPATIENT SURGERY
Discharge: HOME | End: 2023-07-29
Attending: PAIN MEDICINE
Payer: MEDICARE

## 2023-07-27 ENCOUNTER — HOSPITAL ENCOUNTER (OUTPATIENT)
Age: 68
Setting detail: OUTPATIENT SURGERY
Discharge: HOME | End: 2023-07-27
Attending: PAIN MEDICINE | Admitting: PAIN MEDICINE
Payer: MEDICARE

## 2023-07-27 VITALS
WEIGHT: 315 LBS | HEIGHT: 72 IN | OXYGEN SATURATION: 96 % | DIASTOLIC BLOOD PRESSURE: 63 MMHG | SYSTOLIC BLOOD PRESSURE: 97 MMHG | RESPIRATION RATE: 16 BRPM | BODY MASS INDEX: 42.66 KG/M2 | HEART RATE: 66 BPM | TEMPERATURE: 97 F

## 2023-07-27 DIAGNOSIS — R52 PAIN MANAGEMENT: ICD-10-CM

## 2023-07-27 LAB — METER GLUCOSE: 162 MG/DL (ref 74–99)

## 2023-07-27 PROCEDURE — 6360000002 HC RX W HCPCS: Performed by: PAIN MEDICINE

## 2023-07-27 PROCEDURE — 3600000002 HC SURGERY LEVEL 2 BASE: Performed by: PAIN MEDICINE

## 2023-07-27 PROCEDURE — 82947 ASSAY GLUCOSE BLOOD QUANT: CPT

## 2023-07-27 PROCEDURE — 7100000011 HC PHASE II RECOVERY - ADDTL 15 MIN: Performed by: PAIN MEDICINE

## 2023-07-27 PROCEDURE — 7100000010 HC PHASE II RECOVERY - FIRST 15 MIN: Performed by: PAIN MEDICINE

## 2023-07-27 PROCEDURE — 6360000004 HC RX CONTRAST MEDICATION: Performed by: PAIN MEDICINE

## 2023-07-27 PROCEDURE — 3600000012 HC SURGERY LEVEL 2 ADDTL 15MIN: Performed by: PAIN MEDICINE

## 2023-07-27 PROCEDURE — 2709999900 HC NON-CHARGEABLE SUPPLY: Performed by: PAIN MEDICINE

## 2023-07-27 PROCEDURE — 2500000003 HC RX 250 WO HCPCS: Performed by: PAIN MEDICINE

## 2023-07-27 PROCEDURE — 62321 NJX INTERLAMINAR CRV/THRC: CPT | Performed by: PAIN MEDICINE

## 2023-07-27 RX ORDER — METHYLPREDNISOLONE ACETATE 40 MG/ML
INJECTION, SUSPENSION INTRA-ARTICULAR; INTRALESIONAL; INTRAMUSCULAR; SOFT TISSUE PRN
Status: DISCONTINUED | OUTPATIENT
Start: 2023-07-27 | End: 2023-07-27 | Stop reason: ALTCHOICE

## 2023-07-27 RX ORDER — LIDOCAINE HYDROCHLORIDE 5 MG/ML
INJECTION, SOLUTION INFILTRATION; INTRAVENOUS PRN
Status: DISCONTINUED | OUTPATIENT
Start: 2023-07-27 | End: 2023-07-27 | Stop reason: ALTCHOICE

## 2023-07-27 ASSESSMENT — PAIN - FUNCTIONAL ASSESSMENT: PAIN_FUNCTIONAL_ASSESSMENT: 0-10

## 2023-07-27 ASSESSMENT — PAIN SCALES - GENERAL
PAINLEVEL_OUTOF10: 0

## 2023-07-27 NOTE — DISCHARGE INSTRUCTIONS
Ernesto Camacho Block/Radiofrequency  Home Going Instructions    1-Go home, rest for the remainder of the day  2-Please do not lift over 20 pounds the day of the injection  3-If you received sedation No: alcohol, driving, operating lawn mowers, plows, tractors or other dangerous equipment until next morning. Do not make important decisions or sign legal documents for 24 hours. You may experience light headedness, dizziness, nausea or sleepiness after sedation. Do not stay alone. A responsible adult must be with you for 24 hours. You could be nauseated from the medications you have received. Your IV site may be sore and bruised. 4-No dietary restrictions     5-Resume all medications the same day, blood thinners to be resumed 24 hours after injection if you were instructed to stop any. 6-Keep the surgical site clean and dry, you may shower the next morning and remove the      dressing. 7- No sitz baths, tub baths or hot tubs/swimming for 24 hours. 8- If you have any pain at the injection site(s), application of an ice pack to the area should be       helpful, 20 minutes on/20 minutes off for next 48 hours. 9- Call [facility] immediately at if you develop.   Fever greater than 100.4 F  Have bleeding or drainage from the puncture site  Have progressive Leg/arm numbness and or weakness  Loss of control of bowel and or bladder (wet/soil yourself)  Severe headache with inability to lift head  10-You may return to work the next day

## 2023-07-27 NOTE — OP NOTE
needle is properly placed. Negative aspiration of blood and CSF was confirmed. Two ml of Isovue-M 300 was used for confirmation of even epidural spread under both live lateral and live AP fluoroscopy. After negative aspiration, a solution of 0.5 % Lidocaine 3 ml and 40 mg DepoMedrol was easily injected. The needle was gently removed intact . The patient neck was cleaned and a Band-Aid was placed over the needle insertion point. Disposition the patient tolerated the procedure well and there were no complications . Vital signs remained stable throughout the procedure. The patient was escorted to the recovery area where they remained until discharge and written discharge instructions for the procedure were given. Plan: Nayeli Madrid will return to our pain management center as scheduled.      Rosalinda Goodwin DO

## 2023-08-01 DIAGNOSIS — Z79.4 TYPE 2 DIABETES MELLITUS WITH OTHER SPECIFIED COMPLICATION, WITH LONG-TERM CURRENT USE OF INSULIN (HCC): ICD-10-CM

## 2023-08-01 DIAGNOSIS — E11.69 TYPE 2 DIABETES MELLITUS WITH OTHER SPECIFIED COMPLICATION, WITH LONG-TERM CURRENT USE OF INSULIN (HCC): ICD-10-CM

## 2023-08-01 NOTE — TELEPHONE ENCOUNTER
Last Appointment:  5/19/2023  Future Appointments   Date Time Provider [address]   8/9/2023  2:30 PM Porter Woodall DPM Col Podiatry [facility]   8/18/2023  3:15 PM DO EMILIANA RhoadesM PAIN [facility]   9/19/2023  1:00 PM Wale Ann  [facility] [address] [address]

## 2023-08-02 RX ORDER — BENAZEPRIL HYDROCHLORIDE 20 MG/1
TABLET ORAL
Qty: 180 TABLET | Refills: 1 | Status: SHIPPED | OUTPATIENT
Start: 2023-08-02

## 2023-08-02 RX ORDER — GLIPIZIDE 10 MG/1
TABLET ORAL
Qty: 180 TABLET | Refills: 1 | Status: SHIPPED | OUTPATIENT
Start: 2023-08-02

## 2023-08-09 ENCOUNTER — PROCEDURE VISIT (OUTPATIENT)
Dept: PODIATRY | Age: 68
End: 2023-08-09
Payer: MEDICARE

## 2023-08-09 VITALS — HEIGHT: 72 IN | WEIGHT: 315 LBS | BODY MASS INDEX: 42.66 KG/M2

## 2023-08-09 DIAGNOSIS — B35.1 TINEA UNGUIUM: ICD-10-CM

## 2023-08-09 DIAGNOSIS — Z79.01 ENCOUNTER FOR CURRENT LONG-TERM USE OF ANTICOAGULANTS: ICD-10-CM

## 2023-08-09 DIAGNOSIS — E11.9 TYPE 2 DIABETES MELLITUS WITHOUT COMPLICATION, UNSPECIFIED WHETHER LONG TERM INSULIN USE (HCC): Primary | ICD-10-CM

## 2023-08-09 DIAGNOSIS — L84 CORNS AND CALLOSITIES: ICD-10-CM

## 2023-08-09 PROCEDURE — 11721 DEBRIDE NAIL 6 OR MORE: CPT | Performed by: PODIATRIST

## 2023-08-09 PROCEDURE — 11056 PARNG/CUTG B9 HYPRKR LES 2-4: CPT | Performed by: PODIATRIST

## 2023-08-09 NOTE — PROGRESS NOTES
glucose monitor strips, 1 strip by Other route daily Test 1 time a day & as needed for symptoms of irregular blood glucose. Dispense sufficient amount for indicated testing frequency plus additional to accommodate PRN testing needs. ReliOn prime test strips, Disp: 100 strip, Rfl: 10    Insulin Pen Needle (PEN NEEDLES 5/16\") 30G X 8 MM MISC, 1 each by Does not apply route daily, Disp: 100 each, Rfl: 3    Alpha-Lipoic Acid 600 MG TABS, Take 1 Bottle by mouth daily, Disp: 60 tablet, Rfl: 1    CPAP Machine MISC, by Does not apply route nightly, Disp: , Rfl:   Allergies   Allergen Reactions    Other Itching     Throat itches when he eats magnus nuts    Lipitor      Body pain, DIARRHEA ETC. HAS PROBS WITH ALL CHOLESTEROL MEDS-MOST PROB WITH LIPITOR       Past Medical History:   Diagnosis Date    Anticoagulant long-term use     Atrial fibrillation (HCC)     CHF (congestive heart failure) (HCC)     Degenerative joint disease of left hip 01/28/2015    Diabetes mellitus ([address])     Hyperlipidemia     Hypertension     Obesity     AMIRAH on CPAP     PONV (postoperative nausea and vomiting)     Radiculopathy of lumbosacral region 07/25/2017    for procedure 5/5/22    Type II or unspecified type diabetes mellitus without mention of complication, not stated as uncontrolled        There were no vitals filed for this visit. Work History/Social History: [employer]  Orthopedic history: EMG testing June 2020, Alpha-lipoic acid 600mg      Focused Lower Extremity Physical Exam:      Neurovascular examination:    Dorsalis Pedis palpable bilateral.  Posterior tibialis non-palpable bilateral.    Capillary Refill Time:  Immediate return  Hair growth:  Symmetrical and bilateral   Skin:  Not atrophic  Edema: Mild edema bilateral feet. Mild edema bilateral ankles.   Neurologic:  Light touch diminished bilateral.  Warm to coolness bilateral distal toes  Decreased epicritic sensation     Musculoskeletal/ Orthopedic examination:

## 2023-08-12 DIAGNOSIS — E11.69 TYPE 2 DIABETES MELLITUS WITH OTHER SPECIFIED COMPLICATION, WITH LONG-TERM CURRENT USE OF INSULIN (HCC): ICD-10-CM

## 2023-08-12 DIAGNOSIS — Z79.4 TYPE 2 DIABETES MELLITUS WITH OTHER SPECIFIED COMPLICATION, WITH LONG-TERM CURRENT USE OF INSULIN (HCC): ICD-10-CM

## 2023-08-15 RX ORDER — HYDROCHLOROTHIAZIDE 25 MG/1
TABLET ORAL
Qty: 90 TABLET | Refills: 1 | Status: SHIPPED | OUTPATIENT
Start: 2023-08-15

## 2023-08-15 NOTE — TELEPHONE ENCOUNTER
Medication(s) pended?    [x] Yes  [] No    Last Appointment:  5/19/2023    Future appts:  Future Appointments   Date Time Provider [address]   8/18/2023  3:15 PM DO EMILIANA GoldsteinM PAIN [facility]   9/19/2023  1:00 PM Apryl De La Fuente  [address]   10/13/2023 11:00 AM Aashish Posada DPM Col Podiatry [facility]

## 2023-08-18 ENCOUNTER — OFFICE VISIT (OUTPATIENT)
Dept: PAIN MANAGEMENT | Age: 68
End: 2023-08-18
Payer: MEDICARE

## 2023-08-18 VITALS
SYSTOLIC BLOOD PRESSURE: 120 MMHG | HEART RATE: 88 BPM | RESPIRATION RATE: 16 BRPM | TEMPERATURE: 97.3 F | OXYGEN SATURATION: 96 % | DIASTOLIC BLOOD PRESSURE: 74 MMHG

## 2023-08-18 DIAGNOSIS — M54.16 LUMBAR RADICULOPATHY: ICD-10-CM

## 2023-08-18 DIAGNOSIS — M21.371 RIGHT FOOT DROP: ICD-10-CM

## 2023-08-18 DIAGNOSIS — M51.9 THORACIC DISC DISORDER: ICD-10-CM

## 2023-08-18 DIAGNOSIS — M54.14 THORACIC RADICULOPATHY: ICD-10-CM

## 2023-08-18 DIAGNOSIS — G89.4 CHRONIC PAIN SYNDROME: Primary | ICD-10-CM

## 2023-08-18 DIAGNOSIS — M43.16 SPONDYLOLISTHESIS OF LUMBAR REGION: ICD-10-CM

## 2023-08-18 DIAGNOSIS — M54.17 RADICULOPATHY OF LUMBOSACRAL REGION: ICD-10-CM

## 2023-08-18 DIAGNOSIS — M51.9 LUMBAR DISC DISORDER: ICD-10-CM

## 2023-08-18 PROCEDURE — 1036F TOBACCO NON-USER: CPT | Performed by: PAIN MEDICINE

## 2023-08-18 PROCEDURE — G8417 CALC BMI ABV UP PARAM F/U: HCPCS | Performed by: PAIN MEDICINE

## 2023-08-18 PROCEDURE — 99213 OFFICE O/P EST LOW 20 MIN: CPT | Performed by: PAIN MEDICINE

## 2023-08-18 PROCEDURE — G8427 DOCREV CUR MEDS BY ELIG CLIN: HCPCS | Performed by: PAIN MEDICINE

## 2023-08-18 PROCEDURE — 1123F ACP DISCUSS/DSCN MKR DOCD: CPT | Performed by: PAIN MEDICINE

## 2023-08-18 PROCEDURE — 99213 OFFICE O/P EST LOW 20 MIN: CPT

## 2023-08-18 PROCEDURE — 3074F SYST BP LT 130 MM HG: CPT | Performed by: PAIN MEDICINE

## 2023-08-18 PROCEDURE — 3017F COLORECTAL CA SCREEN DOC REV: CPT | Performed by: PAIN MEDICINE

## 2023-08-18 PROCEDURE — 3078F DIAST BP <80 MM HG: CPT | Performed by: PAIN MEDICINE

## 2023-08-18 RX ORDER — ACETAMINOPHEN 500 MG
500 TABLET ORAL EVERY 6 HOURS PRN
COMMUNITY

## 2023-08-18 NOTE — PROGRESS NOTES
[id number] [facility]        [address] [place] [address]  Dept: [phone]        Follow up Note      Justus Chavez     Date of Visit:  23     CC:  Patient presents for follow up   Chief Complaint   Patient presents with    Follow Up After Procedure     Fluoroscopic guided therapeutic thoracic epidural steroid injection at the T5-6 level. Back Pain     Mid right rib area     HPI:    Pain is unchanged. Change in quality of symptoms:no. Medication side effects:not applicable . Recent diagnostic testing:none. Recent interventional procedures:T5-6 ZAHEER with 80% relief. He has been on anticoagulation medications to include Ayala Chely and has not been on herbal supplements. He is diabetic. Imagin/2023 thoracic MRI w/o -  FINDINGS:  BONES/ALIGNMENT: No fracture or bony destructive lesion. Multiple T1 and T2  hyperintense foci in the vertebral bodies, including at T2, T6, T7, T9, T11  and T12 levels likely represent hemangiomas. No evidence of a marrow  infiltrative process. SPINAL CORD: No abnormal cord signal is seen. SOFT TISSUES: No paraspinal mass identified. DEGENERATIVE CHANGES:     At T2-3, small left paracentral disc protrusion is noted. No significant  central canal or neural foraminal stenosis. At T4-5, small central disc protrusion results in mild central canal  stenosis. No neural foraminal stenosis. At T5-6, small right paracentral disc protrusion may impinge the right T5  nerve. Mild central canal stenosis. No significant neural foraminal  stenosis. At T6-7: Small central disc protrusion. No significant central canal or  neural foraminal stenosis. At T7-8, a broad-based central and left paracentral disc protrusion results  in mild central canal stenosis and may impinge the left T7 nerve. No  significant neural foraminal stenosis. IMPRESSION:  1. No fracture or bony destructive lesion.   2. T1 and T2

## 2023-09-19 ENCOUNTER — OFFICE VISIT (OUTPATIENT)
Dept: FAMILY MEDICINE CLINIC | Age: 68
End: 2023-09-19
Payer: MEDICARE

## 2023-09-19 VITALS
HEART RATE: 79 BPM | DIASTOLIC BLOOD PRESSURE: 74 MMHG | SYSTOLIC BLOOD PRESSURE: 126 MMHG | WEIGHT: 315 LBS | TEMPERATURE: 98 F | RESPIRATION RATE: 16 BRPM | BODY MASS INDEX: 42.66 KG/M2 | HEIGHT: 72 IN | OXYGEN SATURATION: 98 %

## 2023-09-19 DIAGNOSIS — Z79.4 TYPE 2 DIABETES MELLITUS WITH OTHER SPECIFIED COMPLICATION, WITH LONG-TERM CURRENT USE OF INSULIN (HCC): Primary | ICD-10-CM

## 2023-09-19 DIAGNOSIS — I10 ESSENTIAL HYPERTENSION: ICD-10-CM

## 2023-09-19 DIAGNOSIS — Z12.5 SCREENING FOR MALIGNANT NEOPLASM OF PROSTATE: ICD-10-CM

## 2023-09-19 DIAGNOSIS — E11.69 TYPE 2 DIABETES MELLITUS WITH OTHER SPECIFIED COMPLICATION, WITH LONG-TERM CURRENT USE OF INSULIN (HCC): Primary | ICD-10-CM

## 2023-09-19 LAB — HBA1C MFR BLD: 7.2 %

## 2023-09-19 PROCEDURE — 3051F HG A1C>EQUAL 7.0%<8.0%: CPT | Performed by: FAMILY MEDICINE

## 2023-09-19 PROCEDURE — 3017F COLORECTAL CA SCREEN DOC REV: CPT | Performed by: FAMILY MEDICINE

## 2023-09-19 PROCEDURE — G8427 DOCREV CUR MEDS BY ELIG CLIN: HCPCS | Performed by: FAMILY MEDICINE

## 2023-09-19 PROCEDURE — 3074F SYST BP LT 130 MM HG: CPT | Performed by: FAMILY MEDICINE

## 2023-09-19 PROCEDURE — 3078F DIAST BP <80 MM HG: CPT | Performed by: FAMILY MEDICINE

## 2023-09-19 PROCEDURE — 1036F TOBACCO NON-USER: CPT | Performed by: FAMILY MEDICINE

## 2023-09-19 PROCEDURE — 83036 HEMOGLOBIN GLYCOSYLATED A1C: CPT | Performed by: FAMILY MEDICINE

## 2023-09-19 PROCEDURE — 99214 OFFICE O/P EST MOD 30 MIN: CPT | Performed by: FAMILY MEDICINE

## 2023-09-19 PROCEDURE — 2022F DILAT RTA XM EVC RTNOPTHY: CPT | Performed by: FAMILY MEDICINE

## 2023-09-19 PROCEDURE — 1123F ACP DISCUSS/DSCN MKR DOCD: CPT | Performed by: FAMILY MEDICINE

## 2023-09-19 PROCEDURE — G8417 CALC BMI ABV UP PARAM F/U: HCPCS | Performed by: FAMILY MEDICINE

## 2023-10-13 ENCOUNTER — PROCEDURE VISIT (OUTPATIENT)
Dept: PODIATRY | Age: 68
End: 2023-10-13

## 2023-10-13 DIAGNOSIS — Z79.01 ENCOUNTER FOR CURRENT LONG-TERM USE OF ANTICOAGULANTS: ICD-10-CM

## 2023-10-13 DIAGNOSIS — E11.9 TYPE 2 DIABETES MELLITUS WITHOUT COMPLICATION, UNSPECIFIED WHETHER LONG TERM INSULIN USE (HCC): Primary | ICD-10-CM

## 2023-10-13 DIAGNOSIS — L84 CORNS AND CALLOSITIES: ICD-10-CM

## 2023-10-13 DIAGNOSIS — B35.1 TINEA UNGUIUM: ICD-10-CM

## 2023-10-13 NOTE — PROGRESS NOTES
Toya Cuevas is here today for a diabetic foot exam and  nail care. his PCP is Valentín Cotto MD last OV was 0 9/18/2023. CC:   Follow-up diabetic foot and ankle exam    HPI:   Follow-up diabetic foot ankle exam.  History anticoagulant therapy with Xarelto. Does have compression stockings. Denies calf pain. Denies any open skin lesions or abrasions. No additional pedal complaints today. ROS:  Const: Denies constitutional symptoms  Musculo: Denies symptoms other than stated above  Skin: Denies symptoms other than stated above      Current Outpatient Medications:     metFORMIN (GLUCOPHAGE) 1000 MG tablet, TAKE 1 TABLET BY MOUTH TWICE DAILY WITH MEALS, Disp: 180 tablet, Rfl: 1    hydroCHLOROthiazide (HYDRODIURIL) 25 MG tablet, TAKE 1 TABLET BY MOUTH EVERY DAY FOR BLOOD PRESSURE, Disp: 90 tablet, Rfl: 1    glipiZIDE (GLUCOTROL) 10 MG tablet, TAKE 1 TABLET BY MOUTH TWICE DAILY BEFORE MEALS, Disp: 180 tablet, Rfl: 1    benazepril (LOTENSIN) 20 MG tablet, TAKE 1 TABLET BY MOUTH TWICE DAILY, Disp: 180 tablet, Rfl: 1    carvedilol (COREG) 25 MG tablet, TAKE 1 TABLET BY MOUTH TWICE DAILY, Disp: 180 tablet, Rfl: 1    furosemide (LASIX) 20 MG tablet, TAKE 1 TABLET BY MOUTH EVERY DAY, Disp: 90 tablet, Rfl: 1    dilTIAZem (TIAZAC) 240 MG extended release capsule, TAKE 1 CAPSULE BY MOUTH EVERY DAY, Disp: 90 capsule, Rfl: 3    Liraglutide (VICTOZA) 18 MG/3ML SOPN SC injection, Inject 0.6 mg into the skin daily for 7 days, THEN 1.2 mg daily for 23 days. , Disp: 6 mL, Rfl: 2    B-D UF III MINI PEN NEEDLES 31G X 5 MM MISC, USE TO TEST BLOOD SUGAR ONCE A DAY, Disp: 100 each, Rfl: 3    XARELTO 20 MG TABS tablet, TAKE 1 TABLET BY MOUTH EVERY MORNING WITH BREAKFAST, Disp: 90 tablet, Rfl: 1    LEVEMIR FLEXTOUCH 100 UNIT/ML injection pen, ADMINISTER 45 UNITS UNDER THE SKIN EVERY NIGHT, Disp: 15 mL, Rfl: 3    Handicap Placard [facility], by Does not apply route Please provide a handicap placard through 05/31/2027, Disp: 1

## 2023-10-23 DIAGNOSIS — I48.19 PERSISTENT ATRIAL FIBRILLATION (HCC): ICD-10-CM

## 2023-10-23 DIAGNOSIS — Z79.4 TYPE 2 DIABETES MELLITUS WITH OTHER SPECIFIED COMPLICATION, WITH LONG-TERM CURRENT USE OF INSULIN (HCC): ICD-10-CM

## 2023-10-23 DIAGNOSIS — E11.69 TYPE 2 DIABETES MELLITUS WITH OTHER SPECIFIED COMPLICATION, WITH LONG-TERM CURRENT USE OF INSULIN (HCC): ICD-10-CM

## 2023-10-23 RX ORDER — LIRAGLUTIDE 6 MG/ML
INJECTION SUBCUTANEOUS
Qty: 6 ML | Refills: 2 | Status: SHIPPED | OUTPATIENT
Start: 2023-10-23 | End: 2023-11-21

## 2023-11-17 ENCOUNTER — OFFICE VISIT (OUTPATIENT)
Dept: PAIN MANAGEMENT | Age: 68
End: 2023-11-17
Payer: MEDICARE

## 2023-11-17 ENCOUNTER — PREP FOR PROCEDURE (OUTPATIENT)
Dept: PAIN MANAGEMENT | Age: 68
End: 2023-11-17

## 2023-11-17 VITALS
SYSTOLIC BLOOD PRESSURE: 120 MMHG | HEART RATE: 80 BPM | OXYGEN SATURATION: 96 % | BODY MASS INDEX: 42.66 KG/M2 | HEIGHT: 72 IN | DIASTOLIC BLOOD PRESSURE: 79 MMHG | WEIGHT: 315 LBS | TEMPERATURE: 97.3 F | RESPIRATION RATE: 16 BRPM

## 2023-11-17 DIAGNOSIS — M51.9 LUMBAR DISC DISORDER: ICD-10-CM

## 2023-11-17 DIAGNOSIS — M54.17 RADICULOPATHY OF LUMBOSACRAL REGION: ICD-10-CM

## 2023-11-17 DIAGNOSIS — G89.4 CHRONIC PAIN SYNDROME: Primary | ICD-10-CM

## 2023-11-17 DIAGNOSIS — M54.14 THORACIC RADICULOPATHY: ICD-10-CM

## 2023-11-17 DIAGNOSIS — M54.16 LUMBAR RADICULOPATHY: Primary | ICD-10-CM

## 2023-11-17 DIAGNOSIS — M51.9 THORACIC DISC DISORDER: ICD-10-CM

## 2023-11-17 DIAGNOSIS — M43.16 SPONDYLOLISTHESIS OF LUMBAR REGION: ICD-10-CM

## 2023-11-17 DIAGNOSIS — M21.371 RIGHT FOOT DROP: ICD-10-CM

## 2023-11-17 DIAGNOSIS — M54.16 LUMBAR RADICULOPATHY: ICD-10-CM

## 2023-11-17 PROCEDURE — 99213 OFFICE O/P EST LOW 20 MIN: CPT | Performed by: PAIN MEDICINE

## 2023-11-17 PROCEDURE — 3074F SYST BP LT 130 MM HG: CPT | Performed by: PAIN MEDICINE

## 2023-11-17 PROCEDURE — G8427 DOCREV CUR MEDS BY ELIG CLIN: HCPCS | Performed by: PAIN MEDICINE

## 2023-11-17 PROCEDURE — G8484 FLU IMMUNIZE NO ADMIN: HCPCS | Performed by: PAIN MEDICINE

## 2023-11-17 PROCEDURE — 3078F DIAST BP <80 MM HG: CPT | Performed by: PAIN MEDICINE

## 2023-11-17 PROCEDURE — 1123F ACP DISCUSS/DSCN MKR DOCD: CPT | Performed by: PAIN MEDICINE

## 2023-11-17 PROCEDURE — G8417 CALC BMI ABV UP PARAM F/U: HCPCS | Performed by: PAIN MEDICINE

## 2023-11-17 PROCEDURE — 3017F COLORECTAL CA SCREEN DOC REV: CPT | Performed by: PAIN MEDICINE

## 2023-11-17 PROCEDURE — 1036F TOBACCO NON-USER: CPT | Performed by: PAIN MEDICINE

## 2023-11-17 NOTE — PROGRESS NOTES
[facility]        [address]  Dept: [phone]        Follow up Note      Wendy Enter     Date of Visit:  23     CC:  Patient presents for follow up   Chief Complaint   Patient presents with    Follow-up     Fluoroscopic guided therapeutic thoracic epidural steroid injection at the T5-6 level. HPI:    Pain is worse. Change in quality of symptoms:no. Medication side effects:not applicable . Recent diagnostic testing:none. Recent interventional procedures:none. He has been on anticoagulation medications to include Pamula Lapidus and has not been on herbal supplements. He is diabetic. Imagin/2023 thoracic MRI w/o -  FINDINGS:  BONES/ALIGNMENT: No fracture or bony destructive lesion. Multiple T1 and T2  hyperintense foci in the vertebral bodies, including at T2, T6, T7, T9, T11  and T12 levels likely represent hemangiomas. No evidence of a marrow  infiltrative process. SPINAL CORD: No abnormal cord signal is seen. SOFT TISSUES: No paraspinal mass identified. DEGENERATIVE CHANGES:     At T2-3, small left paracentral disc protrusion is noted. No significant  central canal or neural foraminal stenosis. At T4-5, small central disc protrusion results in mild central canal  stenosis. No neural foraminal stenosis. At T5-6, small right paracentral disc protrusion may impinge the right T5  nerve. Mild central canal stenosis. No significant neural foraminal  stenosis. At T6-7: Small central disc protrusion. No significant central canal or  neural foraminal stenosis. At T7-8, a broad-based central and left paracentral disc protrusion results  in mild central canal stenosis and may impinge the left T7 nerve. No  significant neural foraminal stenosis. IMPRESSION:  1. No fracture or bony destructive lesion.   2. T1 and T2 hyperintense foci at multiple levels in the vertebral bodies  likely represent

## 2023-11-30 ENCOUNTER — TELEPHONE (OUTPATIENT)
Dept: PAIN MANAGEMENT | Age: 68
End: 2023-11-30

## 2023-12-01 ENCOUNTER — TELEPHONE (OUTPATIENT)
Dept: PAIN MANAGEMENT | Age: 68
End: 2023-12-01

## 2023-12-01 NOTE — TELEPHONE ENCOUNTER
Call to Margarita Dan that procedure was approved for 12/5/2023 and that the surgery center should call him a few days before for the pre op call and after 3:00 PM the business day before with the arrival time. Instructed Jesus to hold ibuprofen for 24 hours, naprosyn for 4 days and any aspirin containing products or fish oil for 7 days, xarelto for 3 days. Instructed to call office back if any questions. Jesus verbalized understanding.     Electronically signed by Aliyah Chan RN on 12/1/2023 at 9:04 AM

## 2023-12-01 NOTE — PROGRESS NOTES
[address] [facility] PAIN MANAGEMENT  INSTRUCTIONS  . .......................................................................................................................................... [x] Parking the day of Surgery is located in the [facility].   Upon entering the door, make immediate right into the surgery reception room    [x]  Bring photo ID and insurance card     [x] You may have a light breakfast day of procedure    [x]  Wear loose comfortable clothing    [x]  Please follow instructions for medications as given per Dr's office    [x] You can expect a call the business day prior to procedure to notify you of your arrival time     [x] Please arrange for     []  Other instructions

## 2023-12-05 ENCOUNTER — HOSPITAL ENCOUNTER (OUTPATIENT)
Dept: GENERAL RADIOLOGY | Age: 68
Setting detail: OUTPATIENT SURGERY
Discharge: HOME | End: 2023-12-07
Attending: PAIN MEDICINE
Payer: MEDICARE

## 2023-12-05 ENCOUNTER — HOSPITAL ENCOUNTER (OUTPATIENT)
Age: 68
Setting detail: OUTPATIENT SURGERY
Discharge: HOME | End: 2023-12-05
Attending: PAIN MEDICINE | Admitting: PAIN MEDICINE
Payer: MEDICARE

## 2023-12-05 VITALS
WEIGHT: 315 LBS | SYSTOLIC BLOOD PRESSURE: 137 MMHG | HEART RATE: 85 BPM | TEMPERATURE: 98 F | HEIGHT: 72 IN | OXYGEN SATURATION: 99 % | DIASTOLIC BLOOD PRESSURE: 76 MMHG | RESPIRATION RATE: 15 BRPM | BODY MASS INDEX: 42.66 KG/M2

## 2023-12-05 DIAGNOSIS — R52 PAIN MANAGEMENT: ICD-10-CM

## 2023-12-05 LAB — GLUCOSE BLD-MCNC: 184 MG/DL (ref 74–99)

## 2023-12-05 PROCEDURE — 2500000003 HC RX 250 WO HCPCS: Performed by: PAIN MEDICINE

## 2023-12-05 PROCEDURE — 6360000004 HC RX CONTRAST MEDICATION: Performed by: PAIN MEDICINE

## 2023-12-05 PROCEDURE — 64483 NJX AA&/STRD TFRM EPI L/S 1: CPT | Performed by: PAIN MEDICINE

## 2023-12-05 PROCEDURE — 3600000002 HC SURGERY LEVEL 2 BASE: Performed by: PAIN MEDICINE

## 2023-12-05 PROCEDURE — 82962 GLUCOSE BLOOD TEST: CPT

## 2023-12-05 PROCEDURE — 6360000002 HC RX W HCPCS: Performed by: PAIN MEDICINE

## 2023-12-05 PROCEDURE — 7100000011 HC PHASE II RECOVERY - ADDTL 15 MIN: Performed by: PAIN MEDICINE

## 2023-12-05 PROCEDURE — 7100000010 HC PHASE II RECOVERY - FIRST 15 MIN: Performed by: PAIN MEDICINE

## 2023-12-05 PROCEDURE — 2709999900 HC NON-CHARGEABLE SUPPLY: Performed by: PAIN MEDICINE

## 2023-12-05 RX ORDER — LIDOCAINE HYDROCHLORIDE 5 MG/ML
INJECTION, SOLUTION INFILTRATION; INTRAVENOUS PRN
Status: DISCONTINUED | OUTPATIENT
Start: 2023-12-05 | End: 2023-12-05 | Stop reason: ALTCHOICE

## 2023-12-05 RX ORDER — METHYLPREDNISOLONE ACETATE 40 MG/ML
INJECTION, SUSPENSION INTRA-ARTICULAR; INTRALESIONAL; INTRAMUSCULAR; SOFT TISSUE PRN
Status: DISCONTINUED | OUTPATIENT
Start: 2023-12-05 | End: 2023-12-05 | Stop reason: ALTCHOICE

## 2023-12-05 RX ORDER — IOPAMIDOL 612 MG/ML
INJECTION, SOLUTION INTRATHECAL PRN
Status: DISCONTINUED | OUTPATIENT
Start: 2023-12-05 | End: 2023-12-05 | Stop reason: ALTCHOICE

## 2023-12-05 ASSESSMENT — PAIN SCALES - GENERAL: PAINLEVEL_OUTOF10: 3

## 2023-12-05 NOTE — H&P
Take 1 Bottle by mouth daily 7/8/20   Achilles Porteous, DPM   CPAP Machine MISC by Does not apply route nightly    Provider, Vasile, MD       Allergies   Allergen Reactions    Other Itching     Throat itches when he eats magnus nuts    Lipitor      Body pain, DIARRHEA ETC. HAS PROBS WITH ALL CHOLESTEROL MEDS-MOST PROB WITH LIPITOR       Social History     Socioeconomic History    Marital status:      Spouse name: Not on file    Number of children: Not on file    Years of education: Not on file    Highest education level: Not on file   Occupational History    Not on file   Tobacco Use    Smoking status: Never    Smokeless tobacco: Never   Vaping Use    Vaping Use: Never used   Substance and Sexual Activity    Alcohol use: No     Alcohol/week: 0.0 standard drinks of alcohol    Drug use: No    Sexual activity: Not on file   Other Topics Concern    Not on file   Social History Narrative    Not on file     Social Determinants of Health     Financial Resource Strain: Not on file   Food Insecurity: Not on file (3/28/2023)   Transportation Needs: Not on file   Physical Activity: Unknown (8/14/2022)    Exercise Vital Sign     Days of Exercise per Week: 0 days     Minutes of Exercise per Session: Not on file   Stress: Not on file   Social Connections: Not on file   Intimate Partner Violence: Not on file   Housing Stability: Not on file       Family History   Problem Relation Age of Onset    High Blood Pressure Mother     Cancer Mother         lymphoma    Stroke Mother     Diabetes Father     Heart Surgery Sister         heart valve replacement    Heart Disease Brother         pt was waiting for a heart transplant    Cancer Maternal Grandfather     Cancer Paternal Grandmother     Cancer Paternal Grandfather          REVIEW OF SYSTEMS:    CONSTITUTIONAL:  negative for  fevers, chills, sweats and fatigue    RESPIRATORY:  negative for  dry cough, cough with sputum, dyspnea, wheezing and chest pain    CARDIOVASCULAR:

## 2023-12-05 NOTE — OP NOTE
2023    Patient: Frankie Batista  :  1955  Age:  76 y.o. Sex:  male     PRE-OPERATIVE DIAGNOSIS: Lumbar disc displacement, lumbar neural foraminal stenosis, lumbar radiculopathy. POST-OPERATIVE DIAGNOSIS: Same. PROCEDURE: Bilateral Transforaminal epidural steroid injection under fluoroscopic guidance at foraminal level L5.    SURGEON: LESLIE Salas. ANESTHESIA: local    ESTIMATED BLOOD LOSS: None.  ______________________________________________________________________  BRIEF HISTORY: Frankie Batista comes in today for the Bilateral transforaminal epidural steroid injection under fluoroscopic guidance at foraminal level L5. The potential complications of this procedure were discussed with him again today. He has elected to undergo the aforementioned procedure. Noe complete History & Physical examination were reviewed in depth, a copy of which is in the chart. DESCRIPTION OF PROCEDURE:    After confirming written and informed consent, a time-out was performed and Noe name and date of birth, the procedure to be performed as well as the plan for the location of the needle insertion were confirmed. The patient was brought into the procedure room and placed in the prone position on the fluoroscopy table. Standard monitors were placed and vital signs were observed throughout the procedure. The area of the lumbar spine was prepped with chloraprep and draped in a sterile manner. The vertebral body was identified with AP fluoroscopy. An oblique view was obtained to better visualize the inferior junction of the pedicle and transverse process . The 6 o'clock position of the pedicle was marked and identified. The skin and subcutaneous tissue were anesthetized with 0.5% lidocaine. A # 22 gauge pencil point needle was directed toward the targeted point under fluoroscopy until bone was contacted. The needle was then walked inferiorly until the neural foramen was entered .  A lateral

## 2023-12-05 NOTE — DISCHARGE INSTRUCTIONS
Shannen Dalton [facility]/Radiofrequency  Home Going Instructions    1-Go home, rest for the remainder of the day  2-Please do not lift over 20 pounds the day of the injection  3-If you received sedation No: alcohol, driving, operating lawn mowers, plows, tractors or other dangerous equipment until next morning. Do not make important decisions or sign legal documents for 24 hours. You may experience light headedness, dizziness, nausea or sleepiness after sedation. Do not stay alone. A responsible adult must be with you for 24 hours. You could be nauseated from the medications you have received. Your IV site may be sore and bruised. 4-No dietary restrictions     5-Resume all medications the same day, blood thinners to be resumed 24 hours after injection if you were instructed to stop any. 6-Keep the surgical site clean and dry, you may shower the next morning and remove the      dressing. 7- No sitz baths, tub baths or hot tubs/swimming for 24 hours. 8- If you have any pain at the injection site(s), application of an ice pack to the area should be       helpful, 20 minutes on/20 minutes off for next 48 hours. 9- Call [facility] immediately at if you develop.   Fever greater than 100.4 F  Have bleeding or drainage from the puncture site  Have progressive Leg/arm numbness and or weakness  Loss of control of bowel and or bladder (wet/soil yourself)  Severe headache with inability to lift head  10-You may return to work the next day

## 2023-12-15 ENCOUNTER — PROCEDURE VISIT (OUTPATIENT)
Dept: PODIATRY | Age: 68
End: 2023-12-15
Payer: MEDICARE

## 2023-12-15 VITALS — WEIGHT: 315 LBS | BODY MASS INDEX: 42.66 KG/M2 | HEIGHT: 72 IN

## 2023-12-15 DIAGNOSIS — B35.1 TINEA UNGUIUM: ICD-10-CM

## 2023-12-15 DIAGNOSIS — E11.9 TYPE 2 DIABETES MELLITUS WITHOUT COMPLICATION, UNSPECIFIED WHETHER LONG TERM INSULIN USE (HCC): Primary | ICD-10-CM

## 2023-12-15 DIAGNOSIS — L84 CORNS AND CALLOSITIES: ICD-10-CM

## 2023-12-15 DIAGNOSIS — Z79.01 ENCOUNTER FOR CURRENT LONG-TERM USE OF ANTICOAGULANTS: ICD-10-CM

## 2023-12-15 PROCEDURE — 11056 PARNG/CUTG B9 HYPRKR LES 2-4: CPT | Performed by: PODIATRIST

## 2023-12-15 PROCEDURE — 11721 DEBRIDE NAIL 6 OR MORE: CPT | Performed by: PODIATRIST

## 2023-12-15 NOTE — PROGRESS NOTES
Urban Raymond is here today for a diabetic foot exam and nail care. his PCP is Niranjan Peña MD last OV was  11/17/2023. CC:   Follow-up diabetic foot and ankle exam    HPI:   Follow-up diabetic foot ankle exam.  No calf pain. History of Xarelto. No additional pedal complaints. ROS:  Const: Denies constitutional symptoms  Musculo: Denies symptoms other than stated above  Skin: Denies symptoms other than stated above      Current Outpatient Medications:     insulin detemir (LEVEMIR FLEXTOUCH) 100 UNIT/ML injection pen, Inject 45 Units into the skin nightly ADMINISTER 45 UNITS UNDER THE SKIN EVERY NIGHT Strength: 100 UNIT/ML, Disp: 15 mL, Rfl: 3    rivaroxaban (XARELTO) 20 MG TABS tablet, Take 1 tablet by mouth daily (with breakfast) TAKE 1 TABLET BY MOUTH EVERY MORNING WITH BREAKFAST Strength: 20 mg, Disp: 90 tablet, Rfl: 1    Liraglutide (VICTOZA) 18 MG/3ML SOPN SC injection, Inject 0.6 mg into the skin daily for 7 days, THEN 1.2 mg daily for 23 days. , Disp: 6 mL, Rfl: 2    metFORMIN (GLUCOPHAGE) 1000 MG tablet, TAKE 1 TABLET BY MOUTH TWICE DAILY WITH MEALS, Disp: 180 tablet, Rfl: 1    hydroCHLOROthiazide (HYDRODIURIL) 25 MG tablet, TAKE 1 TABLET BY MOUTH EVERY DAY FOR BLOOD PRESSURE, Disp: 90 tablet, Rfl: 1    glipiZIDE (GLUCOTROL) 10 MG tablet, TAKE 1 TABLET BY MOUTH TWICE DAILY BEFORE MEALS, Disp: 180 tablet, Rfl: 1    benazepril (LOTENSIN) 20 MG tablet, TAKE 1 TABLET BY MOUTH TWICE DAILY, Disp: 180 tablet, Rfl: 1    carvedilol (COREG) 25 MG tablet, TAKE 1 TABLET BY MOUTH TWICE DAILY, Disp: 180 tablet, Rfl: 1    furosemide (LASIX) 20 MG tablet, TAKE 1 TABLET BY MOUTH EVERY DAY, Disp: 90 tablet, Rfl: 1    dilTIAZem (TIAZAC) 240 MG extended release capsule, TAKE 1 CAPSULE BY MOUTH EVERY DAY, Disp: 90 capsule, Rfl: 3    B-D UF III MINI PEN NEEDLES 31G X 5 MM MISC, USE TO TEST BLOOD SUGAR ONCE A DAY, Disp: 100 each, Rfl: 3    Handicap Placard [facility] by Does not apply route Please provide a

## 2023-12-28 ENCOUNTER — HOSPITAL ENCOUNTER (OUTPATIENT)
Dept: HOSPITAL 83 - RESCLI | Age: 68
Discharge: HOME | End: 2023-12-28
Attending: STUDENT IN AN ORGANIZED HEALTH CARE EDUCATION/TRAINING PROGRAM
Payer: MEDICARE

## 2023-12-28 DIAGNOSIS — Z79.899: ICD-10-CM

## 2023-12-28 DIAGNOSIS — E11.9: ICD-10-CM

## 2023-12-28 DIAGNOSIS — Z82.49: ICD-10-CM

## 2023-12-28 DIAGNOSIS — I11.0: ICD-10-CM

## 2023-12-28 DIAGNOSIS — G62.9: ICD-10-CM

## 2023-12-28 DIAGNOSIS — R60.0: ICD-10-CM

## 2023-12-28 DIAGNOSIS — G47.33: ICD-10-CM

## 2023-12-28 DIAGNOSIS — Z88.8: ICD-10-CM

## 2023-12-28 DIAGNOSIS — I48.91: Primary | ICD-10-CM

## 2023-12-28 DIAGNOSIS — I50.30: ICD-10-CM

## 2024-01-03 ENCOUNTER — TELEPHONE (OUTPATIENT)
Dept: FAMILY MEDICINE CLINIC | Age: 69
End: 2024-01-03

## 2024-01-03 NOTE — TELEPHONE ENCOUNTER
Patient needs preop clearance appointment ASAP.  Could likely see him Friday at 3:00 or 4 if must.  Alternatively sometime next week that has availability

## 2024-01-04 NOTE — TELEPHONE ENCOUNTER
Patient informed.  Patient scheduled.    Last Appointment:  9/19/2023  Future Appointments   Date Time Provider Department Center   1/5/2024 10:45 AM Corinne Rodriguez DO BDM PAIN [facility]   1/5/2024  3:00 PM Ty Sol MD [facility]   2/16/2024 10:30 AM Fer Ware DPM Col Podiatry [facility]   3/19/2024  1:00 PM Ty Sol MD [facility]

## 2024-01-04 NOTE — TELEPHONE ENCOUNTER
Last Appointment:  9/19/2023  Future Appointments   Date Time Provider Department Center   1/5/2024 10:45 AM Corinne Rodriguez DO BDM PAIN [facility]   2/16/2024 10:30 AM Fer Ware DPM Col Podiatry [facility]   3/19/2024  1:00 PM Ty Sol MD COL[facility]

## 2024-01-05 ENCOUNTER — OFFICE VISIT (OUTPATIENT)
Dept: FAMILY MEDICINE CLINIC | Age: 69
End: 2024-01-05

## 2024-01-05 ENCOUNTER — PREP FOR PROCEDURE (OUTPATIENT)
Dept: PAIN MANAGEMENT | Age: 69
End: 2024-01-05

## 2024-01-05 ENCOUNTER — OFFICE VISIT (OUTPATIENT)
Dept: PAIN MANAGEMENT | Age: 69
End: 2024-01-05
Payer: MEDICARE

## 2024-01-05 VITALS
DIASTOLIC BLOOD PRESSURE: 80 MMHG | BODY MASS INDEX: 42.66 KG/M2 | TEMPERATURE: 97.8 F | HEART RATE: 58 BPM | OXYGEN SATURATION: 99 % | WEIGHT: 315 LBS | HEIGHT: 72 IN | SYSTOLIC BLOOD PRESSURE: 126 MMHG

## 2024-01-05 VITALS
HEIGHT: 72 IN | RESPIRATION RATE: 18 BRPM | SYSTOLIC BLOOD PRESSURE: 137 MMHG | BODY MASS INDEX: 42.66 KG/M2 | OXYGEN SATURATION: 98 % | WEIGHT: 315 LBS | DIASTOLIC BLOOD PRESSURE: 85 MMHG | TEMPERATURE: 97 F | HEART RATE: 76 BPM

## 2024-01-05 DIAGNOSIS — I48.19 PERSISTENT ATRIAL FIBRILLATION (HCC): ICD-10-CM

## 2024-01-05 DIAGNOSIS — E11.69 TYPE 2 DIABETES MELLITUS WITH OTHER SPECIFIED COMPLICATION, WITH LONG-TERM CURRENT USE OF INSULIN (HCC): ICD-10-CM

## 2024-01-05 DIAGNOSIS — M51.9 LUMBAR DISC DISORDER: ICD-10-CM

## 2024-01-05 DIAGNOSIS — Z01.818 PRE-OP EXAM: Primary | ICD-10-CM

## 2024-01-05 DIAGNOSIS — M54.17 RADICULOPATHY OF LUMBOSACRAL REGION: ICD-10-CM

## 2024-01-05 DIAGNOSIS — G89.4 CHRONIC PAIN SYNDROME: Primary | ICD-10-CM

## 2024-01-05 DIAGNOSIS — M54.14 THORACIC RADICULOPATHY: ICD-10-CM

## 2024-01-05 DIAGNOSIS — M54.16 LUMBAR RADICULOPATHY: Primary | ICD-10-CM

## 2024-01-05 DIAGNOSIS — M54.16 LUMBAR RADICULOPATHY: ICD-10-CM

## 2024-01-05 DIAGNOSIS — M43.16 SPONDYLOLISTHESIS OF LUMBAR REGION: ICD-10-CM

## 2024-01-05 DIAGNOSIS — I10 ESSENTIAL HYPERTENSION: ICD-10-CM

## 2024-01-05 DIAGNOSIS — Z79.4 TYPE 2 DIABETES MELLITUS WITH OTHER SPECIFIED COMPLICATION, WITH LONG-TERM CURRENT USE OF INSULIN (HCC): ICD-10-CM

## 2024-01-05 DIAGNOSIS — M51.9 THORACIC DISC DISORDER: ICD-10-CM

## 2024-01-05 DIAGNOSIS — M21.371 RIGHT FOOT DROP: ICD-10-CM

## 2024-01-05 PROCEDURE — G8484 FLU IMMUNIZE NO ADMIN: HCPCS | Performed by: PAIN MEDICINE

## 2024-01-05 PROCEDURE — G8427 DOCREV CUR MEDS BY ELIG CLIN: HCPCS | Performed by: PAIN MEDICINE

## 2024-01-05 PROCEDURE — 3017F COLORECTAL CA SCREEN DOC REV: CPT | Performed by: PAIN MEDICINE

## 2024-01-05 PROCEDURE — 3075F SYST BP GE 130 - 139MM HG: CPT | Performed by: PAIN MEDICINE

## 2024-01-05 PROCEDURE — 1123F ACP DISCUSS/DSCN MKR DOCD: CPT | Performed by: PAIN MEDICINE

## 2024-01-05 PROCEDURE — 99213 OFFICE O/P EST LOW 20 MIN: CPT | Performed by: PAIN MEDICINE

## 2024-01-05 PROCEDURE — 3079F DIAST BP 80-89 MM HG: CPT | Performed by: PAIN MEDICINE

## 2024-01-05 PROCEDURE — 1036F TOBACCO NON-USER: CPT | Performed by: PAIN MEDICINE

## 2024-01-05 PROCEDURE — G8417 CALC BMI ABV UP PARAM F/U: HCPCS | Performed by: PAIN MEDICINE

## 2024-01-05 RX ORDER — HYDROCHLOROTHIAZIDE 25 MG/1
25 TABLET ORAL DAILY
Qty: 90 TABLET | Refills: 1 | Status: SHIPPED | OUTPATIENT
Start: 2024-01-05

## 2024-01-05 RX ORDER — CARVEDILOL 25 MG/1
TABLET ORAL
Qty: 180 TABLET | Refills: 1 | Status: SHIPPED | OUTPATIENT
Start: 2024-01-05

## 2024-01-05 RX ORDER — GLIPIZIDE 10 MG/1
10 TABLET ORAL
Qty: 180 TABLET | Refills: 1 | Status: SHIPPED | OUTPATIENT
Start: 2024-01-05

## 2024-01-05 RX ORDER — ACETAMINOPHEN 500 MG
1000 TABLET ORAL AS NEEDED
COMMUNITY

## 2024-01-05 RX ORDER — BENAZEPRIL HYDROCHLORIDE 20 MG/1
20 TABLET ORAL 2 TIMES DAILY
Qty: 180 TABLET | Refills: 1 | Status: SHIPPED | OUTPATIENT
Start: 2024-01-05

## 2024-01-05 ASSESSMENT — PATIENT HEALTH QUESTIONNAIRE - PHQ9
SUM OF ALL RESPONSES TO PHQ9 QUESTIONS 1 & 2: 0
2. FEELING DOWN, DEPRESSED OR HOPELESS: 0
SUM OF ALL RESPONSES TO PHQ QUESTIONS 1-9: 0
1. LITTLE INTEREST OR PLEASURE IN DOING THINGS: 0

## 2024-01-05 NOTE — PROGRESS NOTES
Jesus Green presents to the [facility] on 1/5/2024. Jesus is complaining of pain low back. The pain is intermittent. The pain is described as aching, dull, and miserable. Pain is rated on his best day at a 1, on his worst day at a 8, and on average at a 3 on the VAS scale. He took his last dose of Tylenol yesterday.     Any procedures since your last visit: Yes, Bilateral Transforaminal epidural steroid injection under fluoroscopic guidance at foraminal level L5. with 70 % relief.    Pacemaker or defibrillator: No     He is not on NSAIDS and is  on anticoagulation medications to include Xarelto and is managed by Dr. Sol     Medication Contract and Consent for Opioid Use Documents Filed        No documents found                    /85   Pulse 76   Temp 97 °F (36.1 °C) (Infrared)   Resp 18   Ht 1.829 m (6')   Wt (!) 145.2 kg (320 lb 1.7 oz)   SpO2 98%   BMI 43.41 kg/m²      No LMP for male patient.

## 2024-01-05 NOTE — PROGRESS NOTES
[facility]  Office Progress Note - Dr. Sol  1/5/24    CC:   Chief Complaint   Patient presents with    Pre-op Exam     Local facial excision - date TBD - Dr. Rdz with [facility]        /80   Pulse 58   Temp 97.8 °F (36.6 °C) (Temporal)   Ht 1.829 m (6')   Wt (!) 152.4 kg (336 lb)   SpO2 99%   BMI 45.57 kg/m²   Wt Readings from Last 3 Encounters:   01/05/24 (!) 152.4 kg (336 lb)   01/05/24 (!) 145.2 kg (320 lb 1.7 oz)   12/15/23 (!) 145.2 kg (320 lb)       HPI:     Here for preop clearance prior to plastic surgery for removal of melanoma in situ with wide margins on the cheek/jawline.  Surgery date not yet scheduled.  Possibly in February.    Overall he has been feeling well at his baseline state of health.  No exacerbations of chronic medical conditions.  Atrial fibrillation remains rate controlled.  He remains anticoagulated on Xarelto.    Hemoglobin A1C   Date Value Ref Range Status   09/19/2023 7.2 % Final   Diabetes is well-controlled, especially given historical elevations.    His physical exertion is limited by back pain and knee pain.  He probably meets 4 METS of activities.  He has been bowling a lot more and he did a lot of walking on a trip to [place] without provoking any anginal symptoms or equivalents.        He denies chest pain, pressure, heaviness, lightheadedness, dizziness, presyncope.    Has had surgeries in the past and has not any complications from anesthesia .  No history of blood clots.  No history of bleeding or need for blood transfusions.    Allergies   Allergen Reactions    Atorvastatin      Other reaction(s): body aches, headache    Hazelnut      Other reaction(s): itchy throat    Rosuvastatin Dizziness or Vertigo    Lipitor      Body pain, DIARRHEA ETC. HAS PROBS WITH ALL CHOLESTEROL MEDS-MOST PROB WITH LIPITOR         Going to [place] in 2 weeks   _________________________________________________________    Assessment / Plan  Jesus was

## 2024-01-05 NOTE — PROGRESS NOTES
[facility]        [address] [address]  Dept: [phone]        Follow up Note      Jesus Green     Date of Visit:  24     CC:  Patient presents for follow up   Chief Complaint   Patient presents with    Follow-up      Bilateral Transforaminal epidural steroid injection under fluoroscopic guidance at foraminal level L5.     HPI:    Pain is worse in the thoracic spine, better in the lumbar  Change in quality of symptoms:no.    Medication side effects:not applicable .   Recent diagnostic testing:none.  Recent interventional procedures:b/l L5 TFESI with >50% relief.    He has been on anticoagulation medications to include XARELTO and has not been on herbal supplements.  He is diabetic.     Imagin/2023 thoracic MRI w/o -  FINDINGS:  BONES/ALIGNMENT: No fracture or bony destructive lesion.  Multiple T1 and T2  hyperintense foci in the vertebral bodies, including at T2, T6, T7, T9, T11  and T12 levels likely represent hemangiomas.  No evidence of a marrow  infiltrative process.     SPINAL CORD: No abnormal cord signal is seen.     SOFT TISSUES: No paraspinal mass identified.     DEGENERATIVE CHANGES:     At T2-3, small left paracentral disc protrusion is noted.  No significant  central canal or neural foraminal stenosis.     At T4-5, small central disc protrusion results in mild central canal  stenosis.  No neural foraminal stenosis.     At T5-6, small right paracentral disc protrusion may impinge the right T5  nerve.  Mild central canal stenosis.  No significant neural foraminal  stenosis.     At T6-7: Small central disc protrusion.  No significant central canal or  neural foraminal stenosis.     At T7-8, a broad-based central and left paracentral disc protrusion results  in mild central canal stenosis and may impinge the left T7 nerve.  No  significant neural foraminal stenosis.     IMPRESSION:  1.  No fracture or bony destructive lesion.  2. T1

## 2024-02-12 DIAGNOSIS — Z79.4 TYPE 2 DIABETES MELLITUS WITH OTHER SPECIFIED COMPLICATION, WITH LONG-TERM CURRENT USE OF INSULIN (HCC): ICD-10-CM

## 2024-02-12 DIAGNOSIS — E11.69 TYPE 2 DIABETES MELLITUS WITH OTHER SPECIFIED COMPLICATION, WITH LONG-TERM CURRENT USE OF INSULIN (HCC): ICD-10-CM

## 2024-02-12 NOTE — TELEPHONE ENCOUNTER
Last Appointment:  1/5/2024  Future Appointments   Date Time Provider Department Center   2/16/2024 10:30 AM Fer Ware DPM Col Podiatry [facility]   4/1/2024  1:00 PM Ty Sol MD [facility]

## 2024-02-16 ENCOUNTER — PROCEDURE VISIT (OUTPATIENT)
Dept: PODIATRY | Age: 69
End: 2024-02-16

## 2024-02-16 DIAGNOSIS — E11.9 TYPE 2 DIABETES MELLITUS WITHOUT COMPLICATION, UNSPECIFIED WHETHER LONG TERM INSULIN USE (HCC): Primary | ICD-10-CM

## 2024-02-16 DIAGNOSIS — B35.1 TINEA UNGUIUM: ICD-10-CM

## 2024-02-16 DIAGNOSIS — L84 CORNS AND CALLOSITIES: ICD-10-CM

## 2024-02-16 DIAGNOSIS — Z79.01 ENCOUNTER FOR CURRENT LONG-TERM USE OF ANTICOAGULANTS: ICD-10-CM

## 2024-02-16 NOTE — PROGRESS NOTES
Jesus Green is here today for a diabetic foot exam and nail care. his PCP is Ty Sol MD last OV was 01/05/2024.       CC:   Follow-up diabetic foot and ankle exam    HPI:   Follow-up diabetic foot ankle exam.  No open wounds.  No recent injury or trauma.  Does have compression stockings.      ROS:  Const: Denies constitutional symptoms  Musculo: Denies symptoms other than stated above  Skin: Denies symptoms other than stated above      Current Outpatient Medications:     metFORMIN (GLUCOPHAGE) 1000 MG tablet, TAKE 1 TABLET BY MOUTH TWICE DAILY WITH MEALS, Disp: 180 tablet, Rfl: 1    carvedilol (COREG) 25 MG tablet, TAKE 1 TABLET BY MOUTH TWICE DAILY, Disp: 180 tablet, Rfl: 1    acetaminophen (TYLENOL) 500 MG tablet, Take 2 tablets by mouth as needed for Pain, Disp: , Rfl:     benazepril (LOTENSIN) 20 MG tablet, Take 1 tablet by mouth 2 times daily, Disp: 180 tablet, Rfl: 1    glipiZIDE (GLUCOTROL) 10 MG tablet, Take 1 tablet by mouth 2 times daily (before meals), Disp: 180 tablet, Rfl: 1    hydroCHLOROthiazide (HYDRODIURIL) 25 MG tablet, Take 1 tablet by mouth daily for blood pressure, Disp: 90 tablet, Rfl: 1    furosemide (LASIX) 20 MG tablet, TAKE 1 TABLET BY MOUTH EVERY DAY, Disp: 90 tablet, Rfl: 1    insulin detemir (LEVEMIR FLEXTOUCH) 100 UNIT/ML injection pen, Inject 45 Units into the skin nightly ADMINISTER 45 UNITS UNDER THE SKIN EVERY NIGHT Strength: 100 UNIT/ML, Disp: 15 mL, Rfl: 3    rivaroxaban (XARELTO) 20 MG TABS tablet, Take 1 tablet by mouth daily (with breakfast) TAKE 1 TABLET BY MOUTH EVERY MORNING WITH BREAKFAST Strength: 20 mg, Disp: 90 tablet, Rfl: 1    Liraglutide (VICTOZA) 18 MG/3ML SOPN SC injection, Inject 0.6 mg into the skin daily for 7 days, THEN 1.2 mg daily for 23 days., Disp: 6 mL, Rfl: 2    dilTIAZem (TIAZAC) 240 MG extended release capsule, TAKE 1 CAPSULE BY MOUTH EVERY DAY, Disp: 90 capsule, Rfl: 3    B-D UF III MINI PEN NEEDLES 31G X 5 MM MISC, USE TO TEST BLOOD

## 2024-03-22 DIAGNOSIS — Z12.5 SCREENING FOR MALIGNANT NEOPLASM OF PROSTATE: ICD-10-CM

## 2024-03-22 DIAGNOSIS — E11.69 TYPE 2 DIABETES MELLITUS WITH OTHER SPECIFIED COMPLICATION, WITH LONG-TERM CURRENT USE OF INSULIN (HCC): ICD-10-CM

## 2024-03-22 DIAGNOSIS — Z79.4 TYPE 2 DIABETES MELLITUS WITH OTHER SPECIFIED COMPLICATION, WITH LONG-TERM CURRENT USE OF INSULIN (HCC): ICD-10-CM

## 2024-03-22 LAB
ABSOLUTE IMMATURE GRANULOCYTE: 0.05 K/UL (ref 0–0.58)
ALBUMIN SERPL-MCNC: 4.3 G/DL (ref 3.5–5.2)
ALP BLD-CCNC: 100 U/L (ref 40–129)
ALT SERPL-CCNC: 14 U/L (ref 0–40)
ANION GAP SERPL CALCULATED.3IONS-SCNC: 9 MMOL/L (ref 7–16)
AST SERPL-CCNC: 19 U/L (ref 0–39)
BASOPHILS ABSOLUTE: 0.06 K/UL (ref 0–0.2)
BASOPHILS RELATIVE PERCENT: 1 % (ref 0–2)
BILIRUB SERPL-MCNC: 0.4 MG/DL (ref 0–1.2)
BUN BLDV-MCNC: 19 MG/DL (ref 6–23)
CALCIUM SERPL-MCNC: 9.2 MG/DL (ref 8.6–10.2)
CHLORIDE BLD-SCNC: 101 MMOL/L (ref 98–107)
CHOLESTEROL: 167 MG/DL
CO2: 29 MMOL/L (ref 22–29)
CREAT SERPL-MCNC: 1.1 MG/DL (ref 0.7–1.2)
CREATININE URINE: 42 MG/DL (ref 40–278)
EOSINOPHILS ABSOLUTE: 0.27 K/UL (ref 0.05–0.5)
EOSINOPHILS RELATIVE PERCENT: 3 % (ref 0–6)
GFR SERPL CREATININE-BSD FRML MDRD: >60 ML/MIN/1.73M2
GLUCOSE BLD-MCNC: 146 MG/DL (ref 74–99)
HBA1C MFR BLD: 7.8 % (ref 4–5.6)
HCT VFR BLD CALC: 44.8 % (ref 37–54)
HDLC SERPL-MCNC: 35 MG/DL
HEMOGLOBIN: 14.6 G/DL (ref 12.5–16.5)
IMMATURE GRANULOCYTES: 1 % (ref 0–5)
LDL CHOLESTEROL: 92 MG/DL
LYMPHOCYTES ABSOLUTE: 1.5 K/UL (ref 1.5–4)
LYMPHOCYTES RELATIVE PERCENT: 18 % (ref 20–42)
MCH RBC QN AUTO: 28.2 PG (ref 26–35)
MCHC RBC AUTO-ENTMCNC: 32.6 G/DL (ref 32–34.5)
MCV RBC AUTO: 86.5 FL (ref 80–99.9)
MICROALBUMIN/CREAT 24H UR: <12 MG/L (ref 0–19)
MICROALBUMIN/CREAT UR-RTO: NORMAL MCG/MG CREAT (ref 0–30)
MONOCYTES ABSOLUTE: 0.76 K/UL (ref 0.1–0.95)
MONOCYTES RELATIVE PERCENT: 9 % (ref 2–12)
NEUTROPHILS ABSOLUTE: 5.59 K/UL (ref 1.8–7.3)
NEUTROPHILS RELATIVE PERCENT: 68 % (ref 43–80)
PDW BLD-RTO: 15.5 % (ref 11.5–15)
PLATELET # BLD: 213 K/UL (ref 130–450)
PMV BLD AUTO: 11.5 FL (ref 7–12)
POTASSIUM SERPL-SCNC: 3.9 MMOL/L (ref 3.5–5)
PROSTATE SPECIFIC ANTIGEN: 1.6 NG/ML (ref 0–4)
RBC # BLD: 5.18 M/UL (ref 3.8–5.8)
SODIUM BLD-SCNC: 139 MMOL/L (ref 132–146)
TOTAL PROTEIN: 7.6 G/DL (ref 6.4–8.3)
TRIGL SERPL-MCNC: 198 MG/DL
VLDLC SERPL CALC-MCNC: 40 MG/DL
WBC # BLD: 8.2 K/UL (ref 4.5–11.5)

## 2024-03-29 SDOH — HEALTH STABILITY: PHYSICAL HEALTH: ON AVERAGE, HOW MANY DAYS PER WEEK DO YOU ENGAGE IN MODERATE TO STRENUOUS EXERCISE (LIKE A BRISK WALK)?: 0 DAYS

## 2024-03-29 SDOH — HEALTH STABILITY: PHYSICAL HEALTH: ON AVERAGE, HOW MANY MINUTES DO YOU ENGAGE IN EXERCISE AT THIS LEVEL?: 0 MIN

## 2024-03-29 ASSESSMENT — PATIENT HEALTH QUESTIONNAIRE - PHQ9
SUM OF ALL RESPONSES TO PHQ QUESTIONS 1-9: 0
SUM OF ALL RESPONSES TO PHQ QUESTIONS 1-9: 0
SUM OF ALL RESPONSES TO PHQ9 QUESTIONS 1 & 2: 0
2. FEELING DOWN, DEPRESSED OR HOPELESS: NOT AT ALL
SUM OF ALL RESPONSES TO PHQ QUESTIONS 1-9: 0
SUM OF ALL RESPONSES TO PHQ QUESTIONS 1-9: 0
1. LITTLE INTEREST OR PLEASURE IN DOING THINGS: NOT AT ALL

## 2024-03-29 ASSESSMENT — LIFESTYLE VARIABLES
HOW OFTEN DO YOU HAVE A DRINK CONTAINING ALCOHOL: NEVER
HOW MANY STANDARD DRINKS CONTAINING ALCOHOL DO YOU HAVE ON A TYPICAL DAY: 0
HOW MANY STANDARD DRINKS CONTAINING ALCOHOL DO YOU HAVE ON A TYPICAL DAY: PATIENT DOES NOT DRINK
HOW OFTEN DO YOU HAVE A DRINK CONTAINING ALCOHOL: 1
HOW OFTEN DO YOU HAVE SIX OR MORE DRINKS ON ONE OCCASION: 1

## 2024-04-01 ENCOUNTER — OFFICE VISIT (OUTPATIENT)
Dept: FAMILY MEDICINE CLINIC | Age: 69
End: 2024-04-01
Payer: MEDICARE

## 2024-04-01 VITALS
BODY MASS INDEX: 42.66 KG/M2 | TEMPERATURE: 98.4 F | HEART RATE: 85 BPM | OXYGEN SATURATION: 95 % | SYSTOLIC BLOOD PRESSURE: 134 MMHG | WEIGHT: 315 LBS | HEIGHT: 72 IN | DIASTOLIC BLOOD PRESSURE: 86 MMHG

## 2024-04-01 DIAGNOSIS — Z00.00 MEDICARE ANNUAL WELLNESS VISIT, SUBSEQUENT: Primary | ICD-10-CM

## 2024-04-01 DIAGNOSIS — Z12.11 COLON CANCER SCREENING: ICD-10-CM

## 2024-04-01 DIAGNOSIS — R15.1 FECAL SMEARING: ICD-10-CM

## 2024-04-01 PROCEDURE — 3079F DIAST BP 80-89 MM HG: CPT | Performed by: FAMILY MEDICINE

## 2024-04-01 PROCEDURE — 3075F SYST BP GE 130 - 139MM HG: CPT | Performed by: FAMILY MEDICINE

## 2024-04-01 PROCEDURE — G0439 PPPS, SUBSEQ VISIT: HCPCS | Performed by: FAMILY MEDICINE

## 2024-04-01 PROCEDURE — 3017F COLORECTAL CA SCREEN DOC REV: CPT | Performed by: FAMILY MEDICINE

## 2024-04-01 PROCEDURE — 1123F ACP DISCUSS/DSCN MKR DOCD: CPT | Performed by: FAMILY MEDICINE

## 2024-04-01 NOTE — PATIENT INSTRUCTIONS
pressure, and high cholesterol. If you think you may have a problem with alcohol or drug use, talk to your doctor.   Medicines    Take your medicines exactly as prescribed. Call your doctor if you think you are having a problem with your medicine.     If your doctor recommends aspirin, take the amount directed each day. Make sure you take aspirin and not another kind of pain reliever, such as acetaminophen (Tylenol).   When should you call for help?   Call 911 if you have symptoms of a heart attack. These may include:    Chest pain or pressure, or a strange feeling in the chest.     Sweating.     Shortness of breath.     Pain, pressure, or a strange feeling in the back, neck, jaw, or upper belly or in one or both shoulders or arms.     Lightheadedness or sudden weakness.     A fast or irregular heartbeat.   After you call 911, the  may tell you to chew 1 adult-strength or 2 to 4 low-dose aspirin. Wait for an ambulance. Do not try to drive yourself.  Watch closely for changes in your health, and be sure to contact your doctor if you have any problems.  Where can you learn more?  Go to https://[email or web address].net/patientEd and enter F075 to learn more about \"A Healthy Heart: Care Instructions.\"  Current as of: June 24, 2023               Content Version: 14.0  © 2006-2024 [employer].   Care instructions adapted under license by [employer]. If you have questions about a medical condition or this instruction, always ask your healthcare professional. [employer] disclaims any warranty or liability for your use of this information.      Personalized Preventive Plan for Jesus rGeen - 4/1/2024  Medicare offers a range of preventive health benefits. Some of the tests and screenings are paid in full while other may be subject to a deductible, co-insurance, and/or copay.    Some of these benefits include a comprehensive review of your medical history including lifestyle, illnesses that

## 2024-04-01 NOTE — PROGRESS NOTES
Height: 1.829 m (6')      Body mass index is 45.98 kg/m².      General Appearance: alert and oriented to person, place and time, well developed and well- nourished, in no acute distress  Skin: warm and dry, no rash or erythema  Head: normocephalic and atraumatic  Eyes: pupils equal, round, and reactive to light, extraocular eye movements intact, conjunctivae normal  Neck: supple and non-tender without mass, no thyromegaly or thyroid nodules, no cervical lymphadenopathy  Pulmonary/Chest: clear to auscultation bilaterally- no wheezes, rales or rhonchi, normal air movement, no respiratory distress  Cardiovascular: normal rate, regular rhythm, normal S1 and S2, no murmurs, rubs, clicks, or gallops, distal pulses intact, no carotid bruits  Abdomen: soft, non-tender, non-distended, normal bowel sounds, no masses or organomegaly  Extremities: no cyanosis, clubbing or edema  Musculoskeletal: normal range of motion, no joint swelling, deformity or tenderness       Allergies   Allergen Reactions    Atorvastatin      Other reaction(s): body aches, headache    Hazelnut      Other reaction(s): itchy throat    Rosuvastatin Dizziness or Vertigo    Lipitor      Body pain, DIARRHEA ETC. HAS PROBS WITH ALL CHOLESTEROL MEDS-MOST PROB WITH LIPITOR     Prior to Visit Medications    Medication Sig Taking? Authorizing Provider   metFORMIN (GLUCOPHAGE) 1000 MG tablet TAKE 1 TABLET BY MOUTH TWICE DAILY WITH MEALS Yes Ty Sol MD   carvedilol (COREG) 25 MG tablet TAKE 1 TABLET BY MOUTH TWICE DAILY Yes Ty Sol MD   acetaminophen (TYLENOL) 500 MG tablet Take 2 tablets by mouth as needed for Pain Yes ProviderVasile MD   benazepril (LOTENSIN) 20 MG tablet Take 1 tablet by mouth 2 times daily Yes Ty Sol MD   glipiZIDE (GLUCOTROL) 10 MG tablet Take 1 tablet by mouth 2 times daily (before meals) Yes Ty Sol MD   hydroCHLOROthiazide (HYDRODIURIL) 25 MG tablet Take 1 tablet by

## 2024-04-09 ENCOUNTER — OFFICE VISIT (OUTPATIENT)
Dept: FAMILY MEDICINE CLINIC | Age: 69
End: 2024-04-09

## 2024-04-09 VITALS
BODY MASS INDEX: 42.66 KG/M2 | SYSTOLIC BLOOD PRESSURE: 128 MMHG | RESPIRATION RATE: 18 BRPM | TEMPERATURE: 98.3 F | HEIGHT: 72 IN | HEART RATE: 113 BPM | DIASTOLIC BLOOD PRESSURE: 84 MMHG | OXYGEN SATURATION: 97 % | WEIGHT: 315 LBS

## 2024-04-09 DIAGNOSIS — E11.9 TYPE 2 DIABETES MELLITUS WITHOUT COMPLICATION, WITH LONG-TERM CURRENT USE OF INSULIN (HCC): ICD-10-CM

## 2024-04-09 DIAGNOSIS — I50.9 CHRONIC CONGESTIVE HEART FAILURE, UNSPECIFIED HEART FAILURE TYPE (HCC): ICD-10-CM

## 2024-04-09 DIAGNOSIS — Z79.4 TYPE 2 DIABETES MELLITUS WITHOUT COMPLICATION, WITH LONG-TERM CURRENT USE OF INSULIN (HCC): ICD-10-CM

## 2024-04-09 DIAGNOSIS — J20.9 BRONCHITIS WITH BRONCHOSPASM: Primary | ICD-10-CM

## 2024-04-09 RX ORDER — METHYLPREDNISOLONE 4 MG/1
TABLET ORAL
Qty: 1 KIT | Refills: 0 | Status: SHIPPED | OUTPATIENT
Start: 2024-04-09 | End: 2024-04-15

## 2024-04-09 RX ORDER — DOXYCYCLINE HYCLATE 100 MG/1
100 CAPSULE ORAL 2 TIMES DAILY
Qty: 20 CAPSULE | Refills: 0 | Status: SHIPPED | OUTPATIENT
Start: 2024-04-09 | End: 2024-04-19

## 2024-04-09 NOTE — PROGRESS NOTES
Chief Complaint       Cough (X 1 wk ) and Congestion (X 1 wk )      History of Present Illness   Source of history provided by:  patient.    Jesus Green is a 68 y.o. old male presenting to the walk in clinic for evaluation of a productive cough with green sputum, chest congestion, and intermittent shortness of breath with coughing fits which has been present for the past week.  Patient reports that his wife is currently being treated for bronchitis and pneumonia. Denies any fever, chills, CP, dyspnea, LE edema, abdominal pain, vomiting, rash, or lethargy. Denies any hx of asthma or COPD.  Patient has attempted OTC remedies with minimal symptomatic relief.  Past medical history is significant for type 2 diabetes, currently on insulin.  Past medical history is also significant for chronic CHF.  Patient has actually lost 7 pounds since his last visit on 4/1/2024.    ROS    Unless otherwise stated in this report or unable to obtain because of the patient's clinical or mental status as evidenced by the medical record, this patients's positive and negative responses for Review of Systems, constitutional, psych, eyes, ENT, cardiovascular, respiratory, gastrointestinal, neurological, genitourinary, musculoskeletal, integument systems and systems related to the presenting problem are either stated in the preceding or were not pertinent or were negative for the symptoms and/or complaints related to the medical problem.    Past Medical History:  has a past medical history of Anticoagulant long-term use, Atrial fibrillation (HCC), Cancer (HCC), CHF (congestive heart failure) (HCC), Degenerative joint disease of left hip, Diabetes mellitus (HCC), Hyperlipidemia, Hypertension, Obesity, AMIRAH on CPAP, PONV (postoperative nausea and vomiting), Radiculopathy of lumbosacral region, and Type II or unspecified type diabetes mellitus without mention of complication, not stated as uncontrolled.  Past Surgical History:  has a past

## 2024-04-17 ENCOUNTER — OFFICE VISIT (OUTPATIENT)
Dept: SURGERY | Age: 69
End: 2024-04-17
Payer: MEDICARE

## 2024-04-17 VITALS
BODY MASS INDEX: 42.66 KG/M2 | SYSTOLIC BLOOD PRESSURE: 132 MMHG | DIASTOLIC BLOOD PRESSURE: 85 MMHG | HEART RATE: 89 BPM | HEIGHT: 72 IN | WEIGHT: 315 LBS | TEMPERATURE: 97.3 F

## 2024-04-17 DIAGNOSIS — Z86.010 HISTORY OF COLON POLYPS: ICD-10-CM

## 2024-04-17 DIAGNOSIS — K59.1 FUNCTIONAL DIARRHEA: Primary | ICD-10-CM

## 2024-04-17 PROCEDURE — G8417 CALC BMI ABV UP PARAM F/U: HCPCS | Performed by: SURGERY

## 2024-04-17 PROCEDURE — 1123F ACP DISCUSS/DSCN MKR DOCD: CPT | Performed by: SURGERY

## 2024-04-17 PROCEDURE — 3075F SYST BP GE 130 - 139MM HG: CPT | Performed by: SURGERY

## 2024-04-17 PROCEDURE — 1036F TOBACCO NON-USER: CPT | Performed by: SURGERY

## 2024-04-17 PROCEDURE — 3079F DIAST BP 80-89 MM HG: CPT | Performed by: SURGERY

## 2024-04-17 PROCEDURE — 99203 OFFICE O/P NEW LOW 30 MIN: CPT | Performed by: SURGERY

## 2024-04-17 PROCEDURE — G8427 DOCREV CUR MEDS BY ELIG CLIN: HCPCS | Performed by: SURGERY

## 2024-04-17 PROCEDURE — 3017F COLORECTAL CA SCREEN DOC REV: CPT | Performed by: SURGERY

## 2024-04-17 RX ORDER — SODIUM CHLORIDE 9 MG/ML
INJECTION, SOLUTION INTRAVENOUS CONTINUOUS
OUTPATIENT
Start: 2024-04-17

## 2024-04-17 NOTE — PROGRESS NOTES
General Surgery History and Physical    Patient's Name/Date of Birth: Jesus Green / 1955    Date: 4/17/2024    PCP: Ty Sol MD    Referring Physician:   Ty Sol,*  [phone]    CHIEF COMPLAINT:    Chief Complaint   Patient presents with    New Patient    Colonoscopy     Anal leakage         HISTORY OF PRESENT ILLNESS:    Jesus Green is an 68 y.o. male who presents with fecal incontinence. He said this waxes and wanes. It can vary in severity. He said he has loose stools since his cholecystectomy. He thinks he may have hemorrhoids. Rarely he has rectal bleeding but this is rarely and on the toilet paper. No nausea, vomiting, constipation. No changes in stool caliber. No bloody or black stools. No abdominal pain. No unintentional weight loss. No family history of colon cancer. The patient has a known history of: colon polyps. The patient has had a colonoscopy before - 11 years ago and he said polyps were removed.    Past Medical History:   Past Medical History:   Diagnosis Date    Anticoagulant long-term use     Atrial fibrillation (HCC)     Cancer (HCC)     skin cancer    CHF (congestive heart failure) (HCC)     Degenerative joint disease of left hip 01/28/2015    Diabetes mellitus (HCC)     Hyperlipidemia     Hypertension     Obesity     AMIRAH on CPAP     PONV (postoperative nausea and vomiting)     Radiculopathy of lumbosacral region 07/25/2017    for procedure 5/5/22    Type II or unspecified type diabetes mellitus without mention of complication, not stated as uncontrolled         Past Surgical History:   Past Surgical History:   Procedure Laterality Date    APPENDECTOMY      CHOLECYSTECTOMY      CYST INCISION AND DRAINAGE  06/08/2017    abd wall cyst    HERNIA REPAIR      HIP ARTHROPLASTY Left     JOINT REPLACEMENT      PAIN MANAGEMENT PROCEDURE Bilateral 5/12/2022    BILATERAL L5 TRANSFORAMINAL EPIDURAL STEROID INJECTION #1 performed by Corinne Rodriguez DO at [facility] OR

## 2024-04-17 NOTE — PATIENT INSTRUCTIONS
General Surgery - Dr. Emmy Becerra MD, FACS    Preoperative Instructions    Please read the following information very carefully. It contains information that is necessary to best prepare you for your upcoming procedure.    Make arrangements for a  to take you to and from your procedure. YOU MUST HAVE SOMEONE DRIVE YOU HOME - this cannot be a taxi or public transportation. You will not be administered anesthesia without someone to go home and be at home with you that day.  Nothing to eat or drink after midnight the night before your procedure.  Follow your bowel prep instructions if you have them for this procedure.    3 days prior to your procedure: Stop taking blood thinners like Coumadin or Plavix or Xarelto.  5 days prior to your procedure: Stop taking Aspirin or Aspirin containing products.   If you cannot stop any of these medications prior to your procedure, please contact our office.    Medications morning of procedure:  Only heart, breathing, blood pressure, and seizure medications are permitted on the morning of your procedure. These medications can be taken with a sip of water.    IF YOU ARE UNABLE TO KEEP THE ABOVE SCHEDULED PROCEDURE, YOU MUST NOTIFY DR. BECERRA'S OFFICE [phone]. NOT THE FACILITY.    NO CHEWING GUM OR CHEWING TOBACCO AFTER MIDNIGHT ON DAY OF PROCEDURE.    YOU MUST HAVE TRANSPORTATION TO AND FROM THE FACILITY.    Colonoscopy: Before Your Procedure  What is a colonoscopy?     A colonoscopy is a test that lets a doctor look inside your colon. The doctor uses a thin, lighted tube called a colonoscope to look for problems. These include small growths called polyps, cancer, or bleeding.  During the test, the doctor can take samples of tissue that can be checked for cancer or other problems. This is called a biopsy. The doctor can also take out polyps.  Before the test, you will need to stop eating solid foods. You also will be given instructions on how to clean out

## 2024-04-18 ENCOUNTER — TELEPHONE (OUTPATIENT)
Dept: SURGERY | Age: 69
End: 2024-04-18

## 2024-04-18 DIAGNOSIS — Z86.010 HX OF COLONIC POLYPS: ICD-10-CM

## 2024-04-18 DIAGNOSIS — K62.5 ANAL BLEEDING: ICD-10-CM

## 2024-04-18 PROBLEM — R19.7 DIARRHEA: Status: ACTIVE | Noted: 2024-04-18

## 2024-04-18 PROBLEM — Z86.0100 HX OF COLONIC POLYPS: Status: ACTIVE | Noted: 2024-04-18

## 2024-04-18 NOTE — TELEPHONE ENCOUNTER
Prior Authorization Form:      DEMOGRAPHICS:                     Patient Name:  Maxi Green  Patient :  1955            Insurance:  Payor: MEDICARE / Plan: MEDICARE PART A AND B / Product Type: *No Product type* /   Insurance ID Number:    Payer/Plan Subscr  Sex Relation Sub. Ins. ID Effective Group Num   1. MEDICARE - MEMAXI LICEA 1955 Male Self [id number] 20                                    [address]    2. [employer] OF KEV[employer]I LICEA 1955 Male Self [id number] 20 plan g                                   [address]         DIAGNOSIS & PROCEDURE:                       Procedure/Operation: colonoscopy           CPT Code: 72511    Diagnosis:  diarrhea, rectal bleeding, hx colon polyps    ICD10 Code: R19.7, K62.5, Z86.010    Location:  [facility]    Surgeon:  Licha Ac    SCHEDULING INFORMATION:                          Date: 24    Time: 1300              Anesthesia:  MAC/TIVA                                                       Status:  Outpatient        Special Comments:         Electronically signed by Emily Powers LPN on 2024 at 3:13 PM aut

## 2024-04-19 ENCOUNTER — PROCEDURE VISIT (OUTPATIENT)
Dept: PODIATRY | Age: 69
End: 2024-04-19
Payer: MEDICARE

## 2024-04-19 DIAGNOSIS — Z79.01 ENCOUNTER FOR CURRENT LONG-TERM USE OF ANTICOAGULANTS: ICD-10-CM

## 2024-04-19 DIAGNOSIS — B35.1 TINEA UNGUIUM: ICD-10-CM

## 2024-04-19 DIAGNOSIS — E11.9 TYPE 2 DIABETES MELLITUS WITHOUT COMPLICATION, UNSPECIFIED WHETHER LONG TERM INSULIN USE (HCC): Primary | ICD-10-CM

## 2024-04-19 DIAGNOSIS — L84 CORNS AND CALLOSITIES: ICD-10-CM

## 2024-04-19 PROCEDURE — 11056 PARNG/CUTG B9 HYPRKR LES 2-4: CPT | Performed by: PODIATRIST

## 2024-04-19 PROCEDURE — 11721 DEBRIDE NAIL 6 OR MORE: CPT | Performed by: PODIATRIST

## 2024-04-19 NOTE — PROGRESS NOTES
Jesus Green is here today for a diabetic foot exam and nail care. his PCP is Ty Sol MD last OV was 04/01/2024.       CC:   Follow-up diabetic foot and ankle exam    HPI:   Diabetic foot ankle exam.  No open wounds.  Does have compression stockings with him.  Denies any calf pain.  History metformin.      ROS:  Const: Denies constitutional symptoms  Musculo: Denies symptoms other than stated above  Skin: Denies symptoms other than stated above      Current Outpatient Medications:     doxycycline hyclate (VIBRAMYCIN) 100 MG capsule, Take 1 capsule by mouth 2 times daily for 10 days, Disp: 20 capsule, Rfl: 0    metFORMIN (GLUCOPHAGE) 1000 MG tablet, TAKE 1 TABLET BY MOUTH TWICE DAILY WITH MEALS, Disp: 180 tablet, Rfl: 1    carvedilol (COREG) 25 MG tablet, TAKE 1 TABLET BY MOUTH TWICE DAILY, Disp: 180 tablet, Rfl: 1    acetaminophen (TYLENOL) 500 MG tablet, Take 2 tablets by mouth as needed for Pain, Disp: , Rfl:     benazepril (LOTENSIN) 20 MG tablet, Take 1 tablet by mouth 2 times daily, Disp: 180 tablet, Rfl: 1    glipiZIDE (GLUCOTROL) 10 MG tablet, Take 1 tablet by mouth 2 times daily (before meals), Disp: 180 tablet, Rfl: 1    hydroCHLOROthiazide (HYDRODIURIL) 25 MG tablet, Take 1 tablet by mouth daily for blood pressure, Disp: 90 tablet, Rfl: 1    furosemide (LASIX) 20 MG tablet, TAKE 1 TABLET BY MOUTH EVERY DAY, Disp: 90 tablet, Rfl: 1    insulin detemir (LEVEMIR FLEXTOUCH) 100 UNIT/ML injection pen, Inject 45 Units into the skin nightly ADMINISTER 45 UNITS UNDER THE SKIN EVERY NIGHT Strength: 100 UNIT/ML, Disp: 15 mL, Rfl: 3    rivaroxaban (XARELTO) 20 MG TABS tablet, Take 1 tablet by mouth daily (with breakfast) TAKE 1 TABLET BY MOUTH EVERY MORNING WITH BREAKFAST Strength: 20 mg, Disp: 90 tablet, Rfl: 1    Liraglutide (VICTOZA) 18 MG/3ML SOPN SC injection, Inject 0.6 mg into the skin daily for 7 days, THEN 1.2 mg daily for 23 days., Disp: 6 mL, Rfl: 2    dilTIAZem (TIAZAC) 240 MG extended

## 2024-05-01 ENCOUNTER — PREP FOR PROCEDURE (OUTPATIENT)
Dept: PAIN MANAGEMENT | Age: 69
End: 2024-05-01

## 2024-05-01 ENCOUNTER — TELEPHONE (OUTPATIENT)
Dept: PAIN MANAGEMENT | Age: 69
End: 2024-05-01

## 2024-05-01 DIAGNOSIS — M54.16 LUMBAR RADICULOPATHY: Primary | ICD-10-CM

## 2024-05-01 NOTE — TELEPHONE ENCOUNTER
Call to Jesus Green that procedure was approved for 5/7/2024 and that [facility] should call him a few days before for the pre op call and between 2:00 PM and 4:00 PM  the business day before with the arrival time. Instructed Jesus to hold ibuprofen for 24 hours, naprosyn for 4 days and any aspirin containing products or fish oil for 7 days and Xarelto  for 3 days. Instructed to call office back if any questions. Jesus verbalized understanding.    Electronically signed by Tommie Hickey RN on 5/1/2024 at 11:19 AM

## 2024-05-03 ENCOUNTER — TELEPHONE (OUTPATIENT)
Dept: FAMILY MEDICINE CLINIC | Age: 69
End: 2024-05-03

## 2024-05-03 NOTE — TELEPHONE ENCOUNTER
Pt gets his PAP supplies through [employer].  They sent fax saying they need F2F documentation on PAP supplies within the last year.  Has not been documented in that time frame.  Pt has appt with Dr. DONALDSON on 7/1.    I left VM asking Luiss if 7/1 is okay to wait for documentation.  Return call requested.    Fax request in top, back of fax bin.

## 2024-05-03 NOTE — PROGRESS NOTES
[facility] PAIN MANAGEMENT  INSTRUCTIONS  ...........................................................................................................................................     [x] Parking the day of Surgery is located in the Main Entrance lot.  Upon entering the door, make immediate right into the surgery reception room    [x]  Bring photo ID and insurance card     [x] You may have a light breakfast day of procedure    [x]  Wear loose comfortable clothing    [x]  Please follow instructions for medications as given per Dr's office    [x] You can expect a call the business day prior to procedure to notify you of your arrival time     [x] Please arrange for     []  Other instructions

## 2024-05-03 NOTE — TELEPHONE ENCOUNTER
Valentina from [employer] confirmed that 07/01 is an okay timeline for F2F notes on PAP supplies.  Appt note updated for 07/01 appt as a reminder to discuss and fax notes.

## 2024-05-07 ENCOUNTER — HOSPITAL ENCOUNTER (OUTPATIENT)
Dept: GENERAL RADIOLOGY | Age: 69
Setting detail: OUTPATIENT SURGERY
Discharge: HOME | End: 2024-05-09
Attending: PAIN MEDICINE
Payer: MEDICARE

## 2024-05-07 ENCOUNTER — HOSPITAL ENCOUNTER (OUTPATIENT)
Age: 69
Setting detail: OUTPATIENT SURGERY
Discharge: HOME | End: 2024-05-07
Attending: PAIN MEDICINE | Admitting: PAIN MEDICINE
Payer: MEDICARE

## 2024-05-07 VITALS
TEMPERATURE: 97.5 F | HEART RATE: 68 BPM | RESPIRATION RATE: 16 BRPM | HEIGHT: 72 IN | SYSTOLIC BLOOD PRESSURE: 126 MMHG | OXYGEN SATURATION: 98 % | BODY MASS INDEX: 42.66 KG/M2 | WEIGHT: 315 LBS | DIASTOLIC BLOOD PRESSURE: 67 MMHG

## 2024-05-07 DIAGNOSIS — R52 PAIN MANAGEMENT: ICD-10-CM

## 2024-05-07 LAB — GLUCOSE BLD-MCNC: 201 MG/DL (ref 74–99)

## 2024-05-07 PROCEDURE — 64483 NJX AA&/STRD TFRM EPI L/S 1: CPT | Performed by: PAIN MEDICINE

## 2024-05-07 PROCEDURE — 2709999900 HC NON-CHARGEABLE SUPPLY: Performed by: PAIN MEDICINE

## 2024-05-07 PROCEDURE — 82962 GLUCOSE BLOOD TEST: CPT

## 2024-05-07 PROCEDURE — 7100000011 HC PHASE II RECOVERY - ADDTL 15 MIN: Performed by: PAIN MEDICINE

## 2024-05-07 PROCEDURE — 6360000002 HC RX W HCPCS: Performed by: PAIN MEDICINE

## 2024-05-07 PROCEDURE — 6360000004 HC RX CONTRAST MEDICATION: Performed by: PAIN MEDICINE

## 2024-05-07 PROCEDURE — 2500000003 HC RX 250 WO HCPCS: Performed by: PAIN MEDICINE

## 2024-05-07 PROCEDURE — 7100000010 HC PHASE II RECOVERY - FIRST 15 MIN: Performed by: PAIN MEDICINE

## 2024-05-07 PROCEDURE — 3600000002 HC SURGERY LEVEL 2 BASE: Performed by: PAIN MEDICINE

## 2024-05-07 RX ORDER — METHYLPREDNISOLONE ACETATE 40 MG/ML
INJECTION, SUSPENSION INTRA-ARTICULAR; INTRALESIONAL; INTRAMUSCULAR; SOFT TISSUE PRN
Status: DISCONTINUED | OUTPATIENT
Start: 2024-05-07 | End: 2024-05-07 | Stop reason: ALTCHOICE

## 2024-05-07 RX ORDER — LIDOCAINE HYDROCHLORIDE 5 MG/ML
INJECTION, SOLUTION INFILTRATION; INTRAVENOUS PRN
Status: DISCONTINUED | OUTPATIENT
Start: 2024-05-07 | End: 2024-05-07 | Stop reason: ALTCHOICE

## 2024-05-07 RX ORDER — IOPAMIDOL 612 MG/ML
INJECTION, SOLUTION INTRATHECAL PRN
Status: DISCONTINUED | OUTPATIENT
Start: 2024-05-07 | End: 2024-05-07 | Stop reason: ALTCHOICE

## 2024-05-07 ASSESSMENT — PAIN - FUNCTIONAL ASSESSMENT
PAIN_FUNCTIONAL_ASSESSMENT: NONE - DENIES PAIN

## 2024-05-07 ASSESSMENT — PAIN DESCRIPTION - DESCRIPTORS: DESCRIPTORS: DISCOMFORT

## 2024-05-07 NOTE — OP NOTE
2024    Patient: Jesus Green  :  1955  Age:  68 y.o.  Sex:  male     PRE-OPERATIVE DIAGNOSIS: Lumbar disc displacement, lumbar neural foraminal stenosis, lumbar radiculopathy.     POST-OPERATIVE DIAGNOSIS: Same.    PROCEDURE: Bilateral Transforaminal epidural steroid injection under fluoroscopic guidance at foraminal level L5    SURGEON: LESLIE Rodriguez D.O.    ANESTHESIA: local    ESTIMATED BLOOD LOSS: None.  ______________________________________________________________________  BRIEF HISTORY: Jesus Green comes in today for the Bilateral transforaminal epidural steroid injection under fluoroscopic guidance at foraminal level L5. The potential complications of this procedure were discussed with him again today.  He has elected to undergo the aforementioned procedure.     Jesus’s complete History & Physical examination were reviewed in depth, a copy of which is in the chart.      DESCRIPTION OF PROCEDURE:    After confirming written and informed consent, a time-out was performed and Jesus’s name and date of birth, the procedure to be performed as well as the plan for the location of the needle insertion were confirmed.    The patient was brought into the procedure room and placed in the prone position on the fluoroscopy table. Standard monitors were placed and vital signs were observed throughout the procedure. The area of the lumbar spine was prepped with chloraprep and draped in a sterile manner. The vertebral body was identified with AP fluoroscopy. An oblique view was obtained to better visualize the inferior junction of the pedicle and transverse process . The 6 o'clock position of the pedicle was marked and identified. The skin and subcutaneous tissue were anesthetized with 0.5% lidocaine. A # 22 gauge pencil point needle was directed toward the targeted point under fluoroscopy until bone was contacted. The needle was then walked inferiorly until the neural foramen was entered . A lateral

## 2024-05-07 NOTE — DISCHARGE INSTRUCTIONS
[facility] Pain Management Department  Bruno Jaeddp-[phone]  Dr. Codi Rodriguez   Post-Pain Block/Radiofrequency  Home Going Instructions    1-Go home, rest for the remainder of the day  2-Please do not lift over 20 pounds the day of the injection  3-If you received sedation No: alcohol, driving, operating lawn mowers, plows, tractors or other dangerous equipment until next morning. Do not make important decisions or sign legal documents for 24 hours. You may experience light headedness, dizziness, nausea or sleepiness after sedation. Do not stay alone. A responsible adult must be with you for 24 hours. You could be nauseated from the medications you have received. Your IV site may be sore and bruised.    4-No dietary restrictions     5-Resume all medications the same day, blood thinners to be resumed 24 hours after injection if you were instructed to stop any.    6-Keep the surgical site clean and dry, you may shower the next morning and remove the      dressing.     7- No sitz baths, tub baths or hot tubs/swimming for 24 hours.       8- If you have any pain at the injection site(s), application of an ice pack to the area should be       helpful, 20 minutes on/20 minutes off for next 48 hours.  9- Call [facility] Pain Management immediately at if you develop.  Fever greater than 100.4 F  Have bleeding or drainage from the puncture site  Have progressive Leg/arm numbness and or weakness  Loss of control of bowel and or bladder (wet/soil yourself)  Severe headache with inability to lift head  10-You may return to work the next day

## 2024-05-07 NOTE — H&P
[facility]        [address] [place] [id number]  Dept: [phone]    Procedure History & Physical      Jesus Green     HPI:    Patient  is here for LBP BLE pain for b/l L5 TFESI  Labs/imaging studies reviewed   All question and concerns addressed including R/B/A associated with the procedure    Past Medical History:   Diagnosis Date    Anticoagulant long-term use     Atrial fibrillation (HCC)     Cancer (HCC)     skin cancer    CHF (congestive heart failure) (HCC)     Degenerative joint disease of left hip 01/28/2015    Diabetes mellitus (HCC)     Hyperlipidemia     Hypertension     Obesity     AMIRAH on CPAP     PONV (postoperative nausea and vomiting)     Radiculopathy of lumbosacral region 07/25/2017    for procedure 5/5/22    Type II or unspecified type diabetes mellitus without mention of complication, not stated as uncontrolled        Past Surgical History:   Procedure Laterality Date    APPENDECTOMY      CHOLECYSTECTOMY      CYST INCISION AND DRAINAGE  06/08/2017    abd wall cyst    HERNIA REPAIR      HIP ARTHROPLASTY Left     JOINT REPLACEMENT      PAIN MANAGEMENT PROCEDURE Bilateral 5/12/2022    BILATERAL L5 TRANSFORAMINAL EPIDURAL STEROID INJECTION #1 performed by Corinne Rodriguez DO at [facility] OR    PAIN MANAGEMENT PROCEDURE Bilateral 1/31/2023    BILATERAL L5 TRANSFORAMINAL EPIDURAL STEROID INJECTION performed by Corinne Rodriguez DO at [facility] OR    PAIN MANAGEMENT PROCEDURE Bilateral 5/11/2023    BILATERAL L5 TRANSFORAMINAL EPIDURAL STEROID INJECTION performed by [facility]    PAIN MANAGEMENT PROCEDURE N/A 7/27/2023    T5-6 EPIDURAL STEROID INJECTION performed by DO [facility]    PAIN MANAGEMENT PROCEDURE Bilateral 12/5/2023    BILATERAL L5 TRANSFORAMINAL EPIDURAL STEROID INJECTION performed by Corinne Rodriguez DO at [facility] OR       Prior to Admission medications    Medication Sig Start Date End Date Taking? Authorizing Provider   metFORMIN

## 2024-05-10 RX ORDER — DILTIAZEM HYDROCHLORIDE 240 MG/1
CAPSULE, EXTENDED RELEASE ORAL
Qty: 90 CAPSULE | Refills: 3 | Status: SHIPPED | OUTPATIENT
Start: 2024-05-10

## 2024-05-10 NOTE — TELEPHONE ENCOUNTER
Last Appointment:  4/1/2024  Future Appointments   Date Time Provider Department Center   6/21/2024 10:00 AM Fer Ware DPM Col Podiatry [facility]   7/1/2024 10:40 AM Ty Sol MD COLUMB B[facility]

## 2024-05-29 NOTE — PROGRESS NOTES
Left BLADIMIR w/Marisol at Dr Jose Mary office pt pt Cx colonoscopy for 5/31/24 d/t family issues needs rescheduled.

## 2024-06-05 NOTE — ED NOTES
Dr. Paola Hendrix notified of critical potassium of 2.3, no new orders to this RN.        Epifanio Freeman RN  08/03/20 2040 Attending Only

## 2024-06-14 ENCOUNTER — OFFICE VISIT (OUTPATIENT)
Dept: FAMILY MEDICINE CLINIC | Age: 69
End: 2024-06-14

## 2024-06-14 VITALS
HEART RATE: 77 BPM | DIASTOLIC BLOOD PRESSURE: 80 MMHG | BODY MASS INDEX: 44.62 KG/M2 | TEMPERATURE: 97.9 F | SYSTOLIC BLOOD PRESSURE: 130 MMHG | OXYGEN SATURATION: 99 % | WEIGHT: 315 LBS

## 2024-06-14 DIAGNOSIS — R35.0 URINARY FREQUENCY: ICD-10-CM

## 2024-06-14 DIAGNOSIS — R31.0 GROSS HEMATURIA: Primary | ICD-10-CM

## 2024-06-14 DIAGNOSIS — R31.0 GROSS HEMATURIA: ICD-10-CM

## 2024-06-14 LAB
BILIRUBIN, POC: NORMAL
BLOOD URINE, POC: NORMAL
CLARITY, POC: NORMAL
COLOR, POC: NORMAL
GLUCOSE URINE, POC: NORMAL
KETONES, POC: NORMAL
LEUKOCYTE EST, POC: NORMAL
NITRITE, POC: NORMAL
PH, POC: 5.5
PROTEIN, POC: NORMAL
SPECIFIC GRAVITY, POC: 1.02
UROBILINOGEN, POC: 0.2

## 2024-06-14 RX ORDER — SULFAMETHOXAZOLE AND TRIMETHOPRIM 800; 160 MG/1; MG/1
1 TABLET ORAL 2 TIMES DAILY
Qty: 14 TABLET | Refills: 0 | Status: SHIPPED | OUTPATIENT
Start: 2024-06-14 | End: 2024-06-21

## 2024-06-14 NOTE — PROGRESS NOTES
Pt reports was started on Bactrim today for hamaturia. Pt instructed to cx procedure if fever >100, chills or if symptoms don't improve or lessen. Pt verbalized understanding.

## 2024-06-14 NOTE — PROGRESS NOTES
Chief Complaint       Hematuria (Since yesterday), Urinary Frequency, and Dysuria      History of Present Illness   Source of history provided by:  patient.      Jesus Green is a 68 y.o. old male presenting to the walk in clinic for evaluation of gross hematuria since yesterday. Reports associated frequency, urgency, dysuria, and suprapubic pressure.  Reports intermittent left flank pain.  Patient denies any known history of nephrolithiasis in the past.  Patient is chronically anticoagulated on Xarelto.  Denies any recent falls or injuries.  Patient does report that his hematuria has improved somewhat since yesterday.  Denies any fever, chills, vomiting, diarrhea, or lethargy.  Past medical history is also significant for type 2 diabetes.    ROS    Unless otherwise stated in this report or unable to obtain because of the patient's clinical or mental status as evidenced by the medical record, this patients's positive and negative responses for Review of Systems, constitutional, psych, eyes, ENT, cardiovascular, respiratory, gastrointestinal, neurological, genitourinary, musculoskeletal, integument systems and systems related to the presenting problem are either stated in the preceding or were not pertinent or were negative for the symptoms and/or complaints related to the medical problem.    Past Medical History:  has a past medical history of Anticoagulant long-term use, Atrial fibrillation (HCC), Cancer (HCC), CHF (congestive heart failure) (HCC), Degenerative joint disease of left hip, Diabetes mellitus (HCC), Hyperlipidemia, Hypertension, Obesity, AMIRAH on CPAP, PONV (postoperative nausea and vomiting), Radiculopathy of lumbosacral region, and Type II or unspecified type diabetes mellitus without mention of complication, not stated as uncontrolled.  Past Surgical History:  has a past surgical history that includes Cholecystectomy; Appendectomy; hernia repair; Hip Arthroplasty (Left); Breast cyst incision and

## 2024-06-14 NOTE — PROGRESS NOTES
[facility] PRE-ADMISSION TESTING INSTRUCTIONS    The Preadmission Testing patient is instructed accordingly using the following criteria (check applicable):    ARRIVAL INSTRUCTIONS:  [x] Parking the day of Surgery is located in the Main Entrance lot.  Upon entering the door, make an immediate right to the surgery reception desk    [x] Bring photo ID and insurance card    [] Bring in a copy of Living will or Durable Power of  papers.    [x] Please be sure to arrange for a responsible adult to provide transportation to and from the hospital    [x] Please arrange for a responsible adult to be with you for the 24 hour period post procedure due to having anesthesia    [x] If you awake am of surgery not feeling well or have temperature >100 please call [phone]    GENERAL INSTRUCTIONS:    [x]tNo gum, no candy, no mints. NPO time:midnight       [x] You may brush your teeth, do not swallow any toothpaste    [x] Take medications as instructed     [x] Stop herbal supplements and vitamins 5 days prior to procedure    [x] Follow preop dosing of blood thinners per physician instructions    [x] Take 1/2 dose of evening insulin, but no insulin after midnight    [x] No oral diabetic medications after midnight    [x] If diabetic and have low blood sugar or feel symptomatic, take 1-2oz apple juice only    [] Bring inhalers day of surgery    [] Bring urine specimen day of surgery    [x] Shower or bath with soap, lather and rinse well, AM of Surgery, no lotion, powders or creams to surgical site    [x] Follow bowel prep as instructed per surgeon    [x] No tobacco products within 24 hours of surgery     [x] No alcohol or illegal drug use, marijuana included, within 24 hours of surgery.    [x] Jewelry, body piercing's, eyeglasses, contact lenses and dentures are not permitted into surgery (bring cases)      [x] Please do not wear any nail polish, make up or hair products on the day of surgery    [x]  You can expect a call the business day prior to procedure to notify you if your arrival time changes    [x] If you receive a survey after surgery we would greatly appreciate your comments    [] Parent/guardian of a minor must accompany their child and remain on the premises  the entire time they are under our care     [] Pediatric patients may bring favorite toy, blanket or comfort item with them    [] A caregiver or family member must remain with the patient during their stay if they are mentally handicapped, have dementia, disoriented or unable to use a call light or would be a safety concern if left unattended    [x] Please notify surgeon if you develop any illness between now and time of surgery (cold, cough, sore throat, fever, nausea, vomiting) or any signs of infections  including skin, wounds, and dental.    []  The Outpatient Pharmacy is available to fill your prescription here on your day of surgery, ask your preop nurse for details    [] Other instructions    EDUCATIONAL MATERIALS PROVIDED:    [] PAT Preoperative Education Packet/Booklet     [] Medication List    [] Transfusion bracelet applied with instructions    [] Shower with soap, lather and rinse well, and use CHG wipes provided the evening before surgery as instructed    [] Incentive spirometer with instructions

## 2024-06-15 DIAGNOSIS — I48.19 PERSISTENT ATRIAL FIBRILLATION (HCC): ICD-10-CM

## 2024-06-16 LAB
CULTURE: NORMAL
SPECIMEN DESCRIPTION: NORMAL

## 2024-06-17 NOTE — TELEPHONE ENCOUNTER
Last Appointment:  4/1/2024  Future Appointments   Date Time Provider Department Center   6/21/2024 10:00 AM Fer Ware DPM Col Podiatry [facility]   7/1/2024 10:40 AM Ty Sol MD COL[facility]B [facility]

## 2024-06-18 ENCOUNTER — TELEPHONE (OUTPATIENT)
Dept: FAMILY MEDICINE CLINIC | Age: 69
End: 2024-06-18

## 2024-06-18 DIAGNOSIS — R31.29 MICROSCOPIC HEMATURIA: Primary | ICD-10-CM

## 2024-06-18 RX ORDER — FUROSEMIDE 20 MG/1
TABLET ORAL
Qty: 90 TABLET | Refills: 1 | Status: SHIPPED | OUTPATIENT
Start: 2024-06-18

## 2024-06-18 NOTE — TELEPHONE ENCOUNTER
Saeid informed of urine culture results. He is agreeable to seeing a urologist but does not have one. Can you place a referral to a urologist in the [place] area for Saeid?

## 2024-06-18 NOTE — TELEPHONE ENCOUNTER
----- Message from Tahira Gray PA-C sent at 6/18/2024  8:17 AM EDT -----  Please advise patient that his urine culture was negative for any infection.  Follow-up with urology ASAP for further evaluation of hematuria.

## 2024-06-18 NOTE — PROGRESS NOTES
Referral placed to [facility] urology for further evaluation of microscopic hematuria.

## 2024-06-19 ENCOUNTER — ANESTHESIA (OUTPATIENT)
Dept: ENDOSCOPY | Age: 69
End: 2024-06-19
Payer: MEDICARE

## 2024-06-19 ENCOUNTER — HOSPITAL ENCOUNTER (OUTPATIENT)
Age: 69
Setting detail: OUTPATIENT SURGERY
Discharge: HOME | End: 2024-06-19
Attending: SURGERY | Admitting: SURGERY
Payer: MEDICARE

## 2024-06-19 ENCOUNTER — ANESTHESIA EVENT (OUTPATIENT)
Dept: ENDOSCOPY | Age: 69
End: 2024-06-19
Payer: MEDICARE

## 2024-06-19 VITALS
HEART RATE: 69 BPM | SYSTOLIC BLOOD PRESSURE: 115 MMHG | DIASTOLIC BLOOD PRESSURE: 80 MMHG | RESPIRATION RATE: 18 BRPM | BODY MASS INDEX: 42.66 KG/M2 | WEIGHT: 315 LBS | OXYGEN SATURATION: 96 % | HEIGHT: 72 IN

## 2024-06-19 DIAGNOSIS — K62.5 ANAL BLEEDING: ICD-10-CM

## 2024-06-19 DIAGNOSIS — Z86.010 HX OF COLONIC POLYPS: ICD-10-CM

## 2024-06-19 DIAGNOSIS — R19.7 DIARRHEA: ICD-10-CM

## 2024-06-19 LAB — GLUCOSE BLD-MCNC: 134 MG/DL (ref 74–99)

## 2024-06-19 PROCEDURE — 2709999900 HC NON-CHARGEABLE SUPPLY: Performed by: SURGERY

## 2024-06-19 PROCEDURE — 3700000000 HC ANESTHESIA ATTENDED CARE: Performed by: SURGERY

## 2024-06-19 PROCEDURE — 2580000003 HC RX 258: Performed by: NURSE ANESTHETIST, CERTIFIED REGISTERED

## 2024-06-19 PROCEDURE — 45380 COLONOSCOPY AND BIOPSY: CPT | Performed by: SURGERY

## 2024-06-19 PROCEDURE — 82962 GLUCOSE BLOOD TEST: CPT

## 2024-06-19 PROCEDURE — 7100000010 HC PHASE II RECOVERY - FIRST 15 MIN: Performed by: SURGERY

## 2024-06-19 PROCEDURE — 7100000011 HC PHASE II RECOVERY - ADDTL 15 MIN: Performed by: SURGERY

## 2024-06-19 PROCEDURE — 6360000002 HC RX W HCPCS: Performed by: NURSE ANESTHETIST, CERTIFIED REGISTERED

## 2024-06-19 PROCEDURE — 3700000001 HC ADD 15 MINUTES (ANESTHESIA): Performed by: SURGERY

## 2024-06-19 PROCEDURE — 3609010300 HC COLONOSCOPY W/BIOPSY SINGLE/MULTIPLE: Performed by: SURGERY

## 2024-06-19 PROCEDURE — 88305 TISSUE EXAM BY PATHOLOGIST: CPT

## 2024-06-19 RX ORDER — PROPOFOL 10 MG/ML
INJECTION, EMULSION INTRAVENOUS PRN
Status: DISCONTINUED | OUTPATIENT
Start: 2024-06-19 | End: 2024-06-19 | Stop reason: SDUPTHER

## 2024-06-19 RX ORDER — SODIUM CHLORIDE 9 MG/ML
INJECTION, SOLUTION INTRAVENOUS CONTINUOUS PRN
Status: DISCONTINUED | OUTPATIENT
Start: 2024-06-19 | End: 2024-06-19 | Stop reason: SDUPTHER

## 2024-06-19 RX ORDER — FENTANYL CITRATE 50 UG/ML
INJECTION, SOLUTION INTRAMUSCULAR; INTRAVENOUS PRN
Status: DISCONTINUED | OUTPATIENT
Start: 2024-06-19 | End: 2024-06-19 | Stop reason: SDUPTHER

## 2024-06-19 RX ORDER — SODIUM CHLORIDE 9 MG/ML
INJECTION, SOLUTION INTRAVENOUS CONTINUOUS
Status: DISCONTINUED | OUTPATIENT
Start: 2024-06-19 | End: 2024-06-19 | Stop reason: HOSPADM

## 2024-06-19 RX ADMIN — FENTANYL CITRATE 100 MCG: 50 INJECTION, SOLUTION INTRAMUSCULAR; INTRAVENOUS at 10:01

## 2024-06-19 RX ADMIN — SODIUM CHLORIDE: 9 INJECTION, SOLUTION INTRAVENOUS at 09:41

## 2024-06-19 RX ADMIN — PROPOFOL 160 MG: 10 INJECTION, EMULSION INTRAVENOUS at 10:01

## 2024-06-19 ASSESSMENT — PAIN SCALES - GENERAL: PAINLEVEL_OUTOF10: 0

## 2024-06-19 NOTE — OP NOTE
Operative Note: Colonoscopy    Jesus Green     DATE OF PROCEDURE: 6/19/2024  SURGEON: Dr. EMMY BECERRA MD, M.D.     PREOPERATIVE DIAGNOSES:    Fecal incontinence  History of colon polyps  Rectal bleeding  Diarrhea    POSTOPERATIVE DIAGNOSES:  Mild sigmoid diverticulosis  Colon polyps x 3    SPECIMENS:  ID Type Source Tests Collected by Time Destination   A : RANDOM COLON BX Tissue Colon SURGICAL PATHOLOGY Emmy Becerra MD 6/19/2024 1007    B : TERMINAL ILEUM BX Tissue Tissue SURGICAL PATHOLOGY Emmy Becerra MD 6/19/2024 1008    C : COLON POLYP BX at 90 cm Tissue Colon SURGICAL PATHOLOGY Emmy Becerra MD 6/19/2024 1010    D : COLON POLYPS x2 BX at 20 cm Tissue Colon SURGICAL PATHOLOGY Emmy Becerra MD 6/19/2024 1013         OPERATION:   Colonoscopy to the terminal ileum with TI, random colon biopsies  Forceps polypectomy x 3      ANESTHESIA: LMAC    COMPLICATIONS: None.     BLOOD LOSS: Minimal    Procedure Note:    CONSENT AND INDICATIONS:  This is a 68 y.o. year old male who is having the above.  I have discussed with the patient and/or the patient representative the indication, alternatives, and the possible risks and/or complications of the planned procedure and the anesthesia methods. The patient and/or patient representative appear to understand and agree to proceed.    OPERATIONS: Bowel prep was done yesterday until the bowels were clear. The patient was placed on the table and sedated by anesthesia. A rectal exam was performed and no mass was felt. A lubricated scope was passed into the rectum which looked normal.  The scope was passed all the way around through the sigmoid, descending, transverse and ascending colon to the cecum. The bowel prep was moderate with some thick liquid stool intermittently seen throughout the colon. There was mild sigmoid diverticulosis seen. There were also three sessile polyps seen and removed via forceps polypectomy at 90 
6

## 2024-06-19 NOTE — H&P
Patient's office history and physical was reviewed.    Patient examined.    There has been no change in the patient's history and physical.      Physician Signature: [facility]     Patient's Name/Date of Birth: Jesus Green / 1955     Date: 6/19/24     PCP: Ty Sol MD     Referring Physician:   Ty Sol,*  [phone]     CHIEF COMPLAINT:         Chief Complaint   Patient presents with    New Patient    Colonoscopy       Anal leakage            HISTORY OF PRESENT ILLNESS:     Jesus Green is an 68 y.o. male who presents with fecal incontinence. He said this waxes and wanes. It can vary in severity. He said he has loose stools since his cholecystectomy. He thinks he may have hemorrhoids. Rarely he has rectal bleeding but this is rarely and on the toilet paper. No nausea, vomiting, constipation. No changes in stool caliber. No bloody or black stools. No abdominal pain. No unintentional weight loss. No family history of colon cancer. The patient has a known history of: colon polyps. The patient has had a colonoscopy before - 11 years ago and he said polyps were removed.     Past Medical History:   Past Medical History        Past Medical History:   Diagnosis Date    Anticoagulant long-term use      Atrial fibrillation (HCC)      Cancer (HCC)       skin cancer    CHF (congestive heart failure) (HCC)      Degenerative joint disease of left hip 01/28/2015    Diabetes mellitus (HCC)      Hyperlipidemia      Hypertension      Obesity      AMIRAH on CPAP      PONV (postoperative nausea and vomiting)      Radiculopathy of lumbosacral region 07/25/2017     for procedure 5/5/22    Type II or unspecified type diabetes mellitus without mention of complication, not stated as uncontrolled              Past Surgical History:   Past Surgical History         Past Surgical History:   Procedure Laterality Date

## 2024-06-19 NOTE — DISCHARGE INSTRUCTIONS
stool   Coughing, shortness of breath, chest pain, severe nausea or vomiting     In case of an emergency, CALL 911 .     Findings:  Mild diverticulosis  Colon polyps x 3     FOLLOW-UP CARE:  [x]Call the office at [phone] for follow-up appointment in 1-2 weeks    Repeat colonoscopy in 3 years.

## 2024-06-19 NOTE — ANESTHESIA PRE PROCEDURE
Department of Anesthesiology  Preprocedure Note       Name:  Jesus Green   Age:  68 y.o.  :  1955                                          MRN:  [id number]         Date:  2024      Surgeon: Surgeon(s):  Emmy Armijo MD    Procedure: Procedure(s):  COLONOSCOPY DIAGNOSTIC    Medications prior to admission:   Prior to Admission medications    Medication Sig Start Date End Date Taking? Authorizing Provider   furosemide (LASIX) 20 MG tablet TAKE 1 TABLET BY MOUTH EVERY DAY 24   Ty Sol MD   sulfamethoxazole-trimethoprim (BACTRIM DS;SEPTRA DS) 800-160 MG per tablet Take 1 tablet by mouth 2 times daily for 7 days  Patient taking differently: Take 1 tablet by mouth 2 times daily Started 24 for hamaturia 24  Tahira Gray PA-C   dilTIAZem (TIAZAC) 240 MG extended release capsule TAKE 1 CAPSULE BY MOUTH EVERY DAY 5/10/24   Ty Sol MD   metFORMIN (GLUCOPHAGE) 1000 MG tablet TAKE 1 TABLET BY MOUTH TWICE DAILY WITH MEALS 24   Ty Sol MD   carvedilol (COREG) 25 MG tablet TAKE 1 TABLET BY MOUTH TWICE DAILY 24   Ty Sol MD   acetaminophen (TYLENOL) 500 MG tablet Take 2 tablets by mouth as needed for Pain    Provider, MD Vasile   benazepril (LOTENSIN) 20 MG tablet Take 1 tablet by mouth 2 times daily 24   Ty Sol MD   glipiZIDE (GLUCOTROL) 10 MG tablet Take 1 tablet by mouth 2 times daily (before meals) 24   Ty Sol MD   hydroCHLOROthiazide (HYDRODIURIL) 25 MG tablet Take 1 tablet by mouth daily for blood pressure 24   Ty Sol MD   insulin detemir (LEVEMIR FLEXTOUCH) 100 UNIT/ML injection pen Inject 45 Units into the skin nightly ADMINISTER 45 UNITS UNDER THE SKIN EVERY NIGHT Strength: 100 UNIT/ML  Patient taking differently: Inject 45 Units into the skin daily ADMINISTER 45 UNITS UNDER THE SKIN EVERY MORNINGStrength: 100 UNIT/ML 10/23/23

## 2024-06-19 NOTE — ANESTHESIA POSTPROCEDURE EVALUATION
Department of Anesthesiology  Postprocedure Note    Patient: Jesus Green  MRN: [id number]  YOB: 1955  Date of evaluation: 6/19/2024    Procedure Summary       Date: 06/19/24 Room / Location: [place] [address] / [facility]    Anesthesia Start: 0959 Anesthesia Stop: 1018    Procedure: COLONOSCOPY BIOPSY Diagnosis:       Diarrhea      Anal bleeding      Hx of colonic polyps      (Diarrhea [R19.7])      (Anal bleeding [K62.5])      (Hx of colonic polyps [Z86.010])    Surgeons: Emmy Armijo MD Responsible Provider: Zachery Gomez MD    Anesthesia Type: MAC ASA Status: 4            Anesthesia Type: No value filed.    Soheila Phase I: Soheila Score: 10    Soheila Phase II: Soheila Score: 10    Anesthesia Post Evaluation    Patient location during evaluation: PACU  Patient participation: complete - patient participated  Level of consciousness: awake and alert  Airway patency: patent  Nausea & Vomiting: no nausea and no vomiting  Cardiovascular status: hemodynamically stable  Respiratory status: acceptable  Hydration status: euvolemic  There was medical reason for not using a multimodal analgesia pain management approach.Pain management: adequate    No notable events documented.

## 2024-06-21 ENCOUNTER — PROCEDURE VISIT (OUTPATIENT)
Dept: PODIATRY | Age: 69
End: 2024-06-21

## 2024-06-21 VITALS — BODY MASS INDEX: 42.66 KG/M2 | HEIGHT: 72 IN | WEIGHT: 315 LBS

## 2024-06-21 DIAGNOSIS — B35.1 TINEA UNGUIUM: ICD-10-CM

## 2024-06-21 DIAGNOSIS — E11.9 TYPE 2 DIABETES MELLITUS WITHOUT COMPLICATION, UNSPECIFIED WHETHER LONG TERM INSULIN USE (HCC): Primary | ICD-10-CM

## 2024-06-21 DIAGNOSIS — L84 CORNS AND CALLOSITIES: ICD-10-CM

## 2024-06-21 DIAGNOSIS — Z79.01 ENCOUNTER FOR CURRENT LONG-TERM USE OF ANTICOAGULANTS: ICD-10-CM

## 2024-06-21 NOTE — PROGRESS NOTES
(Patient taking differently: Inject 45 Units into the skin daily ADMINISTER 45 UNITS UNDER THE SKIN EVERY MORNINGStrength: 100 UNIT/ML), Disp: 15 mL, Rfl: 3    rivaroxaban (XARELTO) 20 MG TABS tablet, Take 1 tablet by mouth daily (with breakfast) TAKE 1 TABLET BY MOUTH EVERY MORNING WITH BREAKFAST Strength: 20 mg, Disp: 90 tablet, Rfl: 1    B-D UF III MINI PEN NEEDLES 31G X 5 MM MISC, USE TO TEST BLOOD SUGAR ONCE A DAY, Disp: 100 each, Rfl: 3    Handicap Placard MISC, by Does not apply route Please provide a handicap placard through 05/31/2027, Disp: 1 each, Rfl: 0    blood glucose monitor strips, 1 strip by Other route daily Test 1 time a day & as needed for symptoms of irregular blood glucose. Dispense sufficient amount for indicated testing frequency plus additional to accommodate PRN testing needs. ReliOn prime test strips, Disp: 100 strip, Rfl: 10    Insulin Pen Needle (PEN NEEDLES 5/16\") 30G X 8 MM MISC, 1 each by Does not apply route daily, Disp: 100 each, Rfl: 3    Alpha-Lipoic Acid 600 MG TABS, Take 1 Bottle by mouth daily (Patient taking differently: Take 1 Bottle by mouth nightly), Disp: 60 tablet, Rfl: 1    CPAP Machine MISC, by Does not apply route nightly, Disp: , Rfl:     Liraglutide (VICTOZA) 18 MG/3ML SOPN SC injection, Inject 0.6 mg into the skin daily for 7 days, THEN 1.2 mg daily for 23 days. (Patient taking differently: Inject 0.6 mg into the skin daily for 7 days, THEN 1.2 mg daily for 23 days. LD 6/17/24), Disp: 6 mL, Rfl: 2  Allergies   Allergen Reactions    Atorvastatin      Other reaction(s): body aches, headache    Hazelnut      Other reaction(s): itchy throat    Rosuvastatin Dizziness or Vertigo    Lipitor      Body pain, DIARRHEA ETC. HAS PROBS WITH ALL CHOLESTEROL MEDS-MOST PROB WITH LIPITOR       Past Medical History:   Diagnosis Date    Anticoagulant long-term use     Atrial fibrillation (HCC)     follows with PCP    Cancer (HCC)     skin cancer    CHF (congestive heart failure) (HCC)

## 2024-06-28 LAB — SURGICAL PATHOLOGY REPORT: NORMAL

## 2024-06-28 SDOH — ECONOMIC STABILITY: FOOD INSECURITY: WITHIN THE PAST 12 MONTHS, YOU WORRIED THAT YOUR FOOD WOULD RUN OUT BEFORE YOU GOT MONEY TO BUY MORE.: PATIENT DECLINED

## 2024-06-28 SDOH — ECONOMIC STABILITY: HOUSING INSECURITY
IN THE LAST 12 MONTHS, WAS THERE A TIME WHEN YOU DID NOT HAVE A STEADY PLACE TO SLEEP OR SLEPT IN A SHELTER (INCLUDING NOW)?: PATIENT DECLINED

## 2024-06-28 SDOH — ECONOMIC STABILITY: FOOD INSECURITY: WITHIN THE PAST 12 MONTHS, THE FOOD YOU BOUGHT JUST DIDN'T LAST AND YOU DIDN'T HAVE MONEY TO GET MORE.: PATIENT DECLINED

## 2024-06-28 SDOH — ECONOMIC STABILITY: TRANSPORTATION INSECURITY
IN THE PAST 12 MONTHS, HAS LACK OF TRANSPORTATION KEPT YOU FROM MEETINGS, WORK, OR FROM GETTING THINGS NEEDED FOR DAILY LIVING?: PATIENT DECLINED

## 2024-06-28 SDOH — ECONOMIC STABILITY: INCOME INSECURITY: HOW HARD IS IT FOR YOU TO PAY FOR THE VERY BASICS LIKE FOOD, HOUSING, MEDICAL CARE, AND HEATING?: PATIENT DECLINED

## 2024-07-01 ENCOUNTER — OFFICE VISIT (OUTPATIENT)
Dept: FAMILY MEDICINE CLINIC | Age: 69
End: 2024-07-01
Payer: MEDICARE

## 2024-07-01 VITALS
BODY MASS INDEX: 42.66 KG/M2 | WEIGHT: 315 LBS | HEIGHT: 72 IN | TEMPERATURE: 97.9 F | HEART RATE: 68 BPM | SYSTOLIC BLOOD PRESSURE: 132 MMHG | DIASTOLIC BLOOD PRESSURE: 82 MMHG | OXYGEN SATURATION: 99 %

## 2024-07-01 DIAGNOSIS — R31.0 GROSS HEMATURIA: ICD-10-CM

## 2024-07-01 DIAGNOSIS — E11.69 TYPE 2 DIABETES MELLITUS WITH OTHER SPECIFIED COMPLICATION, WITH LONG-TERM CURRENT USE OF INSULIN (HCC): Primary | ICD-10-CM

## 2024-07-01 DIAGNOSIS — G47.33 OSA (OBSTRUCTIVE SLEEP APNEA): ICD-10-CM

## 2024-07-01 DIAGNOSIS — Z79.4 TYPE 2 DIABETES MELLITUS WITH OTHER SPECIFIED COMPLICATION, WITH LONG-TERM CURRENT USE OF INSULIN (HCC): Primary | ICD-10-CM

## 2024-07-01 LAB — HBA1C MFR BLD: 7.5 %

## 2024-07-01 PROCEDURE — 3075F SYST BP GE 130 - 139MM HG: CPT | Performed by: FAMILY MEDICINE

## 2024-07-01 PROCEDURE — 99214 OFFICE O/P EST MOD 30 MIN: CPT | Performed by: FAMILY MEDICINE

## 2024-07-01 PROCEDURE — 3051F HG A1C>EQUAL 7.0%<8.0%: CPT | Performed by: FAMILY MEDICINE

## 2024-07-01 PROCEDURE — 3079F DIAST BP 80-89 MM HG: CPT | Performed by: FAMILY MEDICINE

## 2024-07-01 PROCEDURE — 83036 HEMOGLOBIN GLYCOSYLATED A1C: CPT | Performed by: FAMILY MEDICINE

## 2024-07-01 PROCEDURE — 2022F DILAT RTA XM EVC RTNOPTHY: CPT | Performed by: FAMILY MEDICINE

## 2024-07-01 PROCEDURE — 1036F TOBACCO NON-USER: CPT | Performed by: FAMILY MEDICINE

## 2024-07-01 PROCEDURE — G8417 CALC BMI ABV UP PARAM F/U: HCPCS | Performed by: FAMILY MEDICINE

## 2024-07-01 PROCEDURE — G8427 DOCREV CUR MEDS BY ELIG CLIN: HCPCS | Performed by: FAMILY MEDICINE

## 2024-07-01 PROCEDURE — 3017F COLORECTAL CA SCREEN DOC REV: CPT | Performed by: FAMILY MEDICINE

## 2024-07-01 PROCEDURE — 1123F ACP DISCUSS/DSCN MKR DOCD: CPT | Performed by: FAMILY MEDICINE

## 2024-07-01 RX ORDER — GLIPIZIDE 10 MG/1
10 TABLET ORAL
Qty: 180 TABLET | Refills: 1 | Status: CANCELLED | OUTPATIENT
Start: 2024-07-01

## 2024-07-01 RX ORDER — BENAZEPRIL HYDROCHLORIDE 20 MG/1
20 TABLET ORAL 2 TIMES DAILY
Qty: 180 TABLET | Refills: 1 | Status: CANCELLED | OUTPATIENT
Start: 2024-07-01

## 2024-07-01 RX ORDER — HYDROCHLOROTHIAZIDE 25 MG/1
25 TABLET ORAL DAILY
Qty: 90 TABLET | Refills: 1 | Status: CANCELLED | OUTPATIENT
Start: 2024-07-01

## 2024-07-01 RX ORDER — CARVEDILOL 25 MG/1
25 TABLET ORAL 2 TIMES DAILY
Qty: 180 TABLET | Refills: 1 | Status: SHIPPED | OUTPATIENT
Start: 2024-07-01

## 2024-07-01 NOTE — PROGRESS NOTES
[facility]  Office Progress Note - Dr. Sol  7/1/24    CC:   Chief Complaint   Patient presents with    Sleep Apnea    OTHER     Express care follow up from 06/14  Hematuria and dysuria.  Has resolved now, was told he should see Urologist, want's PCP opinion.        /82   Pulse 68   Temp 97.9 °F (36.6 °C) (Temporal)   Ht 1.829 m (6')   Wt (!) 150.1 kg (331 lb)   SpO2 99%   BMI 44.89 kg/m²   Wt Readings from Last 3 Encounters:   07/01/24 (!) 150.1 kg (331 lb)   06/21/24 (!) 149.2 kg (329 lb)   06/14/24 (!) 149.2 kg (329 lb)       HPI: painful hematuria   Episode recently.  Seen through express care.  Urinalysis showed large blood only.  He was having some back pain and also some dysuria at the time so he thinks he might of passed kidney stone.  He has not passed 1 in the past with certainty, however there was a time when they thought he may have had a kidney stone.  CT of abdomen and pelvis from 2022 showed a punctate calcification in the right kidney so it is within the realm of possibility.  He is also on Xarelto for history of atrial fibrillation.    AMIRAH  Continues using CPAP machine.   Uses it nightly. Doesn't like to sleep without it.   Wears it all night.   It cuts down on snoring.   Feels mostly rested in the morning.   CPAP was replaced last Feb or so. This s his second machine now  Original study was done in 10/2015 showing moderate AMIRAH.       Diabetes mellitus  Follow-up  Lab Results   Component Value Date    LABA1C 7.5 07/01/2024    LABA1C 7.8 (H) 03/22/2024    LABA1C 7.2 09/19/2023     Lab Results   Component Value Date    CREATININE 1.1 03/22/2024     Wt Readings from Last 3 Encounters:   07/01/24 (!) 150.1 kg (331 lb)   06/21/24 (!) 149.2 kg (329 lb)   06/14/24 (!) 149.2 kg (329 lb)     Patient continues diabetic medication regimen.  Compliance is good. No side effects of medications noted.  Diabetes is  adequately controlled.  Peripheral neuropathy is  present.

## 2024-07-06 DIAGNOSIS — E11.69 TYPE 2 DIABETES MELLITUS WITH OTHER SPECIFIED COMPLICATION, WITH LONG-TERM CURRENT USE OF INSULIN (HCC): ICD-10-CM

## 2024-07-06 DIAGNOSIS — Z79.4 TYPE 2 DIABETES MELLITUS WITH OTHER SPECIFIED COMPLICATION, WITH LONG-TERM CURRENT USE OF INSULIN (HCC): ICD-10-CM

## 2024-07-08 RX ORDER — BENAZEPRIL HYDROCHLORIDE 20 MG/1
20 TABLET ORAL 2 TIMES DAILY
Qty: 180 TABLET | Refills: 1 | Status: SHIPPED | OUTPATIENT
Start: 2024-07-08

## 2024-07-08 RX ORDER — GLIPIZIDE 10 MG/1
10 TABLET ORAL
Qty: 180 TABLET | Refills: 1 | Status: SHIPPED | OUTPATIENT
Start: 2024-07-08

## 2024-07-08 NOTE — TELEPHONE ENCOUNTER
Last Appointment:  7/1/2024  Future Appointments   Date Time Provider Department Center   8/23/2024 10:15 AM Fer Ware DPM Col Podiatry [facility]   1/2/2025 10:40 AM Ty Sol MD [facility]

## 2024-07-26 LAB
BUN SERPL-MCNC: 23 MG/DL (ref 6–23)
CREAT SERPL-MCNC: 1.3 MG/DL (ref 0.7–1.2)
GFR, ESTIMATED: 61 ML/MIN/1.73M2

## 2024-08-01 ENCOUNTER — HOSPITAL ENCOUNTER (OUTPATIENT)
Dept: CT IMAGING | Age: 69
Discharge: HOME | End: 2024-08-03
Attending: STUDENT IN AN ORGANIZED HEALTH CARE EDUCATION/TRAINING PROGRAM
Payer: MEDICARE

## 2024-08-01 DIAGNOSIS — R31.0 GROSS HEMATURIA: ICD-10-CM

## 2024-08-01 PROCEDURE — 2580000003 HC RX 258: Performed by: RADIOLOGY

## 2024-08-01 PROCEDURE — 6360000004 HC RX CONTRAST MEDICATION: Performed by: RADIOLOGY

## 2024-08-01 PROCEDURE — 76376 3D RENDER W/INTRP POSTPROCES: CPT

## 2024-08-01 PROCEDURE — 74178 CT ABD&PLV WO CNTR FLWD CNTR: CPT | Performed by: STUDENT IN AN ORGANIZED HEALTH CARE EDUCATION/TRAINING PROGRAM

## 2024-08-01 RX ORDER — SODIUM CHLORIDE 0.9 % (FLUSH) 0.9 %
10 SYRINGE (ML) INJECTION
Status: COMPLETED | OUTPATIENT
Start: 2024-08-01 | End: 2024-08-01

## 2024-08-01 RX ADMIN — IOPAMIDOL 110 ML: 755 INJECTION, SOLUTION INTRAVENOUS at 09:36

## 2024-08-01 RX ADMIN — Medication 10 ML: at 09:36

## 2024-08-23 ENCOUNTER — PROCEDURE VISIT (OUTPATIENT)
Dept: PODIATRY | Age: 69
End: 2024-08-23
Payer: MEDICARE

## 2024-08-23 VITALS — HEIGHT: 72 IN | WEIGHT: 315 LBS | BODY MASS INDEX: 42.66 KG/M2

## 2024-08-23 DIAGNOSIS — L84 CORNS AND CALLOSITIES: ICD-10-CM

## 2024-08-23 DIAGNOSIS — B35.1 TINEA UNGUIUM: ICD-10-CM

## 2024-08-23 DIAGNOSIS — Z79.01 ENCOUNTER FOR CURRENT LONG-TERM USE OF ANTICOAGULANTS: ICD-10-CM

## 2024-08-23 DIAGNOSIS — E11.9 TYPE 2 DIABETES MELLITUS WITHOUT COMPLICATION, UNSPECIFIED WHETHER LONG TERM INSULIN USE (HCC): Primary | ICD-10-CM

## 2024-08-23 DIAGNOSIS — R60.0 LOCALIZED EDEMA: ICD-10-CM

## 2024-08-23 PROCEDURE — 11721 DEBRIDE NAIL 6 OR MORE: CPT | Performed by: PODIATRIST

## 2024-08-23 PROCEDURE — 11056 PARNG/CUTG B9 HYPRKR LES 2-4: CPT | Performed by: PODIATRIST

## 2024-08-23 NOTE — PROGRESS NOTES
Radiculopathy of lumbosacral region 07/25/2017    Type II or unspecified type diabetes mellitus without mention of complication, not stated as uncontrolled        There were no vitals filed for this visit.       Work History/Social History: [employer]  Orthopedic history: EMG testing June 2020, Alpha-lipoic acid 600mg      Focused Lower Extremity Physical Exam:      Neurovascular examination:    Dorsalis Pedis palpable bilateral.  Posterior tibialis non-palpable bilateral.    Capillary Refill Time:  Immediate return  Hair growth:  Symmetrical and bilateral   Skin:  Not atrophic  Edema: Mild edema bilateral feet.   Mild edema bilateral ankles.  Neurologic:  Light touch diminished bilateral.  Warm to coolness bilateral distal toes  Decreased epicritic sensation     Musculoskeletal/ Orthopedic examination:    Equinis: present bilateral  Dorsiflexion, plantarflexion, inversion, eversion bilateral 5 out of 5 muscle strength  Wiggling toes pain-free.  Negative Homans.    Dermatology examination:    Toenails 1 through 5 bilateral thickened, elongated, dystrophic, mycotic with subungual debris.    Webspaces 1 through 4 clean dry intact bilateral.  Dry skin plantar feet.  Hyperkeratotic tissue fifth metatarsal bilateral.  No open wounds.  No erythema foot or ankle.      Assessment and Plan:  Jesus \"Saeid\" was seen today for nail problem, diabetes and callouses.    Diagnoses and all orders for this visit:    Type 2 diabetes mellitus without complication, unspecified whether long term insulin use (HCC)    Tinea unguium    Corns and callosities    Encounter for current long-term use of anticoagulants    Localized edema        Follow-up diabetic foot ankle exam  Manual debridement with standard technique toenails 1 through 5 right and left in thickness and length.  Patient tolerated procedure well.  Paring hyperkeratotic tissue fifth metatarsal bilateral  Continue compression stockings during the day.  Avoid

## 2024-08-30 RX ORDER — HYDROCHLOROTHIAZIDE 25 MG/1
25 TABLET ORAL DAILY
Qty: 90 TABLET | Refills: 1 | Status: SHIPPED | OUTPATIENT
Start: 2024-08-30

## 2024-08-30 NOTE — TELEPHONE ENCOUNTER
Last Appointment:  7/1/2024  Future Appointments   Date Time Provider Department Center   11/5/2024 10:00 AM Fer Ware DPM Col Podiatry [facility]   1/2/2025 10:40 AM Ty Sol MD [facility]

## 2024-09-17 ENCOUNTER — PATIENT MESSAGE (OUTPATIENT)
Dept: FAMILY MEDICINE CLINIC | Age: 69
End: 2024-09-17

## 2024-09-18 RX ORDER — INSULIN GLARGINE 100 [IU]/ML
45 INJECTION, SOLUTION SUBCUTANEOUS EVERY MORNING
Qty: 5 ADJUSTABLE DOSE PRE-FILLED PEN SYRINGE | Refills: 3 | Status: SHIPPED | OUTPATIENT
Start: 2024-09-18

## 2024-11-19 ENCOUNTER — PROCEDURE VISIT (OUTPATIENT)
Dept: PODIATRY | Age: 69
End: 2024-11-19
Payer: MEDICARE

## 2024-11-19 VITALS — HEIGHT: 72 IN | WEIGHT: 315 LBS | BODY MASS INDEX: 42.66 KG/M2

## 2024-11-19 DIAGNOSIS — Z79.01 ENCOUNTER FOR CURRENT LONG-TERM USE OF ANTICOAGULANTS: ICD-10-CM

## 2024-11-19 DIAGNOSIS — B35.1 TINEA UNGUIUM: ICD-10-CM

## 2024-11-19 DIAGNOSIS — L84 CORNS AND CALLOSITIES: ICD-10-CM

## 2024-11-19 DIAGNOSIS — E11.9 TYPE 2 DIABETES MELLITUS WITHOUT COMPLICATION, UNSPECIFIED WHETHER LONG TERM INSULIN USE (HCC): Primary | ICD-10-CM

## 2024-11-19 PROCEDURE — 11056 PARNG/CUTG B9 HYPRKR LES 2-4: CPT | Performed by: PODIATRIST

## 2024-11-19 PROCEDURE — 11721 DEBRIDE NAIL 6 OR MORE: CPT | Performed by: PODIATRIST

## 2024-11-19 NOTE — PROGRESS NOTES
Patient in today for nail care. Patient does not have any complaints of pain at this time.   Type 2 diabetic.  Patient's PCP is Ty Sol MD and date of last ov 7/01/2024.            Fer Ware DPM       CC:   Follow-up diabetic foot and ankle exam    HPI:   Follow-up diabetic foot ankle exam.  History metformin.  No open wounds.  No recent injury.  No additional pedal complaints.  He does have a history anticoagulant therapy Xarelto.    ROS:  Const: Denies constitutional symptoms  Musculo: Denies symptoms other than stated above  Skin: Denies symptoms other than stated above      Current Outpatient Medications:     insulin glargine (LANTUS SOLOSTAR) 100 UNIT/ML injection pen, Inject 45 Units into the skin every morning, Disp: 5 Adjustable Dose Pre-filled Pen Syringe, Rfl: 3    hydroCHLOROthiazide (HYDRODIURIL) 25 MG tablet, TAKE 1 TABLET BY MOUTH DAILY FOR BLOOD PRESSURE, Disp: 90 tablet, Rfl: 1    benazepril (LOTENSIN) 20 MG tablet, TAKE 1 TABLET BY MOUTH TWICE DAILY, Disp: 180 tablet, Rfl: 1    glipiZIDE (GLUCOTROL) 10 MG tablet, TAKE 1 TABLET BY MOUTH TWICE DAILY BEFORE MEALS, Disp: 180 tablet, Rfl: 1    carvedilol (COREG) 25 MG tablet, Take 1 tablet by mouth 2 times daily, Disp: 180 tablet, Rfl: 1    furosemide (LASIX) 20 MG tablet, TAKE 1 TABLET BY MOUTH EVERY DAY, Disp: 90 tablet, Rfl: 1    dilTIAZem (TIAZAC) 240 MG extended release capsule, TAKE 1 CAPSULE BY MOUTH EVERY DAY, Disp: 90 capsule, Rfl: 3    metFORMIN (GLUCOPHAGE) 1000 MG tablet, TAKE 1 TABLET BY MOUTH TWICE DAILY WITH MEALS, Disp: 180 tablet, Rfl: 1    acetaminophen (TYLENOL) 500 MG tablet, Take 2 tablets by mouth as needed for Pain, Disp: , Rfl:     rivaroxaban (XARELTO) 20 MG TABS tablet, Take 1 tablet by mouth daily (with breakfast) TAKE 1 TABLET BY MOUTH EVERY MORNING WITH BREAKFAST Strength: 20 mg, Disp: 90 tablet, Rfl: 1    B-D UF III MINI PEN NEEDLES 31G X 5 MM MISC, USE TO TEST BLOOD SUGAR ONCE A DAY, Disp: 100 each,

## 2024-11-20 ENCOUNTER — OFFICE VISIT (OUTPATIENT)
Dept: PAIN MANAGEMENT | Age: 69
End: 2024-11-20
Payer: MEDICARE

## 2024-11-20 ENCOUNTER — PREP FOR PROCEDURE (OUTPATIENT)
Dept: PAIN MANAGEMENT | Age: 69
End: 2024-11-20

## 2024-11-20 VITALS
DIASTOLIC BLOOD PRESSURE: 95 MMHG | RESPIRATION RATE: 18 BRPM | HEART RATE: 84 BPM | TEMPERATURE: 98.2 F | HEIGHT: 72 IN | OXYGEN SATURATION: 96 % | SYSTOLIC BLOOD PRESSURE: 142 MMHG | WEIGHT: 315 LBS | BODY MASS INDEX: 42.66 KG/M2

## 2024-11-20 DIAGNOSIS — M54.16 LUMBAR RADICULOPATHY: Primary | ICD-10-CM

## 2024-11-20 DIAGNOSIS — M21.371 RIGHT FOOT DROP: ICD-10-CM

## 2024-11-20 DIAGNOSIS — M51.9 LUMBAR DISC DISORDER: ICD-10-CM

## 2024-11-20 DIAGNOSIS — M51.9 THORACIC DISC DISORDER: ICD-10-CM

## 2024-11-20 DIAGNOSIS — M54.14 THORACIC RADICULOPATHY: ICD-10-CM

## 2024-11-20 DIAGNOSIS — G89.4 CHRONIC PAIN SYNDROME: ICD-10-CM

## 2024-11-20 DIAGNOSIS — M54.17 RADICULOPATHY OF LUMBOSACRAL REGION: ICD-10-CM

## 2024-11-20 DIAGNOSIS — M43.16 SPONDYLOLISTHESIS OF LUMBAR REGION: ICD-10-CM

## 2024-11-20 PROCEDURE — 3017F COLORECTAL CA SCREEN DOC REV: CPT | Performed by: PHYSICIAN ASSISTANT

## 2024-11-20 PROCEDURE — 1159F MED LIST DOCD IN RCRD: CPT | Performed by: PHYSICIAN ASSISTANT

## 2024-11-20 PROCEDURE — 3080F DIAST BP >= 90 MM HG: CPT | Performed by: PHYSICIAN ASSISTANT

## 2024-11-20 PROCEDURE — 1036F TOBACCO NON-USER: CPT | Performed by: PHYSICIAN ASSISTANT

## 2024-11-20 PROCEDURE — G8427 DOCREV CUR MEDS BY ELIG CLIN: HCPCS | Performed by: PHYSICIAN ASSISTANT

## 2024-11-20 PROCEDURE — G8484 FLU IMMUNIZE NO ADMIN: HCPCS | Performed by: PHYSICIAN ASSISTANT

## 2024-11-20 PROCEDURE — 99213 OFFICE O/P EST LOW 20 MIN: CPT | Performed by: PHYSICIAN ASSISTANT

## 2024-11-20 PROCEDURE — G8417 CALC BMI ABV UP PARAM F/U: HCPCS | Performed by: PHYSICIAN ASSISTANT

## 2024-11-20 PROCEDURE — 1123F ACP DISCUSS/DSCN MKR DOCD: CPT | Performed by: PHYSICIAN ASSISTANT

## 2024-11-20 PROCEDURE — 3077F SYST BP >= 140 MM HG: CPT | Performed by: PHYSICIAN ASSISTANT

## 2024-11-20 NOTE — PROGRESS NOTES
Jesus Green presents to the [facility] on 11/20/2024. Jesus is complaining of pain in his lumbar spine. Patient states he had an injection back in May, and would like to schedule another.. The pain is constant. The pain is described as shooting, stabbing, sharp, and penetrating. Pain is rated on his best day at a 3, on his worst day at a 8, and on average at a 5 on the VAS scale. He took his last dose of Tylenol last Thursday..     Any procedures since your last visit: No.    Pacemaker or defibrillator: No.    He is not on NSAIDS and is  on anticoagulation medications to include Xarelto and is managed by Dr. Sol.     Do you want someone present when the provider examines you? No    Medication Contract and Consent for Opioid Use Documents Filed        No documents found                    BP (!) 142/95   Pulse 84   Temp 98.2 °F (36.8 °C) (Infrared)   Resp 18   Ht 1.829 m (6')   Wt (!) 147.4 kg (325 lb)   SpO2 96%   BMI 44.08 kg/m²      No LMP for male patient.

## 2024-11-20 NOTE — PROGRESS NOTES
[facility]   [facility] [address], [place] [address]  [phone]    Follow up Note      Jesus Green     Date of Visit:  2024    CC:  Patient presents for follow up   Chief Complaint   Patient presents with    Follow-up     Patient is here to follow up on his lumbar spine and injections.       HPI:    Pain is getting a little worse to his lower back.    Appropriate analgesia with current medications regimen: n/a    Change in quality of symptoms:no.    Medication side effects:not applicable .   Recent diagnostic testing:none.   Recent interventional procedures: 2024 Bilateral Transforaminal epidural steroid injection under fluoroscopic guidance at foraminal level L5 - 90% relief until about the last month.      He has been on anticoagulation medications to include XARELTO and has not been on herbal supplements.  He is diabetic.     Imagin/2023 thoracic MRI w/o -  FINDINGS:  BONES/ALIGNMENT: No fracture or bony destructive lesion.  Multiple T1 and T2  hyperintense foci in the vertebral bodies, including at T2, T6, T7, T9, T11  and T12 levels likely represent hemangiomas.  No evidence of a marrow  infiltrative process.     SPINAL CORD: No abnormal cord signal is seen.     SOFT TISSUES: No paraspinal mass identified.     DEGENERATIVE CHANGES:     At T2-3, small left paracentral disc protrusion is noted.  No significant  central canal or neural foraminal stenosis.     At T4-5, small central disc protrusion results in mild central canal  stenosis.  No neural foraminal stenosis.     At T5-6, small right paracentral disc protrusion may impinge the right T5  nerve.  Mild central canal stenosis.  No significant neural foraminal  stenosis.     At T6-7: Small central disc protrusion.  No significant central canal or  neural foraminal stenosis.     At T7-8, a broad-based central and left paracentral disc protrusion results  in mild central canal stenosis and may impinge

## 2024-11-21 ENCOUNTER — TELEPHONE (OUTPATIENT)
Dept: PAIN MANAGEMENT | Age: 69
End: 2024-11-21

## 2024-11-21 NOTE — TELEPHONE ENCOUNTER
Saeid is having an upcoming Transforaminal Epidural Steroid Injection with Dr. Rodriguez on 12/03/2024. Would it be acceptable for patient to old his Xarelto 3 days prior to procedure? Please advise. Thank you

## 2024-11-25 ENCOUNTER — TELEPHONE (OUTPATIENT)
Dept: PAIN MANAGEMENT | Age: 69
End: 2024-11-25

## 2024-11-25 DIAGNOSIS — M54.16 LUMBAR RADICULOPATHY: Primary | ICD-10-CM

## 2024-11-25 NOTE — TELEPHONE ENCOUNTER
Call to Jesus Green that procedure was scheduled for 12/3/2024 and that [facility] should call him a few days before for the pre op call and between 2:00 PM and 4:00 PM  the business day before with the arrival time. Instructed Jesus to hold ibuprofen for 24 hours, Celebrex, Mobic, and naprosyn for 4 days and any aspirin containing products, CoQ -10, or fish oil for 7 days and Xarelto for 3 days. Instructed to call office back if any questions. Jesus verbalized understanding.    Electronically signed by Tommie Hickey RN on 11/25/2024 at 1:00 PM

## 2024-11-27 NOTE — PROGRESS NOTES
[facility] PAIN MANAGEMENT  INSTRUCTIONS  ...........................................................................................................................................     [x] Parking the day of Surgery is located in the Main Entrance lot.  Upon entering the door, make immediate right into the surgery reception room    [x]  Bring photo ID and insurance card     [x] You may have a light breakfast day of procedure    [x]  Wear loose comfortable clothing    [x]  Please follow instructions for medications as given per Dr's office    [x] You can expect a call the business day prior to procedure to notify you of your arrival time     [x] Please arrange for     []  Other instructions

## 2024-12-03 ENCOUNTER — HOSPITAL ENCOUNTER (OUTPATIENT)
Age: 69
Setting detail: OUTPATIENT SURGERY
Discharge: HOME | End: 2024-12-03
Attending: PAIN MEDICINE | Admitting: PAIN MEDICINE
Payer: MEDICARE

## 2024-12-03 ENCOUNTER — HOSPITAL ENCOUNTER (OUTPATIENT)
Dept: GENERAL RADIOLOGY | Age: 69
Discharge: HOME | End: 2024-12-05
Attending: PAIN MEDICINE
Payer: MEDICARE

## 2024-12-03 VITALS
WEIGHT: 315 LBS | BODY MASS INDEX: 42.66 KG/M2 | HEIGHT: 72 IN | SYSTOLIC BLOOD PRESSURE: 142 MMHG | RESPIRATION RATE: 16 BRPM | HEART RATE: 70 BPM | TEMPERATURE: 97.8 F | OXYGEN SATURATION: 98 % | DIASTOLIC BLOOD PRESSURE: 88 MMHG

## 2024-12-03 DIAGNOSIS — R52 PAIN MANAGEMENT: ICD-10-CM

## 2024-12-03 LAB — GLUCOSE BLD-MCNC: 130 MG/DL (ref 74–99)

## 2024-12-03 PROCEDURE — 7100000011 HC PHASE II RECOVERY - ADDTL 15 MIN: Performed by: PAIN MEDICINE

## 2024-12-03 PROCEDURE — 2709999900 HC NON-CHARGEABLE SUPPLY: Performed by: PAIN MEDICINE

## 2024-12-03 PROCEDURE — 64483 NJX AA&/STRD TFRM EPI L/S 1: CPT | Performed by: PAIN MEDICINE

## 2024-12-03 PROCEDURE — 7100000010 HC PHASE II RECOVERY - FIRST 15 MIN: Performed by: PAIN MEDICINE

## 2024-12-03 PROCEDURE — 6360000004 HC RX CONTRAST MEDICATION: Performed by: PAIN MEDICINE

## 2024-12-03 PROCEDURE — 6360000002 HC RX W HCPCS: Performed by: PAIN MEDICINE

## 2024-12-03 PROCEDURE — 3600000002 HC SURGERY LEVEL 2 BASE: Performed by: PAIN MEDICINE

## 2024-12-03 PROCEDURE — 82947 ASSAY GLUCOSE BLOOD QUANT: CPT

## 2024-12-03 RX ORDER — IOPAMIDOL 612 MG/ML
INJECTION, SOLUTION INTRATHECAL PRN
Status: DISCONTINUED | OUTPATIENT
Start: 2024-12-03 | End: 2024-12-03 | Stop reason: ALTCHOICE

## 2024-12-03 RX ORDER — LIDOCAINE HYDROCHLORIDE 5 MG/ML
INJECTION, SOLUTION INFILTRATION; INTRAVENOUS PRN
Status: DISCONTINUED | OUTPATIENT
Start: 2024-12-03 | End: 2024-12-03 | Stop reason: ALTCHOICE

## 2024-12-03 RX ORDER — METHYLPREDNISOLONE ACETATE 40 MG/ML
INJECTION, SUSPENSION INTRA-ARTICULAR; INTRALESIONAL; INTRAMUSCULAR; SOFT TISSUE PRN
Status: DISCONTINUED | OUTPATIENT
Start: 2024-12-03 | End: 2024-12-03 | Stop reason: ALTCHOICE

## 2024-12-03 ASSESSMENT — PAIN DESCRIPTION - DESCRIPTORS
DESCRIPTORS: DISCOMFORT
DESCRIPTORS: DISCOMFORT

## 2024-12-03 ASSESSMENT — PAIN - FUNCTIONAL ASSESSMENT
PAIN_FUNCTIONAL_ASSESSMENT: ACTIVITIES ARE NOT PREVENTED
PAIN_FUNCTIONAL_ASSESSMENT: ACTIVITIES ARE NOT PREVENTED
PAIN_FUNCTIONAL_ASSESSMENT: 0-10
PAIN_FUNCTIONAL_ASSESSMENT: ACTIVITIES ARE NOT PREVENTED

## 2024-12-03 NOTE — DISCHARGE INSTRUCTIONS
[facility] Pain Management Department  [facility]-[phone]  Dr. Antonietta Rodriguez   Post-Pain Block/Radiofrequency  Home Going Instructions    1-Go home, rest for the remainder of the day  2-Please do not lift over 20 pounds the day of the injection  3-If you received sedation No: alcohol, driving, operating lawn mowers, plows, tractors or other dangerous equipment until next morning. Do not make important decisions or sign legal documents for 24 hours. You may experience light headedness, dizziness, nausea or sleepiness after sedation. Do not stay alone. A responsible adult must be with you for 24 hours. You could be nauseated from the medications you have received. Your IV site may be sore and bruised.    4-No dietary restrictions     5-Resume all medications the same day, blood thinners to be resumed 24 hours after injection if you were instructed to stop any.    6-Keep the surgical site clean and dry, you may shower the next morning and remove the      dressing.     7- No sitz baths, tub baths or hot tubs/swimming for 24 hours.       8- If you have any pain at the injection site(s), application of an ice pack to the area should be       helpful, 20 minutes on/20 minutes off for next 48 hours.  9- Call [facility] Pain Management immediately at if you develop.  Fever greater than 100.4 F  Have bleeding or drainage from the puncture site  Have progressive Leg/arm numbness and or weakness  Loss of control of bowel and or bladder (wet/soil yourself)  Severe headache with inability to lift head  10-You may return to work the next day

## 2024-12-03 NOTE — H&P
[facility]        [address] [place] [address]  Dept: [phone]    Procedure History & Physical      Jesus Green     HPI:    Patient  is here for LBP BLE pain for b/l L5 TFESI  Labs/imaging studies reviewed   All question and concerns addressed including R/B/A associated with the procedure    Past Medical History:   Diagnosis Date    Anticoagulant long-term use     Atrial fibrillation (HCC)     follows with PCP    Cancer (HCC)     skin cancer    CHF (congestive heart failure) (HCC)     Degenerative joint disease of left hip 01/28/2015    Diabetes mellitus (HCC)     Hyperlipidemia     Hypertension     Obesity     AMIRAH on CPAP     PONV (postoperative nausea and vomiting)     Radiculopathy of lumbosacral region 07/25/2017    Type II or unspecified type diabetes mellitus without mention of complication, not stated as uncontrolled        Past Surgical History:   Procedure Laterality Date    APPENDECTOMY      CHOLECYSTECTOMY      COLONOSCOPY N/A 6/19/2024    COLONOSCOPY BIOPSY performed by Emmy Armijo MD at [facility] ENDOSCOPY    CYST INCISION AND DRAINAGE  06/08/2017    abd wall cyst    HERNIA REPAIR      HIP ARTHROPLASTY Left     JOINT REPLACEMENT      PAIN MANAGEMENT PROCEDURE Bilateral 5/12/2022    BILATERAL L5 TRANSFORAMINAL EPIDURAL STEROID INJECTION #1 performed by Corinne Rodriguez DO at [facility] OR    PAIN MANAGEMENT PROCEDURE Bilateral 1/31/2023    BILATERAL L5 TRANSFORAMINAL EPIDURAL STEROID INJECTION performed by Corinne Rodriguez DO at [facility] OR    PAIN MANAGEMENT PROCEDURE Bilateral 5/11/2023    BILATERAL L5 TRANSFORAMINAL EPIDURAL STEROID INJECTION performed by Corinne Rodriguez DO at [facility] OR    PAIN MANAGEMENT PROCEDURE N/A 7/27/2023    T5-6 EPIDURAL STEROID INJECTION performed by [facility] OR    PAIN MANAGEMENT PROCEDURE Bilateral 12/5/2023    BILATERAL L5 TRANSFORAMINAL EPIDURAL STEROID INJECTION performed by Corinne Rodriguez DO at [facility] OR    PAIN

## 2024-12-03 NOTE — OP NOTE
12/3/2024    Patient: Jesus Green  :  1955  Age:  69 y.o.  Sex:  male     PRE-OPERATIVE DIAGNOSIS: Lumbar disc displacement, lumbar neural foraminal stenosis, lumbar radiculopathy.     POST-OPERATIVE DIAGNOSIS: Same.    PROCEDURE: Bilateral Transforaminal epidural steroid injection under fluoroscopic guidance at foraminal level L5.    SURGEON: LESLIE Rodriguez D.O.    ANESTHESIA: local    ESTIMATED BLOOD LOSS: None.  ______________________________________________________________________  BRIEF HISTORY: Jesus Green comes in today for the Bilateral transforaminal epidural steroid injection under fluoroscopic guidance at foraminal level L5. The potential complications of this procedure were discussed with him again today.  He has elected to undergo the aforementioned procedure.     Jesus’s complete History & Physical examination were reviewed in depth, a copy of which is in the chart.      DESCRIPTION OF PROCEDURE:    After confirming written and informed consent, a time-out was performed and Jesus’s name and date of birth, the procedure to be performed as well as the plan for the location of the needle insertion were confirmed.    The patient was brought into the procedure room and placed in the prone position on the fluoroscopy table. Standard monitors were placed and vital signs were observed throughout the procedure. The area of the lumbar spine was prepped with chloraprep and draped in a sterile manner. The vertebral body was identified with AP fluoroscopy. An oblique view was obtained to better visualize the inferior junction of the pedicle and transverse process . The 6 o'clock position of the pedicle was marked and identified. The skin and subcutaneous tissue were anesthetized with 0.5% lidocaine. A # 22 gauge pencil point needle was directed toward the targeted point under fluoroscopy until bone was contacted. The needle was then walked inferiorly until the neural foramen was entered . A lateral

## 2024-12-19 ENCOUNTER — OFFICE VISIT (OUTPATIENT)
Dept: FAMILY MEDICINE CLINIC | Age: 69
End: 2024-12-19

## 2024-12-19 VITALS
WEIGHT: 315 LBS | BODY MASS INDEX: 42.66 KG/M2 | HEIGHT: 72 IN | DIASTOLIC BLOOD PRESSURE: 72 MMHG | OXYGEN SATURATION: 98 % | HEART RATE: 84 BPM | RESPIRATION RATE: 16 BRPM | SYSTOLIC BLOOD PRESSURE: 130 MMHG | TEMPERATURE: 98.1 F

## 2024-12-19 DIAGNOSIS — H61.21 IMPACTED CERUMEN, RIGHT EAR: ICD-10-CM

## 2024-12-19 DIAGNOSIS — H69.92 EUSTACHIAN TUBE DYSFUNCTION, LEFT: Primary | ICD-10-CM

## 2024-12-19 NOTE — PROGRESS NOTES
Chief Complaint       Ear Fullness (Left ear)      History of Present Illness   Source of history provided by:  patient.      Jesus Green is a 69 y.o. old male presenting to the walk in clinic for evaluation of left ear fullness and difficulty hearing for the past few days. Denies associated nasal congestion, rhinorrhea, or sore throat. Denies any discharge from the ear canal. Denies any fever, chills, CP, SOB, abdominal pain, neck stiffness, rash, or lethargy.     ROS    Unless otherwise stated in this report or unable to obtain because of the patient's clinical or mental status as evidenced by the medical record, this patients's positive and negative responses for Review of Systems, constitutional, psych, eyes, ENT, cardiovascular, respiratory, gastrointestinal, neurological, genitourinary, musculoskeletal, integument systems and systems related to the presenting problem are either stated in the preceding or were not pertinent or were negative for the symptoms and/or complaints related to the medical problem.    Past Medical History:  has a past medical history of Anticoagulant long-term use, Atrial fibrillation (HCC), Cancer (HCC), CHF (congestive heart failure) (HCC), Degenerative joint disease of left hip, Diabetes mellitus (HCC), Hyperlipidemia, Hypertension, Obesity, AMIRAH on CPAP, PONV (postoperative nausea and vomiting), Radiculopathy of lumbosacral region, and Type II or unspecified type diabetes mellitus without mention of complication, not stated as uncontrolled.  Past Surgical History:  has a past surgical history that includes Cholecystectomy; Appendectomy; hernia repair; Hip Arthroplasty (Left); Breast cyst incision and drainage (06/08/2017); joint replacement; Pain management procedure (Bilateral, 5/12/2022); Pain management procedure (Bilateral, 1/31/2023); Pain management procedure (Bilateral, 5/11/2023); Pain management procedure (N/A, 7/27/2023); Pain management procedure (Bilateral,

## 2024-12-23 DIAGNOSIS — I48.19 PERSISTENT ATRIAL FIBRILLATION (HCC): ICD-10-CM

## 2024-12-23 RX ORDER — FUROSEMIDE 20 MG/1
TABLET ORAL
Qty: 90 TABLET | Refills: 1 | Status: SHIPPED | OUTPATIENT
Start: 2024-12-23

## 2024-12-23 NOTE — TELEPHONE ENCOUNTER
Last Appointment:  7/1/2024  Future Appointments   Date Time Provider Department Center   1/2/2025 10:40 AM Ty Sol MD [facility] DEP   1/8/2025  2:15 PM Corinne Rodriguez DO [facility] PAIN MAR [facility]   1/21/2025  2:00 PM Fer Ware DPM [facility]

## 2024-12-26 ENCOUNTER — OFFICE VISIT (OUTPATIENT)
Dept: FAMILY MEDICINE CLINIC | Age: 69
End: 2024-12-26

## 2024-12-26 VITALS
DIASTOLIC BLOOD PRESSURE: 76 MMHG | HEIGHT: 72 IN | SYSTOLIC BLOOD PRESSURE: 136 MMHG | HEART RATE: 86 BPM | WEIGHT: 315 LBS | RESPIRATION RATE: 16 BRPM | BODY MASS INDEX: 42.66 KG/M2 | OXYGEN SATURATION: 98 % | TEMPERATURE: 98.6 F

## 2024-12-26 DIAGNOSIS — H69.92 DYSFUNCTION OF LEFT EUSTACHIAN TUBE: ICD-10-CM

## 2024-12-26 DIAGNOSIS — R42 VERTIGO: Primary | ICD-10-CM

## 2024-12-26 RX ORDER — MECLIZINE HCL 12.5 MG 12.5 MG/1
12.5 TABLET ORAL 2 TIMES DAILY
Qty: 14 TABLET | Refills: 0 | Status: SHIPPED | OUTPATIENT
Start: 2024-12-26 | End: 2025-01-02

## 2024-12-26 RX ORDER — PREDNISONE 10 MG/1
TABLET ORAL
Qty: 12 TABLET | Refills: 0 | Status: SHIPPED | OUTPATIENT
Start: 2024-12-26 | End: 2024-12-31

## 2024-12-26 ASSESSMENT — ENCOUNTER SYMPTOMS
VOMITING: 1
WHEEZING: 0
NAUSEA: 1
ABDOMINAL PAIN: 0
COUGH: 0
CHEST TIGHTNESS: 0
SINUS PAIN: 0
EYES NEGATIVE: 1
SINUS PRESSURE: 0
DIARRHEA: 0
SHORTNESS OF BREATH: 0
SORE THROAT: 0

## 2024-12-26 NOTE — PROGRESS NOTES
[facility]     24  Jesus Green : 1955 Sex: male  Age: 69 y.o.    Chief Complaint   Patient presents with    Dizziness     [facility]    can't hear out of left ear, intermittent ringing       HPI    [facility] stating that he was here about 10 days ago and had right ear flushed for cerumen but is now complaining of not being able to hear out of his left ear also complaining of vertigo since he was in the last time.  Admits to couple episodes of nausea and vomiting.  But this has since subsided.  Patient denies any other neurological complaints.  Denies any numbness weakness or headache.  No visual changes outside of the vertiginous symptoms.        Review of Systems   Constitutional:  Negative for chills and fever.   HENT:  Positive for hearing loss. Negative for congestion, ear pain, postnasal drip, sinus pressure, sinus pain and sore throat.         See above   Eyes: Negative.    Respiratory:  Negative for cough, chest tightness, shortness of breath and wheezing.    Cardiovascular:  Negative for chest pain.        Atrial fibrillation on anticoagulation   Gastrointestinal:  Positive for nausea and vomiting. Negative for abdominal pain and diarrhea.        Resolved   Endocrine:        Type 2 diabetes   Neurological:  Positive for dizziness. Negative for syncope, weakness, numbness and headaches.        See above             REST OF PERTINENT ROS GONE OVER AND WAS NEGATIVE.               Current Outpatient Medications:     predniSONE (DELTASONE) 10 MG tablet, Take 3 tablets by mouth daily for 2 days, THEN 2 tablets daily for 2 days, THEN 1 tablet daily for 2 days., Disp: 12 tablet, Rfl: 0    meclizine (ANTIVERT) 12.5 MG tablet, Take 1 tablet by mouth in the morning and 1 tablet in the evening. Do all this for 7 days., Disp: 14 tablet, Rfl: 0    furosemide (LASIX) 20 MG tablet, TAKE 1 TABLET BY MOUTH EVERY DAY, Disp: 90 tablet, Rfl: 1

## 2024-12-30 RX ORDER — CARVEDILOL 25 MG/1
25 TABLET ORAL 2 TIMES DAILY
Qty: 180 TABLET | Refills: 1 | Status: SHIPPED | OUTPATIENT
Start: 2024-12-30

## 2024-12-30 NOTE — TELEPHONE ENCOUNTER
Name of Medication(s) Requested:  Requested Prescriptions     Pending Prescriptions Disp Refills    carvedilol (COREG) 25 MG tablet [Pharmacy Med Name: CARVEDILOL 25MG TABLETS] 180 tablet 1     Sig: TAKE 1 TABLET BY MOUTH TWICE DAILY       Medication is on current medication list Yes    Dosage and directions were verified? Yes    Quantity verified: 90 day supply     Pharmacy Verified?  Yes    Last Appointment:  7/1/2024    Future appts:  Future Appointments   Date Time Provider Department Center   1/2/2025 10:40 AM Ty Sol MD [facility] DEP   1/8/2025  2:15 PM Corinne Rodriguez DO [facility] PAIN [facility]   1/21/2025  2:00 PM Fer Ware DPM [facility] Pod[facility]        (If no appt send self scheduling link. .REFILLAPPT)  Scheduling request sent?     [] Yes  [x] No    Does patient need updated?  [] Yes  [x] No

## 2025-01-01 DIAGNOSIS — E11.69 TYPE 2 DIABETES MELLITUS WITH OTHER SPECIFIED COMPLICATION, WITH LONG-TERM CURRENT USE OF INSULIN (HCC): ICD-10-CM

## 2025-01-01 DIAGNOSIS — Z79.4 TYPE 2 DIABETES MELLITUS WITH OTHER SPECIFIED COMPLICATION, WITH LONG-TERM CURRENT USE OF INSULIN (HCC): ICD-10-CM

## 2025-01-02 ENCOUNTER — OFFICE VISIT (OUTPATIENT)
Dept: FAMILY MEDICINE CLINIC | Age: 70
End: 2025-01-02

## 2025-01-02 ENCOUNTER — HOSPITAL ENCOUNTER (OUTPATIENT)
Dept: MRI IMAGING | Age: 70
Discharge: HOME | End: 2025-01-04
Payer: MEDICARE

## 2025-01-02 ENCOUNTER — HOSPITAL ENCOUNTER (OUTPATIENT)
Age: 70
Discharge: HOME | End: 2025-01-02
Payer: MEDICARE

## 2025-01-02 ENCOUNTER — OFFICE VISIT (OUTPATIENT)
Dept: ENT CLINIC | Age: 70
End: 2025-01-02

## 2025-01-02 ENCOUNTER — PROCEDURE VISIT (OUTPATIENT)
Dept: AUDIOLOGY | Age: 70
End: 2025-01-02
Payer: MEDICARE

## 2025-01-02 VITALS
BODY MASS INDEX: 42.66 KG/M2 | WEIGHT: 315 LBS | OXYGEN SATURATION: 98 % | HEART RATE: 89 BPM | DIASTOLIC BLOOD PRESSURE: 82 MMHG | HEIGHT: 72 IN | SYSTOLIC BLOOD PRESSURE: 130 MMHG | TEMPERATURE: 98.7 F | RESPIRATION RATE: 18 BRPM

## 2025-01-02 VITALS
SYSTOLIC BLOOD PRESSURE: 124 MMHG | HEIGHT: 72 IN | WEIGHT: 315 LBS | TEMPERATURE: 98.8 F | DIASTOLIC BLOOD PRESSURE: 79 MMHG | HEART RATE: 79 BPM | BODY MASS INDEX: 42.66 KG/M2

## 2025-01-02 DIAGNOSIS — H91.22 SUDDEN LEFT HEARING LOSS: ICD-10-CM

## 2025-01-02 DIAGNOSIS — H91.92 HEARING LOSS OF LEFT EAR, UNSPECIFIED HEARING LOSS TYPE: ICD-10-CM

## 2025-01-02 DIAGNOSIS — R42 VERTIGO: ICD-10-CM

## 2025-01-02 DIAGNOSIS — R42 DIZZINESS: ICD-10-CM

## 2025-01-02 DIAGNOSIS — H91.8X3 ASYMMETRICAL HEARING LOSS: ICD-10-CM

## 2025-01-02 DIAGNOSIS — E11.69 TYPE 2 DIABETES MELLITUS WITH OTHER SPECIFIED COMPLICATION, WITH LONG-TERM CURRENT USE OF INSULIN (HCC): ICD-10-CM

## 2025-01-02 DIAGNOSIS — H91.8X3 ASYMMETRICAL HEARING LOSS: Primary | ICD-10-CM

## 2025-01-02 DIAGNOSIS — I48.19 PERSISTENT ATRIAL FIBRILLATION (HCC): ICD-10-CM

## 2025-01-02 DIAGNOSIS — Z79.4 TYPE 2 DIABETES MELLITUS WITH OTHER SPECIFIED COMPLICATION, WITH LONG-TERM CURRENT USE OF INSULIN (HCC): ICD-10-CM

## 2025-01-02 DIAGNOSIS — H90.3 ASYMMETRICAL SENSORINEURAL HEARING LOSS: Primary | ICD-10-CM

## 2025-01-02 DIAGNOSIS — I50.9 CHRONIC CONGESTIVE HEART FAILURE, UNSPECIFIED HEART FAILURE TYPE (HCC): ICD-10-CM

## 2025-01-02 DIAGNOSIS — H93.12 TINNITUS OF LEFT EAR: ICD-10-CM

## 2025-01-02 DIAGNOSIS — R42 VERTIGO: Primary | ICD-10-CM

## 2025-01-02 DIAGNOSIS — I48.91 ATRIAL FIBRILLATION, UNSPECIFIED TYPE (HCC): ICD-10-CM

## 2025-01-02 LAB
BASOPHILS ABSOLUTE: 0.06 K/UL (ref 0–0.2)
BASOPHILS RELATIVE PERCENT: 1 % (ref 0–2)
BUN SERPL-MCNC: 22 MG/DL (ref 6–23)
CREAT SERPL-MCNC: 1.2 MG/DL (ref 0.7–1.2)
EOSINOPHILS ABSOLUTE: 0.23 K/UL (ref 0.05–0.5)
EOSINOPHILS RELATIVE PERCENT: 2 % (ref 0–6)
GFR, ESTIMATED: 65 ML/MIN/1.73M2
HBA1C MFR BLD: 7.7 %
HCT VFR BLD CALC: 49 % (ref 37–54)
HEMOGLOBIN: 15.6 G/DL (ref 12.5–16.5)
IMMATURE GRANULOCYTES %: 1 % (ref 0–5)
IMMATURE GRANULOCYTES ABSOLUTE: 0.14 K/UL (ref 0–0.58)
LYMPHOCYTES ABSOLUTE: 1.46 K/UL (ref 1.5–4)
LYMPHOCYTES RELATIVE PERCENT: 12 % (ref 20–42)
MCH RBC QN AUTO: 27.7 PG (ref 26–35)
MCHC RBC AUTO-ENTMCNC: 31.8 G/DL (ref 32–34.5)
MCV RBC AUTO: 87 FL (ref 80–99.9)
MONOCYTES ABSOLUTE: 1.14 K/UL (ref 0.1–0.95)
MONOCYTES RELATIVE PERCENT: 10 % (ref 2–12)
NEUTROPHILS ABSOLUTE: 8.88 K/UL (ref 1.8–7.3)
NEUTROPHILS RELATIVE PERCENT: 75 % (ref 43–80)
PDW BLD-RTO: 16.2 % (ref 11.5–15)
PLATELET # BLD: 236 K/UL (ref 130–450)
PMV BLD AUTO: 10.8 FL (ref 7–12)
RBC # BLD: 5.63 M/UL (ref 3.8–5.8)
WBC # BLD: 11.9 K/UL (ref 4.5–11.5)

## 2025-01-02 PROCEDURE — 36415 COLL VENOUS BLD VENIPUNCTURE: CPT

## 2025-01-02 PROCEDURE — 6360000004 HC RX CONTRAST MEDICATION: Performed by: RADIOLOGY

## 2025-01-02 PROCEDURE — 82565 ASSAY OF CREATININE: CPT

## 2025-01-02 PROCEDURE — 92567 TYMPANOMETRY: CPT

## 2025-01-02 PROCEDURE — 70553 MRI BRAIN STEM W/O & W/DYE: CPT

## 2025-01-02 PROCEDURE — 92557 COMPREHENSIVE HEARING TEST: CPT

## 2025-01-02 PROCEDURE — A9579 GAD-BASE MR CONTRAST NOS,1ML: HCPCS | Performed by: RADIOLOGY

## 2025-01-02 PROCEDURE — 84520 ASSAY OF UREA NITROGEN: CPT

## 2025-01-02 RX ORDER — LIRAGLUTIDE 6 MG/ML
INJECTION SUBCUTANEOUS
Qty: 6 ML | Refills: 2 | Status: SHIPPED | OUTPATIENT
Start: 2025-01-02 | End: 2025-02-01

## 2025-01-02 RX ORDER — BENAZEPRIL HYDROCHLORIDE 20 MG/1
20 TABLET ORAL 2 TIMES DAILY
Qty: 180 TABLET | Refills: 1 | Status: SHIPPED | OUTPATIENT
Start: 2025-01-02

## 2025-01-02 RX ORDER — INSULIN GLARGINE 100 [IU]/ML
45 INJECTION, SOLUTION SUBCUTANEOUS EVERY MORNING
Qty: 5 ADJUSTABLE DOSE PRE-FILLED PEN SYRINGE | Refills: 3 | Status: SHIPPED | OUTPATIENT
Start: 2025-01-02

## 2025-01-02 RX ORDER — GLIPIZIDE 10 MG/1
10 TABLET ORAL
Qty: 180 TABLET | Refills: 1 | OUTPATIENT
Start: 2025-01-02

## 2025-01-02 RX ORDER — DEXAMETHASONE SODIUM PHOSPHATE 10 MG/ML
10 INJECTION INTRAMUSCULAR; INTRAVENOUS ONCE
Status: COMPLETED | OUTPATIENT
Start: 2025-01-02 | End: 2025-01-02

## 2025-01-02 RX ORDER — BENAZEPRIL HYDROCHLORIDE 20 MG/1
20 TABLET ORAL 2 TIMES DAILY
Qty: 180 TABLET | Refills: 1 | OUTPATIENT
Start: 2025-01-02

## 2025-01-02 RX ADMIN — GADOTERIDOL 20 ML: 279.3 INJECTION, SOLUTION INTRAVENOUS at 20:30

## 2025-01-02 RX ADMIN — DEXAMETHASONE SODIUM PHOSPHATE 10 MG: 10 INJECTION INTRAMUSCULAR; INTRAVENOUS at 17:45

## 2025-01-02 ASSESSMENT — ENCOUNTER SYMPTOMS
SORE THROAT: 0
SHORTNESS OF BREATH: 0
STRIDOR: 0
CHOKING: 0
SINUS PAIN: 0
SINUS PRESSURE: 0
COUGH: 0
WHEEZING: 0
RHINORRHEA: 0

## 2025-01-02 NOTE — PROGRESS NOTES
BIOPSY performed by Emmy Armijo MD at [facility] ENDOSCOPY    CYST INCISION AND DRAINAGE  06/08/2017    abd wall cyst    HERNIA REPAIR      HIP ARTHROPLASTY Left     JOINT REPLACEMENT      PAIN MANAGEMENT PROCEDURE Bilateral 5/12/2022    BILATERAL L5 TRANSFORAMINAL EPIDURAL STEROID INJECTION #1 performed by Corinne Rodriguez DO at [facility] OR    PAIN MANAGEMENT PROCEDURE Bilateral 1/31/2023    BILATERAL L5 TRANSFORAMINAL EPIDURAL STEROID INJECTION performed by Corinne Rodriguez DO at [facility] OR    PAIN MANAGEMENT PROCEDURE Bilateral 5/11/2023    BILATERAL L5 TRANSFORAMINAL EPIDURAL STEROID INJECTION performed by Corinne Rodriguez DO at [facility] OR    PAIN MANAGEMENT PROCEDURE N/A 7/27/2023    T5-6 EPIDURAL STEROID INJECTION performed by Corinne Rodriguez DO at [facility] OR    PAIN MANAGEMENT PROCEDURE Bilateral 12/5/2023    BILATERAL L5 TRANSFORAMINAL EPIDURAL STEROID INJECTION performed by Corinne Rodriguez DO at [facility] OR    PAIN MANAGEMENT PROCEDURE Bilateral 5/7/2024    BILATERAL LUMBAR 5 TRANSFORAMINAL EPIDURAL STEROID INJECTION UNDER FLUOROSCOPIC GUIDANCE performed by Corinne Rodriguez DO at [facility] OR    PAIN MANAGEMENT PROCEDURE Bilateral 12/3/2024    BILATERAL TRANSFORAMINAL EPIDURAL STEROID INJECTION UNDER FLUOROSCOPIC GUIDANCE AT FORAMINAL LEVEL LUMBAR 5 performed by Corinne Rodriguez DO at [facility] OR       Social History     Tobacco Use    Smoking status: Never    Smokeless tobacco: Never   Vaping Use    Vaping status: Never Used   Substance Use Topics    Alcohol use: No     Alcohol/week: 0.0 standard drinks of alcohol    Drug use: No       Chart reviewed and updated where appropriate for PMH, Fam, and Soc Hx.  _________________________________________________________  ROS: POSITIVE: As in the HPI.   Otherwise Pertinent negatives are negative.    __________________________________________________________  Physical Exam   Constitutional:    He is oriented to person, place, and time.    He appears well-developed and well-nourished.

## 2025-01-02 NOTE — PROGRESS NOTES
This patient was referred for audiometric and tympanometric testing by Dr. Escalera due to  a left sudden hearing loss and dizziness . Patient reported that his left hearing sudden decreased approximately 2 weeks ago. Patient also reported an onset of tinnitus and vertigo since the loss of hearing. Patient denied any concerns for hearing prior to the sudden loss.     Audiometry using pure tone air and bone conduction testing revealed hearing sensitivity within normal limits through 2000 Hz sloping to a mild sensorineural hearing loss beyond in the right ear and a profound sensorineural hearing loss in the left ear. Reliability was good. Speech reception thresholds were in good agreement with the pure tone averages, in the right ear. Speech discrimination scores were excellent, in the right ear. Unmasked speech detection thresholds were obtained at 110 dB HL in the left ear, though crossover is likely contributing to scores. Patient did not respond to masked speech testing in the left ear.     Tympanometry revealed shallow tympanograms, bilaterally.    The results were reviewed with the patient and ordering provider.     Recommendations for follow up will be made pending physician consult.      Judy Lopez, CCC-A  Clinical Audiologist  [place] License: [id number]    Electronically signed by Cordell Pal on 1/2/2025 at 3:29 PM

## 2025-01-02 NOTE — PROGRESS NOTES
Department of Otolaryngology  Office Consult Note  1/2/25          Subjective:        Chief Complaint:  had concerns including New Patient ( NP-vertigo/sudden hearing loss L ear (2 1/2 weeks); referred by Dr. Sol//).     Patient ID: Jesus Green is a 69 y.o. male.    HPI: Patient presents as  new patient for sudden hearing loss. Patient reports waking 2.5 weeks ago with no hearing from the left ear followed by onset of dizziness in the following few days associated with off balance, room spinning with nausea, worse with movement. Has had tinnitus constant non pulsatile since onset in the left ear. No recent illness. No similar hx. No family hx of hearing loss. No surgery to the ear in the past. No previous hearing tests.   Was seen by urgent care, given prednisone 30mg and meclizine, with some improvement of dizziness but no change in his hearing.     Review of Systems   Constitutional: Negative.    HENT:  Positive for hearing loss. Negative for congestion, ear discharge, ear pain, rhinorrhea, sinus pressure, sinus pain, sore throat and tinnitus.    Respiratory:  Negative for cough, choking, shortness of breath, wheezing and stridor.    Cardiovascular:  Negative for chest pain.   Skin:  Negative for rash.   Allergic/Immunologic: Negative for environmental allergies and immunocompromised state.   Neurological:  Positive for dizziness. Negative for weakness, light-headedness and headaches.   Psychiatric/Behavioral:  Negative for confusion.    All other systems reviewed and are negative.        Past Medical History:   Diagnosis Date    Anticoagulant long-term use     Atrial fibrillation (HCC)     follows with PCP    Cancer (HCC)     skin cancer    CHF (congestive heart failure) (HCC)     Degenerative joint disease of left hip 01/28/2015    Diabetes mellitus (HCC)     Hyperlipidemia     Hypertension     Obesity     AMIRAH on CPAP     PONV (postoperative nausea and vomiting)     Radiculopathy of lumbosacral region

## 2025-01-03 DIAGNOSIS — I48.19 PERSISTENT ATRIAL FIBRILLATION (HCC): ICD-10-CM

## 2025-01-03 LAB
ALBUMIN: 4.2 G/DL (ref 3.5–5.2)
ALP BLD-CCNC: 94 U/L (ref 40–129)
ALT SERPL-CCNC: 22 U/L (ref 0–40)
ANION GAP SERPL CALCULATED.3IONS-SCNC: 20 MMOL/L (ref 7–16)
AST SERPL-CCNC: 16 U/L (ref 0–39)
BILIRUB SERPL-MCNC: 0.5 MG/DL (ref 0–1.2)
BUN BLDV-MCNC: 20 MG/DL (ref 6–23)
CALCIUM SERPL-MCNC: 9.7 MG/DL (ref 8.6–10.2)
CHLORIDE BLD-SCNC: 103 MMOL/L (ref 98–107)
CHOLESTEROL, TOTAL: 164 MG/DL
CO2: 24 MMOL/L (ref 22–29)
CREAT SERPL-MCNC: 1.2 MG/DL (ref 0.7–1.2)
GFR, ESTIMATED: 65 ML/MIN/1.73M2
GLUCOSE BLD-MCNC: 128 MG/DL (ref 74–99)
HDLC SERPL-MCNC: 44 MG/DL
LDL CHOLESTEROL: 97 MG/DL
POTASSIUM SERPL-SCNC: 4.2 MMOL/L (ref 3.5–5)
SODIUM BLD-SCNC: 147 MMOL/L (ref 132–146)
TOTAL PROTEIN: 7.6 G/DL (ref 6.4–8.3)
TRIGL SERPL-MCNC: 116 MG/DL
TSH SERPL DL<=0.05 MIU/L-ACNC: 1.25 UIU/ML (ref 0.27–4.2)
VLDLC SERPL CALC-MCNC: 23 MG/DL

## 2025-01-08 ENCOUNTER — OFFICE VISIT (OUTPATIENT)
Dept: PAIN MANAGEMENT | Age: 70
End: 2025-01-08
Payer: MEDICARE

## 2025-01-08 ENCOUNTER — PREP FOR PROCEDURE (OUTPATIENT)
Dept: PAIN MANAGEMENT | Age: 70
End: 2025-01-08

## 2025-01-08 VITALS
DIASTOLIC BLOOD PRESSURE: 87 MMHG | HEIGHT: 72 IN | TEMPERATURE: 97.5 F | RESPIRATION RATE: 16 BRPM | WEIGHT: 315 LBS | HEART RATE: 80 BPM | SYSTOLIC BLOOD PRESSURE: 146 MMHG | BODY MASS INDEX: 42.66 KG/M2 | OXYGEN SATURATION: 96 %

## 2025-01-08 DIAGNOSIS — M21.371 RIGHT FOOT DROP: ICD-10-CM

## 2025-01-08 DIAGNOSIS — M43.16 SPONDYLOLISTHESIS OF LUMBAR REGION: ICD-10-CM

## 2025-01-08 DIAGNOSIS — M54.16 LUMBAR RADICULOPATHY: ICD-10-CM

## 2025-01-08 DIAGNOSIS — G89.4 CHRONIC PAIN SYNDROME: Primary | ICD-10-CM

## 2025-01-08 DIAGNOSIS — M54.16 LUMBAR RADICULOPATHY: Primary | ICD-10-CM

## 2025-01-08 PROCEDURE — 99213 OFFICE O/P EST LOW 20 MIN: CPT

## 2025-01-08 NOTE — PROGRESS NOTES
Jesus Green presents to the [facility] on 1/8/2025. Jesus is complaining of pain no pain in his back. The pain is intermittent. The pain is described as nothing. Pain is rated on his best day at a 0, on his worst day at a 0, and on average at a 0 on the VAS scale. He took his last dose of  na  na.     Any procedures since your last visit: Yes, with 100 % relief.    Pacemaker or defibrillator: No managed by na.    He is not on NSAIDS and is  on anticoagulation medications to include Xarelto and is managed by Dr. Sol.     Do you want someone present when the provider examines you? No    Medication Contract and Consent for Opioid Use Documents Filed        No documents found                    There were no vitals taken for this visit.     No LMP for male patient.    
Relation Age of Onset    High Blood Pressure Mother     Cancer Mother         lymphoma    Stroke Mother     Diabetes Father     Heart Surgery Sister         heart valve replacement    Heart Disease Brother         pt was waiting for a heart transplant    Cancer Maternal Grandfather     Cancer Paternal Grandmother     Cancer Paternal Grandfather        REVIEW OF SYSTEMS:     Jesus denies fever/chills, chest pain, shortness of breath, new bowel or bladder complaints. All other review of systems was negative.    PHYSICAL EXAMINATION:      BP (!) 146/87   Pulse 80   Temp 97.5 °F (36.4 °C)   Resp 16   Ht 1.829 m (6')   Wt (!) 146.1 kg (322 lb)   SpO2 96%   BMI 43.67 kg/m²     General:      General appearance:   pleasant and well-hydrated.   , in mild discomfort and A & O x3  Build:Obese    HEENT:    Head:normocephalic and atraumatic  Sclera: icterus absent,    Lungs:    Breathing:Normal expansion.  No wheezing.    Abdomen:    Shape:non-distended and normal    Lumbar spine:    Spine inspection:normal   CVA tenderness:No   Palpation:midline tenderness.  Range of motion:abnormal mildly Lateral bending, flexion, extension rotation bilateral and is  painful.    Musculoskeletal:    SI joint tenderness:negative right, negative left              Piriformis tenderness:negative right, negative left  SLR:negative right, negative left, sitting     Extremities:    Tremors:None bilaterally upper and lower  Range of motion:Generally normal shoulders, pain with internal rotation of hips negative.  Intact:Yes  Edema:Wearing lymphedema wraps.    Neurological:    Sensory:normal to light touch   Motor:   Right Quadriceps5/5          Left Quadriceps5/5           Right Gastrocnemius5/5    Left Gastrocnemius5/5  Right Ant Tibialis4+/5  Left Ant Tibialis5/5  Gait:antalgic    Dermatology:    Skin:no unusual rashes, no skin lesions, no palpable subcutaneous nodules, and good skin turgor    Impression:    LBP radiating into b/l

## 2025-01-13 ENCOUNTER — PROCEDURE VISIT (OUTPATIENT)
Dept: AUDIOLOGY | Age: 70
End: 2025-01-13
Payer: MEDICARE

## 2025-01-13 ENCOUNTER — OFFICE VISIT (OUTPATIENT)
Dept: ENT CLINIC | Age: 70
End: 2025-01-13
Payer: MEDICARE

## 2025-01-13 VITALS
TEMPERATURE: 98.2 F | BODY MASS INDEX: 42.66 KG/M2 | SYSTOLIC BLOOD PRESSURE: 140 MMHG | OXYGEN SATURATION: 97 % | WEIGHT: 315 LBS | DIASTOLIC BLOOD PRESSURE: 100 MMHG | HEART RATE: 99 BPM | HEIGHT: 72 IN

## 2025-01-13 DIAGNOSIS — H91.22 SUDDEN LEFT HEARING LOSS: ICD-10-CM

## 2025-01-13 DIAGNOSIS — H91.22 SUDDEN LEFT HEARING LOSS: Primary | ICD-10-CM

## 2025-01-13 DIAGNOSIS — R42 VERTIGO: ICD-10-CM

## 2025-01-13 DIAGNOSIS — H83.02 VESTIBULAR LABYRINTHITIS, LEFT: ICD-10-CM

## 2025-01-13 DIAGNOSIS — H91.8X3 ASYMMETRICAL HEARING LOSS: Primary | ICD-10-CM

## 2025-01-13 PROCEDURE — 3080F DIAST BP >= 90 MM HG: CPT | Performed by: OTOLARYNGOLOGY

## 2025-01-13 PROCEDURE — 1036F TOBACCO NON-USER: CPT | Performed by: OTOLARYNGOLOGY

## 2025-01-13 PROCEDURE — 69801 INCISE INNER EAR: CPT | Performed by: OTOLARYNGOLOGY

## 2025-01-13 PROCEDURE — G8427 DOCREV CUR MEDS BY ELIG CLIN: HCPCS | Performed by: OTOLARYNGOLOGY

## 2025-01-13 PROCEDURE — 1159F MED LIST DOCD IN RCRD: CPT | Performed by: OTOLARYNGOLOGY

## 2025-01-13 PROCEDURE — 3017F COLORECTAL CA SCREEN DOC REV: CPT | Performed by: OTOLARYNGOLOGY

## 2025-01-13 PROCEDURE — 3077F SYST BP >= 140 MM HG: CPT | Performed by: OTOLARYNGOLOGY

## 2025-01-13 PROCEDURE — 92552 PURE TONE AUDIOMETRY AIR: CPT | Performed by: AUDIOLOGIST

## 2025-01-13 PROCEDURE — 1123F ACP DISCUSS/DSCN MKR DOCD: CPT | Performed by: OTOLARYNGOLOGY

## 2025-01-13 PROCEDURE — 99212 OFFICE O/P EST SF 10 MIN: CPT | Performed by: OTOLARYNGOLOGY

## 2025-01-13 PROCEDURE — G8417 CALC BMI ABV UP PARAM F/U: HCPCS | Performed by: OTOLARYNGOLOGY

## 2025-01-13 RX ORDER — CIPROFLOXACIN AND DEXAMETHASONE 3; 1 MG/ML; MG/ML
4 SUSPENSION/ DROPS AURICULAR (OTIC) 2 TIMES DAILY
Qty: 7.5 ML | Refills: 0 | Status: SHIPPED | OUTPATIENT
Start: 2025-01-13 | End: 2025-01-20

## 2025-01-13 RX ORDER — DEXAMETHASONE SODIUM PHOSPHATE 10 MG/ML
10 INJECTION INTRAMUSCULAR; INTRAVENOUS ONCE
Status: COMPLETED | OUTPATIENT
Start: 2025-01-13 | End: 2025-01-13

## 2025-01-13 RX ADMIN — DEXAMETHASONE SODIUM PHOSPHATE 10 MG: 10 INJECTION INTRAMUSCULAR; INTRAVENOUS at 17:41

## 2025-01-13 ASSESSMENT — ENCOUNTER SYMPTOMS
SINUS PAIN: 0
SINUS PRESSURE: 0
STRIDOR: 0
COUGH: 0
WHEEZING: 0
SHORTNESS OF BREATH: 0
SORE THROAT: 0
CHOKING: 0
RHINORRHEA: 0

## 2025-01-13 NOTE — PROGRESS NOTES
Department of Otolaryngology  Office Consult Note        Subjective:        Chief Complaint:  had concerns including Follow-up (1 week dizziness w/audio).     Patient ID: Jesus Green is a 69 y.o. male.    HPI: Patient presents as  follow-up for left vertigo and sudden hearing loss.  He had intratympanic steroid injection at the last visit as well as MRI IAC.  He states he is still dizzy mostly when he moves and his hearing is unchanged.  He cannot hear anything from the left ear.        Review of Systems   Constitutional: Negative.    HENT:  Positive for hearing loss. Negative for congestion, ear discharge, ear pain, rhinorrhea, sinus pressure, sinus pain, sore throat and tinnitus.    Respiratory:  Negative for cough, choking, shortness of breath, wheezing and stridor.    Cardiovascular:  Negative for chest pain.   Skin:  Negative for rash.   Allergic/Immunologic: Negative for environmental allergies and immunocompromised state.   Neurological:  Positive for dizziness. Negative for weakness, light-headedness and headaches.   Psychiatric/Behavioral:  Negative for confusion.    All other systems reviewed and are negative.        Past Medical History:   Diagnosis Date    Anticoagulant long-term use     Atrial fibrillation (HCC)     follows with PCP    Cancer (HCC)     skin cancer    CHF (congestive heart failure) (HCC)     Degenerative joint disease of left hip 01/28/2015    Diabetes mellitus (HCC)     Hyperlipidemia     Hypertension     Obesity     AMIRAH on CPAP     PONV (postoperative nausea and vomiting)     Radiculopathy of lumbosacral region 07/25/2017    Type II or unspecified type diabetes mellitus without mention of complication, not stated as uncontrolled      Past Surgical History:   Procedure Laterality Date    APPENDECTOMY      CHOLECYSTECTOMY      COLONOSCOPY N/A 6/19/2024    COLONOSCOPY BIOPSY performed by Emmy Armijo MD at [facility] ENDOSCOPY    CYST INCISION AND DRAINAGE  06/08/2017    abd wall

## 2025-01-15 DIAGNOSIS — Z79.4 TYPE 2 DIABETES MELLITUS WITH OTHER SPECIFIED COMPLICATION, WITH LONG-TERM CURRENT USE OF INSULIN (HCC): ICD-10-CM

## 2025-01-15 DIAGNOSIS — E11.69 TYPE 2 DIABETES MELLITUS WITH OTHER SPECIFIED COMPLICATION, WITH LONG-TERM CURRENT USE OF INSULIN (HCC): ICD-10-CM

## 2025-01-15 NOTE — TELEPHONE ENCOUNTER
Name of Medication(s) Requested:  Requested Prescriptions     Pending Prescriptions Disp Refills    metFORMIN (GLUCOPHAGE) 1000 MG tablet [Pharmacy Med Name: METFORMIN 1000MG TABLETS] 180 tablet 1     Sig: TAKE 1 TABLET BY MOUTH TWICE DAILY WITH MEALS       Medication is on current medication list Yes    Dosage and directions were verified? Yes    Quantity verified: 90 day supply     Pharmacy Verified?  Yes    Last Appointment:  1/2/2025    Future appts:  Future Appointments   Date Time Provider Department Center   1/21/2025  2:00 PM Fer Ware DPM Col Podiatry [facility]   1/23/2025  2:00 PM Christiano Escalera DO [place] [facility]   4/7/2025  1:00 PM yT Sol MD [facility] ECC DEP        (If no appt send self scheduling link. .REFILLAPPT)  Scheduling request sent?     [] Yes  [x] No    Does patient need updated?  [] Yes  [x] No

## 2025-01-15 NOTE — PROGRESS NOTES
This patient was referred for audiometric/tympanometric testing by Dr. Escalera due to SSNHL - 1 week post medical treatment.  Pure tones only preformed.       Audiometry using pure tone air conduction revealed mild high frequency hearing loss from 8655-5821 Hz in the right ear and a profound (no response) hearing loss in the left ear.   Reliability was good.   The results were reviewed with the patient and physician.     Recommendations for follow up will be made pending ordering provider consult.    Cordell Armstrong/CCC-A  [place] Lic # [id number]

## 2025-01-23 ENCOUNTER — PROCEDURE VISIT (OUTPATIENT)
Dept: AUDIOLOGY | Age: 70
End: 2025-01-23
Payer: MEDICARE

## 2025-01-23 ENCOUNTER — OFFICE VISIT (OUTPATIENT)
Dept: ENT CLINIC | Age: 70
End: 2025-01-23
Payer: MEDICARE

## 2025-01-23 VITALS
WEIGHT: 315 LBS | OXYGEN SATURATION: 94 % | DIASTOLIC BLOOD PRESSURE: 92 MMHG | HEART RATE: 102 BPM | TEMPERATURE: 98.8 F | BODY MASS INDEX: 42.66 KG/M2 | SYSTOLIC BLOOD PRESSURE: 149 MMHG | HEIGHT: 72 IN

## 2025-01-23 DIAGNOSIS — H91.22 SUDDEN LEFT HEARING LOSS: Primary | ICD-10-CM

## 2025-01-23 DIAGNOSIS — H83.02 VESTIBULAR LABYRINTHITIS, LEFT: Primary | ICD-10-CM

## 2025-01-23 DIAGNOSIS — R42 VERTIGO: ICD-10-CM

## 2025-01-23 DIAGNOSIS — H91.22 SUDDEN LEFT HEARING LOSS: ICD-10-CM

## 2025-01-23 DIAGNOSIS — H91.8X3 ASYMMETRICAL HEARING LOSS: ICD-10-CM

## 2025-01-23 PROCEDURE — 1036F TOBACCO NON-USER: CPT | Performed by: OTOLARYNGOLOGY

## 2025-01-23 PROCEDURE — 3017F COLORECTAL CA SCREEN DOC REV: CPT | Performed by: OTOLARYNGOLOGY

## 2025-01-23 PROCEDURE — 3077F SYST BP >= 140 MM HG: CPT | Performed by: OTOLARYNGOLOGY

## 2025-01-23 PROCEDURE — G8427 DOCREV CUR MEDS BY ELIG CLIN: HCPCS | Performed by: OTOLARYNGOLOGY

## 2025-01-23 PROCEDURE — 3080F DIAST BP >= 90 MM HG: CPT | Performed by: OTOLARYNGOLOGY

## 2025-01-23 PROCEDURE — 99213 OFFICE O/P EST LOW 20 MIN: CPT | Performed by: OTOLARYNGOLOGY

## 2025-01-23 PROCEDURE — 1159F MED LIST DOCD IN RCRD: CPT | Performed by: OTOLARYNGOLOGY

## 2025-01-23 PROCEDURE — 1123F ACP DISCUSS/DSCN MKR DOCD: CPT | Performed by: OTOLARYNGOLOGY

## 2025-01-23 PROCEDURE — G8417 CALC BMI ABV UP PARAM F/U: HCPCS | Performed by: OTOLARYNGOLOGY

## 2025-01-23 PROCEDURE — 92552 PURE TONE AUDIOMETRY AIR: CPT

## 2025-01-23 NOTE — PROGRESS NOTES
This patient was referred for audiometric testing by Dr. Escalera due to hearing loss.     Pure tone air conduction audiometry revealed hearing sensitivity within normal limits, in the right ear. The left ear revealed a profound hearing loss. Reliability was good.     The results were reviewed with the patient and ordering provider.     Recommendations for follow up will be made pending ordering provider consult.    Cordell Armstrong/CCC-A  [place] Lic [id number]  Electronically signed by Cordell Armstrong on 1/23/2025 at 2:48 PM

## 2025-02-13 ENCOUNTER — PROCEDURE VISIT (OUTPATIENT)
Dept: PODIATRY | Age: 70
End: 2025-02-13
Payer: MEDICARE

## 2025-02-13 VITALS — HEIGHT: 72 IN | WEIGHT: 315 LBS | BODY MASS INDEX: 42.66 KG/M2

## 2025-02-13 DIAGNOSIS — R60.0 LOCALIZED EDEMA: ICD-10-CM

## 2025-02-13 DIAGNOSIS — L84 CORNS AND CALLOSITIES: ICD-10-CM

## 2025-02-13 DIAGNOSIS — E11.9 TYPE 2 DIABETES MELLITUS WITHOUT COMPLICATION, UNSPECIFIED WHETHER LONG TERM INSULIN USE (HCC): Primary | ICD-10-CM

## 2025-02-13 DIAGNOSIS — Z79.01 ENCOUNTER FOR CURRENT LONG-TERM USE OF ANTICOAGULANTS: ICD-10-CM

## 2025-02-13 DIAGNOSIS — B35.1 TINEA UNGUIUM: ICD-10-CM

## 2025-02-13 PROCEDURE — 11056 PARNG/CUTG B9 HYPRKR LES 2-4: CPT | Performed by: PODIATRIST

## 2025-02-13 PROCEDURE — 11721 DEBRIDE NAIL 6 OR MORE: CPT | Performed by: PODIATRIST

## 2025-02-13 RX ORDER — AMMONIUM LACTATE 12 G/100G
LOTION TOPICAL
Qty: 400 G | Refills: 2 | Status: SHIPPED | OUTPATIENT
Start: 2025-02-13

## 2025-02-13 NOTE — PROGRESS NOTES
Patient in today for nail care. Patient does not have any complaints of pain at this time.   Type 2 diabetic.  Patient's PCP is Ty Sol MD and date of last ov 1/02/2025.            Fer Ware DPM       CC:   Follow-up diabetic foot and ankle exam    HPI:   Follow-up diabetic foot ankle exam.  Compression stockings with him today.  Does have dry skin both feet.  No open wounds no recent injury or trauma.      ROS:  Const: Denies constitutional symptoms  Musculo: Denies symptoms other than stated above  Skin: Denies symptoms other than stated above      Current Outpatient Medications:     ammonium lactate (LAC-HYDRIN) 12 % lotion, Apply topically twice daily, Disp: 400 g, Rfl: 2    metFORMIN (GLUCOPHAGE) 1000 MG tablet, TAKE 1 TABLET BY MOUTH TWICE DAILY WITH MEALS, Disp: 180 tablet, Rfl: 1    rivaroxaban (XARELTO) 20 MG TABS tablet, Take 1 tablet by mouth daily (with breakfast), Disp: 90 tablet, Rfl: 1    benazepril (LOTENSIN) 20 MG tablet, Take 1 tablet by mouth 2 times daily, Disp: 180 tablet, Rfl: 1    insulin glargine (LANTUS SOLOSTAR) 100 UNIT/ML injection pen, Inject 45 Units into the skin every morning, Disp: 5 Adjustable Dose Pre-filled Pen Syringe, Rfl: 3    carvedilol (COREG) 25 MG tablet, TAKE 1 TABLET BY MOUTH TWICE DAILY, Disp: 180 tablet, Rfl: 1    furosemide (LASIX) 20 MG tablet, TAKE 1 TABLET BY MOUTH EVERY DAY, Disp: 90 tablet, Rfl: 1    hydroCHLOROthiazide (HYDRODIURIL) 25 MG tablet, TAKE 1 TABLET BY MOUTH DAILY FOR BLOOD PRESSURE, Disp: 90 tablet, Rfl: 1    glipiZIDE (GLUCOTROL) 10 MG tablet, TAKE 1 TABLET BY MOUTH TWICE DAILY BEFORE MEALS, Disp: 180 tablet, Rfl: 1    dilTIAZem (TIAZAC) 240 MG extended release capsule, TAKE 1 CAPSULE BY MOUTH EVERY DAY, Disp: 90 capsule, Rfl: 3    acetaminophen (TYLENOL) 500 MG tablet, Take 2 tablets by mouth as needed for Pain, Disp: , Rfl:     B-D UF III MINI PEN NEEDLES 31G X 5 MM MISC, USE TO TEST BLOOD SUGAR ONCE A DAY, Disp: 100 each,

## 2025-02-20 DIAGNOSIS — Z79.4 TYPE 2 DIABETES MELLITUS WITH OTHER SPECIFIED COMPLICATION, WITH LONG-TERM CURRENT USE OF INSULIN (HCC): ICD-10-CM

## 2025-02-20 DIAGNOSIS — E11.69 TYPE 2 DIABETES MELLITUS WITH OTHER SPECIFIED COMPLICATION, WITH LONG-TERM CURRENT USE OF INSULIN (HCC): ICD-10-CM

## 2025-02-21 RX ORDER — GLIPIZIDE 10 MG/1
10 TABLET ORAL
Qty: 180 TABLET | Refills: 1 | Status: SHIPPED | OUTPATIENT
Start: 2025-02-21

## 2025-02-21 NOTE — TELEPHONE ENCOUNTER
Last Appointment:  1/2/2025  Future Appointments   Date Time Provider Department Center   3/3/2025 11:00 AM SCHEDULE, POLLY HART AUDIO BDM Audio [facility]   3/3/2025 11:15 AM Christiano Escalera DO [place] [facility]   4/7/2025  1:00 PM Ty Sol MD [facility] ECC DEP   4/18/2025  1:15 PM Fer Ware DPM Col Podiatry [facility]

## 2025-03-03 ENCOUNTER — OFFICE VISIT (OUTPATIENT)
Dept: ENT CLINIC | Age: 70
End: 2025-03-03
Payer: MEDICARE

## 2025-03-03 VITALS
HEIGHT: 72 IN | TEMPERATURE: 98.1 F | OXYGEN SATURATION: 96 % | DIASTOLIC BLOOD PRESSURE: 79 MMHG | SYSTOLIC BLOOD PRESSURE: 111 MMHG | HEART RATE: 79 BPM | BODY MASS INDEX: 42.66 KG/M2 | WEIGHT: 315 LBS

## 2025-03-03 DIAGNOSIS — H91.22 SUDDEN LEFT HEARING LOSS: ICD-10-CM

## 2025-03-03 DIAGNOSIS — H91.8X3 ASYMMETRICAL HEARING LOSS: ICD-10-CM

## 2025-03-03 DIAGNOSIS — H83.02 VESTIBULAR LABYRINTHITIS, LEFT: Primary | ICD-10-CM

## 2025-03-03 PROCEDURE — G8427 DOCREV CUR MEDS BY ELIG CLIN: HCPCS | Performed by: OTOLARYNGOLOGY

## 2025-03-03 PROCEDURE — 3078F DIAST BP <80 MM HG: CPT | Performed by: OTOLARYNGOLOGY

## 2025-03-03 PROCEDURE — 99213 OFFICE O/P EST LOW 20 MIN: CPT | Performed by: OTOLARYNGOLOGY

## 2025-03-03 PROCEDURE — G8417 CALC BMI ABV UP PARAM F/U: HCPCS | Performed by: OTOLARYNGOLOGY

## 2025-03-03 PROCEDURE — 1123F ACP DISCUSS/DSCN MKR DOCD: CPT | Performed by: OTOLARYNGOLOGY

## 2025-03-03 PROCEDURE — 3017F COLORECTAL CA SCREEN DOC REV: CPT | Performed by: OTOLARYNGOLOGY

## 2025-03-03 PROCEDURE — 3074F SYST BP LT 130 MM HG: CPT | Performed by: OTOLARYNGOLOGY

## 2025-03-03 PROCEDURE — 1036F TOBACCO NON-USER: CPT | Performed by: OTOLARYNGOLOGY

## 2025-03-03 PROCEDURE — 1159F MED LIST DOCD IN RCRD: CPT | Performed by: OTOLARYNGOLOGY

## 2025-03-03 ASSESSMENT — ENCOUNTER SYMPTOMS
SORE THROAT: 0
SINUS PRESSURE: 0
RHINORRHEA: 0
WHEEZING: 0
CHOKING: 0
COUGH: 0
SHORTNESS OF BREATH: 0
STRIDOR: 0
SINUS PAIN: 0

## 2025-03-03 NOTE — PROGRESS NOTES
and chronic microvascular ischemic changes.    Prior:      Assessment:       Diagnosis Orders   1. Vestibular labyrinthitis, left        2. Asymmetrical hearing loss        3. Sudden left hearing loss                    Plan:        69-year-old male with history of left vestibular labyrinthitis/left sudden hearing loss 12/26/24.MRI IAC shows evidence of left vestibular labyrinthitis, rest of imaging is clear.  He underwent oral steroid course followed by intratympanic steroid injections x2 with no change in symptoms/hearing.  He is currently undergoing physical therapy.  His exam is within normal limits today.  Subjectively there is no change in his hearing therefore no reason to repeat his audiogram today.  Encouraged to continue vestibular therapy.  Discussed signs and symptoms on which to return sooner for.    Follow-up in few months for reevaluation. Discussed signs and symptoms to return.  Discussed hearing protection for the right ear and if there are any acute changes to be seen immediately.      This note may be dictated with vocal recognition software. All grammatical or semantic errors should be considered accordingly.

## 2025-03-06 RX ORDER — HYDROCHLOROTHIAZIDE 25 MG/1
25 TABLET ORAL DAILY
Qty: 90 TABLET | Refills: 1 | Status: SHIPPED | OUTPATIENT
Start: 2025-03-06

## 2025-03-06 NOTE — TELEPHONE ENCOUNTER
Name of Medication(s) Requested:  Requested Prescriptions     Pending Prescriptions Disp Refills    hydroCHLOROthiazide (HYDRODIURIL) 25 MG tablet [Pharmacy Med Name: HYDROCHLOROTHIAZIDE 25MG TABLETS] 90 tablet 1     Sig: TAKE 1 TABLET BY MOUTH DAILY FOR BLOOD PRESSURE       Medication is on current medication list Yes    Dosage and directions were verified? Yes    Quantity verified: 90 day supply     Pharmacy Verified?  Yes    Last Appointment:  1/2/2025    Future appts:  Future Appointments   Date Time Provider Department Center   4/7/2025  1:00 PM Ty Sol MD [facility]   4/18/2025  1:15 PM Fer Ware DPM Col Podiatry [facility]   6/9/2025  1:45 PM SCHEDULE, POLLY CORR[place] AUDIO BDM Audio [facility]   6/9/2025  2:00 PM Christiano Escalera, DO [place] [facility]        (If no appt send self scheduling link. .REFILLAPPT)  Scheduling request sent?     [] Yes  [x] No    Does patient need updated?  [] Yes  [x] No

## 2025-03-10 ENCOUNTER — TELEPHONE (OUTPATIENT)
Dept: FAMILY MEDICINE CLINIC | Age: 70
End: 2025-03-10

## 2025-03-10 NOTE — TELEPHONE ENCOUNTER
Patient calling to report that his pharmacy does not have his victoza. They are hoping this will be back in stock in April. They suggest the patient take Byetta, they did not give a dose, just reported it would be BID.   Patient did take his last shot of Victoza today. He was trying to hold out to see if it would come in.   This would need sent to [facility]

## 2025-03-17 NOTE — TELEPHONE ENCOUNTER
Is there any reason we are doing Victoza instead of something like Ozempic or Trulicity?  Has he tried this before?  Where there are problems with cost?  Further side effects?  If he has not tried them I would prefer using something like that rather than Byetta or Victoza.

## 2025-03-18 DIAGNOSIS — E11.69 TYPE 2 DIABETES MELLITUS WITH OTHER SPECIFIED COMPLICATION, WITH LONG-TERM CURRENT USE OF INSULIN: ICD-10-CM

## 2025-03-18 DIAGNOSIS — Z79.4 TYPE 2 DIABETES MELLITUS WITH OTHER SPECIFIED COMPLICATION, WITH LONG-TERM CURRENT USE OF INSULIN: ICD-10-CM

## 2025-03-18 NOTE — TELEPHONE ENCOUNTER
Spoke with patient he reports, Ozempic was ordered first but [facility] could not get it. So it was changed to Victoza.   Patient is willing to try whatever as long as its covered/low cost at [facility].

## 2025-03-18 NOTE — TELEPHONE ENCOUNTER
Last Appointment:  1/2/2025  Future Appointments   Date Time Provider Department Center   4/7/2025  1:00 PM Ty Sol MD [facility]   4/18/2025  1:15 PM Fer Ware DPM Col Podiatry [facility]   6/9/2025  1:45 PM SCHEDULE, POLLY HART AUDIO BDM Audio [facility]   6/9/2025  2:00 PM Christiano Escalera DO [place] [facility]

## 2025-03-18 NOTE — TELEPHONE ENCOUNTER
I sent Rx for Ozempic to [facility].  I think patient is already established there, so I'd like for him to simply call the pharmacy and see if they can get that in for him or if he needs anything else logistically to make that happen.

## 2025-03-19 NOTE — TELEPHONE ENCOUNTER
Byetta is a twice daily shot with meals.   If he is willing to do that, I'll send a Rx.   Bydureon, a different similar medication, is a once daily injection, but I dont know if its covered through the 403b pharmacy program he is using.

## 2025-03-19 NOTE — TELEPHONE ENCOUNTER
Patient returned call to office to report Ozempic is not covered in this government program. The only drug that is, is Byetta.   Please advise

## 2025-03-25 ENCOUNTER — TELEPHONE (OUTPATIENT)
Dept: FAMILY MEDICINE CLINIC | Age: 70
End: 2025-03-25

## 2025-03-25 NOTE — TELEPHONE ENCOUNTER
Patient called to report that [facility]B program does not have Byetta. He has been out of Victoza for 2 weeks. States that he cannot afford to fill through his insurance. While on Victoza blood sugars were in the low to mid 100s and now blood sugars are in the low 200s. Patient is still injecting Lantus 45u daily.

## 2025-04-04 SDOH — ECONOMIC STABILITY: FOOD INSECURITY: WITHIN THE PAST 12 MONTHS, YOU WORRIED THAT YOUR FOOD WOULD RUN OUT BEFORE YOU GOT MONEY TO BUY MORE.: NEVER TRUE

## 2025-04-04 SDOH — ECONOMIC STABILITY: INCOME INSECURITY: IN THE LAST 12 MONTHS, WAS THERE A TIME WHEN YOU WERE NOT ABLE TO PAY THE MORTGAGE OR RENT ON TIME?: PATIENT DECLINED

## 2025-04-04 SDOH — ECONOMIC STABILITY: FOOD INSECURITY: WITHIN THE PAST 12 MONTHS, THE FOOD YOU BOUGHT JUST DIDN'T LAST AND YOU DIDN'T HAVE MONEY TO GET MORE.: NEVER TRUE

## 2025-04-04 SDOH — ECONOMIC STABILITY: TRANSPORTATION INSECURITY
IN THE PAST 12 MONTHS, HAS THE LACK OF TRANSPORTATION KEPT YOU FROM MEDICAL APPOINTMENTS OR FROM GETTING MEDICATIONS?: NO

## 2025-04-04 SDOH — HEALTH STABILITY: PHYSICAL HEALTH
ON AVERAGE, HOW MANY DAYS PER WEEK DO YOU ENGAGE IN MODERATE TO STRENUOUS EXERCISE (LIKE A BRISK WALK)?: PATIENT DECLINED

## 2025-04-04 ASSESSMENT — PATIENT HEALTH QUESTIONNAIRE - PHQ9
1. LITTLE INTEREST OR PLEASURE IN DOING THINGS: NOT AT ALL
SUM OF ALL RESPONSES TO PHQ QUESTIONS 1-9: 0
2. FEELING DOWN, DEPRESSED OR HOPELESS: NOT AT ALL

## 2025-04-04 ASSESSMENT — LIFESTYLE VARIABLES
HOW OFTEN DO YOU HAVE SIX OR MORE DRINKS ON ONE OCCASION: 1
HOW MANY STANDARD DRINKS CONTAINING ALCOHOL DO YOU HAVE ON A TYPICAL DAY: 0
HOW OFTEN DO YOU HAVE A DRINK CONTAINING ALCOHOL: NEVER
HOW OFTEN DO YOU HAVE A DRINK CONTAINING ALCOHOL: 1
HOW MANY STANDARD DRINKS CONTAINING ALCOHOL DO YOU HAVE ON A TYPICAL DAY: PATIENT DOES NOT DRINK

## 2025-04-07 ENCOUNTER — OFFICE VISIT (OUTPATIENT)
Dept: FAMILY MEDICINE CLINIC | Age: 70
End: 2025-04-07
Payer: MEDICARE

## 2025-04-07 VITALS
BODY MASS INDEX: 42.66 KG/M2 | DIASTOLIC BLOOD PRESSURE: 82 MMHG | HEIGHT: 72 IN | OXYGEN SATURATION: 98 % | SYSTOLIC BLOOD PRESSURE: 126 MMHG | HEART RATE: 84 BPM | WEIGHT: 315 LBS | TEMPERATURE: 98 F

## 2025-04-07 DIAGNOSIS — G62.9 PERIPHERAL POLYNEUROPATHY: ICD-10-CM

## 2025-04-07 DIAGNOSIS — I89.0 LYMPHEDEMA OF BOTH LOWER EXTREMITIES: ICD-10-CM

## 2025-04-07 DIAGNOSIS — Z79.4 TYPE 2 DIABETES MELLITUS WITH OTHER SPECIFIED COMPLICATION, WITH LONG-TERM CURRENT USE OF INSULIN: ICD-10-CM

## 2025-04-07 DIAGNOSIS — Z00.00 MEDICARE ANNUAL WELLNESS VISIT, SUBSEQUENT: ICD-10-CM

## 2025-04-07 DIAGNOSIS — Z79.4 TYPE 2 DIABETES MELLITUS WITH OTHER SPECIFIED COMPLICATION, WITH LONG-TERM CURRENT USE OF INSULIN: Primary | ICD-10-CM

## 2025-04-07 DIAGNOSIS — E11.69 TYPE 2 DIABETES MELLITUS WITH OTHER SPECIFIED COMPLICATION, WITH LONG-TERM CURRENT USE OF INSULIN: ICD-10-CM

## 2025-04-07 DIAGNOSIS — E11.69 TYPE 2 DIABETES MELLITUS WITH OTHER SPECIFIED COMPLICATION, WITH LONG-TERM CURRENT USE OF INSULIN: Primary | ICD-10-CM

## 2025-04-07 LAB
CREATININE URINE: 71.4 MG/DL (ref 40–278)
FOLATE: 13.1 NG/ML (ref 4.8–24.2)
HBA1C MFR BLD: 7.6 %
MICROALBUMIN/CREAT 24H UR: 16 MG/L (ref 0–19)
MICROALBUMIN/CREAT UR-RTO: 23 MCG/MG CREAT (ref 0–30)
VITAMIN B-12: 382 PG/ML (ref 211–946)

## 2025-04-07 PROCEDURE — G0439 PPPS, SUBSEQ VISIT: HCPCS | Performed by: FAMILY MEDICINE

## 2025-04-07 PROCEDURE — 83036 HEMOGLOBIN GLYCOSYLATED A1C: CPT | Performed by: FAMILY MEDICINE

## 2025-04-07 PROCEDURE — 3051F HG A1C>EQUAL 7.0%<8.0%: CPT | Performed by: FAMILY MEDICINE

## 2025-04-07 PROCEDURE — 3017F COLORECTAL CA SCREEN DOC REV: CPT | Performed by: FAMILY MEDICINE

## 2025-04-07 PROCEDURE — 1159F MED LIST DOCD IN RCRD: CPT | Performed by: FAMILY MEDICINE

## 2025-04-07 PROCEDURE — 1123F ACP DISCUSS/DSCN MKR DOCD: CPT | Performed by: FAMILY MEDICINE

## 2025-04-07 PROCEDURE — 3079F DIAST BP 80-89 MM HG: CPT | Performed by: FAMILY MEDICINE

## 2025-04-07 PROCEDURE — 3074F SYST BP LT 130 MM HG: CPT | Performed by: FAMILY MEDICINE

## 2025-04-07 RX ORDER — DILTIAZEM HYDROCHLORIDE 240 MG/1
240 CAPSULE, EXTENDED RELEASE ORAL DAILY
Qty: 90 CAPSULE | Refills: 1 | Status: SHIPPED | OUTPATIENT
Start: 2025-04-07

## 2025-04-07 RX ORDER — INSULIN GLARGINE 100 [IU]/ML
50 INJECTION, SOLUTION SUBCUTANEOUS EVERY MORNING
Qty: 15 ML | Refills: 1 | Status: SHIPPED | OUTPATIENT
Start: 2025-04-07

## 2025-04-07 NOTE — PATIENT INSTRUCTIONS
proteins. Heart-healthy proteins include seafood, lean meats and poultry, eggs, beans, peas, nuts, seeds, and soy products.     Limit drinks and foods with added sugar. These include candy, desserts, and soda pop.   Heart-healthy lifestyle    If your doctor recommends it, get more exercise. For many people, walking is a good choice. Or you may want to swim, bike, or do other activities. Bit by bit, increase the time you're active every day. Try for at least 30 minutes on most days of the week.     Try to quit or cut back on using tobacco and other nicotine products. This includes smoking and vaping. If you need help quitting, talk to your doctor about stop-smoking programs and medicines. These can increase your chances of quitting for good. Quitting is one of the most important things you can do to protect your heart. It is never too late to quit. Try to avoid secondhand smoke too.     Stay at a weight that's healthy for you. Talk to your doctor if you need help losing weight.     Try to get 7 to 9 hours of sleep each night.     Limit alcohol to 2 drinks a day for men and 1 drink a day for women. Too much alcohol can cause health problems.     Manage other health problems such as diabetes, high blood pressure, and high cholesterol. If you think you may have a problem with alcohol or drug use, talk to your doctor.   Medicines    Take your medicines exactly as prescribed. Call your doctor if you think you are having a problem with your medicine.     If your doctor recommends aspirin, take the amount directed each day. Make sure you take aspirin and not another kind of pain reliever, such as acetaminophen (Tylenol).   When should you call for help?   Call 911 if you have symptoms of a heart attack. These may include:    Chest pain or pressure, or a strange feeling in the chest.     Sweating.     Shortness of breath.     Pain, pressure, or a strange feeling in the back, neck, jaw, or upper belly or in one or both

## 2025-04-07 NOTE — PROGRESS NOTES
route daily Yes Ty Sol MD   Alpha-Lipoic Acid 600 MG TABS Take 1 Bottle by mouth daily Yes Fer Ware DPM   CPAP Machine MISC by Does not apply route nightly Yes Provider, Historical, MD       CareTeam (Including outside providers/suppliers regularly involved in providing care):   Patient Care Team:  Ty Sol MD as PCP - General (Family Medicine)  Ty Sol MD as PCP - Empaneled Provider  Bronson Morrison MD as Surgeon (Orthopedic Surgery)  Becca Long MD as Consulting Physician (Cardiology)     Recommendations for Preventive Services Due: see orders and patient instructions/AVS.  Recommended screening schedule for the next 5-10 years is provided to the patient in written form: see Patient Instructions/AVS.     Reviewed and updated this visit:  Tobacco  Allergies  Meds  Med Hx                   The patient (or guardian, if applicable) and other individuals in attendance with the patient were advised that Artificial Intelligence will be utilized during this visit to record and process the conversation to generate a clinical note. The patient (or guardian, if applicable) and other individuals in attendance at the appointment consented to the use of AI, including the recording.

## 2025-04-08 ENCOUNTER — RESULTS FOLLOW-UP (OUTPATIENT)
Dept: FAMILY MEDICINE CLINIC | Age: 70
End: 2025-04-08

## 2025-04-18 ENCOUNTER — PROCEDURE VISIT (OUTPATIENT)
Dept: PODIATRY | Age: 70
End: 2025-04-18

## 2025-04-18 VITALS — WEIGHT: 315 LBS | BODY MASS INDEX: 47.88 KG/M2

## 2025-04-18 DIAGNOSIS — E11.9 TYPE 2 DIABETES MELLITUS WITHOUT COMPLICATION, UNSPECIFIED WHETHER LONG TERM INSULIN USE: Primary | ICD-10-CM

## 2025-04-18 DIAGNOSIS — R60.0 LOCALIZED EDEMA: ICD-10-CM

## 2025-04-18 DIAGNOSIS — L84 CORNS AND CALLOSITIES: ICD-10-CM

## 2025-04-18 DIAGNOSIS — Z79.01 ENCOUNTER FOR CURRENT LONG-TERM USE OF ANTICOAGULANTS: ICD-10-CM

## 2025-04-18 DIAGNOSIS — B35.1 TINEA UNGUIUM: ICD-10-CM

## 2025-04-18 NOTE — PROGRESS NOTES
Patient in for nail and callous care  Type 2 diabetic  Ty Sol MD  LOV 4/7/25      Electronically signed by Tabitha Madera LPN on 4/18/2025 at 1:05 PM      CC:   Follow-up diabetic foot and ankle exam    HPI:   Follow-up diabetic foot ankle exam.  Does have compression wraps and compression stockings intact.  Denies any calf pain.  No open wounds.  Denies nausea vomiting fever chills shortness of breath.  No additional pedal complaints.        ROS:  Const: Denies constitutional symptoms  Musculo: Denies symptoms other than stated above  Skin: Denies symptoms other than stated above      Current Outpatient Medications:     dilTIAZem (TIAZAC) 240 MG extended release capsule, Take 1 capsule by mouth daily, Disp: 90 capsule, Rfl: 1    insulin glargine (LANTUS SOLOSTAR) 100 UNIT/ML injection pen, Inject 50 Units into the skin every morning, Disp: 15 mL, Rfl: 1    metFORMIN (GLUCOPHAGE) 1000 MG tablet, Take 1 tablet by mouth 2 times daily (with meals), Disp: 180 tablet, Rfl: 1    hydroCHLOROthiazide (HYDRODIURIL) 25 MG tablet, TAKE 1 TABLET BY MOUTH DAILY FOR BLOOD PRESSURE, Disp: 90 tablet, Rfl: 1    glipiZIDE (GLUCOTROL) 10 MG tablet, Take 1 tablet by mouth 2 times daily (before meals), Disp: 180 tablet, Rfl: 1    ammonium lactate (LAC-HYDRIN) 12 % lotion, Apply topically twice daily, Disp: 400 g, Rfl: 2    rivaroxaban (XARELTO) 20 MG TABS tablet, Take 1 tablet by mouth daily (with breakfast), Disp: 90 tablet, Rfl: 1    benazepril (LOTENSIN) 20 MG tablet, Take 1 tablet by mouth 2 times daily, Disp: 180 tablet, Rfl: 1    carvedilol (COREG) 25 MG tablet, TAKE 1 TABLET BY MOUTH TWICE DAILY, Disp: 180 tablet, Rfl: 1    furosemide (LASIX) 20 MG tablet, TAKE 1 TABLET BY MOUTH EVERY DAY, Disp: 90 tablet, Rfl: 1    acetaminophen (TYLENOL) 500 MG tablet, Take 2 tablets by mouth as needed for Pain, Disp: , Rfl:     B-D UF III MINI PEN NEEDLES 31G X 5 MM MISC, USE TO TEST BLOOD SUGAR ONCE A DAY, Disp: 100 each,

## 2025-05-26 DIAGNOSIS — Z79.4 TYPE 2 DIABETES MELLITUS WITH OTHER SPECIFIED COMPLICATION, WITH LONG-TERM CURRENT USE OF INSULIN (HCC): ICD-10-CM

## 2025-05-26 DIAGNOSIS — E11.69 TYPE 2 DIABETES MELLITUS WITH OTHER SPECIFIED COMPLICATION, WITH LONG-TERM CURRENT USE OF INSULIN (HCC): ICD-10-CM

## 2025-05-27 RX ORDER — GLIPIZIDE 10 MG/1
10 TABLET ORAL
Qty: 180 TABLET | Refills: 1 | Status: SHIPPED | OUTPATIENT
Start: 2025-05-27

## 2025-05-27 NOTE — TELEPHONE ENCOUNTER
Last Appointment:  4/7/2025  Future Appointments   Date Time Provider Department Center   6/9/2025  1:45 PM SCHEDULE, POLLY HART AUDIO BDM Audio [facility]   6/9/2025  2:00 PM Christiano Escalera DO [place] [facility]   6/20/2025 12:15 PM Fer Ware DPM Col Podiatry [facility]   8/11/2025  1:00 PM Ty Sol MD [facility] DEP

## 2025-06-09 ENCOUNTER — OFFICE VISIT (OUTPATIENT)
Dept: ENT CLINIC | Age: 70
End: 2025-06-09
Payer: MEDICARE

## 2025-06-09 ENCOUNTER — PROCEDURE VISIT (OUTPATIENT)
Dept: AUDIOLOGY | Age: 70
End: 2025-06-09
Payer: MEDICARE

## 2025-06-09 VITALS
TEMPERATURE: 60.8 F | BODY MASS INDEX: 42.66 KG/M2 | WEIGHT: 315 LBS | HEIGHT: 72 IN | HEART RATE: 81 BPM | SYSTOLIC BLOOD PRESSURE: 131 MMHG | DIASTOLIC BLOOD PRESSURE: 83 MMHG | OXYGEN SATURATION: 95 % | RESPIRATION RATE: 16 BRPM

## 2025-06-09 DIAGNOSIS — H91.22 SUDDEN LEFT HEARING LOSS: Primary | ICD-10-CM

## 2025-06-09 DIAGNOSIS — R42 VERTIGO: ICD-10-CM

## 2025-06-09 DIAGNOSIS — H91.22 SUDDEN LEFT HEARING LOSS: ICD-10-CM

## 2025-06-09 DIAGNOSIS — H90.3 ASYMMETRICAL SENSORINEURAL HEARING LOSS: ICD-10-CM

## 2025-06-09 DIAGNOSIS — H93.12 TINNITUS OF LEFT EAR: ICD-10-CM

## 2025-06-09 DIAGNOSIS — H83.02 VESTIBULAR LABYRINTHITIS, LEFT: ICD-10-CM

## 2025-06-09 DIAGNOSIS — H91.8X3 ASYMMETRICAL HEARING LOSS: Primary | ICD-10-CM

## 2025-06-09 PROCEDURE — 3017F COLORECTAL CA SCREEN DOC REV: CPT | Performed by: OTOLARYNGOLOGY

## 2025-06-09 PROCEDURE — 3079F DIAST BP 80-89 MM HG: CPT | Performed by: OTOLARYNGOLOGY

## 2025-06-09 PROCEDURE — G8417 CALC BMI ABV UP PARAM F/U: HCPCS | Performed by: OTOLARYNGOLOGY

## 2025-06-09 PROCEDURE — 92552 PURE TONE AUDIOMETRY AIR: CPT | Performed by: AUDIOLOGIST

## 2025-06-09 PROCEDURE — 92567 TYMPANOMETRY: CPT | Performed by: AUDIOLOGIST

## 2025-06-09 PROCEDURE — G8427 DOCREV CUR MEDS BY ELIG CLIN: HCPCS | Performed by: OTOLARYNGOLOGY

## 2025-06-09 PROCEDURE — 1036F TOBACCO NON-USER: CPT | Performed by: OTOLARYNGOLOGY

## 2025-06-09 PROCEDURE — 1123F ACP DISCUSS/DSCN MKR DOCD: CPT | Performed by: OTOLARYNGOLOGY

## 2025-06-09 PROCEDURE — 99213 OFFICE O/P EST LOW 20 MIN: CPT | Performed by: OTOLARYNGOLOGY

## 2025-06-09 PROCEDURE — 3075F SYST BP GE 130 - 139MM HG: CPT | Performed by: OTOLARYNGOLOGY

## 2025-06-09 PROCEDURE — 1159F MED LIST DOCD IN RCRD: CPT | Performed by: OTOLARYNGOLOGY

## 2025-06-09 ASSESSMENT — ENCOUNTER SYMPTOMS
SINUS PRESSURE: 0
COUGH: 0
SHORTNESS OF BREATH: 0
CHOKING: 0
SORE THROAT: 0
STRIDOR: 0
SINUS PAIN: 0
WHEEZING: 0
RHINORRHEA: 0

## 2025-06-09 NOTE — PROGRESS NOTES
Department of Otolaryngology  Office Consult Note        Subjective:        Chief Complaint:  had concerns including Follow-up (3 month recheck ears history of asymetrical hearing loss and vestibular labyrinthitis left ear ).     Patient ID: Jesus Green is a 69 y.o. male.    HPI: Patient presents as  follow-up for left vestibular labyrinthitis/left sudden hearing loss s/p high-dose steroids and intratympanic injections x 2.  Dysequilibrium has been stabe.    He states he can hear clicking in his left ear from time to time but still no significant hearing.   Completed vestibular therapy       Review of Systems   Constitutional: Negative.    HENT:  Positive for hearing loss. Negative for congestion, ear discharge, ear pain, rhinorrhea, sinus pressure, sinus pain, sore throat and tinnitus.    Respiratory:  Negative for cough, choking, shortness of breath, wheezing and stridor.    Cardiovascular:  Negative for chest pain.   Skin:  Negative for rash.   Allergic/Immunologic: Negative for environmental allergies and immunocompromised state.   Neurological:  Positive for dizziness. Negative for weakness, light-headedness and headaches.   Psychiatric/Behavioral:  Negative for confusion.    All other systems reviewed and are negative.        Past Medical History:   Diagnosis Date    Anticoagulant long-term use     Atrial fibrillation (HCC)     follows with PCP    Cancer (HCC)     skin cancer    CHF (congestive heart failure) (HCC)     Degenerative joint disease of left hip 01/28/2015    Diabetes mellitus (HCC)     Hyperlipidemia     Hypertension     Obesity     AMIRAH on CPAP     PONV (postoperative nausea and vomiting)     Radiculopathy of lumbosacral region 07/25/2017    Type II or unspecified type diabetes mellitus without mention of complication, not stated as uncontrolled      Past Surgical History:   Procedure Laterality Date    APPENDECTOMY      CHOLECYSTECTOMY      COLONOSCOPY N/A 6/19/2024    COLONOSCOPY BIOPSY

## 2025-06-09 NOTE — PROGRESS NOTES
This patient was referred for audiometric/tympanometric testing by Dr. Escalera due to 3 month hearing recheck.      Audiometry using pure tone air conduction mild high frequency hearing loss in the right ear. Left ear revealed a profound sensorineural hearing loss.  Reliability was good.     Tympanometry revealed shallow tympanograms, bilaterally.          The results were reviewed with the patient and physician.     Recommendations for follow up will be made pending ordering provider consult.    Cordell Armstrong/CCC-A  [place] Lic # [id number]

## 2025-06-16 RX ORDER — HYDROCHLOROTHIAZIDE 25 MG/1
25 TABLET ORAL DAILY
Qty: 90 TABLET | Refills: 1 | Status: SHIPPED | OUTPATIENT
Start: 2025-06-16

## 2025-06-16 NOTE — TELEPHONE ENCOUNTER
Last Appointment:  4/7/2025  Future Appointments   Date Time Provider Department Center   6/20/2025 12:15 PM Fer Ware DPM Col Podiatry [facility]   8/11/2025  1:00 PM Ty Sol MD [facility]   12/8/2025  1:15 PM Christiano Escalera DO [place] [facility]

## 2025-06-23 ENCOUNTER — OFFICE VISIT (OUTPATIENT)
Dept: FAMILY MEDICINE CLINIC | Age: 70
End: 2025-06-23
Payer: MEDICARE

## 2025-06-23 VITALS
WEIGHT: 315 LBS | SYSTOLIC BLOOD PRESSURE: 130 MMHG | HEART RATE: 81 BPM | DIASTOLIC BLOOD PRESSURE: 82 MMHG | OXYGEN SATURATION: 95 % | TEMPERATURE: 97.4 F | BODY MASS INDEX: 48.42 KG/M2

## 2025-06-23 DIAGNOSIS — G89.29 CHRONIC HEEL PAIN, RIGHT: Primary | ICD-10-CM

## 2025-06-23 DIAGNOSIS — M79.671 CHRONIC HEEL PAIN, RIGHT: Primary | ICD-10-CM

## 2025-06-23 PROCEDURE — G8427 DOCREV CUR MEDS BY ELIG CLIN: HCPCS | Performed by: FAMILY MEDICINE

## 2025-06-23 PROCEDURE — 1159F MED LIST DOCD IN RCRD: CPT | Performed by: FAMILY MEDICINE

## 2025-06-23 PROCEDURE — 3079F DIAST BP 80-89 MM HG: CPT | Performed by: FAMILY MEDICINE

## 2025-06-23 PROCEDURE — G8417 CALC BMI ABV UP PARAM F/U: HCPCS | Performed by: FAMILY MEDICINE

## 2025-06-23 PROCEDURE — 3075F SYST BP GE 130 - 139MM HG: CPT | Performed by: FAMILY MEDICINE

## 2025-06-23 PROCEDURE — 99213 OFFICE O/P EST LOW 20 MIN: CPT | Performed by: FAMILY MEDICINE

## 2025-06-23 PROCEDURE — 3017F COLORECTAL CA SCREEN DOC REV: CPT | Performed by: FAMILY MEDICINE

## 2025-06-23 PROCEDURE — 1036F TOBACCO NON-USER: CPT | Performed by: FAMILY MEDICINE

## 2025-06-23 PROCEDURE — 1123F ACP DISCUSS/DSCN MKR DOCD: CPT | Performed by: FAMILY MEDICINE

## 2025-06-23 NOTE — PROGRESS NOTES
[facility] In    Jesus Green presents to the office today for   Chief Complaint   Patient presents with    Foot Pain     Right heel -- sx started 4 days ago, no Hx of trauma or injury        History of Present Illness  The patient presents for evaluation of right heel pain.    He has been experiencing severe pain in his right heel since the previous week, with no known injury or trauma. The pain is reminiscent of a similar episode he experienced several years ago when he used to walk while golfing. At that time, the pain would manifest upon returning home and would persist for a short duration. Currently, he no longer walks during his golf sessions. The pain intensifies during walking and occasionally during sitting. It is also triggered by certain movements in bed, such as rolling over. Upon waking up this morning, the pain was not severe, but it escalated as he began his daily activities. He has not taken any over-the-counter medications for the heel pain. He is on Xarelto and is advised against taking ibuprofen. He occasionally takes Tylenol 1300 mg 3 times a day, but not on a regular basis. He took Tylenol on Saturday before going golfing and also takes it before bowling to manage other aches and pains.    He has a history of labyrinthitis, which has resulted in balance issues.    Review of Systems     /82   Pulse 81   Temp 97.4 °F (36.3 °C)   Wt (!) 161.9 kg (357 lb)   SpO2 95%   BMI 48.42 kg/m²   Physical Exam  Constitutional:       Appearance: Normal appearance.   HENT:      Head: Normocephalic and atraumatic.   Eyes:      Extraocular Movements: Extraocular movements intact.      Conjunctiva/sclera: Conjunctivae normal.   Cardiovascular:      Rate and Rhythm: Normal rate.   Pulmonary:      Effort: Pulmonary effort is normal.   Musculoskeletal:      Comments: Left heel mild tenderness  Not painful over plantar  fascia or with plantar flexion   Skin:     General: Skin is warm.

## 2025-06-25 ENCOUNTER — TELEPHONE (OUTPATIENT)
Dept: ADMINISTRATIVE | Age: 70
End: 2025-06-25

## 2025-07-02 RX ORDER — CARVEDILOL 25 MG/1
25 TABLET ORAL 2 TIMES DAILY
Qty: 180 TABLET | Refills: 1 | Status: SHIPPED | OUTPATIENT
Start: 2025-07-02

## 2025-07-02 NOTE — TELEPHONE ENCOUNTER
Name of Medication(s) Requested:  Requested Prescriptions     Pending Prescriptions Disp Refills    carvedilol (COREG) 25 MG tablet [Pharmacy Med Name: CARVEDILOL 25MG TABLETS] 180 tablet 1     Sig: TAKE 1 TABLET BY MOUTH TWICE DAILY       Medication is on current medication list Yes    Dosage and directions were verified? Yes    Quantity verified: 90 day supply     Pharmacy Verified?  Yes    Last Appointment:  4/7/2025    Future appts:  Future Appointments   Date Time Provider Department Center   7/8/2025  1:00 PM Fer Ware DPM Col Podiatry [facility]   8/11/2025  1:00 PM Ty Sol MD [facility]   12/8/2025  1:15 PM Christiano Escalera DO [place] [facility]        (If no appt send self scheduling link. .REFILLAPPT)  Scheduling request sent?     [] Yes  [x] No    Does patient need updated?  [] Yes  [x] No

## 2025-07-08 ENCOUNTER — OFFICE VISIT (OUTPATIENT)
Dept: PODIATRY | Age: 70
End: 2025-07-08
Payer: MEDICARE

## 2025-07-08 VITALS — WEIGHT: 315 LBS | BODY MASS INDEX: 48.42 KG/M2

## 2025-07-08 DIAGNOSIS — B35.1 TINEA UNGUIUM: ICD-10-CM

## 2025-07-08 DIAGNOSIS — M79.671 PAIN OF RIGHT HEEL: ICD-10-CM

## 2025-07-08 DIAGNOSIS — L84 CORNS AND CALLOSITIES: ICD-10-CM

## 2025-07-08 DIAGNOSIS — Z79.01 ENCOUNTER FOR CURRENT LONG-TERM USE OF ANTICOAGULANTS: ICD-10-CM

## 2025-07-08 DIAGNOSIS — E11.9 TYPE 2 DIABETES MELLITUS WITHOUT COMPLICATION, UNSPECIFIED WHETHER LONG TERM INSULIN USE (HCC): Primary | ICD-10-CM

## 2025-07-08 DIAGNOSIS — M72.2 PLANTAR FASCIITIS: ICD-10-CM

## 2025-07-08 PROCEDURE — 11056 PARNG/CUTG B9 HYPRKR LES 2-4: CPT | Performed by: PODIATRIST

## 2025-07-08 PROCEDURE — G8417 CALC BMI ABV UP PARAM F/U: HCPCS | Performed by: PODIATRIST

## 2025-07-08 PROCEDURE — 1159F MED LIST DOCD IN RCRD: CPT | Performed by: PODIATRIST

## 2025-07-08 PROCEDURE — 2022F DILAT RTA XM EVC RTNOPTHY: CPT | Performed by: PODIATRIST

## 2025-07-08 PROCEDURE — 1036F TOBACCO NON-USER: CPT | Performed by: PODIATRIST

## 2025-07-08 PROCEDURE — 99213 OFFICE O/P EST LOW 20 MIN: CPT | Performed by: PODIATRIST

## 2025-07-08 PROCEDURE — 3051F HG A1C>EQUAL 7.0%<8.0%: CPT | Performed by: PODIATRIST

## 2025-07-08 PROCEDURE — G8427 DOCREV CUR MEDS BY ELIG CLIN: HCPCS | Performed by: PODIATRIST

## 2025-07-08 PROCEDURE — 1123F ACP DISCUSS/DSCN MKR DOCD: CPT | Performed by: PODIATRIST

## 2025-07-08 PROCEDURE — 11721 DEBRIDE NAIL 6 OR MORE: CPT | Performed by: PODIATRIST

## 2025-07-08 PROCEDURE — 3017F COLORECTAL CA SCREEN DOC REV: CPT | Performed by: PODIATRIST

## 2025-07-08 NOTE — PROGRESS NOTES
Placard MISC, by Does not apply route Please provide a handicap placard through 05/31/2027, Disp: 1 each, Rfl: 0    blood glucose monitor strips, 1 strip by Other route daily Test 1 time a day & as needed for symptoms of irregular blood glucose. Dispense sufficient amount for indicated testing frequency plus additional to accommodate PRN testing needs. ReliOn prime test strips, Disp: 100 strip, Rfl: 10    Insulin Pen Needle (PEN NEEDLES 5/16\") 30G X 8 MM MISC, 1 each by Does not apply route daily, Disp: 100 each, Rfl: 3    Alpha-Lipoic Acid 600 MG TABS, Take 1 Bottle by mouth daily, Disp: 60 tablet, Rfl: 1    CPAP Machine MISC, by Does not apply route nightly, Disp: , Rfl:   Allergies   Allergen Reactions    Atorvastatin      Other reaction(s): body aches, headache    Hazelnut      Other reaction(s): itchy throat    Rosuvastatin Dizziness or Vertigo    Lipitor      Body pain, DIARRHEA ETC. HAS PROBS WITH ALL CHOLESTEROL MEDS-MOST PROB WITH LIPITOR       Past Medical History:   Diagnosis Date    Anticoagulant long-term use     Atrial fibrillation (HCC)     follows with PCP    Cancer (HCC)     skin cancer    CHF (congestive heart failure) (HCC)     Degenerative joint disease of left hip 01/28/2015    Diabetes mellitus (HCC)     Hyperlipidemia     Hypertension     Obesity     AMIRAH on CPAP     PONV (postoperative nausea and vomiting)     Radiculopathy of lumbosacral region 07/25/2017    Type II or unspecified type diabetes mellitus without mention of complication, not stated as uncontrolled        There were no vitals filed for this visit.       Orthopedic history: EMG testing June 2020, Alpha-lipoic acid 600mg      Focused Lower Extremity Physical Exam:      Neurovascular examination:    Dorsalis Pedis palpable bilateral.  Posterior tibialis non-palpable bilateral.    Capillary Refill Time:  Immediate return  Hair growth:  Symmetrical and bilateral   Skin:  Not atrophic  Edema: Mild edema bilateral feet.   Mild edema

## 2025-07-25 RX ORDER — BENAZEPRIL HYDROCHLORIDE 20 MG/1
20 TABLET ORAL 2 TIMES DAILY
Qty: 180 TABLET | Refills: 1 | Status: SHIPPED | OUTPATIENT
Start: 2025-07-25

## 2025-07-25 NOTE — TELEPHONE ENCOUNTER
Name of Medication(s) Requested:  Requested Prescriptions     Pending Prescriptions Disp Refills    benazepril (LOTENSIN) 20 MG tablet 180 tablet 1     Sig: Take 1 tablet by mouth 2 times daily       Medication is on current medication list Yes    Dosage and directions were verified? Yes    Quantity verified: 30 day supply     Pharmacy Verified?  Yes    Last Appointment:  4/7/2025    Future appts:  Future Appointments   Date Time Provider Department Center   8/11/2025  1:00 PM Ty Sol MD [facility]   9/19/2025 11:30 AM Fer Ware DPM [facility]   12/8/2025  1:15 PM Christiano Escalera DO [facility]        (If no appt send self scheduling link. .REFILLAPPT)  Scheduling request sent?     [] Yes  [x] No    Does patient need updated?  [] Yes  [x] No

## 2025-08-11 ENCOUNTER — OFFICE VISIT (OUTPATIENT)
Dept: FAMILY MEDICINE CLINIC | Age: 70
End: 2025-08-11

## 2025-08-11 VITALS
HEIGHT: 72 IN | HEART RATE: 76 BPM | WEIGHT: 315 LBS | SYSTOLIC BLOOD PRESSURE: 122 MMHG | DIASTOLIC BLOOD PRESSURE: 86 MMHG | OXYGEN SATURATION: 98 % | BODY MASS INDEX: 42.66 KG/M2 | TEMPERATURE: 98.8 F

## 2025-08-11 DIAGNOSIS — R42 VERTIGO: ICD-10-CM

## 2025-08-11 DIAGNOSIS — G62.9 PERIPHERAL POLYNEUROPATHY: ICD-10-CM

## 2025-08-11 DIAGNOSIS — G47.33 OSA (OBSTRUCTIVE SLEEP APNEA): ICD-10-CM

## 2025-08-11 DIAGNOSIS — E11.69 TYPE 2 DIABETES MELLITUS WITH OTHER SPECIFIED COMPLICATION, WITH LONG-TERM CURRENT USE OF INSULIN (HCC): Primary | ICD-10-CM

## 2025-08-11 DIAGNOSIS — Z79.4 TYPE 2 DIABETES MELLITUS WITH OTHER SPECIFIED COMPLICATION, WITH LONG-TERM CURRENT USE OF INSULIN (HCC): Primary | ICD-10-CM

## 2025-08-11 LAB — HBA1C MFR BLD: 7.8 %

## 2025-08-18 DIAGNOSIS — Z79.4 TYPE 2 DIABETES MELLITUS WITH OTHER SPECIFIED COMPLICATION, WITH LONG-TERM CURRENT USE OF INSULIN (HCC): ICD-10-CM

## 2025-08-18 DIAGNOSIS — E11.69 TYPE 2 DIABETES MELLITUS WITH OTHER SPECIFIED COMPLICATION, WITH LONG-TERM CURRENT USE OF INSULIN (HCC): ICD-10-CM

## 2025-08-18 RX ORDER — GLIPIZIDE 10 MG/1
10 TABLET ORAL
Qty: 180 TABLET | Refills: 1 | Status: SHIPPED | OUTPATIENT
Start: 2025-08-18

## (undated) DEVICE — BANDAGE ADH W1XL3IN NAT FAB WVN FLX DURABLE N ADH PD SEAL

## (undated) DEVICE — 3M™ RED DOT™ MONITORING ELECTRODE WITH FOAM TAPE AND STICKY GEL 2560, 50/BAG, 20/CASE, 72/PLT: Brand: RED DOT™

## (undated) DEVICE — GLOVE ORANGE PI 7 1/2   MSG9075

## (undated) DEVICE — SYRINGE, LUER LOCK, 5ML: Brand: MEDLINE

## (undated) DEVICE — NON-DEHP CATHETER EXTENSION SET, MALE LUER LOCK ADAPTER

## (undated) DEVICE — 6 ML SYRINGE LUER-LOCK TIP: Brand: MONOJECT

## (undated) DEVICE — GAUZE,SPONGE,4"X4",8PLY,STRL,LF,10/TRAY: Brand: MEDLINE

## (undated) DEVICE — 12 ML SYRINGE,LUER-LOCK TIP: Brand: MONOJECT

## (undated) DEVICE — GAUZE,SPONGE,4"X4",12PLY,STERILE,LF,2'S: Brand: MEDLINE

## (undated) DEVICE — NEEDLE HYPO 18GA L1.5IN PNK POLYPR HUB S STL REG BVL STR

## (undated) DEVICE — NEEDLE HYPO 25GA L1.5IN BLU POLYPR HUB S STL REG BVL STR

## (undated) DEVICE — NEEDLE HYPO 18GA L1.5IN PNK POLYPR HUB S STL THN WALL FILL

## (undated) DEVICE — Device: Brand: PORTEX

## (undated) DEVICE — SPONGE GZ W4XL4IN RAYON POLY CVR W/NONWOVEN FAB STRL 2/PK

## (undated) DEVICE — Z DISCONTINUED APPLICATOR SURG PREP 0.35OZ 2% CHG 70% ISO ALC W/ HI LT

## (undated) DEVICE — SYRINGE MED 5ML STD CLR PLAS LUERLOCK TIP N CTRL DISP

## (undated) DEVICE — FORCEPS BX L240CM JAW DIA2.4MM ORNG L CAP W/ NDL DISP RAD

## (undated) DEVICE — GRADUATE TRIANG MEASURE 1000ML BLK PRNT